# Patient Record
Sex: MALE | Race: WHITE | NOT HISPANIC OR LATINO | Employment: PART TIME | URBAN - METROPOLITAN AREA
[De-identification: names, ages, dates, MRNs, and addresses within clinical notes are randomized per-mention and may not be internally consistent; named-entity substitution may affect disease eponyms.]

---

## 2021-04-14 ENCOUNTER — HOSPITAL ENCOUNTER (EMERGENCY)
Facility: HOSPITAL | Age: 61
Discharge: HOME | End: 2021-04-14
Attending: EMERGENCY MEDICINE

## 2021-04-14 VITALS
HEART RATE: 86 BPM | BODY MASS INDEX: 26.66 KG/M2 | HEIGHT: 69 IN | DIASTOLIC BLOOD PRESSURE: 85 MMHG | SYSTOLIC BLOOD PRESSURE: 175 MMHG | TEMPERATURE: 98 F | RESPIRATION RATE: 20 BRPM | WEIGHT: 180 LBS | OXYGEN SATURATION: 90 %

## 2021-04-14 DIAGNOSIS — B96.89 BACTERIAL CONJUNCTIVITIS OF BOTH EYES: Primary | ICD-10-CM

## 2021-04-14 DIAGNOSIS — H10.9 BACTERIAL CONJUNCTIVITIS OF BOTH EYES: Primary | ICD-10-CM

## 2021-04-14 PROCEDURE — 99283 EMERGENCY DEPT VISIT LOW MDM: CPT

## 2021-04-14 RX ORDER — ERYTHROMYCIN 5 MG/G
OINTMENT OPHTHALMIC
Qty: 1 TUBE | Refills: 0 | Status: SHIPPED | OUTPATIENT
Start: 2021-04-14 | End: 2022-10-07

## 2022-10-07 PROBLEM — I10 PRIMARY HYPERTENSION: Status: ACTIVE | Noted: 2022-10-07

## 2022-10-07 PROBLEM — I70.203 ATHEROSCLEROSIS OF ARTERY OF BOTH LOWER EXTREMITIES: Status: ACTIVE | Noted: 2022-10-07

## 2022-10-07 PROBLEM — I82.90 THROMBOSIS: Status: ACTIVE | Noted: 2022-10-07

## 2022-10-07 PROBLEM — Z72.0 TOBACCO ABUSE: Status: ACTIVE | Noted: 2022-10-07

## 2022-10-07 PROBLEM — I70.222 CRITICAL LIMB ISCHEMIA OF LEFT LOWER EXTREMITY: Status: ACTIVE | Noted: 2022-10-07

## 2022-10-12 ENCOUNTER — PATIENT OUTREACH (OUTPATIENT)
Dept: ADMINISTRATIVE | Facility: CLINIC | Age: 62
End: 2022-10-12
Payer: MEDICAID

## 2022-10-12 NOTE — PROGRESS NOTES
C3 nurse attempted to contact Eric Soliman  for a TCC post hospital discharge follow up call. No answer. The patient does not have a scheduled HOSFU appointment, and the pt does not have an [facility] PCP.

## 2022-10-13 NOTE — PROGRESS NOTES
C3 Nurse made second attempt to reach patient for TCC call. Number is unavailable at this time.

## 2022-10-24 ENCOUNTER — TELEPHONE (OUTPATIENT)
Dept: CARDIOLOGY | Facility: CLINIC | Age: 62
End: 2022-10-24
Payer: MEDICAID

## 2022-10-24 NOTE — TELEPHONE ENCOUNTER
Called pt x2 regarding setting up a follow up for a recent procedure, lvm with call back number and brief explanation of following up.

## 2023-01-01 ENCOUNTER — TELEPHONE (OUTPATIENT)
Dept: PULMONOLOGY | Facility: CLINIC | Age: 63
End: 2023-01-01
Payer: MEDICAID

## 2023-01-01 ENCOUNTER — CLINICAL SUPPORT (OUTPATIENT)
Dept: SMOKING CESSATION | Facility: CLINIC | Age: 63
End: 2023-01-01

## 2023-01-01 ENCOUNTER — ANESTHESIA (OUTPATIENT)
Dept: ENDOSCOPY | Facility: HOSPITAL | Age: 63
DRG: 180 | End: 2023-01-01
Payer: MEDICAID

## 2023-01-01 ENCOUNTER — CLINICAL SUPPORT (OUTPATIENT)
Dept: SMOKING CESSATION | Facility: CLINIC | Age: 63
End: 2023-01-01
Payer: MEDICAID

## 2023-01-01 ENCOUNTER — TELEPHONE (OUTPATIENT)
Dept: PRIMARY CARE CLINIC | Facility: CLINIC | Age: 63
End: 2023-01-01

## 2023-01-01 ENCOUNTER — PATIENT OUTREACH (OUTPATIENT)
Dept: EMERGENCY MEDICINE | Facility: HOSPITAL | Age: 63
End: 2023-01-01
Payer: MEDICAID

## 2023-01-01 ENCOUNTER — OFFICE VISIT (OUTPATIENT)
Dept: PRIMARY CARE CLINIC | Facility: CLINIC | Age: 63
End: 2023-01-01
Payer: MEDICAID

## 2023-01-01 ENCOUNTER — HOSPITAL ENCOUNTER (EMERGENCY)
Facility: HOSPITAL | Age: 63
Discharge: HOME | End: 2023-05-13
Attending: STUDENT IN AN ORGANIZED HEALTH CARE EDUCATION/TRAINING PROGRAM
Payer: MEDICAID

## 2023-01-01 ENCOUNTER — HOSPITAL ENCOUNTER (INPATIENT)
Facility: HOSPITAL | Age: 63
LOS: 2 days | DRG: 300 | End: 2023-12-05
Attending: EMERGENCY MEDICINE | Admitting: FAMILY MEDICINE
Payer: MEDICAID

## 2023-01-01 ENCOUNTER — TELEPHONE (OUTPATIENT)
Dept: PHARMACY | Facility: CLINIC | Age: 63
End: 2023-01-01
Payer: MEDICAID

## 2023-01-01 ENCOUNTER — HOSPITAL ENCOUNTER (INPATIENT)
Facility: HOSPITAL | Age: 63
LOS: 10 days | Discharge: HOME | DRG: 180 | End: 2023-11-17
Attending: EMERGENCY MEDICINE | Admitting: HOSPITALIST
Payer: MEDICAID

## 2023-01-01 ENCOUNTER — ANESTHESIA EVENT (OUTPATIENT)
Dept: ENDOSCOPY | Facility: HOSPITAL | Age: 63
DRG: 180 | End: 2023-01-01
Payer: MEDICAID

## 2023-01-01 ENCOUNTER — DOCUMENTATION ONLY (OUTPATIENT)
Dept: PULMONOLOGY | Facility: OTHER | Age: 63
End: 2023-01-01

## 2023-01-01 ENCOUNTER — HOSPITAL ENCOUNTER (EMERGENCY)
Facility: HOSPITAL | Age: 63
Discharge: HOME | End: 2023-04-25
Attending: EMERGENCY MEDICINE
Payer: MEDICAID

## 2023-01-01 ENCOUNTER — HOSPITAL ENCOUNTER (INPATIENT)
Facility: HOSPITAL | Age: 63
LOS: 3 days | Discharge: HOME | DRG: 271 | End: 2023-04-06
Attending: EMERGENCY MEDICINE | Admitting: FAMILY MEDICINE
Payer: MEDICAID

## 2023-01-01 VITALS
RESPIRATION RATE: 20 BRPM | SYSTOLIC BLOOD PRESSURE: 156 MMHG | TEMPERATURE: 98 F | WEIGHT: 151 LBS | HEART RATE: 76 BPM | OXYGEN SATURATION: 96 % | BODY MASS INDEX: 22.3 KG/M2 | DIASTOLIC BLOOD PRESSURE: 88 MMHG

## 2023-01-01 VITALS
RESPIRATION RATE: 18 BRPM | TEMPERATURE: 99 F | OXYGEN SATURATION: 79 % | SYSTOLIC BLOOD PRESSURE: 116 MMHG | DIASTOLIC BLOOD PRESSURE: 67 MMHG | HEART RATE: 139 BPM

## 2023-01-01 VITALS
SYSTOLIC BLOOD PRESSURE: 122 MMHG | HEIGHT: 69 IN | TEMPERATURE: 97 F | WEIGHT: 315 LBS | OXYGEN SATURATION: 93 % | DIASTOLIC BLOOD PRESSURE: 61 MMHG | BODY MASS INDEX: 46.65 KG/M2 | RESPIRATION RATE: 18 BRPM | HEART RATE: 80 BPM

## 2023-01-01 VITALS
BODY MASS INDEX: 22.37 KG/M2 | HEART RATE: 83 BPM | OXYGEN SATURATION: 94 % | DIASTOLIC BLOOD PRESSURE: 83 MMHG | WEIGHT: 151.44 LBS | SYSTOLIC BLOOD PRESSURE: 157 MMHG

## 2023-01-01 VITALS
WEIGHT: 133.19 LBS | DIASTOLIC BLOOD PRESSURE: 67 MMHG | HEIGHT: 69 IN | HEART RATE: 110 BPM | TEMPERATURE: 98 F | BODY MASS INDEX: 19.73 KG/M2 | OXYGEN SATURATION: 95 % | RESPIRATION RATE: 16 BRPM | SYSTOLIC BLOOD PRESSURE: 148 MMHG

## 2023-01-01 VITALS
RESPIRATION RATE: 16 BRPM | TEMPERATURE: 98 F | BODY MASS INDEX: 21.41 KG/M2 | HEART RATE: 85 BPM | SYSTOLIC BLOOD PRESSURE: 149 MMHG | OXYGEN SATURATION: 95 % | DIASTOLIC BLOOD PRESSURE: 79 MMHG | WEIGHT: 145 LBS

## 2023-01-01 DIAGNOSIS — I99.8 ACUTE LOWER LIMB ISCHEMIA: Primary | ICD-10-CM

## 2023-01-01 DIAGNOSIS — F17.210 CIGARETTE SMOKER: Primary | ICD-10-CM

## 2023-01-01 DIAGNOSIS — I73.9 PERIPHERAL ARTERIAL DISEASE: ICD-10-CM

## 2023-01-01 DIAGNOSIS — E44.1 MILD MALNUTRITION: ICD-10-CM

## 2023-01-01 DIAGNOSIS — J90 PLEURAL EFFUSION: ICD-10-CM

## 2023-01-01 DIAGNOSIS — K92.1 HEMATOCHEZIA: ICD-10-CM

## 2023-01-01 DIAGNOSIS — L03.116 CELLULITIS OF LEFT LOWER EXTREMITY: Primary | ICD-10-CM

## 2023-01-01 DIAGNOSIS — A41.9 SEPSIS: ICD-10-CM

## 2023-01-01 DIAGNOSIS — I70.222 CRITICAL LIMB ISCHEMIA OF LEFT LOWER EXTREMITY: ICD-10-CM

## 2023-01-01 DIAGNOSIS — R07.9 CHEST PAIN: ICD-10-CM

## 2023-01-01 DIAGNOSIS — B02.9 HERPES ZOSTER WITHOUT COMPLICATION: ICD-10-CM

## 2023-01-01 DIAGNOSIS — I10 PRIMARY HYPERTENSION: ICD-10-CM

## 2023-01-01 DIAGNOSIS — R06.02 SHORTNESS OF BREATH: ICD-10-CM

## 2023-01-01 DIAGNOSIS — R06.02 SOB (SHORTNESS OF BREATH): ICD-10-CM

## 2023-01-01 DIAGNOSIS — I26.99 PULMONARY EMBOLISM, UNSPECIFIED CHRONICITY, UNSPECIFIED PULMONARY EMBOLISM TYPE, UNSPECIFIED WHETHER ACUTE COR PULMONALE PRESENT: Primary | ICD-10-CM

## 2023-01-01 DIAGNOSIS — I70.223 CRITICAL LIMB ISCHEMIA OF BOTH LOWER EXTREMITIES: Primary | ICD-10-CM

## 2023-01-01 DIAGNOSIS — B02.8 HERPES ZOSTER WITH OTHER COMPLICATION: ICD-10-CM

## 2023-01-01 DIAGNOSIS — J93.12 SECONDARY SPONTANEOUS PNEUMOTHORAX: ICD-10-CM

## 2023-01-01 DIAGNOSIS — R91.8 LUNG MASS: ICD-10-CM

## 2023-01-01 DIAGNOSIS — E87.1 HYPONATREMIA: Primary | ICD-10-CM

## 2023-01-01 DIAGNOSIS — I73.9 PAD (PERIPHERAL ARTERY DISEASE): ICD-10-CM

## 2023-01-01 DIAGNOSIS — Z98.62 STATUS POST ANGIOPLASTY: ICD-10-CM

## 2023-01-01 DIAGNOSIS — Z72.0 TOBACCO ABUSE: ICD-10-CM

## 2023-01-01 DIAGNOSIS — F17.200 NEEDS SMOKING CESSATION EDUCATION: ICD-10-CM

## 2023-01-01 DIAGNOSIS — K62.5 RECTAL BLEEDING: ICD-10-CM

## 2023-01-01 DIAGNOSIS — F17.200 TOBACCO DEPENDENCY: ICD-10-CM

## 2023-01-01 DIAGNOSIS — I70.223 CRITICAL LIMB ISCHEMIA OF BOTH LOWER EXTREMITIES: ICD-10-CM

## 2023-01-01 DIAGNOSIS — C79.9 METASTATIC ADENOCARCINOMA: Primary | ICD-10-CM

## 2023-01-01 DIAGNOSIS — I82.90 THROMBOSIS: ICD-10-CM

## 2023-01-01 DIAGNOSIS — I20.0 UNSTABLE ANGINA: ICD-10-CM

## 2023-01-01 DIAGNOSIS — R53.1 WEAKNESS: Primary | ICD-10-CM

## 2023-01-01 LAB
ABO + RH BLD: NORMAL
ALBUMIN SERPL BCP-MCNC: 2.1 G/DL (ref 3.5–5.2)
ALBUMIN SERPL BCP-MCNC: 2.2 G/DL (ref 3.5–5.2)
ALBUMIN SERPL BCP-MCNC: 2.2 G/DL (ref 3.5–5.2)
ALBUMIN SERPL BCP-MCNC: 2.3 G/DL (ref 3.5–5.2)
ALBUMIN SERPL BCP-MCNC: 2.4 G/DL (ref 3.5–5.2)
ALBUMIN SERPL BCP-MCNC: 2.5 G/DL (ref 3.5–5.2)
ALBUMIN SERPL BCP-MCNC: 3.4 G/DL (ref 3.5–5.2)
ALBUMIN SERPL BCP-MCNC: 3.6 G/DL (ref 3.5–5.2)
ALBUMIN SERPL BCP-MCNC: 3.6 G/DL (ref 3.5–5.2)
ALLENS TEST: ABNORMAL
ALLENS TEST: ABNORMAL
ALLENS TEST: NORMAL
ALP SERPL-CCNC: 100 U/L (ref 55–135)
ALP SERPL-CCNC: 105 U/L (ref 55–135)
ALP SERPL-CCNC: 106 U/L (ref 55–135)
ALP SERPL-CCNC: 110 U/L (ref 55–135)
ALP SERPL-CCNC: 155 U/L (ref 55–135)
ALP SERPL-CCNC: 60 U/L (ref 55–135)
ALP SERPL-CCNC: 63 U/L (ref 55–135)
ALP SERPL-CCNC: 66 U/L (ref 55–135)
ALP SERPL-CCNC: 71 U/L (ref 55–135)
ALP SERPL-CCNC: 73 U/L (ref 55–135)
ALP SERPL-CCNC: 76 U/L (ref 55–135)
ALP SERPL-CCNC: 77 U/L (ref 55–135)
ALP SERPL-CCNC: 81 U/L (ref 55–135)
ALP SERPL-CCNC: 82 U/L (ref 55–135)
ALP SERPL-CCNC: 90 U/L (ref 55–135)
ALT SERPL W/O P-5'-P-CCNC: 10 U/L (ref 10–44)
ALT SERPL W/O P-5'-P-CCNC: 10 U/L (ref 10–44)
ALT SERPL W/O P-5'-P-CCNC: 23 U/L (ref 10–44)
ALT SERPL W/O P-5'-P-CCNC: 24 U/L (ref 10–44)
ALT SERPL W/O P-5'-P-CCNC: 32 U/L (ref 10–44)
ALT SERPL W/O P-5'-P-CCNC: 33 U/L (ref 10–44)
ALT SERPL W/O P-5'-P-CCNC: 38 U/L (ref 10–44)
ALT SERPL W/O P-5'-P-CCNC: 41 U/L (ref 10–44)
ALT SERPL W/O P-5'-P-CCNC: 42 U/L (ref 10–44)
ALT SERPL W/O P-5'-P-CCNC: 44 U/L (ref 10–44)
ALT SERPL W/O P-5'-P-CCNC: 54 U/L (ref 10–44)
ALT SERPL W/O P-5'-P-CCNC: 62 U/L (ref 10–44)
ALT SERPL W/O P-5'-P-CCNC: 7 U/L (ref 10–44)
ALT SERPL W/O P-5'-P-CCNC: 82 U/L (ref 10–44)
ALT SERPL W/O P-5'-P-CCNC: 94 U/L (ref 10–44)
ANION GAP SERPL CALC-SCNC: 10 MMOL/L (ref 8–16)
ANION GAP SERPL CALC-SCNC: 10 MMOL/L (ref 8–16)
ANION GAP SERPL CALC-SCNC: 11 MMOL/L (ref 8–16)
ANION GAP SERPL CALC-SCNC: 11 MMOL/L (ref 8–16)
ANION GAP SERPL CALC-SCNC: 14 MMOL/L (ref 8–16)
ANION GAP SERPL CALC-SCNC: 6 MMOL/L (ref 8–16)
ANION GAP SERPL CALC-SCNC: 7 MMOL/L (ref 8–16)
ANION GAP SERPL CALC-SCNC: 8 MMOL/L (ref 8–16)
ANION GAP SERPL CALC-SCNC: 9 MMOL/L (ref 8–16)
ANION GAP SERPL CALC-SCNC: 9 MMOL/L (ref 8–16)
APPEARANCE FLD: NORMAL
APTT PPP: 136.9 SEC (ref 21–32)
APTT PPP: 25.3 SEC (ref 21–32)
APTT PPP: 26.3 SEC (ref 21–32)
APTT PPP: 27.6 SEC (ref 21–32)
APTT PPP: 28.3 SEC (ref 21–32)
APTT PPP: 28.5 SEC (ref 21–32)
APTT PPP: 35.6 SEC (ref 21–32)
APTT PPP: 40 SEC (ref 21–32)
APTT PPP: 40.4 SEC (ref 21–32)
APTT PPP: 40.6 SEC (ref 21–32)
APTT PPP: 40.6 SEC (ref 21–32)
APTT PPP: 41.7 SEC (ref 21–32)
APTT PPP: 42.3 SEC (ref 21–32)
APTT PPP: 46.7 SEC (ref 21–32)
APTT PPP: 48.3 SEC (ref 21–32)
ASCENDING AORTA: 3.04 CM
AST SERPL-CCNC: 11 U/L (ref 10–40)
AST SERPL-CCNC: 12 U/L (ref 10–40)
AST SERPL-CCNC: 14 U/L (ref 10–40)
AST SERPL-CCNC: 15 U/L (ref 10–40)
AST SERPL-CCNC: 18 U/L (ref 10–40)
AST SERPL-CCNC: 24 U/L (ref 10–40)
AST SERPL-CCNC: 28 U/L (ref 10–40)
AST SERPL-CCNC: 33 U/L (ref 10–40)
AST SERPL-CCNC: 34 U/L (ref 10–40)
AST SERPL-CCNC: 34 U/L (ref 10–40)
AST SERPL-CCNC: 41 U/L (ref 10–40)
AST SERPL-CCNC: 43 U/L (ref 10–40)
AST SERPL-CCNC: 78 U/L (ref 10–40)
AST SERPL-CCNC: 83 U/L (ref 10–40)
AST SERPL-CCNC: 85 U/L (ref 10–40)
AV INDEX (PROSTH): 0.76
AV MEAN GRADIENT: 2 MMHG
AV PEAK GRADIENT: 3 MMHG
AV VALVE AREA BY VELOCITY RATIO: 3.6 CM²
AV VALVE AREA: 3.1 CM²
AV VELOCITY RATIO: 0.88
BACTERIA FLD AEROBE CULT: NO GROWTH
BASOPHILS # BLD AUTO: 0.02 K/UL (ref 0–0.2)
BASOPHILS # BLD AUTO: 0.02 K/UL (ref 0–0.2)
BASOPHILS # BLD AUTO: 0.03 K/UL (ref 0–0.2)
BASOPHILS # BLD AUTO: 0.04 K/UL (ref 0–0.2)
BASOPHILS # BLD AUTO: 0.05 K/UL (ref 0–0.2)
BASOPHILS # BLD AUTO: 0.05 K/UL (ref 0–0.2)
BASOPHILS # BLD AUTO: 0.06 K/UL (ref 0–0.2)
BASOPHILS # BLD AUTO: 0.08 K/UL (ref 0–0.2)
BASOPHILS NFR BLD: 0.2 % (ref 0–1.9)
BASOPHILS NFR BLD: 0.3 % (ref 0–1.9)
BASOPHILS NFR BLD: 0.4 % (ref 0–1.9)
BASOPHILS NFR BLD: 0.5 % (ref 0–1.9)
BASOPHILS NFR BLD: 0.6 % (ref 0–1.9)
BASOPHILS NFR BLD: 0.7 % (ref 0–1.9)
BASOPHILS NFR BLD: 0.7 % (ref 0–1.9)
BASOPHILS NFR BLD: 0.8 % (ref 0–1.9)
BASOPHILS NFR BLD: 1 % (ref 0–1.9)
BASOPHILS NFR BLD: 1 % (ref 0–1.9)
BILIRUB SERPL-MCNC: 0.1 MG/DL (ref 0.1–1)
BILIRUB SERPL-MCNC: 0.2 MG/DL (ref 0.1–1)
BILIRUB SERPL-MCNC: 0.2 MG/DL (ref 0.1–1)
BILIRUB SERPL-MCNC: 0.3 MG/DL (ref 0.1–1)
BILIRUB SERPL-MCNC: 0.3 MG/DL (ref 0.1–1)
BILIRUB SERPL-MCNC: 0.4 MG/DL (ref 0.1–1)
BILIRUB SERPL-MCNC: 0.5 MG/DL (ref 0.1–1)
BILIRUB SERPL-MCNC: 0.7 MG/DL (ref 0.1–1)
BILIRUB SERPL-MCNC: 1.5 MG/DL (ref 0.1–1)
BILIRUB UR QL STRIP: NEGATIVE
BILIRUB UR QL STRIP: NEGATIVE
BLD GP AB SCN CELLS X3 SERPL QL: NORMAL
BLD PROD TYP BPU: NORMAL
BLOOD UNIT EXPIRATION DATE: NORMAL
BLOOD UNIT TYPE CODE: 6200
BLOOD UNIT TYPE: NORMAL
BNP SERPL-MCNC: 74 PG/ML (ref 0–99)
BODY FLD TYPE: NORMAL
BODY FLUID SOURCE, LDH: NORMAL
BSA FOR ECHO PROCEDURE: 1.83 M2
BUN SERPL-MCNC: 11 MG/DL (ref 6–30)
BUN SERPL-MCNC: 11 MG/DL (ref 8–23)
BUN SERPL-MCNC: 12 MG/DL (ref 8–23)
BUN SERPL-MCNC: 13 MG/DL (ref 8–23)
BUN SERPL-MCNC: 14 MG/DL (ref 8–23)
BUN SERPL-MCNC: 15 MG/DL (ref 8–23)
BUN SERPL-MCNC: 15 MG/DL (ref 8–23)
BUN SERPL-MCNC: 17 MG/DL (ref 8–23)
BUN SERPL-MCNC: 4 MG/DL (ref 8–23)
BUN SERPL-MCNC: 9 MG/DL (ref 8–23)
BUN SERPL-MCNC: 9 MG/DL (ref 8–23)
CALCIUM SERPL-MCNC: 8.1 MG/DL (ref 8.7–10.5)
CALCIUM SERPL-MCNC: 8.2 MG/DL (ref 8.7–10.5)
CALCIUM SERPL-MCNC: 8.3 MG/DL (ref 8.7–10.5)
CALCIUM SERPL-MCNC: 8.4 MG/DL (ref 8.7–10.5)
CALCIUM SERPL-MCNC: 8.4 MG/DL (ref 8.7–10.5)
CALCIUM SERPL-MCNC: 8.5 MG/DL (ref 8.7–10.5)
CALCIUM SERPL-MCNC: 8.5 MG/DL (ref 8.7–10.5)
CALCIUM SERPL-MCNC: 8.7 MG/DL (ref 8.7–10.5)
CALCIUM SERPL-MCNC: 8.7 MG/DL (ref 8.7–10.5)
CALCIUM SERPL-MCNC: 8.8 MG/DL (ref 8.7–10.5)
CALCIUM SERPL-MCNC: 8.9 MG/DL (ref 8.7–10.5)
CALCIUM SERPL-MCNC: 9.6 MG/DL (ref 8.7–10.5)
CHLORIDE SERPL-SCNC: 102 MMOL/L (ref 95–110)
CHLORIDE SERPL-SCNC: 103 MMOL/L (ref 95–110)
CHLORIDE SERPL-SCNC: 103 MMOL/L (ref 95–110)
CHLORIDE SERPL-SCNC: 88 MMOL/L (ref 95–110)
CHLORIDE SERPL-SCNC: 89 MMOL/L (ref 95–110)
CHLORIDE SERPL-SCNC: 90 MMOL/L (ref 95–110)
CHLORIDE SERPL-SCNC: 94 MMOL/L (ref 95–110)
CHLORIDE SERPL-SCNC: 95 MMOL/L (ref 95–110)
CHLORIDE SERPL-SCNC: 96 MMOL/L (ref 95–110)
CHLORIDE SERPL-SCNC: 97 MMOL/L (ref 95–110)
CHLORIDE SERPL-SCNC: 97 MMOL/L (ref 95–110)
CHLORIDE SERPL-SCNC: 98 MMOL/L (ref 95–110)
CHLORIDE SERPL-SCNC: 99 MMOL/L (ref 95–110)
CHOLEST FLD-MCNC: 129 MG/DL
CHOLEST SERPL-MCNC: 131 MG/DL (ref 120–199)
CHOLEST/HDLC SERPL: 5.2 {RATIO} (ref 2–5)
CLARITY UR REFRACT.AUTO: CLEAR
CLARITY UR: CLEAR
CO2 SERPL-SCNC: 16 MMOL/L (ref 23–29)
CO2 SERPL-SCNC: 17 MMOL/L (ref 23–29)
CO2 SERPL-SCNC: 20 MMOL/L (ref 23–29)
CO2 SERPL-SCNC: 20 MMOL/L (ref 23–29)
CO2 SERPL-SCNC: 21 MMOL/L (ref 23–29)
CO2 SERPL-SCNC: 22 MMOL/L (ref 23–29)
CO2 SERPL-SCNC: 23 MMOL/L (ref 23–29)
CO2 SERPL-SCNC: 24 MMOL/L (ref 23–29)
CO2 SERPL-SCNC: 25 MMOL/L (ref 23–29)
CO2 SERPL-SCNC: 25 MMOL/L (ref 23–29)
CO2 SERPL-SCNC: 28 MMOL/L (ref 23–29)
CODING SYSTEM: NORMAL
COLOR FLD: YELLOW
COLOR UR AUTO: YELLOW
COLOR UR: YELLOW
CREAT SERPL-MCNC: 0.6 MG/DL (ref 0.5–1.4)
CREAT SERPL-MCNC: 0.7 MG/DL (ref 0.5–1.4)
CREAT SERPL-MCNC: 0.8 MG/DL (ref 0.5–1.4)
CREAT SERPL-MCNC: 0.9 MG/DL (ref 0.5–1.4)
CREAT SERPL-MCNC: 1 MG/DL (ref 0.5–1.4)
CREAT SERPL-MCNC: 1 MG/DL (ref 0.5–1.4)
CREAT SERPL-MCNC: 1.4 MG/DL (ref 0.5–1.4)
CROSSMATCH INTERPRETATION: NORMAL
DELSYS: ABNORMAL
DIFFERENTIAL METHOD: ABNORMAL
DISPENSE STATUS: NORMAL
DOP CALC AO PEAK VEL: 0.93 M/S
DOP CALC AO VTI: 20.8 CM
DOP CALC LVOT AREA: 4.1 CM2
DOP CALC LVOT DIAMETER: 2.28 CM
DOP CALC LVOT PEAK VEL: 0.82 M/S
DOP CALC LVOT STROKE VOLUME: 64.48 CM3
DOP CALC MV VTI: 13.7 CM
DOP CALCLVOT PEAK VEL VTI: 15.8 CM
E WAVE DECELERATION TIME: 194.14 MSEC
E/A RATIO: 0.42
E/E' RATIO: 10.86 M/S
EOSINOPHIL # BLD AUTO: 0 K/UL (ref 0–0.5)
EOSINOPHIL # BLD AUTO: 0 K/UL (ref 0–0.5)
EOSINOPHIL # BLD AUTO: 0.1 K/UL (ref 0–0.5)
EOSINOPHIL # BLD AUTO: 0.2 K/UL (ref 0–0.5)
EOSINOPHIL # BLD AUTO: 0.3 K/UL (ref 0–0.5)
EOSINOPHIL NFR BLD: 0.2 % (ref 0–8)
EOSINOPHIL NFR BLD: 0.3 % (ref 0–8)
EOSINOPHIL NFR BLD: 0.6 % (ref 0–8)
EOSINOPHIL NFR BLD: 0.6 % (ref 0–8)
EOSINOPHIL NFR BLD: 1 % (ref 0–8)
EOSINOPHIL NFR BLD: 1 % (ref 0–8)
EOSINOPHIL NFR BLD: 1.3 % (ref 0–8)
EOSINOPHIL NFR BLD: 1.5 % (ref 0–8)
EOSINOPHIL NFR BLD: 1.7 % (ref 0–8)
EOSINOPHIL NFR BLD: 1.8 % (ref 0–8)
EOSINOPHIL NFR BLD: 2.1 % (ref 0–8)
EOSINOPHIL NFR BLD: 2.4 % (ref 0–8)
EOSINOPHIL NFR BLD: 2.5 % (ref 0–8)
EOSINOPHIL NFR BLD: 2.7 % (ref 0–8)
EOSINOPHIL NFR BLD: 2.8 % (ref 0–8)
EOSINOPHIL NFR BLD: 3 % (ref 0–8)
EOSINOPHIL NFR BLD: 3.9 % (ref 0–8)
EOSINOPHIL NFR FLD MANUAL: 12 %
ERYTHROCYTE [DISTWIDTH] IN BLOOD BY AUTOMATED COUNT: 14.2 % (ref 11.5–14.5)
ERYTHROCYTE [DISTWIDTH] IN BLOOD BY AUTOMATED COUNT: 14.7 % (ref 11.5–14.5)
ERYTHROCYTE [DISTWIDTH] IN BLOOD BY AUTOMATED COUNT: 15.1 % (ref 11.5–14.5)
ERYTHROCYTE [DISTWIDTH] IN BLOOD BY AUTOMATED COUNT: 15.3 % (ref 11.5–14.5)
ERYTHROCYTE [DISTWIDTH] IN BLOOD BY AUTOMATED COUNT: 15.4 % (ref 11.5–14.5)
ERYTHROCYTE [DISTWIDTH] IN BLOOD BY AUTOMATED COUNT: 15.6 % (ref 11.5–14.5)
ERYTHROCYTE [DISTWIDTH] IN BLOOD BY AUTOMATED COUNT: 15.7 % (ref 11.5–14.5)
ERYTHROCYTE [DISTWIDTH] IN BLOOD BY AUTOMATED COUNT: 17.2 % (ref 11.5–14.5)
ERYTHROCYTE [DISTWIDTH] IN BLOOD BY AUTOMATED COUNT: 18.3 % (ref 11.5–14.5)
ERYTHROCYTE [DISTWIDTH] IN BLOOD BY AUTOMATED COUNT: 19.2 % (ref 11.5–14.5)
ERYTHROCYTE [DISTWIDTH] IN BLOOD BY AUTOMATED COUNT: 19.4 % (ref 11.5–14.5)
ERYTHROCYTE [DISTWIDTH] IN BLOOD BY AUTOMATED COUNT: 20.5 % (ref 11.5–14.5)
ERYTHROCYTE [DISTWIDTH] IN BLOOD BY AUTOMATED COUNT: 21 % (ref 11.5–14.5)
ERYTHROCYTE [DISTWIDTH] IN BLOOD BY AUTOMATED COUNT: 21.2 % (ref 11.5–14.5)
EST. GFR  (NO RACE VARIABLE): 57 ML/MIN/1.73 M^2
EST. GFR  (NO RACE VARIABLE): >60 ML/MIN/1.73 M^2
FINAL PATHOLOGIC DIAGNOSIS: ABNORMAL
FINAL PATHOLOGIC DIAGNOSIS: ABNORMAL
FIO2: 21
GLUCOSE FLD-MCNC: <5 MG/DL
GLUCOSE SERPL-MCNC: 102 MG/DL (ref 70–110)
GLUCOSE SERPL-MCNC: 103 MG/DL (ref 70–110)
GLUCOSE SERPL-MCNC: 105 MG/DL (ref 70–110)
GLUCOSE SERPL-MCNC: 106 MG/DL (ref 70–110)
GLUCOSE SERPL-MCNC: 107 MG/DL (ref 70–110)
GLUCOSE SERPL-MCNC: 110 MG/DL (ref 70–110)
GLUCOSE SERPL-MCNC: 75 MG/DL (ref 70–110)
GLUCOSE SERPL-MCNC: 79 MG/DL (ref 70–110)
GLUCOSE SERPL-MCNC: 83 MG/DL (ref 70–110)
GLUCOSE SERPL-MCNC: 86 MG/DL (ref 70–110)
GLUCOSE SERPL-MCNC: 88 MG/DL (ref 70–110)
GLUCOSE SERPL-MCNC: 89 MG/DL (ref 70–110)
GLUCOSE SERPL-MCNC: 92 MG/DL (ref 70–110)
GLUCOSE SERPL-MCNC: 92 MG/DL (ref 70–110)
GLUCOSE SERPL-MCNC: 93 MG/DL (ref 70–110)
GLUCOSE SERPL-MCNC: 94 MG/DL (ref 70–110)
GLUCOSE SERPL-MCNC: 95 MG/DL (ref 70–110)
GLUCOSE SERPL-MCNC: 95 MG/DL (ref 70–110)
GLUCOSE SERPL-MCNC: 97 MG/DL (ref 70–110)
GLUCOSE UR QL STRIP: NEGATIVE
GLUCOSE UR QL STRIP: NEGATIVE
GRAM STN SPEC: NORMAL
GRAM STN SPEC: NORMAL
HCO3 UR-SCNC: 22.3 MMOL/L (ref 24–28)
HCT VFR BLD AUTO: 20.4 % (ref 40–54)
HCT VFR BLD AUTO: 22.5 % (ref 40–54)
HCT VFR BLD AUTO: 23.1 % (ref 40–54)
HCT VFR BLD AUTO: 23.5 % (ref 40–54)
HCT VFR BLD AUTO: 24.6 % (ref 40–54)
HCT VFR BLD AUTO: 26.1 % (ref 40–54)
HCT VFR BLD AUTO: 26.8 % (ref 40–54)
HCT VFR BLD AUTO: 26.8 % (ref 40–54)
HCT VFR BLD AUTO: 27.1 % (ref 40–54)
HCT VFR BLD AUTO: 28.1 % (ref 40–54)
HCT VFR BLD AUTO: 28.5 % (ref 40–54)
HCT VFR BLD AUTO: 28.6 % (ref 40–54)
HCT VFR BLD AUTO: 28.6 % (ref 40–54)
HCT VFR BLD AUTO: 28.7 % (ref 40–54)
HCT VFR BLD AUTO: 28.9 % (ref 40–54)
HCT VFR BLD AUTO: 28.9 % (ref 40–54)
HCT VFR BLD AUTO: 29.2 % (ref 40–54)
HCT VFR BLD AUTO: 29.3 % (ref 40–54)
HCT VFR BLD AUTO: 29.7 % (ref 40–54)
HCT VFR BLD AUTO: 29.7 % (ref 40–54)
HCT VFR BLD AUTO: 30 % (ref 40–54)
HCT VFR BLD AUTO: 30.2 % (ref 40–54)
HCT VFR BLD AUTO: 30.4 % (ref 40–54)
HCT VFR BLD AUTO: 30.4 % (ref 40–54)
HCT VFR BLD AUTO: 31.5 % (ref 40–54)
HCT VFR BLD AUTO: 32.7 % (ref 40–54)
HCT VFR BLD AUTO: 35.4 % (ref 40–54)
HCT VFR BLD AUTO: 36.3 % (ref 40–54)
HCT VFR BLD AUTO: 36.3 % (ref 40–54)
HCT VFR BLD AUTO: 37.8 % (ref 40–54)
HCT VFR BLD AUTO: 38.6 % (ref 40–54)
HCT VFR BLD AUTO: 38.7 % (ref 40–54)
HCT VFR BLD AUTO: 40.2 % (ref 40–54)
HCT VFR BLD CALC: 25 %PCV (ref 36–54)
HDLC SERPL-MCNC: 25 MG/DL (ref 40–75)
HDLC SERPL: 19.1 % (ref 20–50)
HGB BLD-MCNC: 10 G/DL (ref 14–18)
HGB BLD-MCNC: 10 G/DL (ref 14–18)
HGB BLD-MCNC: 10.2 G/DL (ref 14–18)
HGB BLD-MCNC: 10.4 G/DL (ref 14–18)
HGB BLD-MCNC: 10.8 G/DL (ref 14–18)
HGB BLD-MCNC: 11.9 G/DL (ref 14–18)
HGB BLD-MCNC: 12.1 G/DL (ref 14–18)
HGB BLD-MCNC: 12.1 G/DL (ref 14–18)
HGB BLD-MCNC: 12.3 G/DL (ref 14–18)
HGB BLD-MCNC: 12.6 G/DL (ref 14–18)
HGB BLD-MCNC: 12.6 G/DL (ref 14–18)
HGB BLD-MCNC: 12.8 G/DL (ref 14–18)
HGB BLD-MCNC: 12.9 G/DL (ref 14–18)
HGB BLD-MCNC: 13.1 G/DL (ref 14–18)
HGB BLD-MCNC: 6.6 G/DL (ref 14–18)
HGB BLD-MCNC: 7.1 G/DL (ref 14–18)
HGB BLD-MCNC: 7.3 G/DL (ref 14–18)
HGB BLD-MCNC: 7.5 G/DL (ref 14–18)
HGB BLD-MCNC: 8.2 G/DL (ref 14–18)
HGB BLD-MCNC: 8.7 G/DL (ref 14–18)
HGB BLD-MCNC: 8.7 G/DL (ref 14–18)
HGB BLD-MCNC: 8.8 G/DL (ref 14–18)
HGB BLD-MCNC: 8.9 G/DL (ref 14–18)
HGB BLD-MCNC: 9.1 G/DL (ref 14–18)
HGB BLD-MCNC: 9.1 G/DL (ref 14–18)
HGB BLD-MCNC: 9.2 G/DL (ref 14–18)
HGB BLD-MCNC: 9.5 G/DL (ref 14–18)
HGB BLD-MCNC: 9.6 G/DL (ref 14–18)
HGB BLD-MCNC: 9.6 G/DL (ref 14–18)
HGB BLD-MCNC: 9.7 G/DL (ref 14–18)
HGB BLD-MCNC: 9.9 G/DL (ref 14–18)
HGB UR QL STRIP: NEGATIVE
HGB UR QL STRIP: NEGATIVE
IMM GRANULOCYTES # BLD AUTO: 0.01 K/UL (ref 0–0.04)
IMM GRANULOCYTES # BLD AUTO: 0.02 K/UL (ref 0–0.04)
IMM GRANULOCYTES # BLD AUTO: 0.05 K/UL (ref 0–0.04)
IMM GRANULOCYTES # BLD AUTO: 0.07 K/UL (ref 0–0.04)
IMM GRANULOCYTES # BLD AUTO: 0.08 K/UL (ref 0–0.04)
IMM GRANULOCYTES # BLD AUTO: 0.09 K/UL (ref 0–0.04)
IMM GRANULOCYTES # BLD AUTO: 0.1 K/UL (ref 0–0.04)
IMM GRANULOCYTES # BLD AUTO: 0.1 K/UL (ref 0–0.04)
IMM GRANULOCYTES # BLD AUTO: 0.11 K/UL (ref 0–0.04)
IMM GRANULOCYTES # BLD AUTO: 0.12 K/UL (ref 0–0.04)
IMM GRANULOCYTES # BLD AUTO: 0.13 K/UL (ref 0–0.04)
IMM GRANULOCYTES # BLD AUTO: 0.13 K/UL (ref 0–0.04)
IMM GRANULOCYTES # BLD AUTO: 0.19 K/UL (ref 0–0.04)
IMM GRANULOCYTES # BLD AUTO: 0.21 K/UL (ref 0–0.04)
IMM GRANULOCYTES # BLD AUTO: 0.21 K/UL (ref 0–0.04)
IMM GRANULOCYTES # BLD AUTO: 0.23 K/UL (ref 0–0.04)
IMM GRANULOCYTES # BLD AUTO: 0.24 K/UL (ref 0–0.04)
IMM GRANULOCYTES # BLD AUTO: 0.26 K/UL (ref 0–0.04)
IMM GRANULOCYTES # BLD AUTO: 0.26 K/UL (ref 0–0.04)
IMM GRANULOCYTES # BLD AUTO: 0.31 K/UL (ref 0–0.04)
IMM GRANULOCYTES NFR BLD AUTO: 0.2 % (ref 0–0.5)
IMM GRANULOCYTES NFR BLD AUTO: 0.4 % (ref 0–0.5)
IMM GRANULOCYTES NFR BLD AUTO: 0.6 % (ref 0–0.5)
IMM GRANULOCYTES NFR BLD AUTO: 0.8 % (ref 0–0.5)
IMM GRANULOCYTES NFR BLD AUTO: 0.9 % (ref 0–0.5)
IMM GRANULOCYTES NFR BLD AUTO: 1.1 % (ref 0–0.5)
IMM GRANULOCYTES NFR BLD AUTO: 1.4 % (ref 0–0.5)
IMM GRANULOCYTES NFR BLD AUTO: 1.4 % (ref 0–0.5)
IMM GRANULOCYTES NFR BLD AUTO: 1.5 % (ref 0–0.5)
IMM GRANULOCYTES NFR BLD AUTO: 1.6 % (ref 0–0.5)
IMM GRANULOCYTES NFR BLD AUTO: 2.5 % (ref 0–0.5)
IMM GRANULOCYTES NFR BLD AUTO: 2.5 % (ref 0–0.5)
IMM GRANULOCYTES NFR BLD AUTO: 2.7 % (ref 0–0.5)
IMM GRANULOCYTES NFR BLD AUTO: 2.7 % (ref 0–0.5)
IMM GRANULOCYTES NFR BLD AUTO: 2.8 % (ref 0–0.5)
IMM GRANULOCYTES NFR BLD AUTO: 3 % (ref 0–0.5)
IMM GRANULOCYTES NFR BLD AUTO: 3.9 % (ref 0–0.5)
INR PPP: 1 (ref 0.8–1.2)
INR PPP: 1 (ref 0.8–1.2)
INR PPP: 1.1 (ref 0.8–1.2)
IVC DIAMETER: 1.25 CM
KETONES UR QL STRIP: NEGATIVE
KETONES UR QL STRIP: NEGATIVE
LA WIDTH: 2.7 CM
LACTATE SERPL-SCNC: 1.2 MMOL/L (ref 0.5–2.2)
LACTATE SERPL-SCNC: 1.4 MMOL/L (ref 0.5–2.2)
LDH FLD L TO P-CCNC: 3004 U/L
LDH SERPL L TO P-CCNC: 0.65 MMOL/L (ref 0.5–2.2)
LDH SERPL L TO P-CCNC: 194 U/L (ref 110–260)
LDH SERPL L TO P-CCNC: 2.72 MMOL/L (ref 0.5–2.2)
LDLC SERPL CALC-MCNC: 60.6 MG/DL (ref 63–159)
LEUKOCYTE ESTERASE UR QL STRIP: NEGATIVE
LEUKOCYTE ESTERASE UR QL STRIP: NEGATIVE
LV LATERAL E/E' RATIO: 9.5 M/S
LV SEPTAL E/E' RATIO: 12.67 M/S
LVOT MG: 1.17 MMHG
LVOT MV: 0.5 CM/S
LYMPHOCYTES # BLD AUTO: 1.3 K/UL (ref 1–4.8)
LYMPHOCYTES # BLD AUTO: 1.7 K/UL (ref 1–4.8)
LYMPHOCYTES # BLD AUTO: 1.8 K/UL (ref 1–4.8)
LYMPHOCYTES # BLD AUTO: 1.9 K/UL (ref 1–4.8)
LYMPHOCYTES # BLD AUTO: 1.9 K/UL (ref 1–4.8)
LYMPHOCYTES # BLD AUTO: 2 K/UL (ref 1–4.8)
LYMPHOCYTES # BLD AUTO: 2.1 K/UL (ref 1–4.8)
LYMPHOCYTES # BLD AUTO: 2.2 K/UL (ref 1–4.8)
LYMPHOCYTES # BLD AUTO: 2.3 K/UL (ref 1–4.8)
LYMPHOCYTES # BLD AUTO: 2.4 K/UL (ref 1–4.8)
LYMPHOCYTES # BLD AUTO: 2.4 K/UL (ref 1–4.8)
LYMPHOCYTES # BLD AUTO: 2.5 K/UL (ref 1–4.8)
LYMPHOCYTES # BLD AUTO: 2.7 K/UL (ref 1–4.8)
LYMPHOCYTES # BLD AUTO: 3 K/UL (ref 1–4.8)
LYMPHOCYTES # BLD AUTO: 3.5 K/UL (ref 1–4.8)
LYMPHOCYTES NFR BLD: 17.2 % (ref 18–48)
LYMPHOCYTES NFR BLD: 17.2 % (ref 18–48)
LYMPHOCYTES NFR BLD: 17.8 % (ref 18–48)
LYMPHOCYTES NFR BLD: 18.2 % (ref 18–48)
LYMPHOCYTES NFR BLD: 19 % (ref 18–48)
LYMPHOCYTES NFR BLD: 20.3 % (ref 18–48)
LYMPHOCYTES NFR BLD: 20.3 % (ref 18–48)
LYMPHOCYTES NFR BLD: 20.9 % (ref 18–48)
LYMPHOCYTES NFR BLD: 21 % (ref 18–48)
LYMPHOCYTES NFR BLD: 21 % (ref 18–48)
LYMPHOCYTES NFR BLD: 22.6 % (ref 18–48)
LYMPHOCYTES NFR BLD: 22.6 % (ref 18–48)
LYMPHOCYTES NFR BLD: 23.2 % (ref 18–48)
LYMPHOCYTES NFR BLD: 23.2 % (ref 18–48)
LYMPHOCYTES NFR BLD: 25.7 % (ref 18–48)
LYMPHOCYTES NFR BLD: 26.7 % (ref 18–48)
LYMPHOCYTES NFR BLD: 31.6 % (ref 18–48)
LYMPHOCYTES NFR BLD: 34.5 % (ref 18–48)
LYMPHOCYTES NFR BLD: 36.5 % (ref 18–48)
LYMPHOCYTES NFR BLD: 38.2 % (ref 18–48)
LYMPHOCYTES NFR BLD: 40 % (ref 18–48)
LYMPHOCYTES NFR BLD: 40.3 % (ref 18–48)
LYMPHOCYTES NFR BLD: 40.3 % (ref 18–48)
LYMPHOCYTES NFR FLD MANUAL: 25 %
Lab: ABNORMAL
Lab: ABNORMAL
MAGNESIUM SERPL-MCNC: 1.7 MG/DL (ref 1.6–2.6)
MAGNESIUM SERPL-MCNC: 1.7 MG/DL (ref 1.6–2.6)
MAGNESIUM SERPL-MCNC: 1.8 MG/DL (ref 1.6–2.6)
MAGNESIUM SERPL-MCNC: 1.8 MG/DL (ref 1.6–2.6)
MAGNESIUM SERPL-MCNC: 1.9 MG/DL (ref 1.6–2.6)
MAGNESIUM SERPL-MCNC: 1.9 MG/DL (ref 1.6–2.6)
MAGNESIUM SERPL-MCNC: 2 MG/DL (ref 1.6–2.6)
MAGNESIUM SERPL-MCNC: 2.1 MG/DL (ref 1.6–2.6)
MCH RBC QN AUTO: 25 PG (ref 27–31)
MCH RBC QN AUTO: 26.3 PG (ref 27–31)
MCH RBC QN AUTO: 26.3 PG (ref 27–31)
MCH RBC QN AUTO: 26.4 PG (ref 27–31)
MCH RBC QN AUTO: 26.5 PG (ref 27–31)
MCH RBC QN AUTO: 26.6 PG (ref 27–31)
MCH RBC QN AUTO: 26.7 PG (ref 27–31)
MCH RBC QN AUTO: 26.7 PG (ref 27–31)
MCH RBC QN AUTO: 26.8 PG (ref 27–31)
MCH RBC QN AUTO: 27.1 PG (ref 27–31)
MCH RBC QN AUTO: 29.2 PG (ref 27–31)
MCH RBC QN AUTO: 29.4 PG (ref 27–31)
MCH RBC QN AUTO: 30.6 PG (ref 27–31)
MCH RBC QN AUTO: 30.7 PG (ref 27–31)
MCH RBC QN AUTO: 30.8 PG (ref 27–31)
MCH RBC QN AUTO: 31 PG (ref 27–31)
MCH RBC QN AUTO: 31.4 PG (ref 27–31)
MCH RBC QN AUTO: 31.7 PG (ref 27–31)
MCHC RBC AUTO-ENTMCNC: 31.1 G/DL (ref 32–36)
MCHC RBC AUTO-ENTMCNC: 31.6 G/DL (ref 32–36)
MCHC RBC AUTO-ENTMCNC: 31.6 G/DL (ref 32–36)
MCHC RBC AUTO-ENTMCNC: 31.8 G/DL (ref 32–36)
MCHC RBC AUTO-ENTMCNC: 31.9 G/DL (ref 32–36)
MCHC RBC AUTO-ENTMCNC: 32.5 G/DL (ref 32–36)
MCHC RBC AUTO-ENTMCNC: 32.5 G/DL (ref 32–36)
MCHC RBC AUTO-ENTMCNC: 32.6 G/DL (ref 32–36)
MCHC RBC AUTO-ENTMCNC: 32.7 G/DL (ref 32–36)
MCHC RBC AUTO-ENTMCNC: 32.8 G/DL (ref 32–36)
MCHC RBC AUTO-ENTMCNC: 32.8 G/DL (ref 32–36)
MCHC RBC AUTO-ENTMCNC: 32.9 G/DL (ref 32–36)
MCHC RBC AUTO-ENTMCNC: 33.2 G/DL (ref 32–36)
MCHC RBC AUTO-ENTMCNC: 33.2 G/DL (ref 32–36)
MCHC RBC AUTO-ENTMCNC: 33.3 G/DL (ref 32–36)
MCHC RBC AUTO-ENTMCNC: 33.6 G/DL (ref 32–36)
MCHC RBC AUTO-ENTMCNC: 33.6 G/DL (ref 32–36)
MCV RBC AUTO: 79 FL (ref 82–98)
MCV RBC AUTO: 80 FL (ref 82–98)
MCV RBC AUTO: 81 FL (ref 82–98)
MCV RBC AUTO: 82 FL (ref 82–98)
MCV RBC AUTO: 88 FL (ref 82–98)
MCV RBC AUTO: 90 FL (ref 82–98)
MCV RBC AUTO: 95 FL (ref 82–98)
MCV RBC AUTO: 95 FL (ref 82–98)
MCV RBC AUTO: 97 FL (ref 82–98)
MCV RBC AUTO: 97 FL (ref 82–98)
MCV RBC AUTO: 98 FL (ref 82–98)
MCV RBC AUTO: 98 FL (ref 82–98)
MESOTHL CELL NFR FLD MANUAL: 6 %
MICROSCOPIC EXAM: ABNORMAL
MICROSCOPIC EXAM: ABNORMAL
MODE: ABNORMAL
MONOCYTES # BLD AUTO: 0.5 K/UL (ref 0.3–1)
MONOCYTES # BLD AUTO: 0.6 K/UL (ref 0.3–1)
MONOCYTES # BLD AUTO: 0.6 K/UL (ref 0.3–1)
MONOCYTES # BLD AUTO: 0.7 K/UL (ref 0.3–1)
MONOCYTES # BLD AUTO: 0.8 K/UL (ref 0.3–1)
MONOCYTES # BLD AUTO: 0.9 K/UL (ref 0.3–1)
MONOCYTES # BLD AUTO: 0.9 K/UL (ref 0.3–1)
MONOCYTES # BLD AUTO: 1 K/UL (ref 0.3–1)
MONOCYTES # BLD AUTO: 1.1 K/UL (ref 0.3–1)
MONOCYTES # BLD AUTO: 1.2 K/UL (ref 0.3–1)
MONOCYTES # BLD AUTO: 1.2 K/UL (ref 0.3–1)
MONOCYTES NFR BLD: 10.8 % (ref 4–15)
MONOCYTES NFR BLD: 11.5 % (ref 4–15)
MONOCYTES NFR BLD: 11.8 % (ref 4–15)
MONOCYTES NFR BLD: 5.7 % (ref 4–15)
MONOCYTES NFR BLD: 6.6 % (ref 4–15)
MONOCYTES NFR BLD: 7.3 % (ref 4–15)
MONOCYTES NFR BLD: 7.4 % (ref 4–15)
MONOCYTES NFR BLD: 7.5 % (ref 4–15)
MONOCYTES NFR BLD: 7.7 % (ref 4–15)
MONOCYTES NFR BLD: 8 % (ref 4–15)
MONOCYTES NFR BLD: 8.1 % (ref 4–15)
MONOCYTES NFR BLD: 8.2 % (ref 4–15)
MONOCYTES NFR BLD: 8.3 % (ref 4–15)
MONOCYTES NFR BLD: 8.5 % (ref 4–15)
MONOCYTES NFR BLD: 8.5 % (ref 4–15)
MONOCYTES NFR BLD: 9.1 % (ref 4–15)
MONOCYTES NFR BLD: 9.1 % (ref 4–15)
MONOCYTES NFR BLD: 9.4 % (ref 4–15)
MONOCYTES NFR BLD: 9.5 % (ref 4–15)
MONOS+MACROS NFR FLD MANUAL: 32 %
MV MEAN GRADIENT: 2 MMHG
MV PEAK A VEL: 0.91 M/S
MV PEAK E VEL: 0.38 M/S
MV PEAK GRADIENT: 4 MMHG
MV STENOSIS PRESSURE HALF TIME: 56.3 MS
MV VALVE AREA BY CONTINUITY EQUATION: 4.71 CM2
MV VALVE AREA P 1/2 METHOD: 3.91 CM2
NEUTROPHILS # BLD AUTO: 2.4 K/UL (ref 1.8–7.7)
NEUTROPHILS # BLD AUTO: 3 K/UL (ref 1.8–7.7)
NEUTROPHILS # BLD AUTO: 3 K/UL (ref 1.8–7.7)
NEUTROPHILS # BLD AUTO: 3.2 K/UL (ref 1.8–7.7)
NEUTROPHILS # BLD AUTO: 3.6 K/UL (ref 1.8–7.7)
NEUTROPHILS # BLD AUTO: 4.1 K/UL (ref 1.8–7.7)
NEUTROPHILS # BLD AUTO: 4.2 K/UL (ref 1.8–7.7)
NEUTROPHILS # BLD AUTO: 5.2 K/UL (ref 1.8–7.7)
NEUTROPHILS # BLD AUTO: 5.2 K/UL (ref 1.8–7.7)
NEUTROPHILS # BLD AUTO: 5.4 K/UL (ref 1.8–7.7)
NEUTROPHILS # BLD AUTO: 5.5 K/UL (ref 1.8–7.7)
NEUTROPHILS # BLD AUTO: 5.9 K/UL (ref 1.8–7.7)
NEUTROPHILS # BLD AUTO: 6.2 K/UL (ref 1.8–7.7)
NEUTROPHILS # BLD AUTO: 6.2 K/UL (ref 1.8–7.7)
NEUTROPHILS # BLD AUTO: 6.8 K/UL (ref 1.8–7.7)
NEUTROPHILS # BLD AUTO: 7.1 K/UL (ref 1.8–7.7)
NEUTROPHILS # BLD AUTO: 7.1 K/UL (ref 1.8–7.7)
NEUTROPHILS # BLD AUTO: 8.6 K/UL (ref 1.8–7.7)
NEUTROPHILS # BLD AUTO: 8.9 K/UL (ref 1.8–7.7)
NEUTROPHILS # BLD AUTO: 9 K/UL (ref 1.8–7.7)
NEUTROPHILS NFR BLD: 44.7 % (ref 38–73)
NEUTROPHILS NFR BLD: 47.1 % (ref 38–73)
NEUTROPHILS NFR BLD: 47.1 % (ref 38–73)
NEUTROPHILS NFR BLD: 47.5 % (ref 38–73)
NEUTROPHILS NFR BLD: 53 % (ref 38–73)
NEUTROPHILS NFR BLD: 53.5 % (ref 38–73)
NEUTROPHILS NFR BLD: 57.5 % (ref 38–73)
NEUTROPHILS NFR BLD: 64 % (ref 38–73)
NEUTROPHILS NFR BLD: 64 % (ref 38–73)
NEUTROPHILS NFR BLD: 64.5 % (ref 38–73)
NEUTROPHILS NFR BLD: 64.5 % (ref 38–73)
NEUTROPHILS NFR BLD: 66 % (ref 38–73)
NEUTROPHILS NFR BLD: 66 % (ref 38–73)
NEUTROPHILS NFR BLD: 66.1 % (ref 38–73)
NEUTROPHILS NFR BLD: 66.2 % (ref 38–73)
NEUTROPHILS NFR BLD: 66.4 % (ref 38–73)
NEUTROPHILS NFR BLD: 66.4 % (ref 38–73)
NEUTROPHILS NFR BLD: 68.1 % (ref 38–73)
NEUTROPHILS NFR BLD: 71.2 % (ref 38–73)
NEUTROPHILS NFR BLD: 71.8 % (ref 38–73)
NEUTROPHILS NFR BLD: 72.4 % (ref 38–73)
NEUTROPHILS NFR BLD: 73.4 % (ref 38–73)
NEUTROPHILS NFR BLD: 73.7 % (ref 38–73)
NEUTROPHILS NFR FLD MANUAL: 25 %
NITRITE UR QL STRIP: NEGATIVE
NITRITE UR QL STRIP: NEGATIVE
NONHDLC SERPL-MCNC: 106 MG/DL
NRBC BLD-RTO: 0 /100 WBC
PCO2 BLDA: 34 MMHG (ref 35–45)
PH SMN: 7.42 [PH] (ref 7.35–7.45)
PH UR STRIP: 6 [PH] (ref 5–8)
PH UR STRIP: 7 [PH] (ref 5–8)
PHOSPHATE SERPL-MCNC: 3.5 MG/DL (ref 2.7–4.5)
PHOSPHATE SERPL-MCNC: 3.6 MG/DL (ref 2.7–4.5)
PHOSPHATE SERPL-MCNC: 3.7 MG/DL (ref 2.7–4.5)
PHOSPHATE SERPL-MCNC: 3.8 MG/DL (ref 2.7–4.5)
PHOSPHATE SERPL-MCNC: 4 MG/DL (ref 2.7–4.5)
PHOSPHATE SERPL-MCNC: 4.4 MG/DL (ref 2.7–4.5)
PHOSPHATE SERPL-MCNC: 4.4 MG/DL (ref 2.7–4.5)
PHOSPHATE SERPL-MCNC: 4.7 MG/DL (ref 2.7–4.5)
PLATELET # BLD AUTO: 237 K/UL (ref 150–450)
PLATELET # BLD AUTO: 243 K/UL (ref 150–450)
PLATELET # BLD AUTO: 256 K/UL (ref 150–450)
PLATELET # BLD AUTO: 258 K/UL (ref 150–450)
PLATELET # BLD AUTO: 265 K/UL (ref 150–450)
PLATELET # BLD AUTO: 295 K/UL (ref 150–450)
PLATELET # BLD AUTO: 296 K/UL (ref 150–450)
PLATELET # BLD AUTO: 301 K/UL (ref 150–450)
PLATELET # BLD AUTO: 311 K/UL (ref 150–450)
PLATELET # BLD AUTO: 338 K/UL (ref 150–450)
PLATELET # BLD AUTO: 341 K/UL (ref 150–450)
PLATELET # BLD AUTO: 349 K/UL (ref 150–450)
PLATELET # BLD AUTO: 350 K/UL (ref 150–450)
PLATELET # BLD AUTO: 350 K/UL (ref 150–450)
PLATELET # BLD AUTO: 356 K/UL (ref 150–450)
PLATELET # BLD AUTO: 356 K/UL (ref 150–450)
PLATELET # BLD AUTO: 358 K/UL (ref 150–450)
PLATELET # BLD AUTO: 358 K/UL (ref 150–450)
PLATELET # BLD AUTO: 368 K/UL (ref 150–450)
PLATELET # BLD AUTO: 406 K/UL (ref 150–450)
PLATELET # BLD AUTO: 420 K/UL (ref 150–450)
PLATELET # BLD AUTO: 464 K/UL (ref 150–450)
PLATELET # BLD AUTO: 484 K/UL (ref 150–450)
PMV BLD AUTO: 8.4 FL (ref 9.2–12.9)
PMV BLD AUTO: 8.7 FL (ref 9.2–12.9)
PMV BLD AUTO: 8.8 FL (ref 9.2–12.9)
PMV BLD AUTO: 8.9 FL (ref 9.2–12.9)
PMV BLD AUTO: 9 FL (ref 9.2–12.9)
PMV BLD AUTO: 9.2 FL (ref 9.2–12.9)
PMV BLD AUTO: 9.3 FL (ref 9.2–12.9)
PMV BLD AUTO: 9.4 FL (ref 9.2–12.9)
PMV BLD AUTO: 9.7 FL (ref 9.2–12.9)
PO2 BLDA: 84 MMHG (ref 80–100)
POC ACTIVATED CLOTTING TIME K: 167 SEC (ref 74–137)
POC ACTIVATED CLOTTING TIME K: 179 SEC (ref 74–137)
POC ACTIVATED CLOTTING TIME K: 179 SEC (ref 74–137)
POC ACTIVATED CLOTTING TIME K: 209 SEC (ref 74–137)
POC ACTIVATED CLOTTING TIME K: 209 SEC (ref 74–137)
POC ACTIVATED CLOTTING TIME K: 221 SEC (ref 74–137)
POC ACTIVATED CLOTTING TIME K: 245 SEC (ref 74–137)
POC ACTIVATED CLOTTING TIME K: 287 SEC (ref 74–137)
POC BE: -2 MMOL/L
POC IONIZED CALCIUM: 1.12 MMOL/L (ref 1.06–1.42)
POC SATURATED O2: 97 % (ref 95–100)
POC TCO2 (MEASURED): 26 MMOL/L (ref 23–29)
POC TCO2: 23 MMOL/L (ref 23–27)
POCT GLUCOSE: 93 MG/DL (ref 70–110)
POTASSIUM BLD-SCNC: 4 MMOL/L (ref 3.5–5.1)
POTASSIUM SERPL-SCNC: 4 MMOL/L (ref 3.5–5.1)
POTASSIUM SERPL-SCNC: 4.1 MMOL/L (ref 3.5–5.1)
POTASSIUM SERPL-SCNC: 4.2 MMOL/L (ref 3.5–5.1)
POTASSIUM SERPL-SCNC: 4.3 MMOL/L (ref 3.5–5.1)
POTASSIUM SERPL-SCNC: 4.5 MMOL/L (ref 3.5–5.1)
POTASSIUM SERPL-SCNC: 4.6 MMOL/L (ref 3.5–5.1)
POTASSIUM SERPL-SCNC: 4.9 MMOL/L (ref 3.5–5.1)
PROT FLD-MCNC: 8.3 G/DL
PROT SERPL-MCNC: 7.4 G/DL (ref 6–8.4)
PROT SERPL-MCNC: 7.5 G/DL (ref 6–8.4)
PROT SERPL-MCNC: 7.6 G/DL (ref 6–8.4)
PROT SERPL-MCNC: 7.8 G/DL (ref 6–8.4)
PROT SERPL-MCNC: 7.9 G/DL (ref 6–8.4)
PROT SERPL-MCNC: 7.9 G/DL (ref 6–8.4)
PROT SERPL-MCNC: 8 G/DL (ref 6–8.4)
PROT SERPL-MCNC: 8.2 G/DL (ref 6–8.4)
PROT SERPL-MCNC: 8.6 G/DL (ref 6–8.4)
PROT SERPL-MCNC: 8.7 G/DL (ref 6–8.4)
PROT SERPL-MCNC: 9 G/DL (ref 6–8.4)
PROT SERPL-MCNC: 9.5 G/DL (ref 6–8.4)
PROT SERPL-MCNC: 9.9 G/DL (ref 6–8.4)
PROT UR QL STRIP: NEGATIVE
PROT UR QL STRIP: NEGATIVE
PROTHROMBIN TIME: 10.4 SEC (ref 9–12.5)
PROTHROMBIN TIME: 11.1 SEC (ref 9–12.5)
PROTHROMBIN TIME: 11.1 SEC (ref 9–12.5)
PROTHROMBIN TIME: 11.2 SEC (ref 9–12.5)
PROTHROMBIN TIME: 11.4 SEC (ref 9–12.5)
RA PRESSURE ESTIMATED: 3 MMHG
RBC # BLD AUTO: 2.31 M/UL (ref 4.6–6.2)
RBC # BLD AUTO: 2.39 M/UL (ref 4.6–6.2)
RBC # BLD AUTO: 2.59 M/UL (ref 4.6–6.2)
RBC # BLD AUTO: 2.81 M/UL (ref 4.6–6.2)
RBC # BLD AUTO: 2.91 M/UL (ref 4.6–6.2)
RBC # BLD AUTO: 2.92 M/UL (ref 4.6–6.2)
RBC # BLD AUTO: 2.95 M/UL (ref 4.6–6.2)
RBC # BLD AUTO: 3.31 M/UL (ref 4.6–6.2)
RBC # BLD AUTO: 3.34 M/UL (ref 4.6–6.2)
RBC # BLD AUTO: 3.43 M/UL (ref 4.6–6.2)
RBC # BLD AUTO: 3.49 M/UL (ref 4.6–6.2)
RBC # BLD AUTO: 3.58 M/UL (ref 4.6–6.2)
RBC # BLD AUTO: 3.63 M/UL (ref 4.6–6.2)
RBC # BLD AUTO: 3.72 M/UL (ref 4.6–6.2)
RBC # BLD AUTO: 3.74 M/UL (ref 4.6–6.2)
RBC # BLD AUTO: 4.39 M/UL (ref 4.6–6.2)
RBC # BLD AUTO: 4.45 M/UL (ref 4.6–6.2)
RBC # BLD AUTO: 4.56 M/UL (ref 4.6–6.2)
RBC # BLD AUTO: 4.56 M/UL (ref 4.6–6.2)
RBC # BLD AUTO: 4.64 M/UL (ref 4.6–6.2)
RBC # BLD AUTO: 4.74 M/UL (ref 4.6–6.2)
RBC # BLD AUTO: 4.74 M/UL (ref 4.6–6.2)
RBC # BLD AUTO: 4.81 M/UL (ref 4.6–6.2)
RV TISSUE DOPPLER FREE WALL SYSTOLIC VELOCITY 1 (APICAL 4 CHAMBER VIEW): 12.6 CM/S
SAMPLE: ABNORMAL
SAMPLE: NORMAL
SINUS: 3.11 CM
SITE: ABNORMAL
SITE: ABNORMAL
SITE: NORMAL
SODIUM BLD-SCNC: 132 MMOL/L (ref 136–145)
SODIUM SERPL-SCNC: 120 MMOL/L (ref 136–145)
SODIUM SERPL-SCNC: 121 MMOL/L (ref 136–145)
SODIUM SERPL-SCNC: 122 MMOL/L (ref 136–145)
SODIUM SERPL-SCNC: 122 MMOL/L (ref 136–145)
SODIUM SERPL-SCNC: 123 MMOL/L (ref 136–145)
SODIUM SERPL-SCNC: 123 MMOL/L (ref 136–145)
SODIUM SERPL-SCNC: 126 MMOL/L (ref 136–145)
SODIUM SERPL-SCNC: 127 MMOL/L (ref 136–145)
SODIUM SERPL-SCNC: 128 MMOL/L (ref 136–145)
SODIUM SERPL-SCNC: 130 MMOL/L (ref 136–145)
SODIUM SERPL-SCNC: 131 MMOL/L (ref 136–145)
SODIUM SERPL-SCNC: 132 MMOL/L (ref 136–145)
SODIUM SERPL-SCNC: 132 MMOL/L (ref 136–145)
SP GR UR STRIP: 1 (ref 1–1.03)
SP GR UR STRIP: 1.02 (ref 1–1.03)
SP02: 95
SPECIMEN OUTDATE: NORMAL
SPECIMEN SOURCE: NORMAL
SPECIMEN SOURCE: NORMAL
STJ: 2.97 CM
SUPPLEMENTAL DIAGNOSIS: ABNORMAL
TDI LATERAL: 0.04 M/S
TDI SEPTAL: 0.03 M/S
TDI: 0.04 M/S
TRANS ERYTHROCYTES VOL PATIENT: NORMAL ML
TRICUSPID ANNULAR PLANE SYSTOLIC EXCURSION: 1.57 CM
TRIGL SERPL-MCNC: 227 MG/DL (ref 30–150)
TROPONIN I SERPL DL<=0.01 NG/ML-MCNC: 0.01 NG/ML (ref 0–0.03)
TROPONIN I SERPL DL<=0.01 NG/ML-MCNC: 0.01 NG/ML (ref 0–0.03)
URN SPEC COLLECT METH UR: NORMAL
URN SPEC COLLECT METH UR: NORMAL
UROBILINOGEN UR STRIP-ACNC: NEGATIVE EU/DL
WBC # BLD AUTO: 10.02 K/UL (ref 3.9–12.7)
WBC # BLD AUTO: 10.62 K/UL (ref 3.9–12.7)
WBC # BLD AUTO: 11.04 K/UL (ref 3.9–12.7)
WBC # BLD AUTO: 12.1 K/UL (ref 3.9–12.7)
WBC # BLD AUTO: 12.29 K/UL (ref 3.9–12.7)
WBC # BLD AUTO: 12.36 K/UL (ref 3.9–12.7)
WBC # BLD AUTO: 5.1 K/UL (ref 3.9–12.7)
WBC # BLD AUTO: 6.09 K/UL (ref 3.9–12.7)
WBC # BLD AUTO: 6.26 K/UL (ref 3.9–12.7)
WBC # BLD AUTO: 6.62 K/UL (ref 3.9–12.7)
WBC # BLD AUTO: 6.66 K/UL (ref 3.9–12.7)
WBC # BLD AUTO: 7.22 K/UL (ref 3.9–12.7)
WBC # BLD AUTO: 7.52 K/UL (ref 3.9–12.7)
WBC # BLD AUTO: 7.89 K/UL (ref 3.9–12.7)
WBC # BLD AUTO: 8.1 K/UL (ref 3.9–12.7)
WBC # BLD AUTO: 8.12 K/UL (ref 3.9–12.7)
WBC # BLD AUTO: 8.13 K/UL (ref 3.9–12.7)
WBC # BLD AUTO: 8.28 K/UL (ref 3.9–12.7)
WBC # BLD AUTO: 8.39 K/UL (ref 3.9–12.7)
WBC # BLD AUTO: 8.39 K/UL (ref 3.9–12.7)
WBC # BLD AUTO: 8.68 K/UL (ref 3.9–12.7)
WBC # BLD AUTO: 9.6 K/UL (ref 3.9–12.7)
WBC # BLD AUTO: 9.6 K/UL (ref 3.9–12.7)
WBC # FLD: 1430 /CU MM

## 2023-01-01 PROCEDURE — 99285 PR EMERGENCY DEPT VISIT,LEVEL V: ICD-10-PCS | Mod: ,,, | Performed by: SURGERY

## 2023-01-01 PROCEDURE — 11000001 HC ACUTE MED/SURG PRIVATE ROOM

## 2023-01-01 PROCEDURE — 36415 COLL VENOUS BLD VENIPUNCTURE: CPT | Performed by: STUDENT IN AN ORGANIZED HEALTH CARE EDUCATION/TRAINING PROGRAM

## 2023-01-01 PROCEDURE — 80048 BASIC METABOLIC PNL TOTAL CA: CPT | Performed by: NURSE PRACTITIONER

## 2023-01-01 PROCEDURE — 85730 THROMBOPLASTIN TIME PARTIAL: CPT | Performed by: EMERGENCY MEDICINE

## 2023-01-01 PROCEDURE — 99222 PR INITIAL HOSPITAL CARE,LEVL II: ICD-10-PCS | Mod: 25,,, | Performed by: NURSE PRACTITIONER

## 2023-01-01 PROCEDURE — 85730 THROMBOPLASTIN TIME PARTIAL: CPT | Mod: 91 | Performed by: HOSPITALIST

## 2023-01-01 PROCEDURE — 63600175 PHARM REV CODE 636 W HCPCS

## 2023-01-01 PROCEDURE — 80053 COMPREHEN METABOLIC PANEL: CPT | Performed by: STUDENT IN AN ORGANIZED HEALTH CARE EDUCATION/TRAINING PROGRAM

## 2023-01-01 PROCEDURE — 25000003 PHARM REV CODE 250: Performed by: STUDENT IN AN ORGANIZED HEALTH CARE EDUCATION/TRAINING PROGRAM

## 2023-01-01 PROCEDURE — 37221 PR REVASCULARIZE ILIAC ARTERY,ANGIOPLASTY/STENT, INITIAL VESSEL: ICD-10-PCS | Mod: LT,,, | Performed by: INTERNAL MEDICINE

## 2023-01-01 PROCEDURE — 37000009 HC ANESTHESIA EA ADD 15 MINS: Performed by: INTERNAL MEDICINE

## 2023-01-01 PROCEDURE — 93005 ELECTROCARDIOGRAM TRACING: CPT

## 2023-01-01 PROCEDURE — 80053 COMPREHEN METABOLIC PANEL: CPT

## 2023-01-01 PROCEDURE — 84100 ASSAY OF PHOSPHORUS: CPT | Performed by: STUDENT IN AN ORGANIZED HEALTH CARE EDUCATION/TRAINING PROGRAM

## 2023-01-01 PROCEDURE — 99233 SBSQ HOSP IP/OBS HIGH 50: CPT | Mod: ,,,

## 2023-01-01 PROCEDURE — 96367 TX/PROPH/DG ADDL SEQ IV INF: CPT

## 2023-01-01 PROCEDURE — 75716 ARTERY X-RAYS ARMS/LEGS: CPT | Mod: 59 | Performed by: INTERNAL MEDICINE

## 2023-01-01 PROCEDURE — 25000003 PHARM REV CODE 250

## 2023-01-01 PROCEDURE — 99222 1ST HOSP IP/OBS MODERATE 55: CPT | Mod: ,,, | Performed by: INTERNAL MEDICINE

## 2023-01-01 PROCEDURE — 88342 IMHCHEM/IMCYTCHM 1ST ANTB: CPT | Performed by: PATHOLOGY

## 2023-01-01 PROCEDURE — 85014 HEMATOCRIT: CPT

## 2023-01-01 PROCEDURE — 88305 TISSUE EXAM BY PATHOLOGIST: CPT | Performed by: PATHOLOGY

## 2023-01-01 PROCEDURE — 85018 HEMOGLOBIN: CPT

## 2023-01-01 PROCEDURE — 84100 ASSAY OF PHOSPHORUS: CPT

## 2023-01-01 PROCEDURE — 88305 TISSUE EXAM BY PATHOLOGIST: CPT | Mod: 26,,, | Performed by: PATHOLOGY

## 2023-01-01 PROCEDURE — 97161 PT EVAL LOW COMPLEX 20 MIN: CPT

## 2023-01-01 PROCEDURE — 85610 PROTHROMBIN TIME: CPT | Performed by: NURSE PRACTITIONER

## 2023-01-01 PROCEDURE — 85025 COMPLETE CBC W/AUTO DIFF WBC: CPT

## 2023-01-01 PROCEDURE — 83735 ASSAY OF MAGNESIUM: CPT

## 2023-01-01 PROCEDURE — 97165 OT EVAL LOW COMPLEX 30 MIN: CPT

## 2023-01-01 PROCEDURE — 96375 TX/PRO/DX INJ NEW DRUG ADDON: CPT

## 2023-01-01 PROCEDURE — 85025 COMPLETE CBC W/AUTO DIFF WBC: CPT | Performed by: EMERGENCY MEDICINE

## 2023-01-01 PROCEDURE — 99223 PR INITIAL HOSPITAL CARE,LEVL III: ICD-10-PCS | Mod: ,,,

## 2023-01-01 PROCEDURE — 88112 PR  CYTOPATH, CELL ENHANCE TECH: ICD-10-PCS | Mod: 26,,, | Performed by: PATHOLOGY

## 2023-01-01 PROCEDURE — 99205 PR OFFICE/OUTPT VISIT, NEW, LEVL V, 60-74 MIN: ICD-10-PCS | Mod: S$PBB,,, | Performed by: INTERNAL MEDICINE

## 2023-01-01 PROCEDURE — 85610 PROTHROMBIN TIME: CPT

## 2023-01-01 PROCEDURE — 99900035 HC TECH TIME PER 15 MIN (STAT)

## 2023-01-01 PROCEDURE — 93010 EKG 12-LEAD: ICD-10-PCS | Mod: ,,, | Performed by: INTERNAL MEDICINE

## 2023-01-01 PROCEDURE — 99223 PR INITIAL HOSPITAL CARE,LEVL III: ICD-10-PCS | Mod: ,,, | Performed by: NURSE PRACTITIONER

## 2023-01-01 PROCEDURE — D9220A PRA ANESTHESIA: ICD-10-PCS | Mod: ANES,,, | Performed by: ANESTHESIOLOGY

## 2023-01-01 PROCEDURE — 99232 SBSQ HOSP IP/OBS MODERATE 35: CPT | Mod: ,,, | Performed by: INTERNAL MEDICINE

## 2023-01-01 PROCEDURE — 36415 COLL VENOUS BLD VENIPUNCTURE: CPT

## 2023-01-01 PROCEDURE — 63600175 PHARM REV CODE 636 W HCPCS: Performed by: EMERGENCY MEDICINE

## 2023-01-01 PROCEDURE — C9113 INJ PANTOPRAZOLE SODIUM, VIA: HCPCS

## 2023-01-01 PROCEDURE — 75716 ARTERY X-RAYS ARMS/LEGS: CPT | Mod: 26,59,, | Performed by: INTERNAL MEDICINE

## 2023-01-01 PROCEDURE — 99233 SBSQ HOSP IP/OBS HIGH 50: CPT | Mod: ,,, | Performed by: NURSE PRACTITIONER

## 2023-01-01 PROCEDURE — 32551 INSERTION OF CHEST TUBE: CPT

## 2023-01-01 PROCEDURE — 88342 CHG IMMUNOCYTOCHEMISTRY: ICD-10-PCS | Mod: 26,,, | Performed by: PATHOLOGY

## 2023-01-01 PROCEDURE — 25500020 PHARM REV CODE 255: Performed by: INTERNAL MEDICINE

## 2023-01-01 PROCEDURE — 63600175 PHARM REV CODE 636 W HCPCS: Performed by: INTERNAL MEDICINE

## 2023-01-01 PROCEDURE — 93010 ELECTROCARDIOGRAM REPORT: CPT | Mod: ,,, | Performed by: INTERNAL MEDICINE

## 2023-01-01 PROCEDURE — 88112 CYTOPATH CELL ENHANCE TECH: CPT | Performed by: PATHOLOGY

## 2023-01-01 PROCEDURE — 88313 PR  SPECIAL STAINS,GROUP II: ICD-10-PCS | Mod: 26,,, | Performed by: PATHOLOGY

## 2023-01-01 PROCEDURE — 99233 PR SUBSEQUENT HOSPITAL CARE,LEVL III: ICD-10-PCS | Mod: ,,, | Performed by: INTERNAL MEDICINE

## 2023-01-01 PROCEDURE — 85025 COMPLETE CBC W/AUTO DIFF WBC: CPT | Mod: 91 | Performed by: EMERGENCY MEDICINE

## 2023-01-01 PROCEDURE — 0238T PR TRANSLUMINAL PERIPHERAL ATHERECTOMY, ILIAC ARTERY, EACH: ICD-10-PCS | Mod: LT,,, | Performed by: INTERNAL MEDICINE

## 2023-01-01 PROCEDURE — 86900 BLOOD TYPING SEROLOGIC ABO: CPT | Performed by: NURSE PRACTITIONER

## 2023-01-01 PROCEDURE — C1760 CLOSURE DEV, VASC: HCPCS | Performed by: INTERNAL MEDICINE

## 2023-01-01 PROCEDURE — 27201423 OPTIME MED/SURG SUP & DEVICES STERILE SUPPLY: Performed by: INTERNAL MEDICINE

## 2023-01-01 PROCEDURE — 99233 PR SUBSEQUENT HOSPITAL CARE,LEVL III: ICD-10-PCS | Mod: ,,, | Performed by: NURSE PRACTITIONER

## 2023-01-01 PROCEDURE — 99406 BEHAV CHNG SMOKING 3-10 MIN: CPT | Mod: S$GLB,,,

## 2023-01-01 PROCEDURE — D9220A PRA ANESTHESIA: Mod: CRNA,,, | Performed by: NURSE ANESTHETIST, CERTIFIED REGISTERED

## 2023-01-01 PROCEDURE — 94761 N-INVAS EAR/PLS OXIMETRY MLT: CPT

## 2023-01-01 PROCEDURE — 99406 PT REFUSED TOBACCO CESSATION: ICD-10-PCS | Mod: ,,,

## 2023-01-01 PROCEDURE — 97530 THERAPEUTIC ACTIVITIES: CPT | Mod: CO

## 2023-01-01 PROCEDURE — 36415 COLL VENOUS BLD VENIPUNCTURE: CPT | Performed by: INTERNAL MEDICINE

## 2023-01-01 PROCEDURE — 85014 HEMATOCRIT: CPT | Mod: 91 | Performed by: STUDENT IN AN ORGANIZED HEALTH CARE EDUCATION/TRAINING PROGRAM

## 2023-01-01 PROCEDURE — 99497 PR ADVNCD CARE PLAN 30 MIN: ICD-10-PCS | Mod: 25,,,

## 2023-01-01 PROCEDURE — 36430 TRANSFUSION BLD/BLD COMPNT: CPT

## 2023-01-01 PROCEDURE — 83605 ASSAY OF LACTIC ACID: CPT

## 2023-01-01 PROCEDURE — 99152 MOD SED SAME PHYS/QHP 5/>YRS: CPT | Performed by: INTERNAL MEDICINE

## 2023-01-01 PROCEDURE — 99222 1ST HOSP IP/OBS MODERATE 55: CPT | Mod: ,,, | Performed by: EMERGENCY MEDICINE

## 2023-01-01 PROCEDURE — D9220A PRA ANESTHESIA: ICD-10-PCS | Mod: CRNA,,, | Performed by: NURSE ANESTHETIST, CERTIFIED REGISTERED

## 2023-01-01 PROCEDURE — 83735 ASSAY OF MAGNESIUM: CPT | Performed by: STUDENT IN AN ORGANIZED HEALTH CARE EDUCATION/TRAINING PROGRAM

## 2023-01-01 PROCEDURE — 25000003 PHARM REV CODE 250: Performed by: NURSE ANESTHETIST, CERTIFIED REGISTERED

## 2023-01-01 PROCEDURE — 88112 CYTOPATH CELL ENHANCE TECH: CPT | Mod: 26,,, | Performed by: PATHOLOGY

## 2023-01-01 PROCEDURE — 85025 COMPLETE CBC W/AUTO DIFF WBC: CPT | Performed by: STUDENT IN AN ORGANIZED HEALTH CARE EDUCATION/TRAINING PROGRAM

## 2023-01-01 PROCEDURE — 36415 COLL VENOUS BLD VENIPUNCTURE: CPT | Performed by: HOSPITALIST

## 2023-01-01 PROCEDURE — 99497 ADVNCD CARE PLAN 30 MIN: CPT | Mod: 25,,,

## 2023-01-01 PROCEDURE — 85730 THROMBOPLASTIN TIME PARTIAL: CPT | Performed by: FAMILY MEDICINE

## 2023-01-01 PROCEDURE — 20600001 HC STEP DOWN PRIVATE ROOM

## 2023-01-01 PROCEDURE — 25500020 PHARM REV CODE 255: Performed by: EMERGENCY MEDICINE

## 2023-01-01 PROCEDURE — 99285 EMERGENCY DEPT VISIT HI MDM: CPT | Mod: ,,, | Performed by: SURGERY

## 2023-01-01 PROCEDURE — P9021 RED BLOOD CELLS UNIT: HCPCS | Performed by: NURSE PRACTITIONER

## 2023-01-01 PROCEDURE — 75625 CONTRAST EXAM ABDOMINL AORTA: CPT | Mod: 26,59,, | Performed by: INTERNAL MEDICINE

## 2023-01-01 PROCEDURE — 1111F DSCHRG MED/CURRENT MED MERGE: CPT | Mod: CPTII,,, | Performed by: INTERNAL MEDICINE

## 2023-01-01 PROCEDURE — 63600175 PHARM REV CODE 636 W HCPCS: Performed by: NURSE PRACTITIONER

## 2023-01-01 PROCEDURE — 25000003 PHARM REV CODE 250: Performed by: EMERGENCY MEDICINE

## 2023-01-01 PROCEDURE — 96360 HYDRATION IV INFUSION INIT: CPT

## 2023-01-01 PROCEDURE — 97116 GAIT TRAINING THERAPY: CPT

## 2023-01-01 PROCEDURE — 88305 TISSUE EXAM BY PATHOLOGIST: ICD-10-PCS | Mod: 26,,, | Performed by: PATHOLOGY

## 2023-01-01 PROCEDURE — 63600175 PHARM REV CODE 636 W HCPCS: Performed by: STUDENT IN AN ORGANIZED HEALTH CARE EDUCATION/TRAINING PROGRAM

## 2023-01-01 PROCEDURE — 85018 HEMOGLOBIN: CPT | Performed by: NURSE PRACTITIONER

## 2023-01-01 PROCEDURE — 80053 COMPREHEN METABOLIC PANEL: CPT | Performed by: NURSE PRACTITIONER

## 2023-01-01 PROCEDURE — 85730 THROMBOPLASTIN TIME PARTIAL: CPT | Performed by: HOSPITALIST

## 2023-01-01 PROCEDURE — 97530 THERAPEUTIC ACTIVITIES: CPT

## 2023-01-01 PROCEDURE — 99406 BEHAV CHNG SMOKING 3-10 MIN: CPT | Mod: ,,,

## 2023-01-01 PROCEDURE — 36415 COLL VENOUS BLD VENIPUNCTURE: CPT | Performed by: FAMILY MEDICINE

## 2023-01-01 PROCEDURE — 99222 1ST HOSP IP/OBS MODERATE 55: CPT | Mod: 25,,, | Performed by: NURSE PRACTITIONER

## 2023-01-01 PROCEDURE — 25000003 PHARM REV CODE 250: Performed by: NURSE PRACTITIONER

## 2023-01-01 PROCEDURE — 88313 SPECIAL STAINS GROUP 2: CPT | Mod: 26,,, | Performed by: PATHOLOGY

## 2023-01-01 PROCEDURE — 3079F DIAST BP 80-89 MM HG: CPT | Mod: CPTII,,, | Performed by: INTERNAL MEDICINE

## 2023-01-01 PROCEDURE — 83615 LACTATE (LD) (LDH) ENZYME: CPT

## 2023-01-01 PROCEDURE — 80053 COMPREHEN METABOLIC PANEL: CPT | Performed by: EMERGENCY MEDICINE

## 2023-01-01 PROCEDURE — 85018 HEMOGLOBIN: CPT | Mod: 91 | Performed by: STUDENT IN AN ORGANIZED HEALTH CARE EDUCATION/TRAINING PROGRAM

## 2023-01-01 PROCEDURE — 85025 COMPLETE CBC W/AUTO DIFF WBC: CPT | Performed by: INTERNAL MEDICINE

## 2023-01-01 PROCEDURE — 63600175 PHARM REV CODE 636 W HCPCS: Performed by: RADIOLOGY

## 2023-01-01 PROCEDURE — 87205 SMEAR GRAM STAIN: CPT

## 2023-01-01 PROCEDURE — 37252 INTRVASC US NONCORONARY 1ST: CPT | Performed by: INTERNAL MEDICINE

## 2023-01-01 PROCEDURE — 99283 EMERGENCY DEPT VISIT LOW MDM: CPT

## 2023-01-01 PROCEDURE — 85730 THROMBOPLASTIN TIME PARTIAL: CPT | Mod: 91 | Performed by: FAMILY MEDICINE

## 2023-01-01 PROCEDURE — 85018 HEMOGLOBIN: CPT | Mod: 91

## 2023-01-01 PROCEDURE — 63600175 PHARM REV CODE 636 W HCPCS: Performed by: FAMILY MEDICINE

## 2023-01-01 PROCEDURE — 3008F BODY MASS INDEX DOCD: CPT | Mod: CPTII,,, | Performed by: INTERNAL MEDICINE

## 2023-01-01 PROCEDURE — 99285 EMERGENCY DEPT VISIT HI MDM: CPT | Mod: 25

## 2023-01-01 PROCEDURE — 84157 ASSAY OF PROTEIN OTHER: CPT

## 2023-01-01 PROCEDURE — 85347 COAGULATION TIME ACTIVATED: CPT | Performed by: INTERNAL MEDICINE

## 2023-01-01 PROCEDURE — 82803 BLOOD GASES ANY COMBINATION: CPT

## 2023-01-01 PROCEDURE — 37000008 HC ANESTHESIA 1ST 15 MINUTES: Performed by: INTERNAL MEDICINE

## 2023-01-01 PROCEDURE — C1884 EMBOLIZATION PROTECT SYST: HCPCS | Performed by: INTERNAL MEDICINE

## 2023-01-01 PROCEDURE — 37225 HC FEM/POPL REVAS W/ATHER: CPT | Mod: LT | Performed by: INTERNAL MEDICINE

## 2023-01-01 PROCEDURE — 83605 ASSAY OF LACTIC ACID: CPT | Performed by: STUDENT IN AN ORGANIZED HEALTH CARE EDUCATION/TRAINING PROGRAM

## 2023-01-01 PROCEDURE — 85025 COMPLETE CBC W/AUTO DIFF WBC: CPT | Performed by: NURSE PRACTITIONER

## 2023-01-01 PROCEDURE — 1111F PR DISCHARGE MEDS RECONCILED W/ CURRENT OUTPATIENT MED LIST: ICD-10-PCS | Mod: CPTII,,, | Performed by: INTERNAL MEDICINE

## 2023-01-01 PROCEDURE — C1725 CATH, TRANSLUMIN NON-LASER: HCPCS | Performed by: INTERNAL MEDICINE

## 2023-01-01 PROCEDURE — C1769 GUIDE WIRE: HCPCS | Performed by: INTERNAL MEDICINE

## 2023-01-01 PROCEDURE — 37225 PR FEM/POPL REVAS W/ATHERECTOMY: CPT | Mod: LT,,, | Performed by: INTERNAL MEDICINE

## 2023-01-01 PROCEDURE — 88341 IMHCHEM/IMCYTCHM EA ADD ANTB: CPT | Performed by: PATHOLOGY

## 2023-01-01 PROCEDURE — C1894 INTRO/SHEATH, NON-LASER: HCPCS | Performed by: INTERNAL MEDICINE

## 2023-01-01 PROCEDURE — 99999 PR PBB SHADOW E&M-EST. PATIENT-LVL III: ICD-10-PCS | Mod: PBBFAC,,, | Performed by: INTERNAL MEDICINE

## 2023-01-01 PROCEDURE — 88342 IMHCHEM/IMCYTCHM 1ST ANTB: CPT | Mod: 26,,, | Performed by: PATHOLOGY

## 2023-01-01 PROCEDURE — 3077F PR MOST RECENT SYSTOLIC BLOOD PRESSURE >= 140 MM HG: ICD-10-PCS | Mod: CPTII,,, | Performed by: INTERNAL MEDICINE

## 2023-01-01 PROCEDURE — 85730 THROMBOPLASTIN TIME PARTIAL: CPT | Mod: 91 | Performed by: EMERGENCY MEDICINE

## 2023-01-01 PROCEDURE — 99223 1ST HOSP IP/OBS HIGH 75: CPT | Mod: NSCH,25,, | Performed by: PHYSICIAN ASSISTANT

## 2023-01-01 PROCEDURE — 81003 URINALYSIS AUTO W/O SCOPE: CPT | Performed by: STUDENT IN AN ORGANIZED HEALTH CARE EDUCATION/TRAINING PROGRAM

## 2023-01-01 PROCEDURE — 3077F SYST BP >= 140 MM HG: CPT | Mod: CPTII,,, | Performed by: INTERNAL MEDICINE

## 2023-01-01 PROCEDURE — 85018 HEMOGLOBIN: CPT | Performed by: STUDENT IN AN ORGANIZED HEALTH CARE EDUCATION/TRAINING PROGRAM

## 2023-01-01 PROCEDURE — 85610 PROTHROMBIN TIME: CPT | Performed by: EMERGENCY MEDICINE

## 2023-01-01 PROCEDURE — 0238T TRLUML PERIP ATHRC ILIAC ART: CPT | Mod: LT,,, | Performed by: INTERNAL MEDICINE

## 2023-01-01 PROCEDURE — 84484 ASSAY OF TROPONIN QUANT: CPT | Performed by: EMERGENCY MEDICINE

## 2023-01-01 PROCEDURE — 88313 SPECIAL STAINS GROUP 2: CPT | Performed by: PATHOLOGY

## 2023-01-01 PROCEDURE — 99284 EMERGENCY DEPT VISIT MOD MDM: CPT | Mod: 25

## 2023-01-01 PROCEDURE — 97110 THERAPEUTIC EXERCISES: CPT

## 2023-01-01 PROCEDURE — 37221 PR REVASCULARIZE ILIAC ARTERY,ANGIOPLASTY/STENT, INITIAL VESSEL: CPT | Mod: LT,,, | Performed by: INTERNAL MEDICINE

## 2023-01-01 PROCEDURE — 36415 COLL VENOUS BLD VENIPUNCTURE: CPT | Performed by: NURSE PRACTITIONER

## 2023-01-01 PROCEDURE — 84311 SPECTROPHOTOMETRY: CPT

## 2023-01-01 PROCEDURE — 99222 PR INITIAL HOSPITAL CARE,LEVL II: ICD-10-PCS | Mod: ,,, | Performed by: EMERGENCY MEDICINE

## 2023-01-01 PROCEDURE — 99223 PR INITIAL HOSPITAL CARE,LEVL III: ICD-10-PCS | Mod: NSCH,25,, | Performed by: PHYSICIAN ASSISTANT

## 2023-01-01 PROCEDURE — 99222 PR INITIAL HOSPITAL CARE,LEVL II: ICD-10-PCS | Mod: ,,, | Performed by: INTERNAL MEDICINE

## 2023-01-01 PROCEDURE — C1887 CATHETER, GUIDING: HCPCS | Performed by: INTERNAL MEDICINE

## 2023-01-01 PROCEDURE — 0238T TRLUML PERIP ATHRC ILIAC ART: CPT | Mod: LT | Performed by: INTERNAL MEDICINE

## 2023-01-01 PROCEDURE — 37252 INTRVASC US NONCORONARY 1ST: CPT | Mod: ,,, | Performed by: INTERNAL MEDICINE

## 2023-01-01 PROCEDURE — C1757 CATH, THROMBECTOMY/EMBOLECT: HCPCS | Performed by: INTERNAL MEDICINE

## 2023-01-01 PROCEDURE — 80061 LIPID PANEL: CPT | Performed by: NURSE PRACTITIONER

## 2023-01-01 PROCEDURE — 99205 OFFICE O/P NEW HI 60 MIN: CPT | Mod: S$PBB,,, | Performed by: INTERNAL MEDICINE

## 2023-01-01 PROCEDURE — 85610 PROTHROMBIN TIME: CPT | Mod: 91 | Performed by: EMERGENCY MEDICINE

## 2023-01-01 PROCEDURE — 25000003 PHARM REV CODE 250: Performed by: INTERNAL MEDICINE

## 2023-01-01 PROCEDURE — 37252 PR IVUS, NON-CORONARY, 1ST VESSEL: ICD-10-PCS | Mod: ,,, | Performed by: INTERNAL MEDICINE

## 2023-01-01 PROCEDURE — 99232 PR SUBSEQUENT HOSPITAL CARE,LEVL II: ICD-10-PCS | Mod: ,,, | Performed by: INTERNAL MEDICINE

## 2023-01-01 PROCEDURE — 36600 WITHDRAWAL OF ARTERIAL BLOOD: CPT

## 2023-01-01 PROCEDURE — 99999 PR PBB SHADOW E&M-EST. PATIENT-LVL III: CPT | Mod: PBBFAC,,, | Performed by: INTERNAL MEDICINE

## 2023-01-01 PROCEDURE — 85730 THROMBOPLASTIN TIME PARTIAL: CPT | Performed by: STUDENT IN AN ORGANIZED HEALTH CARE EDUCATION/TRAINING PROGRAM

## 2023-01-01 PROCEDURE — 99152 PR MOD CONSCIOUS SEDATION, SAME PHYS, 5+ YRS, FIRST 15 MIN: ICD-10-PCS | Mod: ,,, | Performed by: INTERNAL MEDICINE

## 2023-01-01 PROCEDURE — C1876 STENT, NON-COA/NON-COV W/DEL: HCPCS | Performed by: INTERNAL MEDICINE

## 2023-01-01 PROCEDURE — 36415 COLL VENOUS BLD VENIPUNCTURE: CPT | Performed by: EMERGENCY MEDICINE

## 2023-01-01 PROCEDURE — 37225 PR FEM/POPL REVAS W/ATHERECTOMY: ICD-10-PCS | Mod: LT,,, | Performed by: INTERNAL MEDICINE

## 2023-01-01 PROCEDURE — 99152 MOD SED SAME PHYS/QHP 5/>YRS: CPT | Mod: ,,, | Performed by: INTERNAL MEDICINE

## 2023-01-01 PROCEDURE — 99233 PR SUBSEQUENT HOSPITAL CARE,LEVL III: ICD-10-PCS | Mod: ,,,

## 2023-01-01 PROCEDURE — 81003 URINALYSIS AUTO W/O SCOPE: CPT

## 2023-01-01 PROCEDURE — 97535 SELF CARE MNGMENT TRAINING: CPT | Mod: CO

## 2023-01-01 PROCEDURE — 85014 HEMATOCRIT: CPT | Mod: 91

## 2023-01-01 PROCEDURE — 1159F PR MEDICATION LIST DOCUMENTED IN MEDICAL RECORD: ICD-10-PCS | Mod: CPTII,,, | Performed by: INTERNAL MEDICINE

## 2023-01-01 PROCEDURE — 27000221 HC OXYGEN, UP TO 24 HOURS

## 2023-01-01 PROCEDURE — 85730 THROMBOPLASTIN TIME PARTIAL: CPT

## 2023-01-01 PROCEDURE — 37221 HC ILIAC REVASC W/STENT: CPT | Mod: LT | Performed by: INTERNAL MEDICINE

## 2023-01-01 PROCEDURE — 75625 PR  ANGIO AORTOGRAM ABD SERIAL: ICD-10-PCS | Mod: 26,59,, | Performed by: INTERNAL MEDICINE

## 2023-01-01 PROCEDURE — 82945 GLUCOSE OTHER FLUID: CPT

## 2023-01-01 PROCEDURE — 97535 SELF CARE MNGMENT TRAINING: CPT

## 2023-01-01 PROCEDURE — 99233 SBSQ HOSP IP/OBS HIGH 50: CPT | Mod: ,,, | Performed by: INTERNAL MEDICINE

## 2023-01-01 PROCEDURE — 99223 1ST HOSP IP/OBS HIGH 75: CPT | Mod: ,,,

## 2023-01-01 PROCEDURE — 3079F PR MOST RECENT DIASTOLIC BLOOD PRESSURE 80-89 MM HG: ICD-10-PCS | Mod: CPTII,,, | Performed by: INTERNAL MEDICINE

## 2023-01-01 PROCEDURE — 99291 CRITICAL CARE FIRST HOUR: CPT

## 2023-01-01 PROCEDURE — 83880 ASSAY OF NATRIURETIC PEPTIDE: CPT

## 2023-01-01 PROCEDURE — 75625 CONTRAST EXAM ABDOMINL AORTA: CPT | Mod: 59 | Performed by: INTERNAL MEDICINE

## 2023-01-01 PROCEDURE — 99213 OFFICE O/P EST LOW 20 MIN: CPT | Mod: PBBFAC,PO | Performed by: INTERNAL MEDICINE

## 2023-01-01 PROCEDURE — 87070 CULTURE OTHR SPECIMN AEROBIC: CPT

## 2023-01-01 PROCEDURE — D9220A PRA ANESTHESIA: Mod: ANES,,, | Performed by: ANESTHESIOLOGY

## 2023-01-01 PROCEDURE — 12000002 HC ACUTE/MED SURGE SEMI-PRIVATE ROOM

## 2023-01-01 PROCEDURE — 85730 THROMBOPLASTIN TIME PARTIAL: CPT | Performed by: NURSE PRACTITIONER

## 2023-01-01 PROCEDURE — 88341 PR IHC OR ICC EACH ADD'L SINGLE ANTIBODY  STAINPR: ICD-10-PCS | Mod: 26,,, | Performed by: PATHOLOGY

## 2023-01-01 PROCEDURE — 85014 HEMATOCRIT: CPT | Performed by: STUDENT IN AN ORGANIZED HEALTH CARE EDUCATION/TRAINING PROGRAM

## 2023-01-01 PROCEDURE — 75716 PR  ANGIO EXTERMITY BILAT: ICD-10-PCS | Mod: 26,59,, | Performed by: INTERNAL MEDICINE

## 2023-01-01 PROCEDURE — 45378 DIAGNOSTIC COLONOSCOPY: CPT | Performed by: INTERNAL MEDICINE

## 2023-01-01 PROCEDURE — 87040 BLOOD CULTURE FOR BACTERIA: CPT

## 2023-01-01 PROCEDURE — 88341 IMHCHEM/IMCYTCHM EA ADD ANTB: CPT | Mod: 26,,, | Performed by: PATHOLOGY

## 2023-01-01 PROCEDURE — 3008F PR BODY MASS INDEX (BMI) DOCUMENTED: ICD-10-PCS | Mod: CPTII,,, | Performed by: INTERNAL MEDICINE

## 2023-01-01 PROCEDURE — 85014 HEMATOCRIT: CPT | Performed by: NURSE PRACTITIONER

## 2023-01-01 PROCEDURE — 85025 COMPLETE CBC W/AUTO DIFF WBC: CPT | Mod: 91

## 2023-01-01 PROCEDURE — 63600175 PHARM REV CODE 636 W HCPCS: Performed by: NURSE ANESTHETIST, CERTIFIED REGISTERED

## 2023-01-01 PROCEDURE — 1159F MED LIST DOCD IN RCRD: CPT | Mod: CPTII,,, | Performed by: INTERNAL MEDICINE

## 2023-01-01 PROCEDURE — 99406 PT REFUSED TOBACCO CESSATION: ICD-10-PCS | Mod: S$GLB,,,

## 2023-01-01 PROCEDURE — 88341 IMHCHEM/IMCYTCHM EA ADD ANTB: CPT | Mod: 59 | Performed by: PATHOLOGY

## 2023-01-01 PROCEDURE — 86920 COMPATIBILITY TEST SPIN: CPT | Performed by: NURSE PRACTITIONER

## 2023-01-01 PROCEDURE — 99223 1ST HOSP IP/OBS HIGH 75: CPT | Mod: ,,, | Performed by: NURSE PRACTITIONER

## 2023-01-01 PROCEDURE — 89051 BODY FLUID CELL COUNT: CPT

## 2023-01-01 PROCEDURE — 96365 THER/PROPH/DIAG IV INF INIT: CPT

## 2023-01-01 PROCEDURE — 99153 MOD SED SAME PHYS/QHP EA: CPT | Performed by: INTERNAL MEDICINE

## 2023-01-01 DEVICE — LIFESTENT® 5F VASCULAR STENT SYSTEM, 7 MM X 40 MM (80 CM DELIVERY CATHETER)
Type: IMPLANTABLE DEVICE | Site: HIP | Status: FUNCTIONAL
Brand: LIFESTENT®

## 2023-01-01 RX ORDER — MORPHINE SULFATE 15 MG/1
15 TABLET, FILM COATED, EXTENDED RELEASE ORAL EVERY 12 HOURS
Qty: 60 TABLET | Refills: 0 | Status: SHIPPED | OUTPATIENT
Start: 2023-01-01 | End: 2023-12-17

## 2023-01-01 RX ORDER — PANTOPRAZOLE SODIUM 40 MG/1
40 TABLET, DELAYED RELEASE ORAL 2 TIMES DAILY
Qty: 60 TABLET | Refills: 11 | Status: SHIPPED | OUTPATIENT
Start: 2023-01-01 | End: 2024-11-16

## 2023-01-01 RX ORDER — VALACYCLOVIR HYDROCHLORIDE 500 MG/1
1000 TABLET, FILM COATED ORAL
Status: COMPLETED | OUTPATIENT
Start: 2023-01-01 | End: 2023-01-01

## 2023-01-01 RX ORDER — HYDROMORPHONE HYDROCHLORIDE 2 MG/1
2 TABLET ORAL
Qty: 240 TABLET | Refills: 0 | Status: SHIPPED | OUTPATIENT
Start: 2023-01-01 | End: 2023-12-17

## 2023-01-01 RX ORDER — CARVEDILOL 3.12 MG/1
3.12 TABLET ORAL 2 TIMES DAILY
Status: DISCONTINUED | OUTPATIENT
Start: 2023-01-01 | End: 2023-01-01 | Stop reason: HOSPADM

## 2023-01-01 RX ORDER — PROPOFOL 10 MG/ML
VIAL (ML) INTRAVENOUS CONTINUOUS PRN
Status: DISCONTINUED | OUTPATIENT
Start: 2023-01-01 | End: 2023-01-01

## 2023-01-01 RX ORDER — HYDROMORPHONE HYDROCHLORIDE 2 MG/1
2 TABLET ORAL
Status: DISCONTINUED | OUTPATIENT
Start: 2023-01-01 | End: 2023-01-01 | Stop reason: HOSPADM

## 2023-01-01 RX ORDER — ACETAMINOPHEN 500 MG
500 TABLET ORAL EVERY 6 HOURS PRN
Status: ON HOLD | COMMUNITY
End: 2023-01-01 | Stop reason: HOSPADM

## 2023-01-01 RX ORDER — CARVEDILOL 6.25 MG/1
6.25 TABLET ORAL 2 TIMES DAILY
Status: DISCONTINUED | OUTPATIENT
Start: 2023-01-01 | End: 2023-01-01

## 2023-01-01 RX ORDER — RIVAROXABAN 2.5 MG/1
2.5 TABLET, FILM COATED ORAL 2 TIMES DAILY
Qty: 60 TABLET | Refills: 2 | Status: SHIPPED | OUTPATIENT
Start: 2023-01-01 | End: 2023-01-01 | Stop reason: SDUPTHER

## 2023-01-01 RX ORDER — ACETAMINOPHEN 325 MG/1
650 TABLET ORAL EVERY 4 HOURS PRN
Status: DISCONTINUED | OUTPATIENT
Start: 2023-01-01 | End: 2023-01-01 | Stop reason: HOSPADM

## 2023-01-01 RX ORDER — HYDROMORPHONE HYDROCHLORIDE 1 MG/ML
2 INJECTION, SOLUTION INTRAMUSCULAR; INTRAVENOUS; SUBCUTANEOUS
Status: COMPLETED | OUTPATIENT
Start: 2023-01-01 | End: 2023-01-01

## 2023-01-01 RX ORDER — HYDROCODONE BITARTRATE AND ACETAMINOPHEN 7.5; 325 MG/1; MG/1
1 TABLET ORAL EVERY 4 HOURS
Status: DISCONTINUED | OUTPATIENT
Start: 2023-01-01 | End: 2023-01-01

## 2023-01-01 RX ORDER — ASPIRIN 81 MG/1
81 TABLET ORAL DAILY
Status: DISCONTINUED | OUTPATIENT
Start: 2023-01-01 | End: 2023-01-01

## 2023-01-01 RX ORDER — ASPIRIN 325 MG
325 TABLET ORAL
Status: COMPLETED | OUTPATIENT
Start: 2023-01-01 | End: 2023-01-01

## 2023-01-01 RX ORDER — SODIUM CHLORIDE 9 MG/ML
INJECTION, SOLUTION INTRAVENOUS CONTINUOUS
Status: ACTIVE | OUTPATIENT
Start: 2023-01-01 | End: 2023-01-01

## 2023-01-01 RX ORDER — ACETAMINOPHEN 325 MG/1
650 TABLET ORAL EVERY 8 HOURS PRN
Status: CANCELLED | OUTPATIENT
Start: 2023-01-01

## 2023-01-01 RX ORDER — SODIUM CHLORIDE 0.9 % (FLUSH) 0.9 %
3 SYRINGE (ML) INJECTION EVERY 12 HOURS PRN
Status: DISCONTINUED | OUTPATIENT
Start: 2023-01-01 | End: 2023-01-01 | Stop reason: HOSPADM

## 2023-01-01 RX ORDER — VERAPAMIL HYDROCHLORIDE 2.5 MG/ML
INJECTION, SOLUTION INTRAVENOUS
Status: DISCONTINUED | OUTPATIENT
Start: 2023-01-01 | End: 2023-01-01 | Stop reason: HOSPADM

## 2023-01-01 RX ORDER — LIDOCAINE 50 MG/G
1 PATCH TOPICAL DAILY PRN
Status: DISCONTINUED | OUTPATIENT
Start: 2023-01-01 | End: 2023-01-01 | Stop reason: HOSPADM

## 2023-01-01 RX ORDER — TALC
9 POWDER (GRAM) TOPICAL NIGHTLY PRN
Status: DISCONTINUED | OUTPATIENT
Start: 2023-01-01 | End: 2023-01-01 | Stop reason: HOSPADM

## 2023-01-01 RX ORDER — ONDANSETRON 2 MG/ML
4 INJECTION INTRAMUSCULAR; INTRAVENOUS EVERY 8 HOURS PRN
Status: DISCONTINUED | OUTPATIENT
Start: 2023-01-01 | End: 2023-01-01 | Stop reason: HOSPADM

## 2023-01-01 RX ORDER — ASPIRIN 81 MG/1
81 TABLET ORAL DAILY
Qty: 30 TABLET | Refills: 0 | Status: SHIPPED | OUTPATIENT
Start: 2023-01-01 | End: 2023-01-01 | Stop reason: HOSPADM

## 2023-01-01 RX ORDER — ATORVASTATIN CALCIUM 40 MG/1
40 TABLET, FILM COATED ORAL DAILY
Status: DISCONTINUED | OUTPATIENT
Start: 2023-01-01 | End: 2023-01-01 | Stop reason: HOSPADM

## 2023-01-01 RX ORDER — ACETAMINOPHEN 500 MG
500 TABLET ORAL ONCE
Status: COMPLETED | OUTPATIENT
Start: 2023-01-01 | End: 2023-01-01

## 2023-01-01 RX ORDER — HYDRALAZINE HYDROCHLORIDE 25 MG/1
25 TABLET, FILM COATED ORAL EVERY 8 HOURS
Status: DISCONTINUED | OUTPATIENT
Start: 2023-01-01 | End: 2023-01-01

## 2023-01-01 RX ORDER — ACETAMINOPHEN 325 MG/1
650 TABLET ORAL EVERY 8 HOURS PRN
Status: DISCONTINUED | OUTPATIENT
Start: 2023-01-01 | End: 2023-01-01

## 2023-01-01 RX ORDER — MAGNESIUM SULFATE HEPTAHYDRATE 40 MG/ML
2 INJECTION, SOLUTION INTRAVENOUS ONCE
Status: COMPLETED | OUTPATIENT
Start: 2023-01-01 | End: 2023-01-01

## 2023-01-01 RX ORDER — CARVEDILOL 3.12 MG/1
3.12 TABLET ORAL 2 TIMES DAILY
Status: DISCONTINUED | OUTPATIENT
Start: 2023-01-01 | End: 2023-01-01

## 2023-01-01 RX ORDER — ATORVASTATIN CALCIUM 40 MG/1
40 TABLET, FILM COATED ORAL DAILY
Qty: 30 TABLET | Refills: 2 | Status: SHIPPED | OUTPATIENT
Start: 2023-01-01 | End: 2023-01-01 | Stop reason: SDUPTHER

## 2023-01-01 RX ORDER — HYDROMORPHONE HCL IN 0.9% NACL 6 MG/30 ML
PATIENT CONTROLLED ANALGESIA SYRINGE INTRAVENOUS CONTINUOUS
Status: DISCONTINUED | OUTPATIENT
Start: 2023-01-01 | End: 2023-01-01 | Stop reason: HOSPADM

## 2023-01-01 RX ORDER — HYDROMORPHONE HYDROCHLORIDE 1 MG/ML
0.2 INJECTION, SOLUTION INTRAMUSCULAR; INTRAVENOUS; SUBCUTANEOUS ONCE
Status: COMPLETED | OUTPATIENT
Start: 2023-01-01 | End: 2023-01-01

## 2023-01-01 RX ORDER — ONDANSETRON 2 MG/ML
4 INJECTION INTRAMUSCULAR; INTRAVENOUS EVERY 6 HOURS PRN
Status: DISCONTINUED | OUTPATIENT
Start: 2023-01-01 | End: 2023-01-01 | Stop reason: HOSPADM

## 2023-01-01 RX ORDER — CARVEDILOL 3.12 MG/1
3.12 TABLET ORAL 2 TIMES DAILY
Qty: 60 TABLET | Refills: 2 | Status: ON HOLD | OUTPATIENT
Start: 2023-01-01 | End: 2023-01-01 | Stop reason: HOSPADM

## 2023-01-01 RX ORDER — GLUCAGON 1 MG
1 KIT INJECTION
Status: DISCONTINUED | OUTPATIENT
Start: 2023-01-01 | End: 2023-01-01 | Stop reason: HOSPADM

## 2023-01-01 RX ORDER — OXYCODONE HYDROCHLORIDE 5 MG/1
5 TABLET ORAL
Status: COMPLETED | OUTPATIENT
Start: 2023-01-01 | End: 2023-01-01

## 2023-01-01 RX ORDER — PREDNISONE 20 MG/1
40 TABLET ORAL
Status: COMPLETED | OUTPATIENT
Start: 2023-01-01 | End: 2023-01-01

## 2023-01-01 RX ORDER — HYDROCODONE BITARTRATE AND ACETAMINOPHEN 500; 5 MG/1; MG/1
TABLET ORAL
Status: DISCONTINUED | OUTPATIENT
Start: 2023-01-01 | End: 2023-01-01 | Stop reason: HOSPADM

## 2023-01-01 RX ORDER — ACETAMINOPHEN 325 MG/1
650 TABLET ORAL EVERY 6 HOURS PRN
Qty: 240 TABLET | Refills: 0 | Status: SHIPPED | OUTPATIENT
Start: 2023-01-01 | End: 2023-12-17

## 2023-01-01 RX ORDER — PANTOPRAZOLE SODIUM 40 MG/10ML
40 INJECTION, POWDER, LYOPHILIZED, FOR SOLUTION INTRAVENOUS 2 TIMES DAILY
Status: DISCONTINUED | OUTPATIENT
Start: 2023-01-01 | End: 2023-01-01 | Stop reason: HOSPADM

## 2023-01-01 RX ORDER — IODIXANOL 320 MG/ML
INJECTION, SOLUTION INTRAVASCULAR
Status: DISCONTINUED | OUTPATIENT
Start: 2023-01-01 | End: 2023-01-01 | Stop reason: HOSPADM

## 2023-01-01 RX ORDER — CLINDAMYCIN HYDROCHLORIDE 150 MG/1
450 CAPSULE ORAL EVERY 6 HOURS
Qty: 120 CAPSULE | Refills: 0 | Status: SHIPPED | OUTPATIENT
Start: 2023-01-01 | End: 2023-01-01

## 2023-01-01 RX ORDER — AMOXICILLIN 250 MG
1 CAPSULE ORAL DAILY
Status: DISCONTINUED | OUTPATIENT
Start: 2023-01-01 | End: 2023-01-01 | Stop reason: HOSPADM

## 2023-01-01 RX ORDER — GABAPENTIN 300 MG/1
600 CAPSULE ORAL 3 TIMES DAILY
Qty: 180 CAPSULE | Refills: 11 | Status: SHIPPED | OUTPATIENT
Start: 2023-01-01 | End: 2024-11-16

## 2023-01-01 RX ORDER — CARVEDILOL 3.12 MG/1
3.12 TABLET ORAL 2 TIMES DAILY
Qty: 60 TABLET | Refills: 2 | Status: SHIPPED | OUTPATIENT
Start: 2023-01-01 | End: 2023-01-01 | Stop reason: SDUPTHER

## 2023-01-01 RX ORDER — TALC
9 POWDER (GRAM) TOPICAL NIGHTLY PRN
Qty: 90 TABLET | Refills: 0 | Status: SHIPPED | OUTPATIENT
Start: 2023-01-01 | End: 2023-12-17

## 2023-01-01 RX ORDER — HEPARIN SODIUM,PORCINE/D5W 25000/250
0-40 INTRAVENOUS SOLUTION INTRAVENOUS CONTINUOUS
Status: DISCONTINUED | OUTPATIENT
Start: 2023-01-01 | End: 2023-01-01

## 2023-01-01 RX ORDER — AMOXICILLIN 250 MG
1 CAPSULE ORAL 2 TIMES DAILY PRN
Status: DISCONTINUED | OUTPATIENT
Start: 2023-01-01 | End: 2023-01-01

## 2023-01-01 RX ORDER — ASPIRIN 81 MG/1
81 TABLET ORAL DAILY
Qty: 30 TABLET | Refills: 0 | Status: SHIPPED | OUTPATIENT
Start: 2023-01-01 | End: 2023-01-01 | Stop reason: SDUPTHER

## 2023-01-01 RX ORDER — IBUPROFEN 200 MG
1 TABLET ORAL DAILY
Status: DISCONTINUED | OUTPATIENT
Start: 2023-01-01 | End: 2023-01-01 | Stop reason: HOSPADM

## 2023-01-01 RX ORDER — IBUPROFEN 200 MG
1 TABLET ORAL DAILY
Qty: 30 PATCH | Refills: 0 | Status: SHIPPED | OUTPATIENT
Start: 2023-01-01 | End: 2023-12-18

## 2023-01-01 RX ORDER — PANTOPRAZOLE SODIUM 40 MG/10ML
80 INJECTION, POWDER, LYOPHILIZED, FOR SOLUTION INTRAVENOUS ONCE
Status: COMPLETED | OUTPATIENT
Start: 2023-01-01 | End: 2023-01-01

## 2023-01-01 RX ORDER — VALACYCLOVIR HYDROCHLORIDE 1 G/1
1000 TABLET, FILM COATED ORAL 3 TIMES DAILY
Qty: 21 TABLET | Refills: 0 | Status: ON HOLD | OUTPATIENT
Start: 2023-01-01 | End: 2023-01-01 | Stop reason: HOSPADM

## 2023-01-01 RX ORDER — HYDROCODONE BITARTRATE AND ACETAMINOPHEN 5; 325 MG/1; MG/1
1 TABLET ORAL EVERY 6 HOURS PRN
Status: DISCONTINUED | OUTPATIENT
Start: 2023-01-01 | End: 2023-01-01

## 2023-01-01 RX ORDER — KETOROLAC TROMETHAMINE 30 MG/ML
15 INJECTION, SOLUTION INTRAMUSCULAR; INTRAVENOUS ONCE
Status: COMPLETED | OUTPATIENT
Start: 2023-01-01 | End: 2023-01-01

## 2023-01-01 RX ORDER — DEXTROSE 40 %
30 GEL (GRAM) ORAL
Status: DISCONTINUED | OUTPATIENT
Start: 2023-01-01 | End: 2023-01-01 | Stop reason: HOSPADM

## 2023-01-01 RX ORDER — HYDROCODONE BITARTRATE AND ACETAMINOPHEN 5; 325 MG/1; MG/1
1 TABLET ORAL EVERY 4 HOURS PRN
Status: DISCONTINUED | OUTPATIENT
Start: 2023-01-01 | End: 2023-01-01

## 2023-01-01 RX ORDER — RIVAROXABAN 2.5 MG/1
2.5 TABLET, FILM COATED ORAL 2 TIMES DAILY
Qty: 60 TABLET | Refills: 2 | Status: SHIPPED | OUTPATIENT
Start: 2023-01-01 | End: 2023-01-01 | Stop reason: HOSPADM

## 2023-01-01 RX ORDER — HEPARIN SODIUM 200 [USP'U]/100ML
INJECTION, SOLUTION INTRAVENOUS
Status: DISCONTINUED | OUTPATIENT
Start: 2023-01-01 | End: 2023-01-01 | Stop reason: HOSPADM

## 2023-01-01 RX ORDER — HYDROMORPHONE HYDROCHLORIDE 1 MG/ML
1 INJECTION, SOLUTION INTRAMUSCULAR; INTRAVENOUS; SUBCUTANEOUS
Status: DISCONTINUED | OUTPATIENT
Start: 2023-01-01 | End: 2023-01-01

## 2023-01-01 RX ORDER — HEPARIN SODIUM 1000 [USP'U]/ML
INJECTION, SOLUTION INTRAVENOUS; SUBCUTANEOUS
Status: DISCONTINUED | OUTPATIENT
Start: 2023-01-01 | End: 2023-01-01 | Stop reason: HOSPADM

## 2023-01-01 RX ORDER — MIDAZOLAM HYDROCHLORIDE 1 MG/ML
INJECTION, SOLUTION INTRAMUSCULAR; INTRAVENOUS
Status: DISCONTINUED | OUTPATIENT
Start: 2023-01-01 | End: 2023-01-01 | Stop reason: HOSPADM

## 2023-01-01 RX ORDER — HYDROMORPHONE HYDROCHLORIDE 2 MG/1
2 TABLET ORAL EVERY 4 HOURS PRN
Status: DISCONTINUED | OUTPATIENT
Start: 2023-01-01 | End: 2023-01-01

## 2023-01-01 RX ORDER — CARVEDILOL 3.12 MG/1
3.12 TABLET ORAL
Status: COMPLETED | OUTPATIENT
Start: 2023-01-01 | End: 2023-01-01

## 2023-01-01 RX ORDER — GABAPENTIN 300 MG/1
300 CAPSULE ORAL 3 TIMES DAILY
Status: DISCONTINUED | OUTPATIENT
Start: 2023-01-01 | End: 2023-01-01

## 2023-01-01 RX ORDER — LIDOCAINE HYDROCHLORIDE 10 MG/ML
INJECTION, SOLUTION EPIDURAL; INFILTRATION; INTRACAUDAL; PERINEURAL
Status: DISCONTINUED | OUTPATIENT
Start: 2023-01-01 | End: 2023-01-01 | Stop reason: HOSPADM

## 2023-01-01 RX ORDER — KETOROLAC TROMETHAMINE 30 MG/ML
15 INJECTION, SOLUTION INTRAMUSCULAR; INTRAVENOUS EVERY 6 HOURS PRN
Status: DISPENSED | OUTPATIENT
Start: 2023-01-01 | End: 2023-01-01

## 2023-01-01 RX ORDER — PROPOFOL 10 MG/ML
VIAL (ML) INTRAVENOUS
Status: DISCONTINUED | OUTPATIENT
Start: 2023-01-01 | End: 2023-01-01

## 2023-01-01 RX ORDER — POLYETHYLENE GLYCOL 3350 17 G/17G
17 POWDER, FOR SOLUTION ORAL 2 TIMES DAILY
Status: DISCONTINUED | OUTPATIENT
Start: 2023-01-01 | End: 2023-01-01 | Stop reason: HOSPADM

## 2023-01-01 RX ORDER — FENTANYL CITRATE 50 UG/ML
INJECTION, SOLUTION INTRAMUSCULAR; INTRAVENOUS
Status: COMPLETED | OUTPATIENT
Start: 2023-01-01 | End: 2023-01-01

## 2023-01-01 RX ORDER — SODIUM CHLORIDE 0.9 % (FLUSH) 0.9 %
5 SYRINGE (ML) INJECTION
Status: DISCONTINUED | OUTPATIENT
Start: 2023-01-01 | End: 2023-01-01 | Stop reason: HOSPADM

## 2023-01-01 RX ORDER — HYDROMORPHONE HYDROCHLORIDE 1 MG/ML
0.2 INJECTION, SOLUTION INTRAMUSCULAR; INTRAVENOUS; SUBCUTANEOUS EVERY 6 HOURS PRN
Status: DISCONTINUED | OUTPATIENT
Start: 2023-01-01 | End: 2023-01-01

## 2023-01-01 RX ORDER — VALACYCLOVIR HYDROCHLORIDE 500 MG/1
1000 TABLET, FILM COATED ORAL 2 TIMES DAILY
Status: DISCONTINUED | OUTPATIENT
Start: 2023-01-01 | End: 2023-01-01

## 2023-01-01 RX ORDER — GABAPENTIN 300 MG/1
600 CAPSULE ORAL 3 TIMES DAILY
Status: DISCONTINUED | OUTPATIENT
Start: 2023-01-01 | End: 2023-01-01 | Stop reason: HOSPADM

## 2023-01-01 RX ORDER — PREDNISONE 50 MG/1
50 TABLET ORAL DAILY
Qty: 5 TABLET | Refills: 0 | Status: SHIPPED | OUTPATIENT
Start: 2023-01-01 | End: 2023-01-01

## 2023-01-01 RX ORDER — DIPHENHYDRAMINE HYDROCHLORIDE 50 MG/ML
INJECTION INTRAMUSCULAR; INTRAVENOUS
Status: DISCONTINUED | OUTPATIENT
Start: 2023-01-01 | End: 2023-01-01 | Stop reason: HOSPADM

## 2023-01-01 RX ORDER — HYDROCODONE BITARTRATE AND ACETAMINOPHEN 7.5; 325 MG/1; MG/1
1 TABLET ORAL EVERY 4 HOURS PRN
Status: DISCONTINUED | OUTPATIENT
Start: 2023-01-01 | End: 2023-01-01

## 2023-01-01 RX ORDER — POLYETHYLENE GLYCOL 3350 17 G/17G
17 POWDER, FOR SOLUTION ORAL 2 TIMES DAILY
Qty: 60 EACH | Refills: 0 | Status: SHIPPED | OUTPATIENT
Start: 2023-01-01 | End: 2023-12-17

## 2023-01-01 RX ORDER — MIDAZOLAM HYDROCHLORIDE 1 MG/ML
INJECTION INTRAMUSCULAR; INTRAVENOUS
Status: COMPLETED | OUTPATIENT
Start: 2023-01-01 | End: 2023-01-01

## 2023-01-01 RX ORDER — IBUPROFEN 200 MG
200 TABLET ORAL EVERY 6 HOURS PRN
Status: ON HOLD | COMMUNITY
End: 2023-01-01 | Stop reason: HOSPADM

## 2023-01-01 RX ORDER — CLOPIDOGREL BISULFATE 75 MG/1
300 TABLET ORAL
Status: COMPLETED | OUTPATIENT
Start: 2023-01-01 | End: 2023-01-01

## 2023-01-01 RX ORDER — POLYETHYLENE GLYCOL 3350 17 G/17G
17 POWDER, FOR SOLUTION ORAL 2 TIMES DAILY PRN
Status: DISCONTINUED | OUTPATIENT
Start: 2023-01-01 | End: 2023-01-01

## 2023-01-01 RX ORDER — CLOPIDOGREL BISULFATE 75 MG/1
75 TABLET ORAL DAILY
Qty: 30 TABLET | Refills: 11 | Status: SHIPPED | OUTPATIENT
Start: 2023-01-01 | End: 2023-01-01 | Stop reason: SDUPTHER

## 2023-01-01 RX ORDER — HEPARIN SODIUM,PORCINE/D5W 25000/250
0-40 INTRAVENOUS SOLUTION INTRAVENOUS CONTINUOUS
Status: CANCELLED | OUTPATIENT
Start: 2023-01-01

## 2023-01-01 RX ORDER — SODIUM BICARBONATE 650 MG/1
650 TABLET ORAL ONCE
Status: COMPLETED | OUTPATIENT
Start: 2023-01-01 | End: 2023-01-01

## 2023-01-01 RX ORDER — CEFAZOLIN SODIUM 1 G/3ML
INJECTION, POWDER, FOR SOLUTION INTRAMUSCULAR; INTRAVENOUS
Status: DISCONTINUED | OUTPATIENT
Start: 2023-01-01 | End: 2023-01-01 | Stop reason: HOSPADM

## 2023-01-01 RX ORDER — FENTANYL CITRATE 50 UG/ML
INJECTION, SOLUTION INTRAMUSCULAR; INTRAVENOUS
Status: DISCONTINUED | OUTPATIENT
Start: 2023-01-01 | End: 2023-01-01 | Stop reason: HOSPADM

## 2023-01-01 RX ORDER — CLOPIDOGREL BISULFATE 75 MG/1
75 TABLET ORAL DAILY
Status: DISCONTINUED | OUTPATIENT
Start: 2023-01-01 | End: 2023-01-01 | Stop reason: HOSPADM

## 2023-01-01 RX ORDER — LIDOCAINE 50 MG/G
1 PATCH TOPICAL DAILY PRN
Qty: 30 PATCH | Refills: 0 | Status: SHIPPED | OUTPATIENT
Start: 2023-01-01 | End: 2023-12-17

## 2023-01-01 RX ORDER — MORPHINE SULFATE 15 MG/1
15 TABLET, FILM COATED, EXTENDED RELEASE ORAL EVERY 12 HOURS
Status: DISCONTINUED | OUTPATIENT
Start: 2023-01-01 | End: 2023-01-01 | Stop reason: HOSPADM

## 2023-01-01 RX ORDER — AMOXICILLIN 250 MG
1 CAPSULE ORAL DAILY
Qty: 30 TABLET | Refills: 0 | Status: SHIPPED | OUTPATIENT
Start: 2023-01-01 | End: 2023-12-18

## 2023-01-01 RX ORDER — NALOXONE HCL 0.4 MG/ML
0.02 VIAL (ML) INJECTION
Status: DISCONTINUED | OUTPATIENT
Start: 2023-01-01 | End: 2023-01-01

## 2023-01-01 RX ORDER — POLYETHYLENE GLYCOL 3350 17 G/17G
17 POWDER, FOR SOLUTION ORAL 2 TIMES DAILY PRN
Status: DISCONTINUED | OUTPATIENT
Start: 2023-01-01 | End: 2023-01-01 | Stop reason: HOSPADM

## 2023-01-01 RX ORDER — ACETAMINOPHEN 325 MG/1
650 TABLET ORAL EVERY 6 HOURS PRN
Status: DISCONTINUED | OUTPATIENT
Start: 2023-01-01 | End: 2023-01-01 | Stop reason: HOSPADM

## 2023-01-01 RX ORDER — NIFEDIPINE 60 MG/1
60 TABLET, EXTENDED RELEASE ORAL DAILY
Status: DISCONTINUED | OUTPATIENT
Start: 2023-01-01 | End: 2023-01-01

## 2023-01-01 RX ORDER — HYDROMORPHONE HYDROCHLORIDE 1 MG/ML
1.5 INJECTION, SOLUTION INTRAMUSCULAR; INTRAVENOUS; SUBCUTANEOUS
Status: DISCONTINUED | OUTPATIENT
Start: 2023-01-01 | End: 2023-01-01 | Stop reason: HOSPADM

## 2023-01-01 RX ORDER — MORPHINE SULFATE 4 MG/ML
4 INJECTION, SOLUTION INTRAMUSCULAR; INTRAVENOUS EVERY 4 HOURS PRN
Status: DISCONTINUED | OUTPATIENT
Start: 2023-01-01 | End: 2023-01-01 | Stop reason: HOSPADM

## 2023-01-01 RX ORDER — CLINDAMYCIN HYDROCHLORIDE 150 MG/1
450 CAPSULE ORAL
Status: COMPLETED | OUTPATIENT
Start: 2023-01-01 | End: 2023-01-01

## 2023-01-01 RX ORDER — ATORVASTATIN CALCIUM 40 MG/1
40 TABLET, FILM COATED ORAL DAILY
Qty: 30 TABLET | Refills: 2 | Status: ON HOLD | OUTPATIENT
Start: 2023-01-01 | End: 2023-01-01 | Stop reason: HOSPADM

## 2023-01-01 RX ORDER — NALOXONE HCL 0.4 MG/ML
0.02 VIAL (ML) INJECTION
Status: DISCONTINUED | OUTPATIENT
Start: 2023-01-01 | End: 2023-01-01 | Stop reason: HOSPADM

## 2023-01-01 RX ORDER — MAGNESIUM SULFATE HEPTAHYDRATE 40 MG/ML
2 INJECTION, SOLUTION INTRAVENOUS
Status: COMPLETED | OUTPATIENT
Start: 2023-01-01 | End: 2023-01-01

## 2023-01-01 RX ORDER — TALC
6 POWDER (GRAM) TOPICAL NIGHTLY PRN
Status: DISCONTINUED | OUTPATIENT
Start: 2023-01-01 | End: 2023-01-01 | Stop reason: HOSPADM

## 2023-01-01 RX ORDER — LIDOCAINE HYDROCHLORIDE 20 MG/ML
INJECTION INTRAVENOUS
Status: DISCONTINUED | OUTPATIENT
Start: 2023-01-01 | End: 2023-01-01

## 2023-01-01 RX ORDER — HYDRALAZINE HYDROCHLORIDE 20 MG/ML
10 INJECTION INTRAMUSCULAR; INTRAVENOUS EVERY 6 HOURS PRN
Status: DISCONTINUED | OUTPATIENT
Start: 2023-01-01 | End: 2023-01-01

## 2023-01-01 RX ORDER — MORPHINE SULFATE 2 MG/ML
2 INJECTION, SOLUTION INTRAMUSCULAR; INTRAVENOUS EVERY 4 HOURS PRN
Status: DISPENSED | OUTPATIENT
Start: 2023-01-01 | End: 2023-01-01

## 2023-01-01 RX ORDER — AMOXICILLIN 250 MG
2 CAPSULE ORAL NIGHTLY
Status: DISCONTINUED | OUTPATIENT
Start: 2023-01-01 | End: 2023-01-01 | Stop reason: HOSPADM

## 2023-01-01 RX ORDER — DEXTROSE 40 %
15 GEL (GRAM) ORAL
Status: DISCONTINUED | OUTPATIENT
Start: 2023-01-01 | End: 2023-01-01 | Stop reason: HOSPADM

## 2023-01-01 RX ORDER — MORPHINE SULFATE 2 MG/ML
2 INJECTION, SOLUTION INTRAMUSCULAR; INTRAVENOUS EVERY 4 HOURS PRN
Status: DISCONTINUED | OUTPATIENT
Start: 2023-01-01 | End: 2023-01-01 | Stop reason: HOSPADM

## 2023-01-01 RX ORDER — MORPHINE SULFATE 2 MG/ML
6 INJECTION, SOLUTION INTRAMUSCULAR; INTRAVENOUS
Status: COMPLETED | OUTPATIENT
Start: 2023-01-01 | End: 2023-01-01

## 2023-01-01 RX ORDER — LORAZEPAM 2 MG/ML
1 INJECTION INTRAMUSCULAR EVERY 4 HOURS PRN
Status: DISCONTINUED | OUTPATIENT
Start: 2023-01-01 | End: 2023-01-01 | Stop reason: HOSPADM

## 2023-01-01 RX ORDER — CLOPIDOGREL BISULFATE 75 MG/1
75 TABLET ORAL DAILY
Qty: 30 TABLET | Refills: 11 | Status: ON HOLD | OUTPATIENT
Start: 2023-01-01 | End: 2023-01-01

## 2023-01-01 RX ADMIN — PANTOPRAZOLE SODIUM 40 MG: 40 INJECTION, POWDER, FOR SOLUTION INTRAVENOUS at 08:11

## 2023-01-01 RX ADMIN — ASPIRIN 325 MG ORAL TABLET 325 MG: 325 PILL ORAL at 09:11

## 2023-01-01 RX ADMIN — HEPARIN SODIUM 18 UNITS/KG/HR: 10000 INJECTION, SOLUTION INTRAVENOUS at 06:11

## 2023-01-01 RX ADMIN — HEPARIN SODIUM 13 UNITS/KG/HR: 10000 INJECTION, SOLUTION INTRAVENOUS at 07:04

## 2023-01-01 RX ADMIN — PANTOPRAZOLE SODIUM 40 MG: 40 INJECTION, POWDER, FOR SOLUTION INTRAVENOUS at 09:11

## 2023-01-01 RX ADMIN — GABAPENTIN 600 MG: 300 CAPSULE ORAL at 02:11

## 2023-01-01 RX ADMIN — Medication: at 10:12

## 2023-01-01 RX ADMIN — OXYCODONE HYDROCHLORIDE 5 MG: 5 TABLET ORAL at 03:04

## 2023-01-01 RX ADMIN — NITROGLYCERIN 2 INCH: 20 OINTMENT TOPICAL at 09:11

## 2023-01-01 RX ADMIN — HYDROCODONE BITARTRATE AND ACETAMINOPHEN 1 TABLET: 5; 325 TABLET ORAL at 11:11

## 2023-01-01 RX ADMIN — FENTANYL CITRATE 100 MCG: 50 INJECTION, SOLUTION INTRAMUSCULAR; INTRAVENOUS at 03:11

## 2023-01-01 RX ADMIN — MAGNESIUM SULFATE HEPTAHYDRATE 2 G: 40 INJECTION, SOLUTION INTRAVENOUS at 08:11

## 2023-01-01 RX ADMIN — HYDROCODONE BITARTRATE AND ACETAMINOPHEN 1 TABLET: 5; 325 TABLET ORAL at 03:11

## 2023-01-01 RX ADMIN — GABAPENTIN 600 MG: 300 CAPSULE ORAL at 09:11

## 2023-01-01 RX ADMIN — HYDROCODONE BITARTRATE AND ACETAMINOPHEN 1 TABLET: 5; 325 TABLET ORAL at 07:11

## 2023-01-01 RX ADMIN — HYDROCODONE BITARTRATE AND ACETAMINOPHEN 1 TABLET: 7.5; 325 TABLET ORAL at 06:11

## 2023-01-01 RX ADMIN — PIPERACILLIN SODIUM AND TAZOBACTAM SODIUM 4.5 G: 4; .5 INJECTION, POWDER, FOR SOLUTION INTRAVENOUS at 11:12

## 2023-01-01 RX ADMIN — GABAPENTIN 600 MG: 300 CAPSULE ORAL at 08:11

## 2023-01-01 RX ADMIN — Medication: at 02:12

## 2023-01-01 RX ADMIN — ASPIRIN 325 MG ORAL TABLET 325 MG: 325 PILL ORAL at 05:04

## 2023-01-01 RX ADMIN — SODIUM BICARBONATE 650 MG: 650 TABLET ORAL at 06:11

## 2023-01-01 RX ADMIN — PROPOFOL 25 MG: 10 INJECTION, EMULSION INTRAVENOUS at 10:11

## 2023-01-01 RX ADMIN — MELATONIN TAB 3 MG 9 MG: 3 TAB at 08:11

## 2023-01-01 RX ADMIN — MORPHINE SULFATE 15 MG: 15 TABLET, EXTENDED RELEASE ORAL at 08:11

## 2023-01-01 RX ADMIN — GABAPENTIN 600 MG: 300 CAPSULE ORAL at 03:11

## 2023-01-01 RX ADMIN — VANCOMYCIN HYDROCHLORIDE 1000 MG: 1 INJECTION, POWDER, LYOPHILIZED, FOR SOLUTION INTRAVENOUS at 12:12

## 2023-01-01 RX ADMIN — ASPIRIN 81 MG: 81 TABLET, COATED ORAL at 09:04

## 2023-01-01 RX ADMIN — HYDROCODONE BITARTRATE AND ACETAMINOPHEN 1 TABLET: 5; 325 TABLET ORAL at 09:11

## 2023-01-01 RX ADMIN — HYDROCODONE BITARTRATE AND ACETAMINOPHEN 1 TABLET: 7.5; 325 TABLET ORAL at 01:11

## 2023-01-01 RX ADMIN — CLOPIDOGREL BISULFATE 75 MG: 75 TABLET ORAL at 09:04

## 2023-01-01 RX ADMIN — HYDROCODONE BITARTRATE AND ACETAMINOPHEN 1 TABLET: 5; 325 TABLET ORAL at 02:11

## 2023-01-01 RX ADMIN — ACETAMINOPHEN 650 MG: 325 TABLET ORAL at 02:11

## 2023-01-01 RX ADMIN — HYDROMORPHONE HYDROCHLORIDE 0.2 MG: 1 INJECTION, SOLUTION INTRAMUSCULAR; INTRAVENOUS; SUBCUTANEOUS at 11:11

## 2023-01-01 RX ADMIN — MORPHINE SULFATE 6 MG: 2 INJECTION, SOLUTION INTRAMUSCULAR; INTRAVENOUS at 11:12

## 2023-01-01 RX ADMIN — GABAPENTIN 300 MG: 300 CAPSULE ORAL at 08:11

## 2023-01-01 RX ADMIN — HEPARIN SODIUM 13 UNITS/KG/HR: 10000 INJECTION, SOLUTION INTRAVENOUS at 01:04

## 2023-01-01 RX ADMIN — HYDROCODONE BITARTRATE AND ACETAMINOPHEN 1 TABLET: 5; 325 TABLET ORAL at 04:11

## 2023-01-01 RX ADMIN — CARVEDILOL 6.25 MG: 6.25 TABLET, FILM COATED ORAL at 10:11

## 2023-01-01 RX ADMIN — HYDROCODONE BITARTRATE AND ACETAMINOPHEN 1 TABLET: 7.5; 325 TABLET ORAL at 02:11

## 2023-01-01 RX ADMIN — MELATONIN TAB 3 MG 9 MG: 3 TAB at 09:11

## 2023-01-01 RX ADMIN — HYDROMORPHONE HYDROCHLORIDE 2 MG: 2 TABLET ORAL at 08:11

## 2023-01-01 RX ADMIN — HEPARIN SODIUM 18 UNITS/KG/HR: 10000 INJECTION, SOLUTION INTRAVENOUS at 12:11

## 2023-01-01 RX ADMIN — NIFEDIPINE 60 MG: 60 TABLET, FILM COATED, EXTENDED RELEASE ORAL at 10:11

## 2023-01-01 RX ADMIN — SODIUM CHLORIDE: 0.9 INJECTION, SOLUTION INTRAVENOUS at 11:04

## 2023-01-01 RX ADMIN — CARVEDILOL 3.12 MG: 3.12 TABLET, FILM COATED ORAL at 11:04

## 2023-01-01 RX ADMIN — HYDROMORPHONE HYDROCHLORIDE 0.2 MG: 1 INJECTION, SOLUTION INTRAMUSCULAR; INTRAVENOUS; SUBCUTANEOUS at 09:11

## 2023-01-01 RX ADMIN — MORPHINE SULFATE 15 MG: 15 TABLET, EXTENDED RELEASE ORAL at 09:11

## 2023-01-01 RX ADMIN — LORAZEPAM 1 MG: 2 INJECTION INTRAMUSCULAR; INTRAVENOUS at 12:12

## 2023-01-01 RX ADMIN — SODIUM CHLORIDE 1000 ML: 0.9 INJECTION, SOLUTION INTRAVENOUS at 06:04

## 2023-01-01 RX ADMIN — HYDROMORPHONE HYDROCHLORIDE 0.2 MG: 1 INJECTION, SOLUTION INTRAMUSCULAR; INTRAVENOUS; SUBCUTANEOUS at 12:11

## 2023-01-01 RX ADMIN — APIXABAN 5 MG: 5 TABLET, FILM COATED ORAL at 09:04

## 2023-01-01 RX ADMIN — HEPARIN SODIUM 11 UNITS/KG/HR: 10000 INJECTION, SOLUTION INTRAVENOUS at 12:04

## 2023-01-01 RX ADMIN — TOPICAL ANESTHETIC 1 EACH: 200 SPRAY DENTAL; PERIODONTAL at 10:11

## 2023-01-01 RX ADMIN — HYDROCODONE BITARTRATE AND ACETAMINOPHEN 1 TABLET: 7.5; 325 TABLET ORAL at 05:11

## 2023-01-01 RX ADMIN — SODIUM CHLORIDE: 0.9 INJECTION, SOLUTION INTRAVENOUS at 10:11

## 2023-01-01 RX ADMIN — HYDROCODONE BITARTRATE AND ACETAMINOPHEN 1 TABLET: 7.5; 325 TABLET ORAL at 10:11

## 2023-01-01 RX ADMIN — DOCUSATE SODIUM AND SENNOSIDES 1 TABLET: 8.6; 5 TABLET, FILM COATED ORAL at 09:11

## 2023-01-01 RX ADMIN — VALACYCLOVIR HYDROCHLORIDE 1000 MG: 500 TABLET, FILM COATED ORAL at 05:04

## 2023-01-01 RX ADMIN — HEPARIN SODIUM 12 UNITS/KG/HR: 10000 INJECTION, SOLUTION INTRAVENOUS at 05:04

## 2023-01-01 RX ADMIN — HYDROCODONE BITARTRATE AND ACETAMINOPHEN 1 TABLET: 5; 325 TABLET ORAL at 12:11

## 2023-01-01 RX ADMIN — ACETAMINOPHEN 500 MG: 500 TABLET ORAL at 11:11

## 2023-01-01 RX ADMIN — MAGNESIUM SULFATE HEPTAHYDRATE 2 G: 40 INJECTION, SOLUTION INTRAVENOUS at 11:11

## 2023-01-01 RX ADMIN — HYDROMORPHONE HYDROCHLORIDE 0.2 MG: 1 INJECTION, SOLUTION INTRAMUSCULAR; INTRAVENOUS; SUBCUTANEOUS at 08:11

## 2023-01-01 RX ADMIN — LIDOCAINE HYDROCHLORIDE 100 MG: 20 INJECTION, SOLUTION INTRAVENOUS at 10:11

## 2023-01-01 RX ADMIN — HYDROCODONE BITARTRATE AND ACETAMINOPHEN 1 TABLET: 5; 325 TABLET ORAL at 06:11

## 2023-01-01 RX ADMIN — NIFEDIPINE 60 MG: 60 TABLET, FILM COATED, EXTENDED RELEASE ORAL at 06:11

## 2023-01-01 RX ADMIN — HYDROMORPHONE HYDROCHLORIDE 0.2 MG: 1 INJECTION, SOLUTION INTRAMUSCULAR; INTRAVENOUS; SUBCUTANEOUS at 07:11

## 2023-01-01 RX ADMIN — HYDROMORPHONE HYDROCHLORIDE 1 MG: 1 INJECTION, SOLUTION INTRAMUSCULAR; INTRAVENOUS; SUBCUTANEOUS at 09:12

## 2023-01-01 RX ADMIN — HYDROMORPHONE HYDROCHLORIDE 1 MG: 1 INJECTION, SOLUTION INTRAMUSCULAR; INTRAVENOUS; SUBCUTANEOUS at 06:12

## 2023-01-01 RX ADMIN — GLYCOPYRROLATE 0.1 MG: 0.2 INJECTION, SOLUTION INTRAMUSCULAR; INTRAVITREAL at 10:11

## 2023-01-01 RX ADMIN — CARVEDILOL 3.12 MG: 3.12 TABLET, FILM COATED ORAL at 09:04

## 2023-01-01 RX ADMIN — KETOROLAC TROMETHAMINE 15 MG: 30 INJECTION, SOLUTION INTRAMUSCULAR; INTRAVENOUS at 01:11

## 2023-01-01 RX ADMIN — HEPARIN SODIUM 12 UNITS/KG/HR: 10000 INJECTION, SOLUTION INTRAVENOUS at 08:04

## 2023-01-01 RX ADMIN — CLOPIDOGREL BISULFATE 300 MG: 75 TABLET ORAL at 05:04

## 2023-01-01 RX ADMIN — ACETAMINOPHEN 650 MG: 325 TABLET ORAL at 06:11

## 2023-01-01 RX ADMIN — HYDROCODONE BITARTRATE AND ACETAMINOPHEN 1 TABLET: 7.5; 325 TABLET ORAL at 03:11

## 2023-01-01 RX ADMIN — HYDRALAZINE HYDROCHLORIDE 10 MG: 20 INJECTION, SOLUTION INTRAMUSCULAR; INTRAVENOUS at 03:11

## 2023-01-01 RX ADMIN — MAGNESIUM SULFATE HEPTAHYDRATE 2 G: 40 INJECTION, SOLUTION INTRAVENOUS at 07:11

## 2023-01-01 RX ADMIN — DOCUSATE SODIUM AND SENNOSIDES 1 TABLET: 8.6; 5 TABLET, FILM COATED ORAL at 08:11

## 2023-01-01 RX ADMIN — IOHEXOL 100 ML: 350 INJECTION, SOLUTION INTRAVENOUS at 10:11

## 2023-01-01 RX ADMIN — Medication: at 06:12

## 2023-01-01 RX ADMIN — CARVEDILOL 6.25 MG: 6.25 TABLET, FILM COATED ORAL at 09:11

## 2023-01-01 RX ADMIN — HYDROMORPHONE HYDROCHLORIDE 2 MG: 2 TABLET ORAL at 10:11

## 2023-01-01 RX ADMIN — MORPHINE SULFATE 4 MG: 4 INJECTION INTRAVENOUS at 03:04

## 2023-01-01 RX ADMIN — HYDROMORPHONE HYDROCHLORIDE 2 MG: 2 TABLET ORAL at 02:11

## 2023-01-01 RX ADMIN — MORPHINE SULFATE 2 MG: 2 INJECTION, SOLUTION INTRAMUSCULAR; INTRAVENOUS at 02:11

## 2023-01-01 RX ADMIN — HUMAN ALBUMIN MICROSPHERES AND PERFLUTREN 0.11 MG: 10; .22 INJECTION, SOLUTION INTRAVENOUS at 04:11

## 2023-01-01 RX ADMIN — ATORVASTATIN CALCIUM 40 MG: 40 TABLET, FILM COATED ORAL at 09:04

## 2023-01-01 RX ADMIN — HYDROCODONE BITARTRATE AND ACETAMINOPHEN 1 TABLET: 5; 325 TABLET ORAL at 01:11

## 2023-01-01 RX ADMIN — CLINDAMYCIN HYDROCHLORIDE 450 MG: 150 CAPSULE ORAL at 01:05

## 2023-01-01 RX ADMIN — MIDAZOLAM HYDROCHLORIDE 2 MG: 1 INJECTION, SOLUTION INTRAMUSCULAR; INTRAVENOUS at 03:11

## 2023-01-01 RX ADMIN — PROPOFOL 100 MCG/KG/MIN: 10 INJECTION, EMULSION INTRAVENOUS at 10:11

## 2023-01-01 RX ADMIN — MAGNESIUM SULFATE HEPTAHYDRATE 2 G: 40 INJECTION, SOLUTION INTRAVENOUS at 06:11

## 2023-01-01 RX ADMIN — HYDROMORPHONE HYDROCHLORIDE 1 MG: 1 INJECTION, SOLUTION INTRAMUSCULAR; INTRAVENOUS; SUBCUTANEOUS at 03:12

## 2023-01-01 RX ADMIN — HYDROMORPHONE HYDROCHLORIDE 0.2 MG: 1 INJECTION, SOLUTION INTRAMUSCULAR; INTRAVENOUS; SUBCUTANEOUS at 03:11

## 2023-01-01 RX ADMIN — ACETAMINOPHEN 650 MG: 325 TABLET ORAL at 03:11

## 2023-01-01 RX ADMIN — KETOROLAC TROMETHAMINE 15 MG: 30 INJECTION, SOLUTION INTRAMUSCULAR; INTRAVENOUS at 11:04

## 2023-01-01 RX ADMIN — HYDROMORPHONE HYDROCHLORIDE 2 MG: 1 INJECTION, SOLUTION INTRAMUSCULAR; INTRAVENOUS; SUBCUTANEOUS at 12:12

## 2023-01-01 RX ADMIN — HEPARIN SODIUM 11 UNITS/KG/HR: 10000 INJECTION, SOLUTION INTRAVENOUS at 08:04

## 2023-01-01 RX ADMIN — ACETAMINOPHEN 650 MG: 325 TABLET ORAL at 08:11

## 2023-01-01 RX ADMIN — CARVEDILOL 3.12 MG: 3.12 TABLET, FILM COATED ORAL at 09:11

## 2023-01-01 RX ADMIN — HYDROCODONE BITARTRATE AND ACETAMINOPHEN 1 TABLET: 5; 325 TABLET ORAL at 05:11

## 2023-01-01 RX ADMIN — GABAPENTIN 600 MG: 300 CAPSULE ORAL at 10:11

## 2023-01-01 RX ADMIN — HYDROMORPHONE HYDROCHLORIDE 1 MG: 1 INJECTION, SOLUTION INTRAMUSCULAR; INTRAVENOUS; SUBCUTANEOUS at 11:12

## 2023-01-01 RX ADMIN — MAGNESIUM SULFATE HEPTAHYDRATE 2 G: 40 INJECTION, SOLUTION INTRAVENOUS at 09:11

## 2023-01-01 RX ADMIN — CARVEDILOL 3.12 MG: 3.12 TABLET, FILM COATED ORAL at 10:04

## 2023-01-01 RX ADMIN — SODIUM CHLORIDE: 0.9 INJECTION, SOLUTION INTRAVENOUS at 03:04

## 2023-01-01 RX ADMIN — PREDNISONE 40 MG: 20 TABLET ORAL at 04:04

## 2023-01-01 RX ADMIN — KETOROLAC TROMETHAMINE 15 MG: 30 INJECTION, SOLUTION INTRAMUSCULAR; INTRAVENOUS at 12:04

## 2023-01-01 RX ADMIN — PANTOPRAZOLE SODIUM 80 MG: 40 INJECTION, POWDER, FOR SOLUTION INTRAVENOUS at 09:11

## 2023-01-01 RX ADMIN — GABAPENTIN 600 MG: 300 CAPSULE ORAL at 04:11

## 2023-01-01 RX ADMIN — MELATONIN TAB 3 MG 9 MG: 3 TAB at 11:11

## 2023-01-01 RX ADMIN — CARVEDILOL 3.12 MG: 3.12 TABLET, FILM COATED ORAL at 08:04

## 2023-04-03 PROBLEM — I70.223 CRITICAL LIMB ISCHEMIA OF BOTH LOWER EXTREMITIES: Status: ACTIVE | Noted: 2022-10-07

## 2023-04-03 NOTE — Clinical Note
Diagnosis: Critical limb ischemia of both lower extremities [[id number]]   Admitting Provider:: BAKARI BRADEN [[id number]]   Future Attending Provider: BAKARI BRADEN [[id number]]   Reason for IP Medical Treatment  (Clinical interventions that can only be accomplished in the IP setting? ) :: Continuous Heparin Infusion / Cardiology Consult   I certify that Inpatient services for greater than or equal to 2 midnights are medically necessary:: Yes   Plans for Post-Acute care--if anticipated (pick the single best option):: A. No post acute care anticipated at this time

## 2023-04-03 NOTE — Clinical Note
An angiography was performed of the proximal, mid and distal left external iliac artery via hand injection with 10 mL of contrast

## 2023-04-03 NOTE — ED PROVIDER NOTES
Encounter Date: 4/3/2023    SCRIBE #1 NOTE: I, Wilber Loving, am scribing for, and in the presence of,  Dominik Cuello MD. I have scribed the following portions of the note - Other sections scribed: HPI, ROS, Physical Exam.     History     Chief Complaint   Patient presents with    Leg Swelling     Pt presents to the ed with a red hurting left leg. Pt states that he has stints in his leg and a hx of dvt. Pt denies current use of anticoags.      This is a 62 y.o. male who has no past medical history on file.     The patient presents to the Emergency Department with left foot pain.   Patient reports a sharp pain that starts at the left 2nd toe and shoots up the leg.  He also reports intermittent numbness to both feet.  Pt denies blood thinner or aspirin use.  Pt is a current smoker (<1ppd)  He has a past medical history of DVT and has had stents placed in the left foot.    The history is provided by the patient.   Review of patient's allergies indicates:  No Known Allergies  Past Medical History:   Diagnosis Date    Anticoagulant long-term use     DVT (deep venous thrombosis)     Seizures     epilepsy controlled w/ marijuana     Past Surgical History:   Procedure Laterality Date    ANGIOGRAPHY OF LOWER EXTREMITY Left 10/7/2022    Procedure: Angiogram Extremity Unilateral;  Surgeon: Charles Patten III, MD;  Location: [facility] CATH LAB;  Service: Cardiology;  Laterality: Left;    AORTOGRAPHY WITH SERIALOGRAPHY Bilateral 10/7/2022    Procedure: AORTOGRAM, WITH SERIALOGRAPHY;  Surgeon: Charles Patten III, MD;  Location: [facility] CATH LAB;  Service: Cardiology;  Laterality: Bilateral;     Family History   Problem Relation Age of Onset    No Known Problems Mother     Heart disease Father      Social History     Tobacco Use    Smoking status: Every Day     Packs/day: 2.00     Years: 49.00     Pack years: 98.00     Types: Cigarettes     Start date: 1974    Smokeless tobacco: Never    Tobacco comments:     Pt states that he  smoked 2 pk/day cigarettes until approximately one month ago, at which time he cut down to 0.5 pk/day.  He declines referral to Ambulatory Smoking Cessation clinic.  Handout provided.    Substance Use Topics    Alcohol use: Not Currently     Comment: rarely 1 beer    Drug use: Yes     Types: Marijuana     Review of Systems   Musculoskeletal:         +left foot pain   Neurological:  Positive for numbness.   All other systems reviewed and are negative.    Physical Exam     Initial Vitals [04/03/23 1311]   BP Pulse Resp Temp SpO2   (!) 187/92 89 18 97.6 °F (36.4 °C) 99 %      MAP       --         Physical Exam    Nursing note and vitals reviewed.  Constitutional: He appears well-developed and well-nourished. He is not diaphoretic. No distress.   HENT:   Head: Normocephalic and atraumatic.   Mouth/Throat: Oropharynx is clear and moist.   Eyes: Conjunctivae are normal.   Cardiovascular:  Normal rate and regular rhythm.           Pulmonary/Chest: No respiratory distress.   Musculoskeletal:      Comments: Left ankle and distal foot:  Significant tenderness to light touch. Foot is cold to touch. Notable pallor and blanching.  Right foot:   Foot not dusky in appearance, no evidence of gangrene.  Foot is cold to the touch.     Neurological: He is alert and oriented to person, place, and time. A sensory deficit is present.   Numbness to bilateral foot.   Skin: Skin is warm and dry. Capillary refill takes more than 3 seconds. No rash noted. No pallor.   Decreased capillary refill, bilateral feet.   Psychiatric: He has a normal mood and affect.       ED Course   Procedures  Labs Reviewed   CBC W/ AUTO DIFFERENTIAL - Abnormal; Notable for the following components:       Result Value    RBC 2.95 (*)     Hemoglobin 9.1 (*)     Hematocrit 28.6 (*)     MCHC 31.8 (*)     RDW 21.2 (*)     Immature Granulocytes 1.4 (*)     Immature Grans (Abs) 0.12 (*)     All other components within normal limits   COMPREHENSIVE METABOLIC PANEL -  Abnormal; Notable for the following components:    Sodium 131 (*)     Total Protein 9.9 (*)     Total Bilirubin 1.5 (*)     ALT 7 (*)     All other components within normal limits   CBC W/ AUTO DIFFERENTIAL - Abnormal; Notable for the following components:    RBC 2.81 (*)     Hemoglobin 8.7 (*)     Hematocrit 26.8 (*)     RDW 21.0 (*)     MPV 8.9 (*)     Immature Granulocytes 1.4 (*)     Immature Grans (Abs) 0.09 (*)     All other components within normal limits    Narrative:     Draw baseline aPTT prior to starting the heparin bolus or  infusion  (if patient is on warfarin prior to heparin therapy)   APTT - Abnormal; Notable for the following components:    aPTT 136.9 (*)     All other components within normal limits   APTT - Abnormal; Notable for the following components:    aPTT 48.3 (*)     All other components within normal limits    Narrative:     Elevated PTT   APTT   PROTIME-INR   APTT    Narrative:     Draw baseline aPTT prior to starting the heparin bolus or  infusion  (if patient is on warfarin prior to heparin therapy)   PROTIME-INR    Narrative:     Draw baseline aPTT prior to starting the heparin bolus or  infusion  (if patient is on warfarin prior to heparin therapy)          Imaging Results              US Lower Extremity Arteries Bilateral (Final result)  Result time 04/03/23 16:40:10      Final result by Tammi Warner MD (04/03/23 16:40:10)                   Impression:      1. Occlusion of the distal right superficial femoral artery.  There is reconstitution at the level of the right proximal popliteal artery.  2. Stenosis of the right common femoral artery.      Electronically signed by: Tammi Warner  Date:    04/03/2023  Time:    16:40               Narrative:    EXAMINATION:  US LOWER EXTREMITY ARTERIES BILATERAL    CLINICAL HISTORY:  Atherosclerosis of native arteries of extremities with rest pain, bilateral legs    TECHNIQUE:  Bilateral spectral, color and grayscale images of the  large arteries of both lower extremities were performed.    COMPARISON:  10/10/2022    FINDINGS:  Peak systolic velocities were obtained as follows (in cm/sec):    Right common femoral:  137/381, triphasic    Right deep femoral artery: 185, triphasic    Right superficial femoral proximal: 78, triphasic    Right superficial femoral mid: 84, monophasic    Right superficial femoral distal: Occluded    Right proximal popliteal: 48, monophasic    Right distal popliteal: 19, monophasic    Right posterior tibial:  28, monophasic    Right anterior tibial:  26, monophasic    Left common femoral:  38, monophasic    Left deep femoral: 28, monophasic    Left superficial femoral proximal: 68, monophasic    Left superficial femoral mid: 61, monophasic    Left superficial femoral distal: 50, monophasic    Left proximal popliteal: 29, monophasic    Left distal popliteal: 34, monophasic    Left posterior tibial: 15, monophasic    Left anterior tibial:  24, monophasic                                       Medications   heparin 25,000 units in dextrose 5% 250 mL (100 units/mL) infusion LOW INTENSITY nomogram - [facility] (11 Units/kg/hr × 74.2 kg (Adjusted) Intravenous Handoff 4/4/23 0730)   heparin 25,000 units in dextrose 5% (100 units/ml) IV bolus from bag - ADDITIONAL PRN BOLUS - 60 units/kg (max bolus 4000 units) (4,000 Units Intravenous Bolus from Bag 4/4/23 0596)   heparin 25,000 units in dextrose 5% (100 units/ml) IV bolus from bag - ADDITIONAL PRN BOLUS - 30 units/kg (max bolus 4000 units) (has no administration in time range)   atorvastatin tablet 40 mg (40 mg Oral Given 4/4/23 0913)   carvediloL tablet 3.125 mg (3.125 mg Oral Given 4/4/23 0913)   sodium chloride 0.9% flush 3 mL (has no administration in time range)   melatonin tablet 6 mg (6 mg Oral Not Given 4/3/23 5214)   ondansetron injection 4 mg (has no administration in time range)   naloxone 0.4 mg/mL injection 0.02 mg (has no administration in time range)   glucagon (human  recombinant) injection 1 mg (has no administration in time range)   dextrose 10% bolus 125 mL 125 mL (has no administration in time range)   dextrose 10% bolus 250 mL 250 mL (has no administration in time range)   dextrose 40 % gel 15,000 mg (has no administration in time range)   dextrose 40 % gel 30,000 mg (has no administration in time range)   acetaminophen tablet 650 mg (has no administration in time range)   ketorolac injection 15 mg (15 mg Intravenous Given 4/3/23 2328)   morphine injection 4 mg (4 mg Intravenous Given 4/4/23 0311)   aspirin EC tablet 81 mg (81 mg Oral Given 4/4/23 0913)   clopidogreL tablet 75 mg (75 mg Oral Given 4/4/23 0913)   heparin infusion 1,000 units/500 ml in 0.9% NaCl (pressure line flush) (2,000 Units/hr Intravenous New Bag 4/4/23 1126)   0.9%  NaCl infusion ( Intravenous New Bag 4/4/23 1525)   oxyCODONE immediate release tablet 5 mg (5 mg Oral Given 4/3/23 1530)   aspirin tablet 325 mg (325 mg Oral Given 4/3/23 1741)   clopidogreL tablet 300 mg (300 mg Oral Given 4/3/23 1741)   heparin 25,000 units in dextrose 5% (100 units/ml) IV bolus from bag INITIAL BOLUS (max bolus 4000 units) (4,000 Units Intravenous Bolus from Bag 4/3/23 1747)     Medical Decision Making:   History:   Old Medical Records: I decided to obtain old medical records.  Old Records Summarized: records from previous admission(s).       <> Summary of Records: [facility] for [facility] issue - found to have large thrombus in left common through external iliac arteries into the thigh requiring thrombectomy.   Initial Assessment:   This is an emergent evaluation of a 62 y.o.male patient with presentation of pain to his left foot and distal leg, history of critical limb ischemia requiring thrombectomy last year.  Foot is pale, has evidence of mottling, exquisitely sensitive to touch, no pulse and delayed cap refill.  Concern for recurrent thrombosis to the LLE with acute to subacute critical limb ischemia.      Initial differentials include but are not limited to:  As above, neuropathy, sciatica, less likely aortic or arterial dissection.     Plan:  Ultrasound bilateral lower extremities, CBC, CMP, PT/INR, consider hospitalization in his patient with likely critical limb ischemia   Clinical Tests:   Lab Tests: Ordered and Reviewed       <> Summary of Lab:   CBC: Chronic anemia  CMP: hyponatremia, elev bilirubin  PT/PTT WNL    Radiological Study: Ordered and Reviewed  Medical Tests: Ordered and Reviewed        Scribe Attestation:   Scribe #1: I performed the above scribed service and the documentation accurately describes the services I performed. I attest to the accuracy of the note.    Attending Attestation:         Attending Critical Care:   Critical Care Times:   Direct Patient Care (initial evaluation, reassessments, and time considering the case)................................................................10 minutes.   Additional History from reviewing old medical records or taking additional history from the family, EMS, PCP, etc.......................10 minutes.   Ordering, Reviewing, and Interpreting Diagnostic Studies...............................................................................................................5 minutes.   Documentation..................................................................................................................................................................................10 minutes.   Consultation with other Physicians. .................................................................................................................................................10 minutes.   ==============================================================  Total Critical Care Time - exclusive of procedural time: 45 minutes.  ==============================================================  Critical care reasons: vascular emergency with acute critical limb ischemia.    Critical care was time spent personally by me on the following activities: obtaining history from patient or relative, examination of patient, review of x-rays / CT sent with the patient, review of old charts, ordering lab, x-rays, and/or EKG, development of treatment plan with patient or relative, ordering and performing treatments and interventions, evaluation of patient's response to treatment, discussion with consultants, interpretation of cardiac measurements and re-evaluation of patient's conition.         ED Course as of 04/04/23 1957 Mon Apr 03, 2023   1611 WBC: 8.68 [NP]   1611 Hemoglobin(!): 9.1 [NP]   1611 Hematocrit(!): 28.6 [NP]   1611 Platelets: 301 [NP]   1611 Sodium(!): 131 [NP]   1611 Potassium: 4.0 [NP]   1611 Chloride: 99 [NP]   1611 CO2: 23 [NP]   1611 Glucose: 95 [NP]   1611 BUN: 9 [NP]   1611 Creatinine: 0.8 [NP]   1611 BILIRUBIN TOTAL(!): 1.5 [NP]   1611 Protime: 11.1 [NP]   1611 INR: 1.1 [NP]   1611 aPTT: 25.3 [NP]      ED Course User Index  [NP] Dominik Cuello MD            US shows decreased flow and velocity to left left lower extremity as compared to the right.    I discussed the case with Cardiology, Dr. Yi.  Believes patient needs emergent angiogram.  Admit to Hospital Medicine, with Cardiology consultation.  Will start patient on aspirin, Plavix load, heparin bolus and drip.    Case discussed with Hospital Medicine JJ, plan as above including angiogram and meds for acute limb ischemia.           Clinical Impression:   Final diagnoses:  [I70.223] Critical limb ischemia of both lower extremities  [I99.8] Acute lower limb ischemia (Primary)        ED Disposition Condition    Admit Corinne Cuello MD  04/04/23 1958

## 2023-04-03 NOTE — Clinical Note
A percutaneous stick to the right radial artery was performed. Ultrasound guidance was used to obtain access. ANTICOAGULATION FOLLOW-UP CLINIC VISIT    Patient Name:  Bebe Alfonso  Date:  2020  Contact Type:  Telephone    SUBJECTIVE:  Patient Findings     Comments:   No changes in medications, activity, or diet noted. No concerns with clotting, bleeding, or increased bruising noted. Took warfarin as prescribed.  Patient is to continue maintenance warfarin plan, and check INR in 6 weeks.  Patient verbalizes understanding and agrees to plan. No further questions or concerns.        Clinical Outcomes     Negatives:   Major bleeding event, Thromboembolic event, Anticoagulation-related hospital admission, Anticoagulation-related ED visit, Anticoagulation-related fatality    Comments:   No changes in medications, activity, or diet noted. No concerns with clotting, bleeding, or increased bruising noted. Took warfarin as prescribed.  Patient is to continue maintenance warfarin plan, and check INR in 6 weeks.  Patient verbalizes understanding and agrees to plan. No further questions or concerns.           OBJECTIVE    Recent labs: (last 7 days)     20  0841   INR 2.30*       ASSESSMENT / PLAN  INR assessment THER    Recheck INR In: 6 WEEKS    INR Location Clinic      Anticoagulation Summary  As of 2020    INR goal:   2.0-3.0   TTR:   83.4 % (1 y)   INR used for dosin.30 (2020)   Warfarin maintenance plan:   10 mg (5 mg x 2) every Mon; 7.5 mg (5 mg x 1.5) all other days   Full warfarin instructions:   10 mg every Mon; 7.5 mg all other days   Weekly warfarin total:   55 mg   No change documented:   Deneen Pretty, RN   Plan last modified:   Natalie Sorto RN (2020)   Next INR check:   2020   Priority:   Maintenance   Target end date:   Indefinite    Indications    History of CVA (cerebrovascular accident) (Resolved) [Z86.73]  Long term current use of anticoagulant therapy [Z79.01]             Anticoagulation Episode Summary     INR check location:       Preferred lab:       Send INR reminders to:    [facility]    Comments:   * Pt states she has had 4 strokes (some after hip surgeries).       Anticoagulation Care Providers     Provider Role Specialty Phone number    Diana Ely APRN CNP Referring [facility] [phone]            See the Encounter Report to view Anticoagulation Flowsheet and Dosing Calendar (Go to Encounters tab in chart review, and find the Anticoagulation Therapy Visit)        Deneen Pretty RN

## 2023-04-03 NOTE — Clinical Note
An angiography was performed of the mid suprarenal abdominal aorta  via power injection. Injection was performed with 30 mL contrast at 15 mL/s.

## 2023-04-03 NOTE — Clinical Note
350 ml of contrast were injected throughout the case. 150 mL of contrast was the total wasted during the case. 500 mL was the total amount used during the case.

## 2023-04-03 NOTE — FIRST PROVIDER EVALUATION
Emergency Department TeleTriage Encounter Note      CHIEF COMPLAINT    Chief Complaint   Patient presents with    Leg Swelling     Pt presents to the ed with a red hurting left leg. Pt states that he has stints in his leg and a hx of dvt. Pt denies current use of anticoags.        VITAL SIGNS   Initial Vitals [04/03/23 1311]   BP Pulse Resp Temp SpO2   (!) 187/92 89 18 97.6 °F (36.4 °C) 99 %      MAP       --            ALLERGIES    Review of patient's allergies indicates:  No Known Allergies    PROVIDER TRIAGE NOTE  This is a teletriage evaluation of a 62 y.o. male presenting to the ED complaining of LLE redness and pain . PMhx of Acute thrombotic occlusion of the L proximal common iliac artery, L External Iliac artery with severe thrombus burden extending to popliteal artery with 100% occlusion. Had successful aspiration thrombectomy and PTA of the L proximal External Iliac artery. Pt denies CP and SOB. No current anticoagulant use.     Well-appearing, no distress.     Initial orders will be placed and care will be transferred to an alternate provider when patient is roomed for a full evaluation. Any additional orders and the final disposition will be determined by that provider.         ORDERS  Labs Reviewed - No data to display    ED Orders (720h ago, onward)      Start Ordered     Status Ordering Provider    04/03/23 1321 04/03/23 1321  CBC Auto Differential  STAT         Ordered YURIDIA BUCHANAN    04/03/23 1321 04/03/23 1321  Comprehensive Metabolic Panel  STAT         Ordered YURIDIA BUCHANAN    04/03/23 1321 04/03/23 1321  Insert Saline lock IV  Once         Ordered YURIDIA BUCHANAN              Virtual Visit Note: The provider triage portion of this emergency department evaluation and documentation was performed via [employer], a HIPAA-compliant telemedicine application, in concert with a tele-presenter in the room. A face to face patient evaluation with one of my colleagues  will occur once the patient is placed in an emergency department room.      DISCLAIMER: This note was prepared with [employer] voice recognition transcription software. Garbled syntax, mangled pronouns, and other bizarre constructions may be attributed to that software system.

## 2023-04-04 PROBLEM — I73.9 PAD (PERIPHERAL ARTERY DISEASE): Status: ACTIVE | Noted: 2023-01-01

## 2023-04-04 NOTE — NURSING
Received report from Cath lab nurse, pt arrived on unit. Sites assessed, Pulses assessed with doppler, VS taken. Heparin to resume at 11 units at 20:00 (8pm.), Pt can sit up at 19:30 (7:30pm), at 21:30 (9:30pm) pt can ambulate. Diet ordered, bed in lowest position, call bell within reach. Instructed to call for assistance.

## 2023-04-04 NOTE — ASSESSMENT & PLAN NOTE
· Per chart review, prior thrombosis noted LLE:  ·  Acute thrombotic occlusion of the L proximal common iliac artery due to a 80% discrete stenois of the proxiaml L External Iliac artery with severe thrombus burden extending to popliteal artery with 100% occlusion status post successful aspiration thrombectomy and PTA with 8x58 mm and 8x26 mm covered stents of the L proximal External Iliac artery  · See above for plan  Will need medication assistance to afford medications

## 2023-04-04 NOTE — H&P
[facility] Medicine  History & Physical    Patient Name: Eric Soliman  MRN: [id number]  Patient Class: IP- Inpatient  Admission Date: 4/3/2023  Attending Physician: Leah Torres*   Primary Care Provider: Primary Doctor No         Patient information was obtained from patient, past medical records and ER records.     Subjective:     Principal Problem:Critical limb ischemia of both lower extremities    Chief Complaint:   Chief Complaint   Patient presents with    Leg Swelling     Pt presents to the ed with a red hurting left leg. Pt states that he has stints in his leg and a hx of dvt. Pt denies current use of anticoags.         HPI: Eric Soliman is a 62 year old male w/ PMH DVT LLE s/p stent placement and epilepsy. Patient presented to the ED w/ significant left foot pain that he states has been worsening throughout the weekend, which prompted him to come in. He reports a sharp pain that shoots up the leg and prevents him from being able to walk. He also reports redness in the left foot and intermittent numbness to b/l feet. He had stents placed in his LLE 2/2 DVT 10/2022. He reports that he was initially prescribed xarelto but was unable to afford it so he stopped taking it a few months ago. He denies CP, SOB, abd pain, palpitation, N/V, or other complaints.    Cardiology contacted by ED provider and patient initiated on heparin gtt, plavix load, and ASA load.       Past Medical History:   Diagnosis Date    Anticoagulant long-term use     DVT (deep venous thrombosis)     Seizures     epilepsy controlled w/ marijuana       Past Surgical History:   Procedure Laterality Date    ANGIOGRAPHY OF LOWER EXTREMITY Left 10/7/2022    Procedure: Angiogram Extremity Unilateral;  Surgeon: Charles Patten III, MD;  Location: [facility] CATH LAB;  Service: Cardiology;  Laterality: Left;    AORTOGRAPHY WITH SERIALOGRAPHY Bilateral 10/7/2022    Procedure: AORTOGRAM, WITH SERIALOGRAPHY;  Surgeon: Charles  SURINDER Patten III, MD;  Location: [facility] CATH LAB;  Service: Cardiology;  Laterality: Bilateral;       Review of patient's allergies indicates:  No Known Allergies    Current Facility-Administered Medications on File Prior to Encounter   Medication    iodixanoL (VISIPAQUE 320) injection     Current Outpatient Medications on File Prior to Encounter   Medication Sig    atorvastatin (LIPITOR) 40 MG tablet Take 1 tablet (40 mg total) by mouth once daily.    carvediloL (COREG) 3.125 MG tablet Take 1 tablet (3.125 mg total) by mouth 2 (two) times daily.    clopidogreL (PLAVIX) 75 mg tablet Take 1 tablet (75 mg total) by mouth once daily.    rivaroxaban (XARELTO) 20 mg Tab Take 1 tablet (20 mg total) by mouth daily with dinner or evening meal.     Family History       Problem Relation (Age of Onset)    Heart disease Father    No Known Problems Mother          Tobacco Use    Smoking status: Every Day     Packs/day: 0.50     Types: Cigarettes    Smokeless tobacco: Never    Tobacco comments:     Refusing nicotine patch   Substance and Sexual Activity    Alcohol use: Not Currently     Comment: rarely 1 beer    Drug use: Yes     Types: Marijuana    Sexual activity: Not on file     Review of Systems   Constitutional:  Positive for activity change. Negative for chills and fever.   HENT:  Negative for trouble swallowing.    Respiratory:  Negative for cough and shortness of breath.    Cardiovascular:  Negative for chest pain and palpitations.   Gastrointestinal:  Negative for abdominal pain, nausea and vomiting.   Genitourinary:  Negative for difficulty urinating.   Musculoskeletal:  Positive for gait problem and myalgias (Left foot pain).   Skin:  Positive for color change.   Neurological:  Positive for numbness (b/l feet). Negative for dizziness, seizures, facial asymmetry, light-headedness and headaches.   Psychiatric/Behavioral:  Negative for agitation and confusion. The patient is not nervous/anxious.    Objective:     Vital Signs  (Most Recent):  Temp: 97.6 °F (36.4 °C) (04/03/23 1311)  Pulse: 77 (04/03/23 1748)  Resp: 18 (04/03/23 1530)  BP: (!) 161/84 (04/03/23 1502)  SpO2: (!) 92 % (04/03/23 1748)   Vital Signs (24h Range):  Temp:  [97.6 °F (36.4 °C)] 97.6 °F (36.4 °C)  Pulse:  [72-91] 77  Resp:  [18] 18  SpO2:  [88 %-99 %] 92 %  BP: (161-187)/(81-92) 161/84     Weight: 79.4 kg (175 lb)  Body mass index is 25.84 kg/m².    Physical Exam  Vitals and nursing note reviewed.   HENT:      Head: Normocephalic and atraumatic.   Cardiovascular:      Rate and Rhythm: Normal rate and regular rhythm.      Pulses:           Dorsalis pedis pulses are detected w/ Doppler on the right side and detected w/ Doppler on the left side.      Heart sounds: Normal heart sounds. No murmur heard.     Comments: B/l DP pulses faintly detected w/ doppler  Pulmonary:      Effort: Pulmonary effort is normal. No respiratory distress.      Breath sounds: Normal breath sounds. No wheezing, rhonchi or rales.   Abdominal:      General: Bowel sounds are normal. There is no distension.      Palpations: Abdomen is soft.      Tenderness: There is no abdominal tenderness.   Musculoskeletal:         General: Tenderness (LLE) present.      Right lower leg: No edema.      Left lower leg: No edema.   Skin:     General: Skin is warm.      Findings: Erythema (LLE) present.   Neurological:      Mental Status: He is alert and oriented to person, place, and time.   Psychiatric:         Mood and Affect: Mood normal.         Behavior: Behavior normal.         Thought Content: Thought content normal.         Judgment: Judgment normal.           Significant Labs: All pertinent labs within the past 24 hours have been reviewed.    Significant Imaging: I have reviewed all pertinent imaging results/findings within the past 24 hours.    Assessment/Plan:     * Critical limb ischemia of both lower extremities  LLE pain, tenderness, color change  Faint pulses dopplerable b/l feet  S/p angiogram and  "stent placement LLE 2/2 thrombosis 10/2022  Patient stopped xarelto d/t inability to afford  Cardiology consulted  Heparin gtt  Plavix load  ASA load  Will start statin  NPO after MN for possible interventions      Seizures  Unknown last seizure event  Epilepsy "controlled" by patient w/ marijuana usage  Does not take anti-epileptic  Initiate seizure precautions      Thrombosis  Per chart review, prior thrombosis noted LLE:   Acute thrombotic occlusion of the L proximal common iliac artery due to a 80% discrete stenois of the proxiaml L External Iliac artery with severe thrombus burden extending to popliteal artery with 100% occlusion status post successful aspiration thrombectomy and PTA with 8x58 mm and 8x26 mm covered stents of the L proximal External Iliac artery  See above for plan      Primary hypertension  Will start coreg      Tobacco abuse  Ready to quit: Yes  Counseling given: Yes  Tobacco comments: Refusing nicotine patch          VTE Risk Mitigation (From admission, onward)           Ordered     heparin 25,000 units in dextrose 5% (100 units/ml) IV bolus from bag - ADDITIONAL PRN BOLUS - 60 units/kg (max bolus 4000 units)  As needed (PRN)        Question:  Heparin Infusion Adjustment (DO NOT MODIFY ANSWER)  Answer:  \[email or web address]\epic\Images\Pharmacy\HeparinInfusions\heparin LOW INTENSITY nomogram for [facility] [id number].pdf    04/03/23 1728     heparin 25,000 units in dextrose 5% (100 units/ml) IV bolus from bag - ADDITIONAL PRN BOLUS - 30 units/kg (max bolus 4000 units)  As needed (PRN)        Question:  Heparin Infusion Adjustment (DO NOT MODIFY ANSWER)  Answer:  \[email or web address]\epic\Images\Pharmacy\HeparinInfusions\heparin LOW INTENSITY nomogram for [facility] [id number].pdf    04/03/23 1728     IP VTE LOW RISK PATIENT  Once         04/03/23 1735     Place sequential compression device  Until discontinued         04/03/23 1735     heparin 25,000 units in dextrose 5% 250 mL (100 units/mL) infusion LOW INTENSITY nomogram - " [facility]  Continuous        Question Answer Comment   Heparin Infusion Adjustment (DO NOT MODIFY ANSWER) \\[email or web address]\epic\Images\Pharmacy\HeparinInfusions\heparin LOW INTENSITY nomogram for [facility] [id number].pdf    Begin at (in units/kg/hr) 12 04/03/23 7936                               Rosenda Pineda NP  Department of Hospital Medicine  [facility] - Emergency Dept

## 2023-04-04 NOTE — PLAN OF CARE
DATE OF ADMISSION: 01/22/2019

DATE OF CONSULTATION:  

 

 

 

TYPE OF CONSULTATION:  Gastroenterology.

 

Dear Dr. Carrillo:

 

Thank you for asking me to see Mr. Johnson in GI consultation.

 

HISTORY OF PRESENT ILLNESS:  As you know, the patient is a 67-year-old white gentleman who was admitt
ed to the hospital because of pneumonia and at that time, he had septic shock on admission.  He was i
ntubated and extubated 2 times.  He is still in the intensive care unit and GI consultation is reques
alexus for consideration of placement of a percutaneous endoscopic gastrostomy tube.  He has recurrent a
spirations and he failed the swallow evaluation.  He did have a pneumothorax in the past which was tr
eated with chest tube.

 

There is a report saying that he also had hemoperitoneum at one point that needs to be evaluated.  He
 has history of emphysema.

 

PHYSICAL EXAMINATION:

GENERAL:  Currently, he is still short of breath and tachypneic.

VITAL SIGNS:  Pulse is 89, blood pressure is 112/73.

CARDIOVASCULAR:  Showed tachycardia.

RESPIRATORY:  Gurgling sounds heard bilaterally.

ABDOMEN:  Shows soft abdomen.

 

LABORATORY WORKUP:  Hemoglobin is 10.3, WBC count of 6600.  The potassium is 3.6, sodium is 154, BUN 
is 46, creatinine is 0.8.

 

CLINICAL IMPRESSION:  The patient is presenting with history of recurrent aspiration pneumonia, faile
d swallow evaluation and multiple other medical problems essentially as mentioned above.

 

PLAN:  At this time, I agree that patient could benefit from a percutaneous endoscopic gastrostomy tu
be placement for long-term nutritional support.  However, I suspect that the patient needs to be intu
bated for procedure because he appears to be rather tachypneic at this time.

 

Once again, Dr. Carrillo, thank you for this consultation.

 

 

Dictated By: ANGÉLICA DANIEL/NTS

DD:    02/12/2019 14:47:09

DT:    02/12/2019 14:57:39

Conf#: [id number]

DID#:  [id number]

CC: DONALDO CARRILLO MD; MAURI GRIGSBY MD; KARLO DUDLEY NP;*EndCC* Patient transferred to recovery cath lab slot 4 via stretcher with side rails up x2 .  Pt drowsy but able to follow commands. Pt is stable when connecting to cardiac monitors.  VSS. Right radial vasc band in place with 12ml of air in band c.d.i. no bleeding or hematoma noted. Dsg to rt and left groin with gauze and tegaderm c.d.I. with no bleeding or hematoma noted.  +2 brandon radial pulses palpated.  Able to  Doppler rt pedal and rt and left post tib pulses.  Unable to doppler left pedal pulse.  Nitro on left pedal side with tegaderm over.   Yousef aware pulse not dopplerable to left pedal site.  Skin normal in color and warm to touch, <3 sec cap refill.  Fall risk precautions given and patient acknowledges.  AIDET completed to pt.  Will continue to monitor patient.

## 2023-04-04 NOTE — ASSESSMENT & PLAN NOTE
"· Unknown last seizure event  · Epilepsy "controlled" by patient w/ marijuana usage  · Does not take anti-epileptic  · Initiate seizure precautions    "

## 2023-04-04 NOTE — ASSESSMENT & PLAN NOTE
- presented with left foot pain x 1 months with worsening for past 2 days  - toes cool and discolored; forefoot and shin warm to touch; unable to doppler DP and PT pulses on left  - BLE arterial with severe PAD indicated with decreased velocities and monophasic waveforms; suspect high grade disease of left with reocclusion of DIANNE/DEB likely  - continue DAPT and statin therapy- off prior to admission; continue IV Heparin drip for now  - feel LE angiogram needed for further evaluation and will need to proceed today  - further recs once LE angiogram completed

## 2023-04-04 NOTE — PLAN OF CARE
2 mL of air removed from radial vasc band.  No hematoma or bleeding noted.  +2 brandon radial pulses palpated. Skin normal in color, warm to touch, < 3 sec cap refill.   Will continue to monitor pt.

## 2023-04-04 NOTE — ASSESSMENT & PLAN NOTE
- SBP 120s-170s overnight  - on Coreg currently; goal BP less than 130/80; elevation could be pain related  - monitor BP and adjust meds prn

## 2023-04-04 NOTE — ASSESSMENT & PLAN NOTE
- history of PAD with intervention in 10/2022 with thrombectomy to DIANNE to popliteal with covered stent placement  - prescribed Plavix x 3months with plans for ASA for life; non compliant with ASA therapy  - stressed importance of medication compliance and smoking cessation  - will need  LE angiogram today

## 2023-04-04 NOTE — PROGRESS NOTES
Smoking cessation education provided. Pt is a 2 pk/day cigarette smoker x 49 yrs. He denies nicotine withdrawal symptoms, stating that he cut down to 0.5 pk/day on average, approximately one month ago. Pt declines referral to Ambualtory Smoking Cessation clinic at this time. Handout provided.

## 2023-04-04 NOTE — DISCHARGE INSTRUCTIONS
Take all medications as prescribed and keep all follow up appointment.       Understanding Transradial Cardiac Catheterization    Cardiac catheterization (cardiac cath) is a common, non-surgical procedure. During the procedure, your doctor will insert a long, thin tube (catheter) into an artery and move it up into your heart. Transradial means the catheter is inserted into an artery in the wrist (the radial artery). This procedure can be used to diagnose and treat certain heart problems.  Why do I need a transradial cardiac cath?  You may need a cardiac cath if signs indicate a problem with your heart. These may include:  Symptoms of chest pain, tightness, or heaviness (known as angina). This is a common symptom of blocked heart arteries, known as coronary artery disease.  Symptoms of weakness, dizziness, trouble breathing, or swollen legs or feet. These may be symptoms of a problem with a heart valve or the heart muscle.  Other test results show heart problems. Tests may include stress tests, heart scans, and echocardiography.  During a cardiac cath, your doctor can see the condition of the coronary arteries and heart valves. He or she can also check how well the heart pumps and the flow of blood through the heart. Your doctor can also measure pressures and take blood samples. And, if needed, he or she can open blocked arteries. This can help reduce symptoms of angina.  Cardiac cath is often done using a catheter inserted into an artery in the groin. During transradial cardiac cath, the catheter is inserted into an artery in the wrist. This can mean less bleeding and a faster recovery. Some people may have blockages in the groin arteries as well as in the heart arteries, making it difficult to reach the heart. The transradial approach can be used to get around this problem.   What happens during a transradial cardiac cath?  The procedure is done in the hospital or a surgery center. First, an IV line is put in your  arm or hand to deliver fluids and medicines. You will likely be given medicine to relax you and make you drowsy. When the procedure begins:  You lie on an X-ray table.  The skin over the insertion site in your wrist is numbed.  The doctor makes a tiny puncture or incision into the artery in the wrist. He or she then inserts a catheter and threads it through the blood vessel into your heart.    The doctor may inject a contrast fluid through the catheter into the arteries. This fluid makes the arteries show up better on X-rays.  Tests may be done to check the condition of your heart and arteries. If needed, the doctor can clear blockages in the arteries or do other repairs.  When the doctor is finished, he or she will remove the catheter and put direct pressure on the site to prevent bleeding.  You will stay for a time to recover, and then go home.  What are the risks of transradial cardiac cath?  These include:  Bleeding, bruising, infection, or blood clots  Damage to the radial artery that may cause injury to the hand  Allergic reaction to the contrast fluid  Abnormal heartbeat (arrhythmia)  Damage to blood vessels or tissues  Kidney damage or failure  The need for emergency heart surgery  Heart attack, stroke, or death  Date Last Reviewed: 5/1/2016  © 0355-5883 [employer]. [address] [place] [address], [place] [address]. All rights reserved. This information is not intended as a substitute for professional medical care. Always follow your healthcare professional's instructions.

## 2023-04-04 NOTE — PROGRESS NOTES
[facility] ([facility] Medicine  Progress Note    Patient Name: Eric Soliman  MRN: [id number]  Patient Class: IP- Inpatient   Admission Date: 4/3/2023  Length of Stay: 1 days  Attending Physician: Leah Torres*  Primary Care Provider: Primary Doctor No        Subjective:     Principal Problem:Critical limb ischemia of both lower extremities        HPI:  Eric Soliman is a 62 year old male w/ PMH DVT LLE s/p stent placement and epilepsy. Patient presented to the ED w/ significant left foot pain that he states has been worsening throughout the weekend, which prompted him to come in. He reports a sharp pain that shoots up the leg and prevents him from being able to walk. He also reports redness in the left foot and intermittent numbness to b/l feet. He had stents placed in his LLE 2/2 DVT 10/2022. He reports that he was initially prescribed xarelto but was unable to afford it so he stopped taking it a few months ago. He denies CP, SOB, abd pain, palpitation, N/V, or other complaints.    Cardiology contacted by ED provider and patient initiated on heparin gtt, plavix load, and ASA load.       Overview/Hospital Course:  No notes on file    Interval history:  Patient complains of left foot pain extending the leg to below the left knee.  He denies chest pain or shortness and breath.      Objective:     Vital Signs (Most Recent):  Temp: 97 °F (36.1 °C) (04/04/23 0840)  Pulse: 73 (04/04/23 0840)  Resp: 18 (04/04/23 0840)  BP: 126/61 (04/04/23 0840)  SpO2: (!) 93 % (04/04/23 0840)   Vital Signs (24h Range):  Temp:  [96.9 °F (36.1 °C)-98 °F (36.7 °C)] 97 °F (36.1 °C)  Pulse:  [64-91] 73  Resp:  [18] 18  SpO2:  [88 %-99 %] 93 %  BP: (126-187)/(58-92) 126/61     Weight: 79.4 kg (175 lb)  Body mass index is 25.84 kg/m².    Physical Exam  Vitals and nursing note reviewed.   HENT:      Head: Normocephalic and atraumatic.   Cardiovascular:      Rate and Rhythm: Normal rate and regular rhythm.      Pulses:   "         Dorsalis pedis pulses are detected w/ Doppler on the right side and detected w/ Doppler on the left side.      Heart sounds: Normal heart sounds. No murmur heard.     Comments: B/l DP pulses faintly detected w/ doppler  Pulmonary:      Effort: Pulmonary effort is normal. No respiratory distress.      Breath sounds: Normal breath sounds. No wheezing, rhonchi or rales.   Abdominal:      General: Bowel sounds are normal. There is no distension.      Palpations: Abdomen is soft.      Tenderness: There is no abdominal tenderness.   Musculoskeletal:         General: Tenderness (LLE) present.      Right lower leg: No edema.      Left lower leg: No edema.   Skin:     General: Skin is warm.      Findings: Erythema (LLE) present.   Neurological:      Mental Status: He is alert and oriented to person, place, and time.   Psychiatric:         Mood and Affect: Mood normal.         Behavior: Behavior normal.         Thought Content: Thought content normal.         Judgment: Judgment normal.           Significant Labs: All pertinent labs within the past 24 hours have been reviewed.    Significant Imaging: I have reviewed all pertinent imaging results/findings within the past 24 hours.      Assessment/Plan:      * Critical limb ischemia of both lower extremities  · LLE pain, tenderness, color change  · Faint pulses dopplerable b/l feet  · S/p angiogram and stent placement LLE 2/2 thrombosis 10/2022  · Patient stopped xarelto d/t inability to afford  · Cardiology consulted  · Heparin gtt  · Plavix load  · ASA load  · Will start statin  · NPO after MN for possible interventions  · Plan for peripheral angiogram with possible intervention today      PAD (peripheral artery disease)        Seizures  · Unknown last seizure event  · Epilepsy "controlled" by patient w/ marijuana usage  · Does not take anti-epileptic  · Initiate seizure precautions      Thrombosis  · Per chart review, prior thrombosis noted LLE:  ·  Acute thrombotic " occlusion of the L proximal common iliac artery due to a 80% discrete stenois of the proxiaml L External Iliac artery with severe thrombus burden extending to popliteal artery with 100% occlusion status post successful aspiration thrombectomy and PTA with 8x58 mm and 8x26 mm covered stents of the L proximal External Iliac artery  · See above for plan  Will need medication assistance to afford medications    Primary hypertension  · Will start coreg      Tobacco abuse  Ready to quit: Not Answered  Counseling given: Yes  Tobacco comments: Pt states that he smoked 2 pk/day cigarettes until approximately one month ago, at which time he cut down to 0.5 pk/day.  He declines referral to Ambulatory Smoking Cessation clinic.  Handout provided.     He states he does not need a nicotine patch      VTE Risk Mitigation (From admission, onward)         Ordered     heparin infusion 1,000 units/500 ml in 0.9% NaCl (pressure line flush)  Intra-op continuous PRN         04/04/23 1126     heparin 25,000 units in dextrose 5% (100 units/ml) IV bolus from bag - ADDITIONAL PRN BOLUS - 60 units/kg (max bolus 4000 units)  As needed (PRN)        Question:  Heparin Infusion Adjustment (DO NOT MODIFY ANSWER)  Answer:  \[email or web address]\epic\Images\Pharmacy\HeparinInfusions\heparin LOW INTENSITY nomogram for [facility] [id number].pdf    04/03/23 1728     heparin 25,000 units in dextrose 5% (100 units/ml) IV bolus from bag - ADDITIONAL PRN BOLUS - 30 units/kg (max bolus 4000 units)  As needed (PRN)        Question:  Heparin Infusion Adjustment (DO NOT MODIFY ANSWER)  Answer:  [email or web address]\epic\Images\Pharmacy\HeparinInfusions\heparin LOW INTENSITY nomogram for [facility] [id number].pdf    04/03/23 1728     IP VTE LOW RISK PATIENT  Once         04/03/23 1735     Place sequential compression device  Until discontinued         04/03/23 1735     heparin 25,000 units in dextrose 5% 250 mL (100 units/mL) infusion LOW INTENSITY nomogram - [facility]  Continuous        Question Answer  Comment   Heparin Infusion Adjustment (DO NOT MODIFY ANSWER) \\[email or web address]\epic\Images\Pharmacy\HeparinInfusions\heparin LOW INTENSITY nomogram for [facility] [id number].pdf    Begin at (in units/kg/hr) 12 04/03/23 5326                Discharge Planning   [place]:      Code Status: Full Code   Is the patient medically ready for discharge?:     Reason for patient still in hospital (select all that apply): Patient trending condition                     Vivian Escamilla NP  Department of Hospital Medicine   [place] - Cath Lab ([facility]

## 2023-04-04 NOTE — HOSPITAL COURSE
4/4/2023 per HPI  4/5/2023 LE angiogram yesterday with following results:       occluded left common iliac/external iliac retrograde approach through left common femoral  Successful atherectomy and aspiration thrombectomy to the left common iliac external iliac with with Rotarex atherectomy device   Successful PTA to left common iliac external iliac with 8 x 100 mm balloon  Successful stenting to left external iliac with 7.0x 40 mm self expandable stent   Clot noted in the left common femoral artery and she is occluded the flow distally and PTA left common femoral done with 5.0 x 80 mm balloon right radial approach  Residual clot noted in the left common femoral/left SFA so aspiration thrombectomy done with Carbon Salon CAT 7 aspiration catheter with successful removal of the thrombus  Angiogram findings:  Left common iliac/external iliac under 100% occluded pre intervention reduced to 0% post intervention  Left common femoral extensive thrombus pre intervention reduced to 0% post intervention  Left % mid segment from migrated thrombus reduced to 0%  Left pop diffuse 70%  3 vs run off  Plan:  Successful PTA as above  ASA/Plavix  At discharge will need DOAC   Tobacco cessation  Resume heparin gtt at previous rate without bolus after 6 hours    Tolerated procedure well and recovered on telemetry. Access site stable. Dopplerable DP/PT/popliteal pulses present. Remains on IV Heparin drip  4/6/2023 Pain improving. H&H down to 6.6&20.4 yesterday up today to 8.2&24.6. BMP WNL. Pain improving. Dopplerable pulses improved as well. HR and BP stable    Last office visit  2/6/2017  Next Appointment  6/5/2017  Last Refill    IBUPROFEN 800MG TABLETS  In chart as: ibuprofen (MOTRIN) 800 MG tablet  TAKE 1 TABLET BY MOUTH THREE TIMES DAILY       Disp: 90 tablet Refills: 0    Class: Eprescribe Start: 2/19/2017   Originally ordered: 5 months ago by Jason Andrea MD  Last refill: 1/22/2017

## 2023-04-04 NOTE — PLAN OF CARE
Received from emergency department via wheel chair. AAOx3, orientation to room provided, voices understanding. Care plan reviewed with patient, no respiratory distress noted, on room air. NSR per cardiac monitor, no red alarm noted. Education provided on medication effect and side effect, including current heparin drip, voices understanding. Call light within his reach, no apparent distress noted, bed in low position, bed alarm on, educated on the importance of calling as needed, voices understanding, stable at this time.       Problem: Adult Inpatient Plan of Care  Goal: Plan of Care Review  Outcome: Ongoing, Progressing  Goal: Patient-Specific Goal (Individualized)  Outcome: Ongoing, Progressing  Goal: Absence of Hospital-Acquired Illness or Injury  Outcome: Ongoing, Progressing  Goal: Optimal Comfort and Wellbeing  Outcome: Ongoing, Progressing  Goal: Readiness for Transition of Care  Outcome: Ongoing, Progressing

## 2023-04-04 NOTE — HPI
61yo male with CLI, PAD s/p LLE revascularization 10/2022, tobacca abuse and seizures who presented to the ER with complaints of left foot pain. He reports his pain started about one month ago and he was hopeful it would improve. He reports the pain worsened over the weekend and was unbearable yesterday therefore he presented to the ER for evaluation. He reports discoloration, pain that is intermittent at rest and can increase with weight bearing. He reports undergoing procedure by Dr. Patten in 10/2022 and took medications for some time afterwards but then independently stopped taking ASA. He unfortunately continues to smoke .5ppd down from 1.5ppd. Labs BMP WNL CBC with H&H 8.9&28.6 HR and BP stable. BLE arterial ultrasound with significant PAD present. Admitted to [facility] and Cardiology consulted for CLI evaluation

## 2023-04-04 NOTE — ASSESSMENT & PLAN NOTE
· LLE pain, tenderness, color change  · Faint pulses dopplerable b/l feet  · S/p angiogram and stent placement LLE 2/2 thrombosis 10/2022  · Patient stopped xarelto d/t inability to afford  · Cardiology consulted  · Heparin gtt  · Plavix load  · ASA load  · Will start statin  · NPO after MN for possible interventions  · Plan for peripheral angiogram with possible intervention today

## 2023-04-04 NOTE — PLAN OF CARE
Report given to Jason tele nurse.  All questions answered.  Pt resting quietly with NAD noted.  VSS.

## 2023-04-04 NOTE — PHARMACY MED REC
"    Admission Medication History     The home medication history was taken by Karie Martinez CPhT.    Medication history obtained from, Patient Verified    You may go to "Admission" then "Reconcile Home Medications" tabs to review and/or act upon these items.     The home medication list has been updated by the Pharmacy department.   Please read ALL comments highlighted in yellow.   Please address this information as you see fit.    Feel free to contact us if you have any questions or require assistance.      The medications listed below were removed from the home medication list.  Please reorder if appropriate:  Patient reports no longer taking the following medication(s):  Atorvastatin 40 mg  Carvedilol 3.125 mg  Plavix 75 mg  Xarelto 20 mg        Karie Martinez CPhT.  Ext [phone]           .        "

## 2023-04-04 NOTE — CONSULTS
[facility] - Telemetry  Cardiology  Consult Note    Patient Name: Erci Soliman  MRN: [id number]  Admission Date: 4/3/2023  Hospital Length of Stay: 1 days  Code Status: Full Code   Attending Provider: Leah Torres*   Consulting Provider: MAN Murillo, ANP  Primary Care Physician: Primary Doctor No  Principal Problem:Critical limb ischemia of both lower extremities    Patient information was obtained from patient, past medical records and ER records.     Inpatient consult to Cardiology[facility]  Consult performed by: MAN Avila, ANP  Consult ordered by: Dominik Cuello MD  Reason for consult: CLI         Subjective:     Chief Complaint:  Left foot pain      HPI:   61yo male with CLI, PAD s/p LLE revascularization 10/2022, tobacca abuse and seizures who presented to the ER with complaints of left foot pain. He reports his pain started about one month ago and he was hopeful it would improve. He reports the pain worsened over the weekend and was unbearable yesterday therefore he presented to the ER for evaluation. He reports discoloration, pain that is intermittent at rest and can increase with weight bearing. He reports undergoing procedure by Dr. Patten in 10/2022 and took medications for some time afterwards but then independently stopped taking ASA. He unfortunately continues to smoke .5ppd down from 1.5ppd. Labs BMP WNL CBC with H&H 8.9&28.6 HR and BP stable. BLE arterial ultrasound with significant PAD present. Admitted to [facility] and Cardiology consulted for CLI evaluation     Hospital Course: 4/4/2023 per HPI    Past Medical History:   Diagnosis Date    Anticoagulant long-term use     DVT (deep venous thrombosis)     Seizures     epilepsy controlled w/ marijuana       Past Surgical History:   Procedure Laterality Date    ANGIOGRAPHY OF LOWER EXTREMITY Left 10/7/2022    Procedure: Angiogram Extremity Unilateral;  Surgeon: Charles Patten III, MD;  Location:  [facility] CATH LAB;  Service: Cardiology;  Laterality: Left;    AORTOGRAPHY WITH SERIALOGRAPHY Bilateral 10/7/2022    Procedure: AORTOGRAM, WITH SERIALOGRAPHY;  Surgeon: Charles Patten III, MD;  Location: [facility] CATH LAB;  Service: Cardiology;  Laterality: Bilateral;       Review of patient's allergies indicates:  No Known Allergies    Current Facility-Administered Medications on File Prior to Encounter   Medication    iodixanoL (VISIPAQUE 320) injection     Current Outpatient Medications on File Prior to Encounter   Medication Sig    acetaminophen (TYLENOL) 500 MG tablet Take 500 mg by mouth every 6 (six) hours as needed for Pain.    ibuprofen (ADVIL,MOTRIN) 200 MG tablet Take 200 mg by mouth every 6 (six) hours as needed for Pain.     Family History       Problem Relation (Age of Onset)    Heart disease Father    No Known Problems Mother          Tobacco Use    Smoking status: Every Day     Packs/day: 0.50     Types: Cigarettes    Smokeless tobacco: Never    Tobacco comments:     Refusing nicotine patch   Substance and Sexual Activity    Alcohol use: Not Currently     Comment: rarely 1 beer    Drug use: Yes     Types: Marijuana    Sexual activity: Not on file     Review of Systems   Constitutional: Negative for chills, decreased appetite, diaphoresis and fever.   Cardiovascular:  Negative for chest pain, claudication, cyanosis, dyspnea on exertion, irregular heartbeat, leg swelling, near-syncope, orthopnea, palpitations, paroxysmal nocturnal dyspnea and syncope.   Respiratory:  Negative for cough, hemoptysis, shortness of breath and wheezing.    Gastrointestinal:  Negative for bloating, abdominal pain, constipation, diarrhea, melena, nausea and vomiting.   Neurological:  Negative for dizziness and weakness.   Objective:     Vital Signs (Most Recent):  Temp: 97 °F (36.1 °C) (04/04/23 0840)  Pulse: 73 (04/04/23 0840)  Resp: 18 (04/04/23 0840)  BP: 126/61 (04/04/23 0840)  SpO2: (!) 93 % (04/04/23 0840)   Vital  Signs (24h Range):  Temp:  [96.9 °F (36.1 °C)-98 °F (36.7 °C)] 97 °F (36.1 °C)  Pulse:  [64-91] 73  Resp:  [18] 18  SpO2:  [88 %-99 %] 93 %  BP: (126-187)/(58-92) 126/61     Weight: 79.4 kg (175 lb)  Body mass index is 25.84 kg/m².    SpO2: (!) 93 %         Intake/Output Summary (Last 24 hours) at 4/4/2023 1058  Last data filed at 4/3/2023 1956  Gross per 24 hour   Intake 57.13 ml   Output 500 ml   Net -442.87 ml       Lines/Drains/Airways       Peripheral Intravenous Line  Duration                  Peripheral IV - Single Lumen 04/03/23 1900 20 G Posterior;Right Forearm <1 day                    Physical Exam  Constitutional:       General: He is not in acute distress.     Appearance: He is well-developed.   Cardiovascular:      Rate and Rhythm: Normal rate and regular rhythm.      Pulses:           Dorsalis pedis pulses are 0 on the left side.        Posterior tibial pulses are 0 on the left side.      Heart sounds: No murmur heard.    No gallop.      Comments: Left femoral pulse 1+ faintly palpable   Pulmonary:      Effort: Pulmonary effort is normal. No respiratory distress.      Breath sounds: Normal breath sounds. No wheezing.   Abdominal:      General: Bowel sounds are normal. There is no distension.      Palpations: Abdomen is soft.      Tenderness: There is no abdominal tenderness.   Skin:     General: Skin is warm and dry.      Comments: Left toes cool to touch with discoloration. No wound noted    Neurological:      Mental Status: He is alert and oriented to person, place, and time.       Significant Labs: BMP:   Recent Labs   Lab 04/03/23  1425 04/04/23  0335   GLU 95 92   * 132*   K 4.0 4.1   CL 99 102   CO2 23 20*   BUN 9 17   CREATININE 0.8 1.0   CALCIUM 8.9 8.8    and CBC   Recent Labs   Lab 04/03/23  1425 04/03/23  1733 04/04/23  0336   WBC 8.68 6.26 6.62   HGB 9.1* 8.7* 8.9*   HCT 28.6* 26.8* 28.6*    296 311       Significant Imaging: Echocardiogram: Transthoracic echo (TTE) complete  (Cupid Only):   Results for orders placed or performed during the hospital encounter of 10/07/22   Echo   Result Value Ref Range    BSA 1.76 m2    TDI SEPTAL 0.06 m/s    LV LATERAL E/E' RATIO 4.50 m/s    LV SEPTAL E/E' RATIO 6.00 m/s    LA WIDTH 2.60 cm    IVC diameter 0.80 cm    Left Ventricular Outflow Tract Mean Velocity 0.60 cm/s    Left Ventricular Outflow Tract Mean Gradient 1.74 mmHg    AORTIC VALVE CUSP SEPERATION 2.03 cm    TDI LATERAL 0.08 m/s    LVIDd 3.96 3.5 - 6.0 cm    IVS 0.86 0.6 - 1.1 cm    Posterior Wall 0.89 0.6 - 1.1 cm    Ao root annulus 3.41 cm    LVIDs 2.64 2.1 - 4.0 cm    FS 33 28 - 44 %    LA volume 19.40 cm3    STJ 2.40 cm    Ascending aorta 2.88 cm    LV mass 103.83 g    LA size 2.27 cm    RVDD 2.00 cm    Right ventricular length in diastole (apical 4-chamber view) 5.87 cm    RA area 12.5 cm2    Left Ventricle Relative Wall Thickness 0.45 cm    AV mean gradient 2 mmHg    AV valve area 3.84 cm2    AV Velocity Ratio 0.97     AV index (prosthetic) 1.13     MV mean gradient 1 mmHg    MV valve area by continuity eq 4.20 cm2    E/A ratio 0.59     Mean e' 0.07 m/s    LVOT diameter 2.08 cm    LVOT area 3.4 cm2    LVOT peak christopher 0.98 m/s    LVOT peak VTI 19.80 cm    Ao peak christopher 1.01 m/s    Ao VTI 17.5 cm    LVOT stroke volume 67.25 cm3    AV peak gradient 4 mmHg    MV peak gradient 2 mmHg    E/E' ratio 5.14 m/s    MV Peak E Christopher 0.36 m/s    TR Max Christopher 2.36 m/s    MV VTI 16.0 cm    MV Peak A Christopher 0.61 m/s    LV Systolic Volume 25.45 mL    LV Systolic Volume Index 14.3 mL/m2    LV Diastolic Volume 68.17 mL    LV Diastolic Volume Index 38.30 mL/m2    LA Volume Index 10.9 mL/m2    LV Mass Index 58 g/m2    Right Atrium Volume Systolic 30.93 mL    RA Major Axis 4.33 cm    Left Atrium Minor Axis 3.42 cm    Left Atrium Major Axis 4.45 cm    Triscuspid Valve Regurgitation Peak Gradient 22 mmHg    LA Volume Index (Mod) 24.5 mL/m2    LA volume (mod) 43.59 cm3    RA Width 2.73 cm    Right Atrial Pressure (from  IVC) 3 mmHg    EF 55 %    TV rest pulmonary artery pressure 25 mmHg    Narrative    · The left ventricle is normal in size with concentric remodeling and   normal systolic function.  · The estimated ejection fraction is 55%.  · Indeterminate left ventricular diastolic function.  · Normal right ventricular size with normal right ventricular systolic   function.  · Normal central venous pressure (3 mmHg).  · The estimated PA systolic pressure is 25 mmHg.        Assessment and Plan:     * Critical limb ischemia of both lower extremities  - presented with left foot pain x 1 months with worsening for past 2 days  - toes cool and discolored; forefoot and shin warm to touch; unable to doppler DP and PT pulses on left  - BLE arterial with severe PAD indicated with decreased velocities and monophasic waveforms; suspect high grade disease of left with reocclusion of DIANNE/DEB likely  - continue DAPT and statin therapy- off prior to admission; continue IV Heparin drip for now  - feel LE angiogram needed for further evaluation and will need to proceed today  - further recs once LE angiogram completed     PAD (peripheral artery disease)  - history of PAD with intervention in 10/2022 with thrombectomy to DIANNE to popliteal with covered stent placement  - prescribed Plavix x 3months with plans for ASA for life; non compliant with ASA therapy  - stressed importance of medication compliance and smoking cessation  - will need  LE angiogram today     Primary hypertension  - SBP 120s-170s overnight  - on Coreg currently; goal BP less than 130/80; elevation could be pain related  - monitor BP and adjust meds prn     Tobacco abuse  - active smoker; reports down to .5ppd from 1.5ppd  - stressed importance of complete cessation          VTE Risk Mitigation (From admission, onward)         Ordered     heparin 25,000 units in dextrose 5% (100 units/ml) IV bolus from bag - ADDITIONAL PRN BOLUS - 60 units/kg (max bolus 4000 units)  As needed  (PRN)        Question:  Heparin Infusion Adjustment (DO NOT MODIFY ANSWER)  Answer:  [email or web address]\epic\Images\Pharmacy\HeparinInfusions\heparin LOW INTENSITY nomogram for [facility] [id number]    04/03/23 1728     heparin 25,000 units in dextrose 5% (100 units/ml) IV bolus from bag - ADDITIONAL PRN BOLUS - 30 units/kg (max bolus 4000 units)  As needed (PRN)        Question:  Heparin Infusion Adjustment (DO NOT MODIFY ANSWER)  Answer:  [email or web address]\epic\Images\Pharmacy\HeparinInfusions\heparin LOW INTENSITY nomogram for [facility] [id number]    04/03/23 1728     IP VTE LOW RISK PATIENT  Once         04/03/23 1735     Place sequential compression device  Until discontinued         04/03/23 1735     heparin 25,000 units in dextrose 5% 250 mL (100 units/mL) infusion LOW INTENSITY nomogram - [facility]  Continuous        Question Answer Comment   Heparin Infusion Adjustment (DO NOT MODIFY ANSWER) \\[email or web address]\epic\Images\Pharmacy\HeparinInfusions\heparin LOW INTENSITY nomogram for [facility] [id number].pdf    Begin at (in units/kg/hr) 12        04/03/23 1728                Thank you for your consult. I will follow-up with patient. Please contact us if you have any additional questions.    MAN Murillo, ANP  Cardiology   [place] - Telemetry

## 2023-04-04 NOTE — PLAN OF CARE
04/03/23 2155   Admission   Initial VN Admission Questions Complete   Communication Issues? None   Shift   Virtual Nurse - Rounding Complete   Pain Management Interventions pain management plan reviewed with patient/caregiver   Virtual Nurse - Patient Verbalized Approval Of Camera Use;VN Rounding   Type of Frequent Check   Type Patient Rounds   Safety/Activity   Safety Promotion/Fall Prevention medications reviewed;Fall Risk reviewed with patient/family;instructed to call staff for mobility     Pt arrived to unit. Introduced self as VN for this shift. Admission questions completed by VN. Educated pt on VTE risk, safety precautions, and VN's role in pt care. Opportunity given for pt's questions. All questions answered.

## 2023-04-04 NOTE — PLAN OF CARE
Patient transferred to floor on tele monitor.  VSS. No c/o pain.   Patient attached to tele monitor upon arrival in room 469  Right groin gauze/tegaderm CDI. No bleeding or hematoma noted. Left groin gauze/tegaderm CDI with no bleeding noted, slight swelling noted around tegaderm, soft to touch.  Manual pressure held and swelling reduced. Right wrist with gauze/tegaderm CDI. No bleeding or hematoma noted.  dopplered brandon pedal, post tib pulses.  site reviewed with ACE Gomez at bedside.  All questions answered.

## 2023-04-04 NOTE — SUBJECTIVE & OBJECTIVE
Past Medical History:   Diagnosis Date    Anticoagulant long-term use     DVT (deep venous thrombosis)     Seizures     epilepsy controlled w/ marijuana       Past Surgical History:   Procedure Laterality Date    ANGIOGRAPHY OF LOWER EXTREMITY Left 10/7/2022    Procedure: Angiogram Extremity Unilateral;  Surgeon: Charles Patten III, MD;  [facility];  Service: Cardiology;  Laterality: Left;    AORTOGRAPHY WITH SERIALOGRAPHY Bilateral 10/7/2022    Procedure: AORTOGRAM, WITH SERIALOGRAPHY;  Surgeon: Charles Patten III, MD;  [facility];  Service: Cardiology;  Laterality: Bilateral;       Review of patient's allergies indicates:  No Known Allergies    Current Facility-Administered Medications on File Prior to Encounter   Medication    iodixanoL (VISIPAQUE 320) injection     Current Outpatient Medications on File Prior to Encounter   Medication Sig    atorvastatin (LIPITOR) 40 MG tablet Take 1 tablet (40 mg total) by mouth once daily.    carvediloL (COREG) 3.125 MG tablet Take 1 tablet (3.125 mg total) by mouth 2 (two) times daily.    clopidogreL (PLAVIX) 75 mg tablet Take 1 tablet (75 mg total) by mouth once daily.    rivaroxaban (XARELTO) 20 mg Tab Take 1 tablet (20 mg total) by mouth daily with dinner or evening meal.     Family History       Problem Relation (Age of Onset)    Heart disease Father    No Known Problems Mother          Tobacco Use    Smoking status: Every Day     Packs/day: 0.50     Types: Cigarettes    Smokeless tobacco: Never    Tobacco comments:     Refusing nicotine patch   Substance and Sexual Activity    Alcohol use: Not Currently     Comment: rarely 1 beer    Drug use: Yes     Types: Marijuana    Sexual activity: Not on file     Review of Systems   Constitutional:  Positive for activity change. Negative for chills and fever.   HENT:  Negative for trouble swallowing.    Respiratory:  Negative for cough and shortness of breath.    Cardiovascular:  Negative for chest  pain and palpitations.   Gastrointestinal:  Negative for abdominal pain, nausea and vomiting.   Genitourinary:  Negative for difficulty urinating.   Musculoskeletal:  Positive for gait problem and myalgias (Left foot pain).   Skin:  Positive for color change.   Neurological:  Positive for numbness (b/l feet). Negative for dizziness, seizures, facial asymmetry, light-headedness and headaches.   Psychiatric/Behavioral:  Negative for agitation and confusion. The patient is not nervous/anxious.    Objective:     Vital Signs (Most Recent):  Temp: 97.6 °F (36.4 °C) (04/03/23 1311)  Pulse: 77 (04/03/23 1748)  Resp: 18 (04/03/23 1530)  BP: (!) 161/84 (04/03/23 1502)  SpO2: (!) 92 % (04/03/23 1748)   Vital Signs (24h Range):  Temp:  [97.6 °F (36.4 °C)] 97.6 °F (36.4 °C)  Pulse:  [72-91] 77  Resp:  [18] 18  SpO2:  [88 %-99 %] 92 %  BP: (161-187)/(81-92) 161/84     Weight: 79.4 kg (175 lb)  Body mass index is 25.84 kg/m².    Physical Exam  Vitals and nursing note reviewed.   HENT:      Head: Normocephalic and atraumatic.   Cardiovascular:      Rate and Rhythm: Normal rate and regular rhythm.      Pulses:           Dorsalis pedis pulses are detected w/ Doppler on the right side and detected w/ Doppler on the left side.      Heart sounds: Normal heart sounds. No murmur heard.     Comments: B/l DP pulses faintly detected w/ doppler  Pulmonary:      Effort: Pulmonary effort is normal. No respiratory distress.      Breath sounds: Normal breath sounds. No wheezing, rhonchi or rales.   Abdominal:      General: Bowel sounds are normal. There is no distension.      Palpations: Abdomen is soft.      Tenderness: There is no abdominal tenderness.   Musculoskeletal:         General: Tenderness (LLE) present.      Right lower leg: No edema.      Left lower leg: No edema.   Skin:     General: Skin is warm.      Findings: Erythema (LLE) present.   Neurological:      Mental Status: He is alert and oriented to person, place, and time.    Psychiatric:         Mood and Affect: Mood normal.         Behavior: Behavior normal.         Thought Content: Thought content normal.         Judgment: Judgment normal.           Significant Labs: All pertinent labs within the past 24 hours have been reviewed.    Significant Imaging: I have reviewed all pertinent imaging results/findings within the past 24 hours.

## 2023-04-04 NOTE — SUBJECTIVE & OBJECTIVE
Interval history:  Patient complains of left foot pain extending the leg to below the left knee.  He denies chest pain or shortness and breath.      Objective:     Vital Signs (Most Recent):  Temp: 97 °F (36.1 °C) (04/04/23 0840)  Pulse: 73 (04/04/23 0840)  Resp: 18 (04/04/23 0840)  BP: 126/61 (04/04/23 0840)  SpO2: (!) 93 % (04/04/23 0840)   Vital Signs (24h Range):  Temp:  [96.9 °F (36.1 °C)-98 °F (36.7 °C)] 97 °F (36.1 °C)  Pulse:  [64-91] 73  Resp:  [18] 18  SpO2:  [88 %-99 %] 93 %  BP: (126-187)/(58-92) 126/61     Weight: 79.4 kg (175 lb)  Body mass index is 25.84 kg/m².    Physical Exam  Vitals and nursing note reviewed.   HENT:      Head: Normocephalic and atraumatic.   Cardiovascular:      Rate and Rhythm: Normal rate and regular rhythm.      Pulses:           Dorsalis pedis pulses are detected w/ Doppler on the right side and detected w/ Doppler on the left side.      Heart sounds: Normal heart sounds. No murmur heard.     Comments: B/l DP pulses faintly detected w/ doppler  Pulmonary:      Effort: Pulmonary effort is normal. No respiratory distress.      Breath sounds: Normal breath sounds. No wheezing, rhonchi or rales.   Abdominal:      General: Bowel sounds are normal. There is no distension.      Palpations: Abdomen is soft.      Tenderness: There is no abdominal tenderness.   Musculoskeletal:         General: Tenderness (LLE) present.      Right lower leg: No edema.      Left lower leg: No edema.   Skin:     General: Skin is warm.      Findings: Erythema (LLE) present.   Neurological:      Mental Status: He is alert and oriented to person, place, and time.   Psychiatric:         Mood and Affect: Mood normal.         Behavior: Behavior normal.         Thought Content: Thought content normal.         Judgment: Judgment normal.           Significant Labs: All pertinent labs within the past 24 hours have been reviewed.    Significant Imaging: I have reviewed all pertinent imaging results/findings within  the past 24 hours.

## 2023-04-04 NOTE — SUBJECTIVE & OBJECTIVE
Past Medical History:   Diagnosis Date    Anticoagulant long-term use     DVT (deep venous thrombosis)     Seizures     epilepsy controlled w/ marijuana       Past Surgical History:   Procedure Laterality Date    ANGIOGRAPHY OF LOWER EXTREMITY Left 10/7/2022    Procedure: Angiogram Extremity Unilateral;  Surgeon: Charles Patten III, MD;  Location: [facility] CATH LAB;  Service: Cardiology;  Laterality: Left;    AORTOGRAPHY WITH SERIALOGRAPHY Bilateral 10/7/2022    Procedure: AORTOGRAM, WITH SERIALOGRAPHY;  Surgeon: Charles Patten III, MD;  Location: [facility] CATH LAB;  Service: Cardiology;  Laterality: Bilateral;       Review of patient's allergies indicates:  No Known Allergies    Current Facility-Administered Medications on File Prior to Encounter   Medication    iodixanoL (VISIPAQUE 320) injection     Current Outpatient Medications on File Prior to Encounter   Medication Sig    acetaminophen (TYLENOL) 500 MG tablet Take 500 mg by mouth every 6 (six) hours as needed for Pain.    ibuprofen (ADVIL,MOTRIN) 200 MG tablet Take 200 mg by mouth every 6 (six) hours as needed for Pain.     Family History       Problem Relation (Age of Onset)    Heart disease Father    No Known Problems Mother          Tobacco Use    Smoking status: Every Day     Packs/day: 0.50     Types: Cigarettes    Smokeless tobacco: Never    Tobacco comments:     Refusing nicotine patch   Substance and Sexual Activity    Alcohol use: Not Currently     Comment: rarely 1 beer    Drug use: Yes     Types: Marijuana    Sexual activity: Not on file     Review of Systems   Constitutional: Negative for chills, decreased appetite, diaphoresis and fever.   Cardiovascular:  Negative for chest pain, claudication, cyanosis, dyspnea on exertion, irregular heartbeat, leg swelling, near-syncope, orthopnea, palpitations, paroxysmal nocturnal dyspnea and syncope.   Respiratory:  Negative for cough, hemoptysis, shortness of breath and wheezing.    Gastrointestinal:   Negative for bloating, abdominal pain, constipation, diarrhea, melena, nausea and vomiting.   Neurological:  Negative for dizziness and weakness.   Objective:     Vital Signs (Most Recent):  Temp: 97 °F (36.1 °C) (04/04/23 0840)  Pulse: 73 (04/04/23 0840)  Resp: 18 (04/04/23 0840)  BP: 126/61 (04/04/23 0840)  SpO2: (!) 93 % (04/04/23 0840)   Vital Signs (24h Range):  Temp:  [96.9 °F (36.1 °C)-98 °F (36.7 °C)] 97 °F (36.1 °C)  Pulse:  [64-91] 73  Resp:  [18] 18  SpO2:  [88 %-99 %] 93 %  BP: (126-187)/(58-92) 126/61     Weight: 79.4 kg (175 lb)  Body mass index is 25.84 kg/m².    SpO2: (!) 93 %         Intake/Output Summary (Last 24 hours) at 4/4/2023 1058  Last data filed at 4/3/2023 1956  Gross per 24 hour   Intake 57.13 ml   Output 500 ml   Net -442.87 ml       Lines/Drains/Airways       Peripheral Intravenous Line  Duration                  Peripheral IV - Single Lumen 04/03/23 1900 20 G Posterior;Right Forearm <1 day                    Physical Exam  Constitutional:       General: He is not in acute distress.     Appearance: He is well-developed.   Cardiovascular:      Rate and Rhythm: Normal rate and regular rhythm.      Pulses:           Dorsalis pedis pulses are 0 on the left side.        Posterior tibial pulses are 0 on the left side.      Heart sounds: No murmur heard.    No gallop.      Comments: Left femoral pulse 1+ faintly palpable   Pulmonary:      Effort: Pulmonary effort is normal. No respiratory distress.      Breath sounds: Normal breath sounds. No wheezing.   Abdominal:      General: Bowel sounds are normal. There is no distension.      Palpations: Abdomen is soft.      Tenderness: There is no abdominal tenderness.   Skin:     General: Skin is warm and dry.      Comments: Left toes cool to touch with discoloration. No wound noted    Neurological:      Mental Status: He is alert and oriented to person, place, and time.       Significant Labs: BMP:   Recent Labs   Lab 04/03/23  1425 04/04/23  9250    GLU 95 92   * 132*   K 4.0 4.1   CL 99 102   CO2 23 20*   BUN 9 17   CREATININE 0.8 1.0   CALCIUM 8.9 8.8    and CBC   Recent Labs   Lab 04/03/23  1425 04/03/23  1733 04/04/23  0336   WBC 8.68 6.26 6.62   HGB 9.1* 8.7* 8.9*   HCT 28.6* 26.8* 28.6*    296 311       Significant Imaging: Echocardiogram: Transthoracic echo (TTE) complete (Cupid Only):   Results for orders placed or performed during the hospital encounter of 10/07/22   Echo   Result Value Ref Range    BSA 1.76 m2    TDI SEPTAL 0.06 m/s    LV LATERAL E/E' RATIO 4.50 m/s    LV SEPTAL E/E' RATIO 6.00 m/s    LA WIDTH 2.60 cm    IVC diameter 0.80 cm    Left Ventricular Outflow Tract Mean Velocity 0.60 cm/s    Left Ventricular Outflow Tract Mean Gradient 1.74 mmHg    AORTIC VALVE CUSP SEPERATION 2.03 cm    TDI LATERAL 0.08 m/s    LVIDd 3.96 3.5 - 6.0 cm    IVS 0.86 0.6 - 1.1 cm    Posterior Wall 0.89 0.6 - 1.1 cm    Ao root annulus 3.41 cm    LVIDs 2.64 2.1 - 4.0 cm    FS 33 28 - 44 %    LA volume 19.40 cm3    STJ 2.40 cm    Ascending aorta 2.88 cm    LV mass 103.83 g    LA size 2.27 cm    RVDD 2.00 cm    Right ventricular length in diastole (apical 4-chamber view) 5.87 cm    RA area 12.5 cm2    Left Ventricle Relative Wall Thickness 0.45 cm    AV mean gradient 2 mmHg    AV valve area 3.84 cm2    AV Velocity Ratio 0.97     AV index (prosthetic) 1.13     MV mean gradient 1 mmHg    MV valve area by continuity eq 4.20 cm2    E/A ratio 0.59     Mean e' 0.07 m/s    LVOT diameter 2.08 cm    LVOT area 3.4 cm2    LVOT peak christopher 0.98 m/s    LVOT peak VTI 19.80 cm    Ao peak christohper 1.01 m/s    Ao VTI 17.5 cm    LVOT stroke volume 67.25 cm3    AV peak gradient 4 mmHg    MV peak gradient 2 mmHg    E/E' ratio 5.14 m/s    MV Peak E Christopher 0.36 m/s    TR Max Chrisotpher 2.36 m/s    MV VTI 16.0 cm    MV Peak A Christopher 0.61 m/s    LV Systolic Volume 25.45 mL    LV Systolic Volume Index 14.3 mL/m2    LV Diastolic Volume 68.17 mL    LV Diastolic Volume Index 38.30 mL/m2    LA Volume  Index 10.9 mL/m2    LV Mass Index 58 g/m2    Right Atrium Volume Systolic 30.93 mL    RA Major Axis 4.33 cm    Left Atrium Minor Axis 3.42 cm    Left Atrium Major Axis 4.45 cm    Triscuspid Valve Regurgitation Peak Gradient 22 mmHg    LA Volume Index (Mod) 24.5 mL/m2    LA volume (mod) 43.59 cm3    RA Width 2.73 cm    Right Atrial Pressure (from IVC) 3 mmHg    EF 55 %    TV rest pulmonary artery pressure 25 mmHg    Narrative    · The left ventricle is normal in size with concentric remodeling and   normal systolic function.  · The estimated ejection fraction is 55%.  · Indeterminate left ventricular diastolic function.  · Normal right ventricular size with normal right ventricular systolic   function.  · Normal central venous pressure (3 mmHg).  · The estimated PA systolic pressure is 25 mmHg.

## 2023-04-04 NOTE — BRIEF OP NOTE
Procedures:  1. Abdominal aortogram  2. Selective lower extremity angiogram   3. Successful crossing of the occluded left common iliac/external iliac retrograde approach through left common femoral  4. Successful atherectomy and aspiration thrombectomy to the left common iliac external iliac with with [employer] atherectomy device   5. Successful PTA to left common iliac external iliac with 8 x 100 mm balloon  6. Successful stenting to left external iliac with 7.0x 40 mm self expandable stent     6. Clot noted in the left common femoral artery and she is occluded the flow distally and PTA left common femoral done with 5.0 x 80 mm balloon right radial approach  7. Residual clot noted in the left common femoral/left SFA so aspiration thrombectomy done with mTraks CAT 7 aspiration catheter with successful removal of the thrombus      Access:  Right radial was 4-5 slender sheath   Left common femoral with 6 Spanish sheath  Right common femoral with 7 Spanish sheath    Closure:  Vasc band, manual pull right common femoral, Mynx closure left common femoral    Angiogram findings:  Left common iliac/external iliac under 100% occluded pre intervention reduced to 0% post intervention    Left common femoral extensive thrombus pre intervention reduced to 0% post intervention    Left % mid segment from migrated thrombus reduced to 0%    Left pop diffuse 70%  3 vs run off    Complications: No apparent complications post procedure    A/P  Critical limb ischemia  Tobacco abuse    Plan:  Successful PTA as above  ASA/Plavix  At discharge with add xarelto 2.5 mg bid along with ASA and plavix and stop ASA after 4 weeks.  Tobacco cessation  Resume heparin gtt at previous rate without bolus after 6 hours

## 2023-04-04 NOTE — ASSESSMENT & PLAN NOTE
Ready to quit: Not Answered  Counseling given: Yes  Tobacco comments: Pt states that he smoked 2 pk/day cigarettes until approximately one month ago, at which time he cut down to 0.5 pk/day.  He declines referral to Ambulatory Smoking Cessation clinic.  Handout provided.     He states he does not need a nicotine patch

## 2023-04-04 NOTE — HPI
Eric Soliman is a 62 year old male w/ PMH DVT LLE s/p stent placement and epilepsy. Patient presented to the ED w/ significant left foot pain that he states has been worsening throughout the weekend, which prompted him to come in. He reports a sharp pain that shoots up the leg and prevents him from being able to walk. He also reports redness in the left foot and intermittent numbness to b/l feet. He had stents placed in his LLE 2/2 DVT 10/2022. He reports that he was initially prescribed xarelto but was unable to afford it so he stopped taking it a few months ago. He denies CP, SOB, abd pain, palpitation, N/V, or other complaints.    Cardiology contacted by ED provider and patient initiated on heparin gtt, plavix load, and ASA load.

## 2023-04-04 NOTE — PLAN OF CARE
SW attempted to complete dca. Please note pt was off the floor. SW will follow up at a later time.        04/04/23 1500   Post-Acute Status   Post-Acute Authorization Other

## 2023-04-04 NOTE — ASSESSMENT & PLAN NOTE
· Per chart review, prior DVT noted LLE:  ·  Acute thrombotic occlusion of the L proximal common iliac artery due to a 80% discrete stenois of the proxiaml L External Iliac artery with severe thrombus burden extending to popliteal artery with 100% occlusion status post successful aspiration thrombectomy and PTA with 8x58 mm and 8x26 mm covered stents of the L proximal External Iliac artery  · See above for plan

## 2023-04-04 NOTE — ASSESSMENT & PLAN NOTE
· LLE pain, tenderness, color change  · Faint pulses dopplerable b/l feet  · S/p angiogram and stent placement LLE 2/2 DVT 10/2022  · Patient stopped xarelto d/t inability to afford  · Cardiology consulted  · Heparin gtt  · Plavix load  · ASA load  · Will start statin  · NPO after MN for possible interventions

## 2023-04-04 NOTE — PLAN OF CARE
Remaining air removed from right radial vasc band. Gauze and tegaderm dressing applied c.d.i.   No drainage or shadowing noted. No bleeding or hematoma noted around site. +2 brandon radial pulses palpated. Skin normal in color, warm to touch, < 3 sec cap refill.   Pt tolerated well.  Will continue to monitor pt.

## 2023-04-05 PROBLEM — D64.9 ANEMIA: Status: ACTIVE | Noted: 2023-01-01

## 2023-04-05 NOTE — PROGRESS NOTES
[facility] Medicine  Progress Note    Patient Name: Eric Soliman  MRN: [id number]  Patient Class: IP- Inpatient   Admission Date: 4/3/2023  Length of Stay: 2 days  Attending Physician: Leah Torres*  Primary Care Provider: Primary Doctor No        Subjective:     Principal Problem:Critical limb ischemia of both lower extremities        HPI:  Eric Soliman is a 62 year old male w/ PMH DVT LLE s/p stent placement and epilepsy. Patient presented to the ED w/ significant left foot pain that he states has been worsening throughout the weekend, which prompted him to come in. He reports a sharp pain that shoots up the leg and prevents him from being able to walk. He also reports redness in the left foot and intermittent numbness to b/l feet. He had stents placed in his LLE 2/2 DVT 10/2022. He reports that he was initially prescribed xarelto but was unable to afford it so he stopped taking it a few months ago. He denies CP, SOB, abd pain, palpitation, N/V, or other complaints.    Cardiology contacted by ED provider and patient initiated on heparin gtt, plavix load, and ASA load.       Overview/Hospital Course:  No notes on file    Interval history:  Patient has tenderness to Bilateral groin access sites, and LLE. He denies CP, SOB. Tolerating PO and able to urinate.       Objective:     Vital Signs (Most Recent):  Temp: 97.7 °F (36.5 °C) (04/05/23 1154)  Pulse: 80 (04/05/23 1158)  Resp: 18 (04/05/23 1154)  BP: (!) 106/59 (04/05/23 1154)  SpO2: (!) 93 % (04/05/23 1154)   Vital Signs (24h Range):  Temp:  [96.4 °F (35.8 °C)-98.4 °F (36.9 °C)] 97.7 °F (36.5 °C)  Pulse:  [71-90] 80  Resp:  [13-20] 18  SpO2:  [90 %-99 %] 93 %  BP: (106-171)/(58-93) 106/59     Weight: 79.4 kg (175 lb)  Body mass index is 25.84 kg/m².    Physical Exam  Vitals and nursing note reviewed.   HENT:      Head: Normocephalic and atraumatic.   Cardiovascular:      Rate and Rhythm: Normal rate and regular rhythm.      Pulses:            Dorsalis pedis pulses are detected w/ Doppler on the right side and detected w/ Doppler on the left side.      Heart sounds: Normal heart sounds. No murmur heard.     Comments: R DP pulses faintly detected w/ doppler    1+ L dorsalis pedal pulse   Pulmonary:      Effort: Pulmonary effort is normal. No respiratory distress.      Breath sounds: Normal breath sounds. No wheezing, rhonchi or rales.   Abdominal:      General: Bowel sounds are normal. There is no distension.      Palpations: Abdomen is soft.      Tenderness: There is no abdominal tenderness.   Musculoskeletal:         General: Tenderness (LLE) present.      Right lower leg: No edema.      Left lower leg: No edema.   Skin:     General: Skin is warm.      Findings: Erythema (LLE) present.      Comments: Bilateral groin access sites clean d/I no erythema, edema , or hematoma.    Neurological:      Mental Status: He is alert and oriented to person, place, and time.   Psychiatric:         Mood and Affect: Mood normal.         Behavior: Behavior normal.         Thought Content: Thought content normal.         Judgment: Judgment normal.           Significant Labs: All pertinent labs within the past 24 hours have been reviewed.    Significant Imaging: I have reviewed all pertinent imaging results/findings within the past 24 hours.  Review of Systems      Assessment/Plan:      * Critical limb ischemia of both lower extremities  · LLE pain, tenderness, color change  · Faint pulses dopplerable b/l feet  · S/p angiogram and stent placement LLE 2/2 thrombosis 10/2022  · Patient stopped xarelto d/t inability to afford  · Cardiology consulted  · Heparin gtt  · Plavix load  · ASA load  · Will start statin  · NPO after MN for possible interventions  · Plan for peripheral angiogram with possible intervention    S/P Successful PTA  4/4  ASA/Plavix  At discharge will add xarelto 2.5 mg bid along with ASA and plavix and stop ASA after 4 weeks.  Tobacco cessation  "  Groin sites without erythema, edema, drainage, or hematoma.   1+ pedal pulse noted to LE dorsalis pedis  LLE tender, but neurovascularly intact. ROM intact.    Tolerating PO   Able to urinate    Follow up with priority care and cardiology   rx sent to [facility] pharmacy for medication assistance   Per cardiology continue heparin gtt today with plans to transition to full dose anticoagulation tomorrow.       Anemia    8.9---> 7.1 post procedure   Patient on DAPT, heparin, post procedure risk of bleeding   Recheck H/H at 1400 to see if he requires PRBC transfusion   Will need anemia panel, can be done outpatient     PAD (peripheral artery disease)    As above     Seizures  · Unknown last seizure event  · Epilepsy "controlled" by patient w/ marijuana usage  · Does not take anti-epileptic  · Initiate seizure precautions      Thrombosis  · Per chart review, prior thrombosis noted LLE:  ·  Acute thrombotic occlusion of the L proximal common iliac artery due to a 80% discrete stenois of the proxiaml L External Iliac artery with severe thrombus burden extending to popliteal artery with 100% occlusion status post successful aspiration thrombectomy and PTA with 8x58 mm and 8x26 mm covered stents of the L proximal External Iliac artery  · See above for plan  Will need medication assistance to afford medications    xarelto 2.5 mg BID tomorrow, heparin gtt continued today per cards recs     Primary hypertension  · Will start coreg      Tobacco abuse  Ready to quit: Not Answered  Counseling given: Yes  Tobacco comments: Pt states that he smoked 2 pk/day cigarettes until approximately one month ago, at which time he cut down to 0.5 pk/day.  He declines referral to Ambulatory Smoking Cessation clinic.  Handout provided.     He states he does not need a nicotine patch  Referred to tobacco cessation       VTE Risk Mitigation (From admission, onward)         Ordered     heparin infusion 1,000 units/500 ml in 0.9% NaCl (pressure " line flush)  Intra-op continuous PRN         04/04/23 1126     heparin 25,000 units in dextrose 5% (100 units/ml) IV bolus from bag - ADDITIONAL PRN BOLUS - 60 units/kg (max bolus 4000 units)  As needed (PRN)        Question:  Heparin Infusion Adjustment (DO NOT MODIFY ANSWER)  Answer:  [email or web address]\[email or web address]\Images\Pharmacy\HeparinInfusions\heparin LOW INTENSITY nomogram for [facility] [id number]    04/03/23 1728     heparin 25,000 units in dextrose 5% (100 units/ml) IV bolus from bag - ADDITIONAL PRN BOLUS - 30 units/kg (max bolus 4000 units)  As needed (PRN)        Question:  Heparin Infusion Adjustment (DO NOT MODIFY ANSWER)  Answer:  \\[email or web address]\epic\Images\Pharmacy\HeparinInfusions\heparin LOW INTENSITY nomogram for [facility] [id number].pdf    04/03/23 1728     IP VTE LOW RISK PATIENT  Once         04/03/23 1735     Place sequential compression device  Until discontinued         04/03/23 1735     heparin 25,000 units in dextrose 5% 250 mL (100 units/mL) infusion LOW INTENSITY nomogram - [facility]  Continuous        Question Answer Comment   Heparin Infusion Adjustment (DO NOT MODIFY ANSWER) \\[email or web address]\epic\Images\Pharmacy\HeparinInfusions\heparin LOW INTENSITY nomogram for [facility] [id number]    Begin at (in units/kg/hr) 12        04/03/23 1728                Discharge Planning   [place]: 4/5/2023     Code Status: Full Code   Is the patient medically ready for discharge?:     Reason for patient still in hospital (select all that apply): Patient trending condition                     Vivian Escamilla NP  Department of Hospital Medicine   [facility]

## 2023-04-05 NOTE — ASSESSMENT & PLAN NOTE
8.9---> 7.1 post procedure   Patient on DAPT, heparin, post procedure risk of bleeding   Recheck H/H at 1400 to see if he requires PRBC transfusion   Will need anemia panel, can be done outpatient

## 2023-04-05 NOTE — ASSESSMENT & PLAN NOTE
- history of PAD with intervention in 10/2022 with thrombectomy to DIANNE to popliteal with covered stent placement  - recurrent occlusion to DIANNE/SFA treated with thrombectomy and PTA  - management as detailed under CLI

## 2023-04-05 NOTE — SUBJECTIVE & OBJECTIVE
Review of Systems   Constitutional: Negative for chills, decreased appetite, diaphoresis and fever.   Cardiovascular:  Negative for chest pain, claudication, cyanosis, dyspnea on exertion, irregular heartbeat, leg swelling, near-syncope, orthopnea, palpitations, paroxysmal nocturnal dyspnea and syncope.   Respiratory:  Negative for cough, hemoptysis, shortness of breath and wheezing.    Gastrointestinal:  Negative for bloating, abdominal pain, constipation, diarrhea, melena, nausea and vomiting.   Neurological:  Negative for dizziness and weakness.   Objective:     Vital Signs (Most Recent):  Temp: 97.7 °F (36.5 °C) (04/05/23 1154)  Pulse: 80 (04/05/23 1158)  Resp: 18 (04/05/23 1154)  BP: (!) 106/59 (04/05/23 1154)  SpO2: (!) 93 % (04/05/23 1154)   Vital Signs (24h Range):  Temp:  [96.4 °F (35.8 °C)-98.4 °F (36.9 °C)] 97.7 °F (36.5 °C)  Pulse:  [71-90] 80  Resp:  [13-20] 18  SpO2:  [90 %-99 %] 93 %  BP: (106-171)/(58-93) 106/59     Weight: 79.4 kg (175 lb)  Body mass index is 25.84 kg/m².     SpO2: (!) 93 %       No intake or output data in the 24 hours ending 04/05/23 1430    Lines/Drains/Airways       Peripheral Intravenous Line  Duration                  Peripheral IV - Single Lumen 04/03/23 1900 20 G Posterior;Right Forearm 1 day         Peripheral IV - Single Lumen 04/05/23 1107 18 G Left;Posterior Forearm <1 day                    Physical Exam  Constitutional:       General: He is not in acute distress.     Appearance: He is well-developed.   Cardiovascular:      Rate and Rhythm: Normal rate and regular rhythm.      Pulses:           Dorsalis pedis pulses are detected w/ Doppler on the left side.        Posterior tibial pulses are detected w/ Doppler on the left side.      Heart sounds: No murmur heard.    No gallop.   Pulmonary:      Effort: Pulmonary effort is normal. No respiratory distress.      Breath sounds: Normal breath sounds. No wheezing.   Abdominal:      General: Bowel sounds are normal. There  is no distension.      Palpations: Abdomen is soft.      Tenderness: There is no abdominal tenderness.   Skin:     General: Skin is warm and dry.   Neurological:      Mental Status: He is alert and oriented to person, place, and time.       Significant Labs: BMP:   Recent Labs   Lab 04/04/23  0335 04/05/23  0255   GLU 92 95   * 131*   K 4.1 4.3    103   CO2 20* 21*   BUN 17 14   CREATININE 1.0 0.9   CALCIUM 8.8 8.1*   , CBC   Recent Labs   Lab 04/04/23  0336 04/05/23  0007 04/05/23  0255   WBC 6.62 7.22 6.09   HGB 8.9* 7.5* 7.1*   HCT 28.6* 23.5* 22.5*    256 243       Significant Imaging: Echocardiogram: Transthoracic echo (TTE) complete (Cupid Only):   Results for orders placed or performed during the hospital encounter of 10/07/22   Echo   Result Value Ref Range    BSA 1.76 m2    TDI SEPTAL 0.06 m/s    LV LATERAL E/E' RATIO 4.50 m/s    LV SEPTAL E/E' RATIO 6.00 m/s    LA WIDTH 2.60 cm    IVC diameter 0.80 cm    Left Ventricular Outflow Tract Mean Velocity 0.60 cm/s    Left Ventricular Outflow Tract Mean Gradient 1.74 mmHg    AORTIC VALVE CUSP SEPERATION 2.03 cm    TDI LATERAL 0.08 m/s    LVIDd 3.96 3.5 - 6.0 cm    IVS 0.86 0.6 - 1.1 cm    Posterior Wall 0.89 0.6 - 1.1 cm    Ao root annulus 3.41 cm    LVIDs 2.64 2.1 - 4.0 cm    FS 33 28 - 44 %    LA volume 19.40 cm3    STJ 2.40 cm    Ascending aorta 2.88 cm    LV mass 103.83 g    LA size 2.27 cm    RVDD 2.00 cm    Right ventricular length in diastole (apical 4-chamber view) 5.87 cm    RA area 12.5 cm2    Left Ventricle Relative Wall Thickness 0.45 cm    AV mean gradient 2 mmHg    AV valve area 3.84 cm2    AV Velocity Ratio 0.97     AV index (prosthetic) 1.13     MV mean gradient 1 mmHg    MV valve area by continuity eq 4.20 cm2    E/A ratio 0.59     Mean e' 0.07 m/s    LVOT diameter 2.08 cm    LVOT area 3.4 cm2    LVOT peak nicolette 0.98 m/s    LVOT peak VTI 19.80 cm    Ao peak nicolette 1.01 m/s    Ao VTI 17.5 cm    LVOT stroke volume 67.25 cm3    AV peak  gradient 4 mmHg    MV peak gradient 2 mmHg    E/E' ratio 5.14 m/s    MV Peak E Christopher 0.36 m/s    TR Max Christopher 2.36 m/s    MV VTI 16.0 cm    MV Peak A Christopher 0.61 m/s    LV Systolic Volume 25.45 mL    LV Systolic Volume Index 14.3 mL/m2    LV Diastolic Volume 68.17 mL    LV Diastolic Volume Index 38.30 mL/m2    LA Volume Index 10.9 mL/m2    LV Mass Index 58 g/m2    Right Atrium Volume Systolic 30.93 mL    RA Major Axis 4.33 cm    Left Atrium Minor Axis 3.42 cm    Left Atrium Major Axis 4.45 cm    Triscuspid Valve Regurgitation Peak Gradient 22 mmHg    LA Volume Index (Mod) 24.5 mL/m2    LA volume (mod) 43.59 cm3    RA Width 2.73 cm    Right Atrial Pressure (from IVC) 3 mmHg    EF 55 %    TV rest pulmonary artery pressure 25 mmHg    Narrative    · The left ventricle is normal in size with concentric remodeling and   normal systolic function.  · The estimated ejection fraction is 55%.  · Indeterminate left ventricular diastolic function.  · Normal right ventricular size with normal right ventricular systolic   function.  · Normal central venous pressure (3 mmHg).  · The estimated PA systolic pressure is 25 mmHg.

## 2023-04-05 NOTE — SUBJECTIVE & OBJECTIVE
Interval history:  Patient has tenderness to Bilateral groin access sites, and LLE. He denies CP, SOB. Tolerating PO and able to urinate.       Objective:     Vital Signs (Most Recent):  Temp: 97.7 °F (36.5 °C) (04/05/23 1154)  Pulse: 80 (04/05/23 1158)  Resp: 18 (04/05/23 1154)  BP: (!) 106/59 (04/05/23 1154)  SpO2: (!) 93 % (04/05/23 1154)   Vital Signs (24h Range):  Temp:  [96.4 °F (35.8 °C)-98.4 °F (36.9 °C)] 97.7 °F (36.5 °C)  Pulse:  [71-90] 80  Resp:  [13-20] 18  SpO2:  [90 %-99 %] 93 %  BP: (106-171)/(58-93) 106/59     Weight: 79.4 kg (175 lb)  Body mass index is 25.84 kg/m².    Physical Exam  Vitals and nursing note reviewed.   HENT:      Head: Normocephalic and atraumatic.   Cardiovascular:      Rate and Rhythm: Normal rate and regular rhythm.      Pulses:           Dorsalis pedis pulses are detected w/ Doppler on the right side and detected w/ Doppler on the left side.      Heart sounds: Normal heart sounds. No murmur heard.     Comments: R DP pulses faintly detected w/ doppler    1+ L dorsalis pedal pulse   Pulmonary:      Effort: Pulmonary effort is normal. No respiratory distress.      Breath sounds: Normal breath sounds. No wheezing, rhonchi or rales.   Abdominal:      General: Bowel sounds are normal. There is no distension.      Palpations: Abdomen is soft.      Tenderness: There is no abdominal tenderness.   Musculoskeletal:         General: Tenderness (LLE) present.      Right lower leg: No edema.      Left lower leg: No edema.   Skin:     General: Skin is warm.      Findings: Erythema (LLE) present.      Comments: Bilateral groin access sites clean d/I no erythema, edema , or hematoma.    Neurological:      Mental Status: He is alert and oriented to person, place, and time.   Psychiatric:         Mood and Affect: Mood normal.         Behavior: Behavior normal.         Thought Content: Thought content normal.         Judgment: Judgment normal.           Significant Labs: All pertinent labs within  the past 24 hours have been reviewed.    Significant Imaging: I have reviewed all pertinent imaging results/findings within the past 24 hours.  Review of Systems

## 2023-04-05 NOTE — TELEPHONE ENCOUNTER
Unfortunately, The Pharmacy Patient Assistance Team is unable to assist Mr. Soliman at this time due to the following reasons       Patient has Medicaid/Government           The [facility] Patient Assistance Program Eligibility requirements states patients cant not have Medicaid insurance to qualify for assistance.           Pharmacy Patient Assistance Team

## 2023-04-05 NOTE — ASSESSMENT & PLAN NOTE
· LLE pain, tenderness, color change  · Faint pulses dopplerable b/l feet  · S/p angiogram and stent placement LLE 2/2 thrombosis 10/2022  · Patient stopped xarelto d/t inability to afford  · Cardiology consulted  · Heparin gtt  · Plavix load  · ASA load  · Will start statin  · NPO after MN for possible interventions  · Plan for peripheral angiogram with possible intervention    S/P Successful PTA  4/4  ASA/Plavix  At discharge will add xarelto 2.5 mg bid along with ASA and plavix and stop ASA after 4 weeks.  Tobacco cessation   Groin sites without erythema, edema, drainage, or hematoma.   1+ pedal pulse noted to LE dorsalis pedis  LLE tender, but neurovascularly intact. ROM intact.    Tolerating PO   Able to urinate    Follow up with priority care and cardiology   rx sent to [facility] pharmacy for medication assistance   Per cardiology continue heparin gtt today with plans to transition to full dose anticoagulation tomorrow.

## 2023-04-05 NOTE — ASSESSMENT & PLAN NOTE
- H&H 7.1&22.5 this AM down from 8.9&28.6 yesterday  - extensive thrombus burden on LE angiogram requiring thrombectomy; anticipated trend down given thrombectomy  - will monitor for now

## 2023-04-05 NOTE — TELEPHONE ENCOUNTER
----- Message from Alexandra Arango RN sent at 4/5/2023  1:47 PM CDT -----  Regarding: Pt needs med assist with Xarelto

## 2023-04-05 NOTE — ASSESSMENT & PLAN NOTE
· Per chart review, prior thrombosis noted LLE:  ·  Acute thrombotic occlusion of the L proximal common iliac artery due to a 80% discrete stenois of the proxiaml L External Iliac artery with severe thrombus burden extending to popliteal artery with 100% occlusion status post successful aspiration thrombectomy and PTA with 8x58 mm and 8x26 mm covered stents of the L proximal External Iliac artery  · See above for plan  Will need medication assistance to afford medications    xarelto 2.5 mg BID tomorrow, heparin gtt continued today per cards recs

## 2023-04-05 NOTE — PLAN OF CARE
SW met with pt via Chirpme to discuss dc planning. Pt confirmed information on chart. Pt is independent in ADLs. Pt has a cane for use. Pt has no HH serves. Pt reports that he has been homeless for over a year and half now. Pt reports that he has been living in his car. SW discussed homeless shelter vs. Group Home. Pt reports that he is interested in hearing more about group home. Pt reports that he gets SS check once a month. SW will continue to follow pt throughout his transitions of care and assist with any dc needs. Pt made aware to contact SW with any questions or concerns.     Homeless shelter vs. Group Home.     SW will contact Deepthi with Group tomorrow to see if she is able to accept pt.      04/05/23 9061   Discharge Assessment   Assessment Type Discharge Planning Assessment   Confirmed/corrected address, phone number and insurance Yes   Confirmed Demographics Correct on Facesheet   Source of Information patient   Communicated [place] with patient/caregiver Yes   Reason For Admission Critical limb ischemia of both lower extremities   Do you expect to return to your current living situation? Yes   Do you have help at home or someone to help you manage your care at home? Yes   Prior to hospitilization cognitive status: Alert/Oriented   Current cognitive status: Alert/Oriented   Equipment Currently Used at Home cane, straight   Readmission within 30 days? No   Patient currently being followed by outpatient case management? No   Do you currently have service(s) that help you manage your care at home? No   Do you take prescription medications? No   Do you have prescription coverage? Yes   Coverage Medicaid   Do you have any problems affording any of your prescribed medications? TBD   How do you get to doctors appointments? car, drives self   Are you on dialysis? No   Do you take coumadin? No   Discharge Plan A Group Home   DME Needed Upon Discharge  none   Discharge Plan discussed with: Patient   Discharge Barriers  Identified None

## 2023-04-05 NOTE — PROGRESS NOTES
[facility]  Card[facility]  Progress Note    Patient Name: Eric Soliman  MRN: [id number]  Admission Date: 4/3/2023  Hospital Length of Stay: 2 days  Code Status: Full Code   Attending Physician: Leah Torres*   Primary Care Physician: Primary Doctor No  Expected Discharge Date: 4/5/2023  Principal Problem:Critical limb ischemia of both lower extremities    Subjective:     Hospital Course:   4/4/2023 per HPI  4/5/2023 LE angiogram yesterday with following results:       occluded left common iliac/external iliac retrograde approach through left common femoral  Successful atherectomy and aspiration thrombectomy to the left common iliac external iliac with with Mailgun atherectomy device   Successful PTA to left common iliac external iliac with 8 x 100 mm balloon  Successful stenting to left external iliac with 7.0x 40 mm self expandable stent   Clot noted in the left common femoral artery and she is occluded the flow distally and PTA left common femoral done with 5.0 x 80 mm balloon right radial approach  Residual clot noted in the left common femoral/left SFA so aspiration thrombectomy done with [employer] CAT 7 aspiration catheter with successful removal of the thrombus  Angiogram findings:  Left common iliac/external iliac under 100% occluded pre intervention reduced to 0% post intervention  Left common femoral extensive thrombus pre intervention reduced to 0% post intervention  Left % mid segment from migrated thrombus reduced to 0%  Left pop diffuse 70%  3 vs run off  Plan:  Successful PTA as above  ASA/Plavix  At discharge will need DOAC   Tobacco cessation  Resume heparin gtt at previous rate without bolus after 6 hours    Tolerated procedure well and recovered on telemetry. Access site stable. Dopplerable DP/PT/popliteal pulses present. Remains on IV Heparin drip          Review of Systems   Constitutional: Negative for chills, decreased appetite, diaphoresis and fever.   Cardiovascular:   Negative for chest pain, claudication, cyanosis, dyspnea on exertion, irregular heartbeat, leg swelling, near-syncope, orthopnea, palpitations, paroxysmal nocturnal dyspnea and syncope.   Respiratory:  Negative for cough, hemoptysis, shortness of breath and wheezing.    Gastrointestinal:  Negative for bloating, abdominal pain, constipation, diarrhea, melena, nausea and vomiting.   Neurological:  Negative for dizziness and weakness.   Objective:     Vital Signs (Most Recent):  Temp: 97.7 °F (36.5 °C) (04/05/23 1154)  Pulse: 80 (04/05/23 1158)  Resp: 18 (04/05/23 1154)  BP: (!) 106/59 (04/05/23 1154)  SpO2: (!) 93 % (04/05/23 1154)   Vital Signs (24h Range):  Temp:  [96.4 °F (35.8 °C)-98.4 °F (36.9 °C)] 97.7 °F (36.5 °C)  Pulse:  [71-90] 80  Resp:  [13-20] 18  SpO2:  [90 %-99 %] 93 %  BP: (106-171)/(58-93) 106/59     Weight: 79.4 kg (175 lb)  Body mass index is 25.84 kg/m².     SpO2: (!) 93 %       No intake or output data in the 24 hours ending 04/05/23 1430    Lines/Drains/Airways       Peripheral Intravenous Line  Duration                  Peripheral IV - Single Lumen 04/03/23 1900 20 G Posterior;Right Forearm 1 day         Peripheral IV - Single Lumen 04/05/23 1107 18 G Left;Posterior Forearm <1 day                    Physical Exam  Constitutional:       General: He is not in acute distress.     Appearance: He is well-developed.   Cardiovascular:      Rate and Rhythm: Normal rate and regular rhythm.      Pulses:           Dorsalis pedis pulses are detected w/ Doppler on the left side.        Posterior tibial pulses are detected w/ Doppler on the left side.      Heart sounds: No murmur heard.    No gallop.   Pulmonary:      Effort: Pulmonary effort is normal. No respiratory distress.      Breath sounds: Normal breath sounds. No wheezing.   Abdominal:      General: Bowel sounds are normal. There is no distension.      Palpations: Abdomen is soft.      Tenderness: There is no abdominal tenderness.   Skin:      General: Skin is warm and dry.   Neurological:      Mental Status: He is alert and oriented to person, place, and time.       Significant Labs: BMP:   Recent Labs   Lab 04/04/23  0335 04/05/23  0255   GLU 92 95   * 131*   K 4.1 4.3    103   CO2 20* 21*   BUN 17 14   CREATININE 1.0 0.9   CALCIUM 8.8 8.1*   , CBC   Recent Labs   Lab 04/04/23  0336 04/05/23  0007 04/05/23  0255   WBC 6.62 7.22 6.09   HGB 8.9* 7.5* 7.1*   HCT 28.6* 23.5* 22.5*    256 243       Significant Imaging: Echocardiogram: Transthoracic echo (TTE) complete (Cupid Only):   Results for orders placed or performed during the hospital encounter of 10/07/22   Echo   Result Value Ref Range    BSA 1.76 m2    TDI SEPTAL 0.06 m/s    LV LATERAL E/E' RATIO 4.50 m/s    LV SEPTAL E/E' RATIO 6.00 m/s    LA WIDTH 2.60 cm    IVC diameter 0.80 cm    Left Ventricular Outflow Tract Mean Velocity 0.60 cm/s    Left Ventricular Outflow Tract Mean Gradient 1.74 mmHg    AORTIC VALVE CUSP SEPERATION 2.03 cm    TDI LATERAL 0.08 m/s    LVIDd 3.96 3.5 - 6.0 cm    IVS 0.86 0.6 - 1.1 cm    Posterior Wall 0.89 0.6 - 1.1 cm    Ao root annulus 3.41 cm    LVIDs 2.64 2.1 - 4.0 cm    FS 33 28 - 44 %    LA volume 19.40 cm3    STJ 2.40 cm    Ascending aorta 2.88 cm    LV mass 103.83 g    LA size 2.27 cm    RVDD 2.00 cm    Right ventricular length in diastole (apical 4-chamber view) 5.87 cm    RA area 12.5 cm2    Left Ventricle Relative Wall Thickness 0.45 cm    AV mean gradient 2 mmHg    AV valve area 3.84 cm2    AV Velocity Ratio 0.97     AV index (prosthetic) 1.13     MV mean gradient 1 mmHg    MV valve area by continuity eq 4.20 cm2    E/A ratio 0.59     Mean e' 0.07 m/s    LVOT diameter 2.08 cm    LVOT area 3.4 cm2    LVOT peak christopher 0.98 m/s    LVOT peak VTI 19.80 cm    Ao peak christopher 1.01 m/s    Ao VTI 17.5 cm    LVOT stroke volume 67.25 cm3    AV peak gradient 4 mmHg    MV peak gradient 2 mmHg    E/E' ratio 5.14 m/s    MV Peak E Christopher 0.36 m/s    TR Max Christopher 2.36 m/s     MV VTI 16.0 cm    MV Peak A Christopher 0.61 m/s    LV Systolic Volume 25.45 mL    LV Systolic Volume Index 14.3 mL/m2    LV Diastolic Volume 68.17 mL    LV Diastolic Volume Index 38.30 mL/m2    LA Volume Index 10.9 mL/m2    LV Mass Index 58 g/m2    Right Atrium Volume Systolic 30.93 mL    RA Major Axis 4.33 cm    Left Atrium Minor Axis 3.42 cm    Left Atrium Major Axis 4.45 cm    Triscuspid Valve Regurgitation Peak Gradient 22 mmHg    LA Volume Index (Mod) 24.5 mL/m2    LA volume (mod) 43.59 cm3    RA Width 2.73 cm    Right Atrial Pressure (from IVC) 3 mmHg    EF 55 %    TV rest pulmonary artery pressure 25 mmHg    Narrative    · The left ventricle is normal in size with concentric remodeling and   normal systolic function.  · The estimated ejection fraction is 55%.  · Indeterminate left ventricular diastolic function.  · Normal right ventricular size with normal right ventricular systolic   function.  · Normal central venous pressure (3 mmHg).  · The estimated PA systolic pressure is 25 mmHg.        Assessment and Plan:     Brief HPI: Seen this morning on AM rounds with Dr. Yi while resting in bed. Reports pain improved. Discussed POC as detailed below-verbalized understanding and agrees with POC     * Critical limb ischemia of both lower extremities  - presented with left foot pain x 1 months with worsening for past 2 days  - toes cool and discolored; forefoot and shin warm to touch; unable to doppler DP and PT pulses on left initially  - BLE arterial with severe PAD indicated with decreased velocities and monophasic waveforms; suspect high grade disease of left with reocclusion of DIANNE/DEB likely  - LE yesterday with thrombectomy and PTA to DIANNE/LSFA with good results; able to doppler left DP and PT pulses this AM   - continue DAPT and statin therapy-continue IV Heparin drip for now; plan to transition to full dose OAC tomorrow and will plan to stop ASA and continue Plavix   - stressed importance of medication  compliance as well as tobacco cessation         Anemia  - H&H 7.1&22.5 this AM down from 8.9&28.6 yesterday  - extensive thrombus burden on LE angiogram requiring thrombectomy; anticipated trend down given thrombectomy  - will monitor for now     PAD (peripheral artery disease)  - history of PAD with intervention in 10/2022 with thrombectomy to DIANNE to popliteal with covered stent placement  - recurrent occlusion to DIANNE/SFA treated with thrombectomy and PTA  - management as detailed under CLI    Primary hypertension  - SBP 100s-110s overnight  - on Coreg currently; BP improved and at  goal BP  - continue meds     Tobacco abuse  - active smoker; reports down to .5ppd from 1.5ppd  - stressed importance of complete cessation          VTE Risk Mitigation (From admission, onward)         Ordered     heparin infusion 1,000 units/500 ml in 0.9% NaCl (pressure line flush)  Intra-op continuous PRN         04/04/23 1126     heparin 25,000 units in dextrose 5% (100 units/ml) IV bolus from bag - ADDITIONAL PRN BOLUS - 60 units/kg (max bolus 4000 units)  As needed (PRN)        Question:  Heparin Infusion Adjustment (DO NOT MODIFY ANSWER)  Answer:  [email or web address]\Science\Images\Pharmacy\HeparinInfusions\heparin LOW INTENSITY nomogram for [facility] [id number]    04/03/23 1728     heparin 25,000 units in dextrose 5% (100 units/ml) IV bolus from bag - ADDITIONAL PRN BOLUS - 30 units/kg (max bolus 4000 units)  As needed (PRN)        Question:  Heparin Infusion Adjustment (DO NOT MODIFY ANSWER)  Answer:  \[email or web address]\Images\Pharmacy\HeparinInfusions\heparin LOW INTENSITY nomogram for [facility] [id number].pdf    04/03/23 1728     IP VTE LOW RISK PATIENT  Once         04/03/23 1735     Place sequential compression device  Until discontinued         04/03/23 1735     heparin 25,000 units in dextrose 5% 250 mL (100 units/mL) infusion LOW INTENSITY nomogram - [facility]  Continuous        Question Answer Comment   Heparin Infusion Adjustment (DO NOT MODIFY  ANSWER) [email or web address]\[email or web address]\Images\Pharmacy\HeparinInfusions\heparin LOW INTENSITY nomogram for [facility] [id number]    Begin at (in units/kg/hr) 12        04/03/23 8876                MAN Murillo, ANP  Cardiology  [place] - Telemetry

## 2023-04-05 NOTE — NURSING
Home Oxygen Evaluation    Date Performed: 2022    1) Patient's Home O2 Sat on room air, while at rest: 91% with pulse 81        If O2 sats on room air at rest are 88% or below, patient qualifies. No additional testing needed. Document N/A in steps 2 and 3. If 89% or above, complete steps 2.      2) Patient's O2 Sat on room air while exercisin% pulse 107        If O2 sats on room air while exercising remain 89% or above patient does not qualify, no further testing needed Document N/A in step 3. If O2 sats on room air while exercising are 88% or below, continue to step 3.      3) Patient's O2 Sat while exercising on O2: N/A         (Must show improvement from #2 for patients to qualify)    If O2 sats improve on oxygen, patient qualifies for portable oxygen. If not, the patient does not qualify.

## 2023-04-05 NOTE — ASSESSMENT & PLAN NOTE
Ready to quit: Not Answered  Counseling given: Yes  Tobacco comments: Pt states that he smoked 2 pk/day cigarettes until approximately one month ago, at which time he cut down to 0.5 pk/day.  He declines referral to Ambulatory Smoking Cessation clinic.  Handout provided.     He states he does not need a nicotine patch  Referred to tobacco cessation

## 2023-04-05 NOTE — PLAN OF CARE
Problem: Adult Inpatient Plan of Care  Goal: Plan of Care Review  Outcome: Ongoing, Progressing  Goal: Patient-Specific Goal (Individualized)  Outcome: Ongoing, Progressing  Goal: Absence of Hospital-Acquired Illness or Injury  Outcome: Ongoing, Progressing  Goal: Optimal Comfort and Wellbeing  Outcome: Ongoing, Progressing  Goal: Readiness for Transition of Care  Outcome: Ongoing, Progressing     Problem: Fall Injury Risk  Goal: Absence of Fall and Fall-Related Injury  Outcome: Ongoing, Progressing     Problem: Seizure Disorder Comorbidity  Goal: Maintenance of Seizure Control  Outcome: Ongoing, Progressing

## 2023-04-06 PROBLEM — R91.8 LUNG MASS: Status: ACTIVE | Noted: 2023-01-01

## 2023-04-06 NOTE — ASSESSMENT & PLAN NOTE
- active smoker; reports down to .5ppd from 1.5ppd  - stressed importance of complete cessation; appears more receptive to quitting today

## 2023-04-06 NOTE — PLAN OF CARE
LISY met with pt via [employer] to discuss dc planning. Pt reports that he is agreeable to dc to Group home with Deepthi. Pt was made aware that SW will  transport him by  van to Group home located in [place]. Pt was informed that Deepthi will coordinate transport tomorrow to get his car. Pt expressed understanding. Pt was made aware that rent at Group home is 650 a month. Pt expressed that he is agreeable to pay.     Deepthi reported that she is able to accept pt to [facility]. Deepthi reported that she will provide transport to pts car on tomorrow. Deepthi expressed that pt is able to dc to group home today.     Transportation: SW setup ambulatory van for 6:00 pm to transport pt to Group Home. ETA pending.     SW will continue to follow pt throughout his transitions of care and assist with any dc needs.     LISY requested Pulm Follow up  LISY requested Cardi follow up    Cleared from CM . Bedside Nurse and VN notified.      Future Appointments   Date Time Provider Department Center   4/19/2023  2:00 PM Viktoriya Shrestha MD [facility]        04/06/23 1604   Final Note   Assessment Type Final Discharge Note   Anticipated Discharge Disposition   (GROUP HOME)   Hospital Resources/Appts/Education Provided Appointments scheduled by Navigator/Coordinator   Post-Acute Status   Discharge Delays None known at this time

## 2023-04-06 NOTE — PROGRESS NOTES
[facility]  Cardiology  Progress Note    Patient Name: Eric Soliman  MRN: [id number]  Admission Date: 4/3/2023  Hospital Length of Stay: 3 days  Code Status: Full Code   Attending Physician: Leah Torres*   Primary Care Physician: Primary Doctor No  Expected Discharge Date: 4/5/2023  Principal Problem:Critical limb ischemia of both lower extremities    Subjective:     Hospital Course:   4/4/2023 per HPI  4/5/2023 LE angiogram yesterday with following results:       occluded left common iliac/external iliac retrograde approach through left common femoral  Successful atherectomy and aspiration thrombectomy to the left common iliac external iliac with with [employer] atherectomy device   Successful PTA to left common iliac external iliac with 8 x 100 mm balloon  Successful stenting to left external iliac with 7.0x 40 mm self expandable stent   Clot noted in the left common femoral artery and she is occluded the flow distally and PTA left common femoral done with 5.0 x 80 mm balloon right radial approach  Residual clot noted in the left common femoral/left SFA so aspiration thrombectomy done with Percolate CAT 7 aspiration catheter with successful removal of the thrombus  Angiogram findings:  Left common iliac/external iliac under 100% occluded pre intervention reduced to 0% post intervention  Left common femoral extensive thrombus pre intervention reduced to 0% post intervention  Left % mid segment from migrated thrombus reduced to 0%  Left pop diffuse 70%  3 vs run off  Plan:  Successful PTA as above  ASA/Plavix  At discharge will need DOAC   Tobacco cessation  Resume heparin gtt at previous rate without bolus after 6 hours    Tolerated procedure well and recovered on telemetry. Access site stable. Dopplerable DP/PT/popliteal pulses present. Remains on IV Heparin drip  4/6/2023 Pain improving. H&H down to 6.6&20.4 yesterday up today to 8.2&24.6. BMP WNL. Pain improving. Dopplerable pulses  improved as well. HR and BP stable           Review of Systems   Constitutional: Negative for chills, decreased appetite, diaphoresis and fever.   Cardiovascular:  Negative for chest pain, claudication, cyanosis, dyspnea on exertion, irregular heartbeat, leg swelling, near-syncope, orthopnea, palpitations, paroxysmal nocturnal dyspnea and syncope.   Respiratory:  Negative for cough, hemoptysis, shortness of breath and wheezing.    Gastrointestinal:  Negative for bloating, abdominal pain, constipation, diarrhea, melena, nausea and vomiting.   Neurological:  Negative for dizziness and weakness.   Objective:     Vital Signs (Most Recent):  Temp: 97.3 °F (36.3 °C) (04/06/23 0822)  Pulse: 80 (04/06/23 0822)  Resp: 16 (04/06/23 0822)  BP: 138/63 (04/06/23 0822)  SpO2: (!) 92 % (04/06/23 0822)   Vital Signs (24h Range):  Temp:  [96.6 °F (35.9 °C)-98.5 °F (36.9 °C)] 97.3 °F (36.3 °C)  Pulse:  [77-92] 80  Resp:  [16-20] 16  SpO2:  [90 %-94 %] 92 %  BP: (106-138)/(55-72) 138/63     Weight: (!) 153.1 kg (337 lb 8.4 oz)  Body mass index is 49.84 kg/m².     SpO2: (!) 92 %         Intake/Output Summary (Last 24 hours) at 4/6/2023 1003  Last data filed at 4/6/2023 0127  Gross per 24 hour   Intake 343.33 ml   Output 500 ml   Net -156.67 ml       Lines/Drains/Airways       Peripheral Intravenous Line  Duration                  Peripheral IV - Single Lumen 04/03/23 1900 20 G Posterior;Right Forearm 2 days         Peripheral IV - Single Lumen 04/05/23 1107 18 G Left;Posterior Forearm <1 day                    Physical Exam  Constitutional:       General: He is not in acute distress.     Appearance: He is well-developed.   Cardiovascular:      Rate and Rhythm: Normal rate and regular rhythm.      Pulses:           Dorsalis pedis pulses are detected w/ Doppler on the left side.        Posterior tibial pulses are detected w/ Doppler on the left side.      Heart sounds: No murmur heard.    No gallop.   Pulmonary:      Effort: Pulmonary  effort is normal. No respiratory distress.      Breath sounds: Normal breath sounds. No wheezing.   Abdominal:      General: Bowel sounds are normal. There is no distension.      Palpations: Abdomen is soft.      Tenderness: There is no abdominal tenderness.   Skin:     General: Skin is warm and dry.   Neurological:      Mental Status: He is alert and oriented to person, place, and time.       Significant Labs: BMP:   Recent Labs   Lab 04/05/23  0255 04/06/23  0435   GLU 95 88   * 130*   K 4.3 4.1    103   CO2 21* 21*   BUN 14 11   CREATININE 0.9 0.8   CALCIUM 8.1* 8.4*    and CBC   Recent Labs   Lab 04/05/23  0007 04/05/23  0255 04/05/23  1519 04/06/23  0435   WBC 7.22 6.09  --  5.10   HGB 7.5* 7.1* 6.6* 8.2*   HCT 23.5* 22.5* 20.4* 24.6*    243  --  237       Significant Imaging: Echocardiogram: Transthoracic echo (TTE) complete ([facility] Only):   Results for orders placed or performed during the hospital encounter of 10/07/22   Echo   Result Value Ref Range    BSA 1.76 m2    TDI SEPTAL 0.06 m/s    LV LATERAL E/E' RATIO 4.50 m/s    LV SEPTAL E/E' RATIO 6.00 m/s    LA WIDTH 2.60 cm    IVC diameter 0.80 cm    Left Ventricular Outflow Tract Mean Velocity 0.60 cm/s    Left Ventricular Outflow Tract Mean Gradient 1.74 mmHg    AORTIC VALVE CUSP SEPERATION 2.03 cm    TDI LATERAL 0.08 m/s    LVIDd 3.96 3.5 - 6.0 cm    IVS 0.86 0.6 - 1.1 cm    Posterior Wall 0.89 0.6 - 1.1 cm    Ao root annulus 3.41 cm    LVIDs 2.64 2.1 - 4.0 cm    FS 33 28 - 44 %    LA volume 19.40 cm3    STJ 2.40 cm    Ascending aorta 2.88 cm    LV mass 103.83 g    LA size 2.27 cm    RVDD 2.00 cm    Right ventricular length in diastole (apical 4-chamber view) 5.87 cm    RA area 12.5 cm2    Left Ventricle Relative Wall Thickness 0.45 cm    AV mean gradient 2 mmHg    AV valve area 3.84 cm2    AV Velocity Ratio 0.97     AV index (prosthetic) 1.13     MV mean gradient 1 mmHg    MV valve area by continuity eq 4.20 cm2    E/A ratio 0.59      Mean e' 0.07 m/s    LVOT diameter 2.08 cm    LVOT area 3.4 cm2    LVOT peak christopher 0.98 m/s    LVOT peak VTI 19.80 cm    Ao peak christopher 1.01 m/s    Ao VTI 17.5 cm    LVOT stroke volume 67.25 cm3    AV peak gradient 4 mmHg    MV peak gradient 2 mmHg    E/E' ratio 5.14 m/s    MV Peak E Christopher 0.36 m/s    TR Max Christopher 2.36 m/s    MV VTI 16.0 cm    MV Peak A Christopher 0.61 m/s    LV Systolic Volume 25.45 mL    LV Systolic Volume Index 14.3 mL/m2    LV Diastolic Volume 68.17 mL    LV Diastolic Volume Index 38.30 mL/m2    LA Volume Index 10.9 mL/m2    LV Mass Index 58 g/m2    Right Atrium Volume Systolic 30.93 mL    RA Major Axis 4.33 cm    Left Atrium Minor Axis 3.42 cm    Left Atrium Major Axis 4.45 cm    Triscuspid Valve Regurgitation Peak Gradient 22 mmHg    LA Volume Index (Mod) 24.5 mL/m2    LA volume (mod) 43.59 cm3    RA Width 2.73 cm    Right Atrial Pressure (from IVC) 3 mmHg    EF 55 %    TV rest pulmonary artery pressure 25 mmHg    Narrative    · The left ventricle is normal in size with concentric remodeling and   normal systolic function.  · The estimated ejection fraction is 55%.  · Indeterminate left ventricular diastolic function.  · Normal right ventricular size with normal right ventricular systolic   function.  · Normal central venous pressure (3 mmHg).  · The estimated PA systolic pressure is 25 mmHg.        Assessment and Plan:     Brief HPI: Seen on AM rounds with Dr. Yi while resting in bed. Reports pain improving from admission.Discussed POC as detailed below-verbalized understanding and agrees with POC. Stressed importance of medication compliance along with complete smoking cessation. Discussed risk of recurrent occlusion leading to limb loss/amputation if non compliant with recommendations     * Critical limb ischemia of both lower extremities  - presented with left foot pain x 1 months with worsening for past 2 days  - toes cool and discolored; forefoot and shin warm to touch; unable to doppler DP and PT  pulses on left initially  - BLE arterial with severe PAD indicated with decreased velocities and monophasic waveforms; suspect high grade disease of left with reocclusion of DIANNE/DEB likely  - LE  with thrombectomy and PTA to DIANNE/LSFA with good results; able to doppler left DP and PT pulses this AM and improved from yesterday   - will stop IV Heparin today; continue Plavix; will add full dose Eliquis 5mg po BID; originally planned on PAD dosing of Xarelto but given extent of clot burden recommend use of full dose Eliquis; no ASA due to bleeding risk with Plavix and Eliquis   - stressed importance of medication compliance as well as tobacco cessation         Anemia  - H&H 7.1&22.5 yesterday AM down to 6.6&20.4 yesterday; improved after 1U PRBC to 8.2&24.6 this AM   - extensive thrombus burden on LE angiogram requiring thrombectomy; anticipated trend down given thrombectomy- no concern for active bleeding       PAD (peripheral artery disease)  - history of PAD with intervention in 10/2022 with thrombectomy to DIANNE to popliteal with covered stent placement  - recurrent occlusion to DIANNE/SFA treated with thrombectomy and PTA  - management as detailed under CLI    Primary hypertension  - SBP 100s-120s overnight  - on Coreg currently; BP at  goal and well controlled  - continue meds     Tobacco abuse  - active smoker; reports down to .5ppd from 1.5ppd  - stressed importance of complete cessation; appears more receptive to quitting today           VTE Risk Mitigation (From admission, onward)         Ordered     apixaban tablet 5 mg  2 times daily         04/06/23 0827     heparin infusion 1,000 units/500 ml in 0.9% NaCl (pressure line flush)  Intra-op continuous PRN         04/04/23 1126     IP VTE LOW RISK PATIENT  Once         04/03/23 1735     Place sequential compression device  Until discontinued         04/03/23 1735              Plan as detailed above. Suitable for discharge from cardiac standpoint. Needs close  follow up in cardiology clinic- can follow up with MAN Matthew, ANP  Cardiology  [facility] - Telemetry

## 2023-04-06 NOTE — ASSESSMENT & PLAN NOTE
- H&H 7.1&22.5 yesterday AM down to 6.6&20.4 yesterday; improved after 1U PRBC to 8.2&24.6 this AM   - extensive thrombus burden on LE angiogram requiring thrombectomy; anticipated trend down given thrombectomy- no concern for active bleeding

## 2023-04-06 NOTE — ASSESSMENT & PLAN NOTE
Patient noted to have some hypoxia with recovery to 93% on RA   ABG  Recent Labs   Lab 04/06/23  1008   PH 7.424   PO2 84   PCO2 34.0*   HCO3 22.3*   BE -2      CT chest: 1. Since prior CT chest performed 10/10/2022, marked enlargement spiculated mass lesion in the left upper lobe measuring up to 4.9 cm.  Finding primarily concerning for neoplasm.  Tissue sampling would provide definitive characterization of the lesion.  2. Multifocal geographic areas of ground-glass type attenuation on the prior examination appear to have essentially resolved, possibly a cyst on the basis of prior infectious or noninfectious inflammatory process.  3. Newly evident airspace consolidation in the right lower lobe may represent atelectasis and/or scar correlating with appearance on coronal reformat imaging, however infectious, noninfectious inflammatory, or neoplastic etiology considered.  Continued attention on follow-up be recommended.  4. Prominent mediastinal lymph nodes, likely related to the above.  Continued attention on follow-up recommended.  5. Additional details, as provided in the body of report.       Pulmonary consulted and arranging outpatinet bronchoscopy with biopsy.   Pulm also discussing holding anticoagulation/antiplatelet for procedure if able.   Referral to pulm sent

## 2023-04-06 NOTE — CONSULTS
Consult Note  [facility]    Attending: Liss Cross  Admit Date: 4/3/2023  Today's Date: 04/06/2023    HPI  Eric Soliman is a 62 year old male w/ epilepsy, HTN, Tobacco Use Disorder, PAD s/p thrombectomy & PTA. Patient presented to the ED w/ significant left foot pain that he states has been worsening throughout the weekend with recent difficulty walking.  He denies CP, SOB, abd pain, palpitation, N/V, or other complaints.     He had imaging performed 10/10/2022, since then there is a marked enlargement spiculated mass lesion in the left upper lobe measuring up to 4.9 cm.  Finding primarily concerning for neoplasm.     On the admission, he has has O2  sats as low as 91% and has required up to 3lpm. He still denies SOB, cough, bloody sputum, fevers, night sweats, weight loss, chest wall tenderness. He has smoked 1pk/day cigarettes since age 14, reducing to 1/2 pk/day within the last few years. He also smokes marijuana 3-4x/week. 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   SUBJECTIVE:     Patient seen and examined. No SOB, cough, resp complaints on RA.     Review of Systems   Constitutional:  Negative for chills, diaphoresis, fever and weight loss.   Respiratory:  Negative for cough, hemoptysis, sputum production and shortness of breath.    Cardiovascular:  Negative for chest pain.     OBJECTIVE:     Vital Signs Trends/Hx Reviewed  Vitals:    04/06/23 0400 04/06/23 0822 04/06/23 1008 04/06/23 1149   BP:  138/63     BP Location:  Left arm     Patient Position:  Lying     Pulse: 77 80 84    Resp:  16 18    Temp:  97.3 °F (36.3 °C)     TempSrc:  Oral     SpO2:  (!) 92% 95% (!) 92%   Weight:  (!) 153.1 kg (337 lb 8.4 oz)     Height:           Physical Exam  Constitutional:       Appearance: Normal appearance. He is obese.   HENT:      Mouth/Throat:      Mouth: Mucous membranes are moist.   Cardiovascular:      Rate and Rhythm: Normal  rate and regular rhythm.      Pulses: Normal pulses.      Heart sounds: Normal heart sounds.   Pulmonary:      Effort: Pulmonary effort is normal. No respiratory distress.      Breath sounds: Normal breath sounds. No wheezing.      Comments: No palp LNs  Chest:      Chest wall: No tenderness.   Neurological:      Mental Status: He is alert and oriented to person, place, and time.       Laboratory:  Recent Labs   Lab 04/06/23  0435   WBC 5.10   RBC 2.59*   HGB 8.2*   HCT 24.6*      MCV 95   MCH 31.7*   MCHC 33.3     Recent Labs   Lab 04/06/23  0435   *   K 4.1      CO2 21*   BUN 11   CREATININE 0.8     Recent Labs   Lab 04/06/23  1008   PH 7.424   PCO2 34.0*   PO2 84   HCO3 22.3*   POCSATURATED 97   BE -2         Chest CT:   1. Since prior CT chest performed 10/10/2022, marked enlargement spiculated mass lesion in the left upper lobe measuring up to 4.9 cm.  Finding primarily concerning for neoplasm.  Tissue sampling would provide definitive characterization of the lesion.  2. Multifocal geographic areas of ground-glass type attenuation on the prior examination appear to have essentially resolved, possibly a cyst on the basis of prior infectious or noninfectious inflammatory process.  3. Newly evident airspace consolidation in the right lower lobe may represent atelectasis and/or scar correlating with appearance on coronal reformat imaging, however infectious, noninfectious inflammatory, or neoplastic etiology considered.  Continued attention on follow-up be recommended.  4. Prominent mediastinal lymph nodes, likely related to the above.  Continued attention on follow-up recommended.    CXR:  Large masslike opacity in the left mid lung zone measures up to 7.1 cm, may be slightly progressed when compared to 10/07/2022 radiograph.  Given persistence of time, findings concerning for neoplasm.      ASSESSMENT & RECOMMENDATIONS     Suspected lung malignancy  No subjective complaints of SOB, cough,  bloody sputum or constitutional symptoms  With risk factors of approx 35 pk/yrs, marijuana use, toxic inhalants at work.  Lungs clear bilat on auscultation, no tenderness to chest wall or palpable lymph nodes  - CT chest back in October with ALO mass consolidation peripherally; repeat imaging this time appears more dense 4.9cm with mediastinal lymphadenopathy concerning for malignancy    PLAN:   - Scheduled for EBUS & Bronchoscopy at [facility] 5/12/2023; Please stop plavix 5 days prior and Eliquis 3 days prior to scheduled procedure.    Critical Limb Ischemia 2/2 PAD  - s/p angiogram findings:  Left common iliac/external iliac under 100% occluded pre intervention reduced to 0% post intervention  Left common femoral extensive thrombus pre intervention reduced to 0% post intervention  Left % mid segment from migrated thrombus reduced to 0%  Left pop diffuse 70%  3 vs run off    Plan:  Successful PTA as above  ASA/Plavix  At discharge eliquis  Tobacco cessation  Resume heparin gtt at previous rate without bolus after 6 hours    #Anemia  Last Hgb 8.2 up from 7.1 post-revascularization    #Seizure disorder  Controlled w/self medication marijuana (inhaled)    #Tobacco use  Approx 35pk yrs, currently at 1/2pk/day with prior attempts to quit w/o assistance  No current cravings while inpatient        Jan Sow M.D.  [facility], PGY-1   04/06/2023 12:15 PM     Felipe Corrigan MD  [facility] Fellow

## 2023-04-06 NOTE — PLAN OF CARE
AAOx 4, blood transfusion completed at 2123, no adverse reaction noted, denies any pain or discomfort at this time, stable, continue to monitor.    Problem: Adult Inpatient Plan of Care  Goal: Plan of Care Review  Outcome: Ongoing, Progressing  Goal: Patient-Specific Goal (Individualized)  Outcome: Ongoing, Progressing  Goal: Absence of Hospital-Acquired Illness or Injury  Outcome: Ongoing, Progressing  Goal: Optimal Comfort and Wellbeing  Outcome: Ongoing, Progressing  Goal: Readiness for Transition of Care  Outcome: Ongoing, Progressing

## 2023-04-06 NOTE — TELEPHONE ENCOUNTER
----- Message from Kaylene Gonzalez sent at 4/6/2023  4:51 PM CDT -----  Regarding: HFU  Patient will discharge from [facility] and will need a HFU with Pulmonary.  Please schedule and message me back so DC can relay appointment information to patient prior  to discharge.    DX: Lung mass     ThanksKimberlee  Physician Referral Specialist

## 2023-04-06 NOTE — ASSESSMENT & PLAN NOTE
· LLE pain, tenderness, color change  · Faint pulses dopplerable b/l feet  · S/p angiogram and stent placement LLE 2/2 thrombosis 10/2022  · Patient stopped xarelto d/t inability to afford  · Cardiology consulted  · Heparin gtt  · Plavix load  · ASA load  · Will start statin  · NPO after MN for possible interventions  · Plan for peripheral angiogram with possible intervention    S/P Successful PTA  4/4  Per cardiology no ASA, continue plavix, start on eliquis 5 mg PO BID   Tobacco cessation   Groin sites without erythema, edema, drainage, or hematoma.   1+ pedal pulse noted to LE dorsalis pedis  LLE tender, but neurovascularly intact. ROM intact.    Tolerating PO   Able to urinate    Follow up with priority care and cardiology   rx sent to [facility] pharmacy for medication assistance   Reviewed home medication changes with patient who voiced understanding   Follow up with cardiology and primary care

## 2023-04-06 NOTE — PROGRESS NOTES
[facility]                    Pharmacy       Discharge Medication Education    Patient ACCEPTED medication education. Pharmacy has provided education on the name, indication, and possible side effects of the medication(s) prescribed, using teach-back method.     The following medications have also been discussed, during this admission.        Medication List        START taking these medications      clopidogreL 75 mg tablet  Commonly known as: PLAVIX  Take 1 tablet (75 mg total) by mouth once daily.     ELIQUIS 5 mg Tab  Generic drug: apixaban  Take 1 tablet (5 mg total) by mouth 2 (two) times daily.            CONTINUE taking these medications      acetaminophen 500 MG tablet  Commonly known as: TYLENOL     atorvastatin 40 MG tablet  Commonly known as: LIPITOR  Take 1 tablet (40 mg total) by mouth once daily.     carvediloL 3.125 MG tablet  Commonly known as: COREG  Take 1 tablet (3.125 mg total) by mouth 2 (two) times daily.            STOP taking these medications      ibuprofen 200 MG tablet  Commonly known as: ADVIL,MOTRIN               Where to Get Your Medications        These medications were sent to [facility]  [address], [place] [address]      Hours: Mon-Fri, 8a-5:30p Phone: [phone]   atorvastatin 40 MG tablet  carvediloL 3.125 MG tablet  clopidogreL 75 mg tablet  ELIQUIS 5 mg Tab          Thank you  Craig Brannon, HussainD

## 2023-04-06 NOTE — ASSESSMENT & PLAN NOTE
8.9---> 7.1 post procedure   Patient on DAPT, heparin, post procedure risk of bleeding   Recheck H/H at 1400 to see if he requires PRBC transfusion   Will need anemia panel, can be done outpatient     Lab Results   Component Value Date    WBC 5.10 04/06/2023    HGB 8.2 (L) 04/06/2023    HCT 24.6 (L) 04/06/2023    MCV 95 04/06/2023     04/06/2023       hgb stable for discharge post transfusion

## 2023-04-06 NOTE — ASSESSMENT & PLAN NOTE
- presented with left foot pain x 1 months with worsening for past 2 days  - toes cool and discolored; forefoot and shin warm to touch; unable to doppler DP and PT pulses on left initially  - BLE arterial with severe PAD indicated with decreased velocities and monophasic waveforms; suspect high grade disease of left with reocclusion of DIANNE/DEB likely  - LE  with thrombectomy and PTA to DIANNE/LSFA with good results; able to doppler left DP and PT pulses this AM and improved from yesterday   - will stop IV Heparin today; continue Plavix; will add full dose Eliquis 5mg po BID; originally planned on PAD dosing of Xarelto but given extent of clot burden recommend use of full dose Eliquis; no ASA due to bleeding risk with Plavix and Eliquis   - stressed importance of medication compliance as well as tobacco cessation

## 2023-04-06 NOTE — PLAN OF CARE
04/06/23 1702   AVS Confirmation   Discharge instructions and AVS given to and reviewed with patient and/or significant other. Yes       AVS printed and handed to patient by bedside nurse. VN reviewed discharge instructions with patient using teachback method.  Allowed time for questions, all questions answered.  Patient verbalized complete understanding of discharge instructions and voices no concerns. Discharge instructions complete. Bedside delivery complete. Bedside nurse notified.

## 2023-04-06 NOTE — ASSESSMENT & PLAN NOTE
- SBP 100s-120s overnight  - on Coreg currently; BP at  goal and well controlled  - continue meds

## 2023-04-06 NOTE — ASSESSMENT & PLAN NOTE
· Per chart review, prior thrombosis noted LLE:  ·  Acute thrombotic occlusion of the L proximal common iliac artery due to a 80% discrete stenois of the proxiaml L External Iliac artery with severe thrombus burden extending to popliteal artery with 100% occlusion status post successful aspiration thrombectomy and PTA with 8x58 mm and 8x26 mm covered stents of the L proximal External Iliac artery  · See above for plan  Will need medication assistance to afford medications    Heparin gtt transition to full dose eliquis per cards recs

## 2023-04-06 NOTE — SUBJECTIVE & OBJECTIVE
Review of Systems   Constitutional: Negative for chills, decreased appetite, diaphoresis and fever.   Cardiovascular:  Negative for chest pain, claudication, cyanosis, dyspnea on exertion, irregular heartbeat, leg swelling, near-syncope, orthopnea, palpitations, paroxysmal nocturnal dyspnea and syncope.   Respiratory:  Negative for cough, hemoptysis, shortness of breath and wheezing.    Gastrointestinal:  Negative for bloating, abdominal pain, constipation, diarrhea, melena, nausea and vomiting.   Neurological:  Negative for dizziness and weakness.   Objective:     Vital Signs (Most Recent):  Temp: 97.3 °F (36.3 °C) (04/06/23 0822)  Pulse: 80 (04/06/23 0822)  Resp: 16 (04/06/23 0822)  BP: 138/63 (04/06/23 0822)  SpO2: (!) 92 % (04/06/23 0822)   Vital Signs (24h Range):  Temp:  [96.6 °F (35.9 °C)-98.5 °F (36.9 °C)] 97.3 °F (36.3 °C)  Pulse:  [77-92] 80  Resp:  [16-20] 16  SpO2:  [90 %-94 %] 92 %  BP: (106-138)/(55-72) 138/63     Weight: (!) 153.1 kg (337 lb 8.4 oz)  Body mass index is 49.84 kg/m².     SpO2: (!) 92 %         Intake/Output Summary (Last 24 hours) at 4/6/2023 1003  Last data filed at 4/6/2023 0127  Gross per 24 hour   Intake 343.33 ml   Output 500 ml   Net -156.67 ml       Lines/Drains/Airways       Peripheral Intravenous Line  Duration                  Peripheral IV - Single Lumen 04/03/23 1900 20 G Posterior;Right Forearm 2 days         Peripheral IV - Single Lumen 04/05/23 1107 18 G Left;Posterior Forearm <1 day                    Physical Exam  Constitutional:       General: He is not in acute distress.     Appearance: He is well-developed.   Cardiovascular:      Rate and Rhythm: Normal rate and regular rhythm.      Pulses:           Dorsalis pedis pulses are detected w/ Doppler on the left side.        Posterior tibial pulses are detected w/ Doppler on the left side.      Heart sounds: No murmur heard.    No gallop.   Pulmonary:      Effort: Pulmonary effort is normal. No respiratory distress.       Breath sounds: Normal breath sounds. No wheezing.   Abdominal:      General: Bowel sounds are normal. There is no distension.      Palpations: Abdomen is soft.      Tenderness: There is no abdominal tenderness.   Skin:     General: Skin is warm and dry.   Neurological:      Mental Status: He is alert and oriented to person, place, and time.       Significant Labs: BMP:   Recent Labs   Lab 04/05/23  0255 04/06/23  0435   GLU 95 88   * 130*   K 4.3 4.1    103   CO2 21* 21*   BUN 14 11   CREATININE 0.9 0.8   CALCIUM 8.1* 8.4*    and CBC   Recent Labs   Lab 04/05/23  0007 04/05/23  0255 04/05/23  1519 04/06/23  0435   WBC 7.22 6.09  --  5.10   HGB 7.5* 7.1* 6.6* 8.2*   HCT 23.5* 22.5* 20.4* 24.6*    243  --  237       Significant Imaging: Echocardiogram: Transthoracic echo (TTE) complete ([facility] Only):   Results for orders placed or performed during the hospital encounter of 10/07/22   Echo   Result Value Ref Range    BSA 1.76 m2    TDI SEPTAL 0.06 m/s    LV LATERAL E/E' RATIO 4.50 m/s    LV SEPTAL E/E' RATIO 6.00 m/s    LA WIDTH 2.60 cm    IVC diameter 0.80 cm    Left Ventricular Outflow Tract Mean Velocity 0.60 cm/s    Left Ventricular Outflow Tract Mean Gradient 1.74 mmHg    AORTIC VALVE CUSP SEPERATION 2.03 cm    TDI LATERAL 0.08 m/s    LVIDd 3.96 3.5 - 6.0 cm    IVS 0.86 0.6 - 1.1 cm    Posterior Wall 0.89 0.6 - 1.1 cm    Ao root annulus 3.41 cm    LVIDs 2.64 2.1 - 4.0 cm    FS 33 28 - 44 %    LA volume 19.40 cm3    STJ 2.40 cm    Ascending aorta 2.88 cm    LV mass 103.83 g    LA size 2.27 cm    RVDD 2.00 cm    Right ventricular length in diastole (apical 4-chamber view) 5.87 cm    RA area 12.5 cm2    Left Ventricle Relative Wall Thickness 0.45 cm    AV mean gradient 2 mmHg    AV valve area 3.84 cm2    AV Velocity Ratio 0.97     AV index (prosthetic) 1.13     MV mean gradient 1 mmHg    MV valve area by continuity eq 4.20 cm2    E/A ratio 0.59     Mean e' 0.07 m/s    LVOT diameter 2.08 cm     LVOT area 3.4 cm2    LVOT peak christopher 0.98 m/s    LVOT peak VTI 19.80 cm    Ao peak christopher 1.01 m/s    Ao VTI 17.5 cm    LVOT stroke volume 67.25 cm3    AV peak gradient 4 mmHg    MV peak gradient 2 mmHg    E/E' ratio 5.14 m/s    MV Peak E Christopher 0.36 m/s    TR Max Christopher 2.36 m/s    MV VTI 16.0 cm    MV Peak A Christopher 0.61 m/s    LV Systolic Volume 25.45 mL    LV Systolic Volume Index 14.3 mL/m2    LV Diastolic Volume 68.17 mL    LV Diastolic Volume Index 38.30 mL/m2    LA Volume Index 10.9 mL/m2    LV Mass Index 58 g/m2    Right Atrium Volume Systolic 30.93 mL    RA Major Axis 4.33 cm    Left Atrium Minor Axis 3.42 cm    Left Atrium Major Axis 4.45 cm    Triscuspid Valve Regurgitation Peak Gradient 22 mmHg    LA Volume Index (Mod) 24.5 mL/m2    LA volume (mod) 43.59 cm3    RA Width 2.73 cm    Right Atrial Pressure (from IVC) 3 mmHg    EF 55 %    TV rest pulmonary artery pressure 25 mmHg    Narrative    · The left ventricle is normal in size with concentric remodeling and   normal systolic function.  · The estimated ejection fraction is 55%.  · Indeterminate left ventricular diastolic function.  · Normal right ventricular size with normal right ventricular systolic   function.  · Normal central venous pressure (3 mmHg).  · The estimated PA systolic pressure is 25 mmHg.

## 2023-04-06 NOTE — PLAN OF CARE
Discharge orders noted. AVS prepared with medication details, clinical reference list and 24/7 [facility] Nurse On Call number attached. AVS review to follow.

## 2023-04-06 NOTE — DISCHARGE SUMMARY
[facility] Medicine  Discharge Summary      Patient Name: Eric Soliman  MRN: [id number]  GILBERT: [id number]  Patient Class: IP- Inpatient  Admission Date: 4/3/2023  Hospital Length of Stay: 3 days  Discharge Date and Time:  04/06/2023 4:01 PM  Attending Physician: Leah Torres*   Discharging Provider: Nicole Vo NP  Primary Care Provider: Primary Doctor Jesika    Primary Care Team: Networked reference to record PCT     HPI:   Eric Soliman is a 62 year old male w/ PMH DVT LLE s/p stent placement and epilepsy. Patient presented to the ED w/ significant left foot pain that he states has been worsening throughout the weekend, which prompted him to come in. He reports a sharp pain that shoots up the leg and prevents him from being able to walk. He also reports redness in the left foot and intermittent numbness to b/l feet. He had stents placed in his LLE 2/2 DVT 10/2022. He reports that he was initially prescribed xarelto but was unable to afford it so he stopped taking it a few months ago. He denies CP, SOB, abd pain, palpitation, N/V, or other complaints.    Cardiology contacted by ED provider and patient initiated on heparin gtt, plavix load, and ASA load.       Procedure(s) (LRB):  AORTOGRAM, WITH SERIALOGRAPHY (N/A)  PTA, Iliac Artery (Left)  ULTRASOUND, INTRAVASCULAR (Left)  ATHERECTOMY, PERIPHERAL BLOOD VESSEL (Left)  Filter Protection, Peripheral  THROMBECTOMY  Placement of Closure Device      Hospital Course:   No notes on file     Goals of Care Treatment Preferences:  Code Status: Full Code      Consults:   Consults (From admission, onward)        Status Ordering Provider     Inpatient consult to Pulmonology  Once        Provider:  (Not yet assigned)    Acknowledged NICOLE VO     Inpatient consult to Social Work  Once        Provider:  (Not yet assigned)    Acknowledged NICOLE VO     Inpatient consult to Social Work  Once        Provider:  (Not yet  "assigned)    Acknowledged YASMANI MAK     Inpatient consult to Cardiology-[facility]  Once        Provider:  Royce Yi MD    Completed PORESSHIRA          Neuro  Seizures  · Unknown last seizure event  · Epilepsy "controlled" by patient w/ marijuana usage  · Does not take anti-epileptic  · Initiate seizure precautions      Pulmonary  Lung mass    Patient noted to have some hypoxia with recovery to 93% on RA   No acute distress  Asymptomatic   ABG  Recent Labs   Lab 04/06/23  1008   PH 7.424   PO2 84   PCO2 34.0*   HCO3 22.3*   BE -2      CT chest: 1. Since prior CT chest performed 10/10/2022, marked enlargement spiculated mass lesion in the left upper lobe measuring up to 4.9 cm.  Finding primarily concerning for neoplasm.  Tissue sampling would provide definitive characterization of the lesion.  2. Multifocal geographic areas of ground-glass type attenuation on the prior examination appear to have essentially resolved, possibly a cyst on the basis of prior infectious or noninfectious inflammatory process.  3. Newly evident airspace consolidation in the right lower lobe may represent atelectasis and/or scar correlating with appearance on coronal reformat imaging, however infectious, noninfectious inflammatory, or neoplastic etiology considered.  Continued attention on follow-up be recommended.  4. Prominent mediastinal lymph nodes, likely related to the above.  Continued attention on follow-up recommended.  5. Additional details, as provided in the body of report.       Pulmonary consulted and arranging outpatinet bronchoscopy with biopsy.   Pulm also discussing holding anticoagulation/antiplatelet for procedure if able.   Referral to pulm sent     Cardiac/Vascular  PAD (peripheral artery disease)    As above     Primary hypertension  · Will start coreg      Oncology  Anemia    8.9---> 7.1 post procedure   Patient on DAPT, heparin, post procedure risk of bleeding   Recheck H/H at 1400 to see if he " requires PRBC transfusion   Will need anemia panel, can be done outpatient     Lab Results   Component Value Date    WBC 5.10 04/06/2023    HGB 8.2 (L) 04/06/2023    HCT 24.6 (L) 04/06/2023    MCV 95 04/06/2023     04/06/2023       hgb stable for discharge post transfusion     Other  * Critical limb ischemia of both lower extremities  · LLE pain, tenderness, color change  · Faint pulses dopplerable b/l feet  · S/p angiogram and stent placement LLE 2/2 thrombosis 10/2022  · Patient stopped xarelto d/t inability to afford  · Cardiology consulted  · Heparin gtt  · Plavix load  · ASA load  · Will start statin  · NPO after MN for possible interventions  · Plan for peripheral angiogram with possible intervention    S/P Successful PTA  4/4  Per cardiology no ASA, continue plavix, start on eliquis 5 mg PO BID   Tobacco cessation   Groin sites without erythema, edema, drainage, or hematoma.   1+ pedal pulse noted to LE dorsalis pedis  LLE tender, but neurovascularly intact. ROM intact.    Tolerating PO   Able to urinate    Follow up with priority care and cardiology   rx sent to [facility] pharmacy for medication assistance   Reviewed home medication changes with patient who voiced understanding   Follow up with cardiology and primary care       Thrombosis  · Per chart review, prior thrombosis noted LLE:  ·  Acute thrombotic occlusion of the L proximal common iliac artery due to a 80% discrete stenois of the proxiaml L External Iliac artery with severe thrombus burden extending to popliteal artery with 100% occlusion status post successful aspiration thrombectomy and PTA with 8x58 mm and 8x26 mm covered stents of the L proximal External Iliac artery  · See above for plan  Will need medication assistance to afford medications    Heparin gtt transition to full dose eliquis per cards recs     Tobacco abuse  Ready to quit: Not Answered  Counseling given: Yes  Tobacco comments: Pt states that he smoked 2 pk/day cigarettes  until approximately one month ago, at which time he cut down to 0.5 pk/day.  He declines referral to Ambulatory Smoking Cessation clinic.  Handout provided.     He states he does not need a nicotine patch  Referred to tobacco cessation       Final Active Diagnoses:    Diagnosis Date Noted POA    PRINCIPAL PROBLEM:  Critical limb ischemia of both lower extremities [I70.223] 10/07/2022 Yes    Lung mass [R91.8] 04/06/2023 Unknown    Anemia [D64.9] 04/05/2023 Yes    PAD (peripheral artery disease) [I73.9] 04/04/2023 Yes    Seizures [R56.9]  Yes    Primary hypertension [I10] 10/07/2022 Yes    Thrombosis [I82.90] 10/07/2022 Yes    Tobacco abuse [Z72.0] 10/07/2022 Yes      Problems Resolved During this Admission:       Discharged Condition: stable    Disposition: Home or Self Care    Follow Up:   Follow-up Information     Primary Doctor No .           Liss Cross MD Follow up.    Specialties: Pulmonary Disease, Critical Care Medicine  Why: for bronchoscopy and biopsy planning  Contact information:  [address] [place] [id number]  [phone]                       Patient Instructions:      Ambulatory referral/consult to Cardiology   Standing Status: Future   Referral Priority: Routine Referral Type: Consultation   Referral Reason: Specialty Services Required   Requested Specialty: Cardiology   Number of Visits Requested: 1     Ambulatory referral/consult to [facility]   Standing Status: Future   Referral Priority: Routine Referral Type: Consultation   Referral Reason: Specialty Services Required   Number of Visits Requested: 1     Ambulatory referral/consult to Pulmonology   Standing Status: Future   Referral Priority: Urgent Referral Type: Consultation   Referral Reason: Specialty Services Required   Requested Specialty: Pulmonary Disease   Number of Visits Requested: 1     Diet Cardiac     Notify your health care provider if you experience any of the following:  temperature >100.4     Notify your  health care provider if you experience any of the following:  persistent nausea and vomiting or diarrhea     Notify your health care provider if you experience any of the following:  severe uncontrolled pain     Notify your health care provider if you experience any of the following:  redness, tenderness, or signs of infection (pain, swelling, redness, odor or green/yellow discharge around incision site)     Notify your health care provider if you experience any of the following:  difficulty breathing or increased cough     Notify your health care provider if you experience any of the following:  severe persistent headache     Notify your health care provider if you experience any of the following:  worsening rash     Notify your health care provider if you experience any of the following:  persistent dizziness, light-headedness, or visual disturbances     Notify your health care provider if you experience any of the following:  increased confusion or weakness     Activity as tolerated       Significant Diagnostic Studies: Labs:   CMP   Recent Labs   Lab 04/05/23  0255 04/06/23  0435   * 130*   K 4.3 4.1    103   CO2 21* 21*   GLU 95 88   BUN 14 11   CREATININE 0.9 0.8   CALCIUM 8.1* 8.4*   ANIONGAP 7* 6*    and CBC   Recent Labs   Lab 04/05/23  0007 04/05/23  0255 04/05/23  1519 04/06/23  0435   WBC 7.22 6.09  --  5.10   HGB 7.5* 7.1* 6.6* 8.2*   HCT 23.5* 22.5* 20.4* 24.6*    243  --  237       Pending Diagnostic Studies:     None         Medications:  Reconciled Home Medications:      Medication List      START taking these medications    clopidogreL 75 mg tablet  Commonly known as: PLAVIX  Take 1 tablet (75 mg total) by mouth once daily.     ELIQUIS 5 mg Tab  Generic drug: apixaban  Take 1 tablet (5 mg total) by mouth 2 (two) times daily.        CONTINUE taking these medications    acetaminophen 500 MG tablet  Commonly known as: TYLENOL  Take 500 mg by mouth every 6 (six) hours as needed for  Pain.     atorvastatin 40 MG tablet  Commonly known as: LIPITOR  Take 1 tablet (40 mg total) by mouth once daily.     carvediloL 3.125 MG tablet  Commonly known as: COREG  Take 1 tablet (3.125 mg total) by mouth 2 (two) times daily.        STOP taking these medications    ibuprofen 200 MG tablet  Commonly known as: ADVIL,MOTRIN            Indwelling Lines/Drains at time of discharge:   Lines/Drains/Airways     None                 Time spent on the discharge of patient: 60 minutes         Vivian Escamilla NP  Department of [facility] Medicine  [facility]

## 2023-04-06 NOTE — NURSING
Discharge instructions and education packet provided.Virtual nurse notified. IV sites removed , Catheter tip intact.Telemetry discontinued . Patient shows no signs of acute distress . Medications delivered at bedside . Awaiting Transport .

## 2023-04-19 NOTE — TELEPHONE ENCOUNTER
----- Message from Miranda Webb sent at 4/19/2023 10:28 AM CDT -----  Contact: pt/[phone]  Type:  Sooner Apoointment Request    Caller is requesting a sooner appointment.  Caller declined first available appointment listed below.  Caller will not accept being placed on the waitlist and is requesting a message be sent to doctor.  Name of Caller:pt    When is the first available appointment?no dates  given    Would the patient rather a call back or a response via [facility]? Call   Best Call Back Number: [phone]  Additional Information: pt  called  requesting to  reschedule  appointment that  was cancel  for  04/19

## 2023-04-25 NOTE — FIRST PROVIDER EVALUATION
Emergency Department TeleTriage Encounter Note      CHIEF COMPLAINT    Chief Complaint   Patient presents with    Leg Pain     Left lower leg pain that starts dull but will shoot from foot to hip. Pt also states he has rash back of left leg, left buttock and left groin. Hx of blood clots, takes eliquis.       VITAL SIGNS   Initial Vitals [04/25/23 1513]   BP Pulse Resp Temp SpO2   136/80 102 18 97.9 °F (36.6 °C) 98 %      MAP       --            ALLERGIES    Review of patient's allergies indicates:  No Known Allergies    PROVIDER TRIAGE NOTE  Patient presents with complaint of leg pain with associated rash. Denied fever. Denied swelling. Denied injury.      Phy:   Constitutional: well nourished, well developed, appearing stated age, NAD   HEENT: NCAT, symmetrical lids, No obvious facial deformity.  Normal phonation. Normal Conjunctiva   Neck: NAROM   Respiratory: Normal effort.  No obvious use of accessory muscles   Musculoskeletal: Moved upper extremities well   Neuro: Alert, answers questions appropriately    Psych: appropriate mood and affect      Initial orders will be placed and care will be transferred to an alternate provider when patient is roomed for a full evaluation. Any additional orders and the final disposition will be determined by that provider.        ORDERS  Labs Reviewed - No data to display    ED Orders (720h ago, onward)      None              Virtual Visit Note: The provider triage portion of this emergency department evaluation and documentation was performed via [email or web address], a HIPAA-compliant telemedicine application, in concert with a tele-presenter in the room. A face to face patient evaluation with one of my colleagues will occur once the patient is placed in an emergency department room.      DISCLAIMER: This note was prepared with [employer] voice recognition transcription software. Garbled syntax, mangled pronouns, and other bizarre constructions may be attributed to that software  system.

## 2023-04-25 NOTE — ED NOTES
Patient arrived to ED with complaints of pain to L leg. Patient reports constant stabbing pain with intermittent sharp, shooting pain. Pain starts at toes and travels up to knee. Reports that he had a blockage in LLE opened up 2 weeks ago and pain has continued.   Patient also complaints of pain to back of L thigh with red/purple burning rash. Rash dry. Rash appeared 1 day a go and has spread up to groin.       APPEARANCE: Alert, oriented and in no acute distress.  CARDIAC: Normal rate and rhythm, no murmur heard.   PERIPHERAL VASCULAR: L foot slightly red, both feet cool. Denies numbness and tingling. +1 doppler pulse bilateral pedal pulses. No edema noted.   RESPIRATORY:Normal rate and effort, breath sounds clear bilaterally throughout chest. Respirations are equal and unlabored no obvious signs of distress.  GASTRO: soft, bowel sounds normal, no tenderness, no abdominal distention.  MUSC: Full ROM. No bony tenderness or soft tissue tenderness. No obvious deformity.  SKIN: dry, patchy purple/red rash L thigh that burns. Skin is warm and dry, normal skin turgor, mucous membranes moist.  NEURO: 5/5 strength major flexors/extensors bilaterally. Sensory intact to light touch bilaterally. Mora coma scale: eyes open spontaneously-4, oriented & converses-5, obeys commands-6. No neurological abnormalities.   MENTAL STATUS: awake, alert and aware of environment.  EYE: PERRL, both eyes: pupils brisk and reactive to light. Normal size.  ENT: EARS: no obvious drainage. NOSE: no active bleeding.

## 2023-04-26 NOTE — ED PROVIDER NOTES
Encounter Date: 4/25/2023       History     Chief Complaint   Patient presents with    Leg Pain     Left lower leg pain that starts dull but will shoot from foot to hip. Pt also states he has rash back of left leg, left buttock and left groin. Hx of blood clots, takes eliquis.     62-year-old male presents emergency department complaining of persistent left lower extremity pain as well as a new rash.  Patient recently had stenting of an artery in his left lower leg.  States he is having persistent pain.  Does not worse since the stenting.  States he just thought it would go away because he had the stent and revascularization.  He also notes a rash to his posterior upper thigh and buttock area.  States this is very sensitive and tender.  Notes a shocking type pain even with light touch.  No other symptoms reported at this time.     Review of patient's allergies indicates:  No Known Allergies  Past Medical History:   Diagnosis Date    Anticoagulant long-term use     DVT (deep venous thrombosis)     Seizures     epilepsy controlled w/ marijuana     Past Surgical History:   Procedure Laterality Date    ANGIOGRAPHY OF LOWER EXTREMITY Left 10/7/2022    Procedure: Angiogram Extremity Unilateral;  Surgeon: Charles Patten III, MD;  Location: [facility] CATH LAB;  Service: Cardiology;  Laterality: Left;    AORTOGRAPHY WITH SERIALOGRAPHY Bilateral 10/7/2022    Procedure: AORTOGRAM, WITH SERIALOGRAPHY;  Surgeon: Charles Patten III, MD;  Location: [facility] CATH LAB;  Service: Cardiology;  Laterality: Bilateral;    AORTOGRAPHY WITH SERIALOGRAPHY N/A 4/4/2023    Procedure: AORTOGRAM, WITH SERIALOGRAPHY;  Surgeon: Royce Yi MD;  Location: [facility] CATH LAB/EP;  Service: Cardiology;  Laterality: N/A;    ATHERECTOMY, PERIPHERAL BLOOD VESSEL Left 4/4/2023    Procedure: ATHERECTOMY, PERIPHERAL BLOOD VESSEL;  Surgeon: Royce Yi MD;  Location: [facility] CATH LAB/EP;  Service: Cardiology;  Laterality: Left;    CLOSURE DEVICE   4/4/2023    Procedure: Placement of Closure Device;  Surgeon: Royce Yi MD;  Location: [facility] CATH LAB/EP;  Service: Cardiology;;    FILTER PROTECTION, PERIPHERAL  4/4/2023    Procedure: Filter Protection, Peripheral;  Surgeon: Royce Yi MD;  Location: [facility] CATH LAB/EP;  Service: Cardiology;;    IVUS (INTRAVASCULAR ULTRASOUND) Left 4/4/2023    Procedure: ULTRASOUND, INTRAVASCULAR;  Surgeon: Royce Yi MD;  Location: [facility] CATH LAB/EP;  Service: Cardiology;  Laterality: Left;    PTA, ILIAC ARTERY Left 4/4/2023    Procedure: PTA, Iliac Artery;  Surgeon: Royce Yi MD;  Location: [facility] CATH LAB/EP;  Service: Cardiology;  Laterality: Left;    THROMBECTOMY  4/4/2023    Procedure: THROMBECTOMY;  Surgeon: Royce Yi MD;  Location: [facility] CATH LAB/EP;  Service: Cardiology;;     Family History   Problem Relation Age of Onset    No Known Problems Mother     Heart disease Father      Social History     Tobacco Use    Smoking status: Every Day     Packs/day: 2.00     Years: 49.00     Pack years: 98.00     Types: Cigarettes     Start date: 1974    Smokeless tobacco: Never    Tobacco comments:     Pt states that he smoked 2 pk/day cigarettes until approximately one month ago, at which time he cut down to 0.5 pk/day.  He declines referral to Ambulatory Smoking Cessation clinic.  Handout provided.    Substance Use Topics    Alcohol use: Not Currently     Comment: rarely 1 beer    Drug use: Yes     Types: Marijuana     Review of Systems   Constitutional:  Negative for chills and fever.   HENT:  Negative for congestion.    Respiratory:  Negative for cough and shortness of breath.    Cardiovascular:  Negative for chest pain.   Gastrointestinal:  Negative for abdominal pain.   Musculoskeletal:  Negative for back pain.   Neurological:  Negative for light-headedness and headaches.     Physical Exam     Initial Vitals [04/25/23 1513]   BP Pulse Resp Temp SpO2   136/80 102 18 97.9 °F (36.6 °C) 98 %      MAP        --         Physical Exam    Nursing note and vitals reviewed.  Constitutional: He appears well-developed and well-nourished. No distress.   HENT:   Head: Normocephalic and atraumatic.   Eyes: Conjunctivae and EOM are normal. Pupils are equal, round, and reactive to light.   Neck: Neck supple. No tracheal deviation present.   Normal range of motion.  Cardiovascular:  Normal rate and intact distal pulses.           Patient's left foot is warm, with capillary refill less than 2 seconds; his dorsalis pedis and posterior tibialis pulses are obtainable only by Doppler which seems consistent with his recent discharge summary   Pulmonary/Chest: No respiratory distress.   Musculoskeletal:         General: Tenderness present. No edema. Normal range of motion.      Cervical back: Normal range of motion and neck supple.      Comments: +TTP to posterior leg with rash as noted in image below     Neurological: He is alert and oriented to person, place, and time. He has normal strength. No cranial nerve deficit. GCS score is 15. GCS eye subscore is 4. GCS verbal subscore is 5. GCS motor subscore is 6.   Skin: Skin is warm and dry.         ED Course   Procedures  Labs Reviewed   CBC W/ AUTO DIFFERENTIAL - Abnormal; Notable for the following components:       Result Value    RBC 4.45 (*)     Hemoglobin 13.1 (*)     RDW 14.7 (*)     Immature Granulocytes 0.6 (*)     Immature Grans (Abs) 0.05 (*)     All other components within normal limits   COMPREHENSIVE METABOLIC PANEL - Abnormal; Notable for the following components:    Sodium 127 (*)     CO2 21 (*)     Total Protein 9.5 (*)     Albumin 3.4 (*)     eGFR 57 (*)     All other components within normal limits   APTT   PROTIME-INR                 Medications   predniSONE tablet 40 mg (40 mg Oral Given 4/25/23 1644)   valACYclovir tablet 1,000 mg (1,000 mg Oral Given 4/25/23 1740)   sodium chloride 0.9% bolus 1,000 mL 1,000 mL (0 mLs Intravenous Stopped 4/25/23 1925)     Medical  Decision Making:   History:   Old Medical Records: I decided to obtain old medical records.  Old Records Summarized: records from previous admission(s).       <> Summary of Records: Reviewed most recent discharge summary documenting patient's past medical history, baseline medications, and recent interventions as well as his most recent physical exam  Initial Assessment:   62-year-old male presents emergency department complaining of pain to his left lower extremity as well as a rash to the back of his left leg  Differential Diagnosis:   Ischemic limb, neuropathy, radiculopathy, reperfusion pain, shingles, cellulitis,  Clinical Tests:   Lab Tests: Reviewed       <> Summary of Lab: CBC without leukocytosis, normal H&H; CMP with normal renal and liver function test, concerning for mild hyponatremia; coags within normal limits  ED Management:  Patient given a L of IV fluid as well as prednisone and valacyclovir.  Rashes most consistent with shingles.  Patient reports that he does drink almost only water and drinks a lot of water.  I encouraged him to drink something different like Gatorade or Powerade or something with electrolytes because his water intake is dropping his sodium levels.  He has follow-up in 3 days.  I have given prescriptions for prednisone and valacyclovir.  Instructed patient on home management, over-the-counter medications, follow-up Friday as scheduled, strict return precautions given.  Vital signs stable, patient comfortable with discharge at this time.           Of note, I did discuss the case with the cardiology group on-call that recently took care of this patient.  They agree that, with a warm foot with Doppler a bowl pulse similar to discharge, no repeat imaging needed at this time.                        Clinical Impression:   Final diagnoses:  [E87.1] Hyponatremia (Primary)  [B02.9] Herpes zoster without complication        ED Disposition Condition    Discharge Stable          ED  Prescriptions       Medication Sig Dispense Start Date End Date Auth. Provider    valACYclovir (VALTREX) 1000 MG tablet Take 1 tablet (1,000 mg total) by mouth 3 (three) times daily. for 7 days 21 tablet 4/25/2023 5/2/2023 Ruben Maynard MD    predniSONE (DELTASONE) 50 MG Tab Take 1 tablet (50 mg total) by mouth once daily. for 5 days 5 tablet 4/25/2023 4/30/2023 Ruben Maynard MD          Follow-up Information       Follow up With Specialties Details Why Contact Info Additional Information    [facility] In 3 days As scheduled [address], [address]  [facility] [address] [phone] Please park in [address] or D and use [address]. Take Medical Office Bldg elevators. [address]             Ruben Maynard MD  04/25/23 7833

## 2023-04-26 NOTE — DISCHARGE INSTRUCTIONS
Please make sure you are drinking plenty of fluids such as Gatorade or Powerade.  Please take the prescribed medication as directed.  Please follow-up in the [facility] Care Clinic on Friday as scheduled.  Please return with any new or worsening symptoms.

## 2023-04-26 NOTE — PROGRESS NOTES
Cathryn Hogue  ED Navigator  Emergency Department    Project: [facility] ED Navigator  Role: Community Health Worker    Date: 04/26/2023  Patient Name: Eric Soliman  MRN: [id number]  PCP: Primary Doctor No    Assessment:     Eric Soliman is a 62 y.o. male who has presented to ED for shingles. Patient has visited the ED 1 times in the past 3 months. Patient did not contact PCP.     ED Navigator Initial Assessment    ED Navigator Enrollment Documentation  Consent to Services  Does patient consent to completing the assessment?: Yes  Contact  Method of Initial Contact: Phone  Transportation  Does the patient have issues with Transportation?: No  Does the patient have transportation to and from healthcare appointments?: Yes  Insurance Coverage  Do you have coverage/adequate coverage?: Yes  Type/kind of coverage: [facility]  Is patient able to afford co-pays/deductibles?: Yes  Is patient able to afford HME or supplies?: Yes  Does patient have an established [facility] PCP?: No  Able to access?: Yes  Does patient need assistance finding a PCP?: No  Does the patient have a lack of adequate coverage?: No  Specialist Appointment  Did the patient come to the ED to see a specialist?: No  Does the patient have a pending specialist referral?: No  Does the patient have a specialist appointment made?: No  PCP Follow Up Appointment  Has the patient had an appointment with a primary care provider in the past year?: No  Does the patient have a follow up appontment with a PCP?: Yes  Upcoming appointment date: 4/27/23  Provider: Viktoriya Shrestha MD  When was the last time you saw your PCP?:  (Comment: No PCP)  Medications  Is patient able to afford medication?: Yes  Is patient unable to get medication due to lack of transportation?: No  Psychological  Does the patient have psycho-social concerns?: No  Food  Does the patient have concerns about food?: Yes  What concerns does the patient have regarding food?: Lack of  food  Communication/Education  Does the patient have limited English proficiency/English not primary language?: No  Does patient have low literacy and/or low health literacy?: Yes  Does patient have concerns with care?: No  Does patient have dissatisfaction with care?: No  Other Financial Concerns  Does the patient have immediate financial distress?: No  Does the patient have general financial concerns?: Yes  Other Social Barriers/Concerns  Does the patient have any additional barriers or concerns?: Housing concerns  Primary Barrier  Barriers identified: Cognitive barrier (health literacy, language and communication, etc.)  Root Cause of ED Utilization: Patient Knowledge/Low Health Literacy  Plan to address Patient Knowledge/Low Health Literacy: Provided information for [facility] On Call 24/7 Nurse triage line [phone] or 1-866-[facility] ([phone])  Next steps: Provided Education  Was education/educational materials provided surrounding PCP services/creating a medical home?: Yes Was education verbal or written?: Written     Was education/educational materials provided surrounding low cost, healthy foods?: Yes Was education verbal or written?: Written     Was education/educational materials provided surrounding other items? If so, use comment to explain.: Yes Was education verbal or written?: Written   Plan: Provided information for [facility] On Call 24/7 Nurse triage line, [phone] or 1-866-[facility] ([phone])  Expected Date of Follow Up 1: 5/24/23         Social History     Socioeconomic History    Marital status: Single   Tobacco Use    Smoking status: Every Day     Packs/day: 2.00     Years: 49.00     Pack years: 98.00     Types: Cigarettes     Start date: 1974    Smokeless tobacco: Never    Tobacco comments:     Pt states that he smoked 2 pk/day cigarettes until approximately one month ago, at which time he cut down to 0.5 pk/day.  He declines referral to Ambulatory Smoking Cessation clinic.  Handout  provided.    Substance and Sexual Activity    Alcohol use: Not Currently     Comment: rarely 1 beer    Drug use: Yes     Types: Marijuana     Social Determinants of Health     Financial Resource Strain: High Risk    Difficulty of Paying Living Expenses: Hard   Food Insecurity: Food Insecurity Present    Worried About Running Out of Food in the Last Year: Sometimes true    Ran Out of Food in the Last Year: Sometimes true   Transportation Needs: No Transportation Needs    Lack of Transportation (Medical): No    Lack of Transportation (Non-Medical): No   Social Connections: Unknown    Frequency of Communication with Friends and Family: Twice a week   Housing Stability: High Risk    Unable to Pay for Housing in the Last Year: Yes    Unstable Housing in the Last Year: Yes       Plan:   Patient agreed with enrollment and F/U calls. Patient states he has PCP appt set up for this week. He states he felt like he was treated as a drug seeker in the ED. I apologized and explained we do not want our patients to feel this way. He is on the way to fill his rx from ED visit. He does not want assistance setting up any other appts. He states he would like housing resources. Currently living out of his car and in hotels. Sending all financial resources available to be by mail to pt. He does not have email. Patient denies needing resources for transportation, smoking cessation, and anxiety/stress/depression. Mailed resources to patient along with [facility]IP documents (Right [facility] Right Place form, [place] Virtual Visit Flyer, [facility] PCP scheduling assistance, [facility] on call RN #, and Heart Healthy Diet education) to pt at [address] [place] [address] [place] [address]. Will F/U with pt on 5/24

## 2023-04-27 NOTE — PROGRESS NOTES
[facility]   New Visit Progress Note   Recent Hospital Discharge     PRESENTING HISTORY     Chief Complaint/Reason for Admission:  Follow up Hospital Discharge   PCP: Primary Doctor No    History of Present Illness:  Mr. Eric Soliman is a 62 y.o. male who was recently admitted to the hospital.    [facility] Medicine  Discharge Summary        Patient Name: Eric Soliman  MRN: [id number]  GILBERT: [id number]  Patient Class: IP- Inpatient  Admission Date: 4/3/2023  Hospital Length of Stay: 3 days  Discharge Date and Time:  04/06/2023 4:01 PM  Attending Physician: Leah Torres*   Discharging Provider: Vivian Escamilla NP  Primary Care Provider: Primary Doctor No  ___________________________________________________________________    Today:  [facility] follow up.  Recently hospitalized for management of bilateral lower extremity critical limb ischemia.   Admitted to [facility] Medicine service.  Interventional Cardiology team performed thrombectomy and revascularization on 4/4/23.   Plan for Plavix and full dose anticoagulation with Eliquis.   Required transfusion 1 U PRBC for worsening anemia.   CT Chest with new mass concerning for malignancy.  Patient discharged to home.    Patient unaccompanied today.  Ambulatory and independent with ADL's.  Reports compliance with all medication and brought bottles for review.  Does not have working phone to receive phone calls, but can use a friends phone to call out.     Review of Systems  General ROS: negative for chills, fever or weight loss  Psychological ROS: negative for hallucination, depression or suicidal ideation  Ophthalmic ROS: negative for blurry vision, photophobia or eye pain  ENT ROS: negative for epistaxis, sore throat or rhinorrhea  Respiratory ROS: no cough, shortness of breath, or wheezing  Cardiovascular ROS: no chest pain or dyspnea on exertion  Gastrointestinal ROS: no abdominal  pain, change in bowel habits, or black/ bloody stools  Genito-Urinary ROS: no dysuria, trouble voiding, or hematuria  Musculoskeletal ROS: negative for gait disturbance or muscular weakness  Neurological ROS: no syncope or seizures; no ataxia  Dermatological ROS: + blistering rash to left leg       PAST HISTORY:     Past Medical History:   Diagnosis Date    Anticoagulant long-term use     DVT (deep venous thrombosis)     Seizures     epilepsy controlled w/ marijuana       Past Surgical History:   Procedure Laterality Date    ANGIOGRAPHY OF LOWER EXTREMITY Left 10/7/2022    Procedure: Angiogram Extremity Unilateral;  Surgeon: Charles Patten III, MD;  Location: [facility] CATH LAB;  Service: Cardiology;  Laterality: Left;    AORTOGRAPHY WITH SERIALOGRAPHY Bilateral 10/7/2022    Procedure: AORTOGRAM, WITH SERIALOGRAPHY;  Surgeon: Charles Patten III, MD;  Location: [facility] CATH LAB;  Service: Cardiology;  Laterality: Bilateral;    AORTOGRAPHY WITH SERIALOGRAPHY N/A 4/4/2023    Procedure: AORTOGRAM, WITH SERIALOGRAPHY;  Surgeon: Royce Yi MD;  Location: [facility] CATH LAB/EP;  Service: Cardiology;  Laterality: N/A;    ATHERECTOMY, PERIPHERAL BLOOD VESSEL Left 4/4/2023    Procedure: ATHERECTOMY, PERIPHERAL BLOOD VESSEL;  Surgeon: Royce Yi MD;  Location: [facility] CATH LAB/EP;  Service: Cardiology;  Laterality: Left;    CLOSURE DEVICE  4/4/2023    Procedure: Placement of Closure Device;  Surgeon: Royce Yi MD;  Location: [facility] CATH LAB/EP;  Service: Cardiology;;    FILTER PROTECTION, PERIPHERAL  4/4/2023    Procedure: Filter Protection, Peripheral;  Surgeon: Royce Yi MD;  Location: [facility] CATH LAB/EP;  Service: Cardiology;;    IVUS (INTRAVASCULAR ULTRASOUND) Left 4/4/2023    Procedure: ULTRASOUND, INTRAVASCULAR;  Surgeon: Royce Yi MD;  Location: [facility] CATH LAB/EP;  Service: Cardiology;  Laterality: Left;    PTA, ILIAC ARTERY Left 4/4/2023    Procedure: PTA, Iliac Artery;  Surgeon: Royce YODER  MD Kd;  Location: [facility] CATH LAB/EP;  Service: Cardiology;  Laterality: Left;    THROMBECTOMY  4/4/2023    Procedure: THROMBECTOMY;  Surgeon: Royce Yi MD;  Location: [facility] CATH LAB/EP;  Service: Cardiology;;       Family History   Problem Relation Age of Onset    No Known Problems Mother     Heart disease Father          MEDICATIONS & ALLERGIES:     Current Outpatient Medications on File Prior to Visit   Medication Sig Dispense Refill    acetaminophen (TYLENOL) 500 MG tablet Take 500 mg by mouth every 6 (six) hours as needed for Pain.      apixaban (ELIQUIS) 5 mg Tab Take 1 tablet (5 mg total) by mouth 2 (two) times daily. 60 tablet 2    atorvastatin (LIPITOR) 40 MG tablet Take 1 tablet (40 mg total) by mouth once daily. 30 tablet 2    carvediloL (COREG) 3.125 MG tablet Take 1 tablet (3.125 mg total) by mouth 2 (two) times daily. 60 tablet 2    clopidogreL (PLAVIX) 75 mg tablet Take 1 tablet (75 mg total) by mouth once daily. 30 tablet 11    predniSONE (DELTASONE) 50 MG Tab Take 1 tablet (50 mg total) by mouth once daily. for 5 days 5 tablet 0    valACYclovir (VALTREX) 1000 MG tablet Take 1 tablet (1,000 mg total) by mouth 3 (three) times daily. for 7 days 21 tablet 0     Current Facility-Administered Medications on File Prior to Visit   Medication Dose Route Frequency Provider Last Rate Last Admin    iodixanoL (VISIPAQUE 320) injection    PRN Charles Patten III, MD   190 mL at 10/07/22 1500        Review of patient's allergies indicates:  No Known Allergies    OBJECTIVE:     Vital Signs:  BP (!) 157/83 (BP Location: Left arm, Patient Position: Sitting, BP Method: Small (Automatic))   Pulse 83   Wt 68.7 kg (151 lb 7.3 oz)   SpO2 (!) 94%   BMI 22.37 kg/m²   Wt Readings from Last 3 Encounters:   04/27/23 1355 68.7 kg (151 lb 7.3 oz)   04/25/23 1513 65.8 kg (145 lb)   04/06/23 0822 (!) 153.1 kg (337 lb 8.4 oz)   04/03/23 1311 79.4 kg (175 lb)     Body mass index is 22.37 kg/m².   (!) 94%    Physical  Exam:  BP (!) 157/83 (BP Location: Left arm, Patient Position: Sitting, BP Method: Small (Automatic))   Pulse 83   Wt 68.7 kg (151 lb 7.3 oz)   SpO2 (!) 94%   BMI 22.37 kg/m²   General appearance: alert, cooperative, no distress  + appears disheveled   Constitutional:Oriented to person, place, and time  + thin, frail appearing    HEENT: Normocephalic, atraumatic, neck symmetrical, no nasal discharge   Eyes: conjunctivae/corneas clear, PERRL, EOM's intact  Lungs: clear to auscultation bilaterally, no dullness to percussion bilaterally  Heart: regular rate and rhythm without rub; no displacement of the PMI   Abdomen: soft, non-tender; bowel sounds normoactive; no organomegaly  Extremities: extremities symmetric; no clubbing, cyanosis, or edema  Integument: + blistering rash left posterior thigh   Neurologic: Alert and oriented X 3, normal strength, normal coordination and gait  Psychiatric: no pressured speech; normal affect; no evidence of impaired cognition     Laboratory  Lab Results   Component Value Date    WBC 8.10 04/25/2023    HGB 13.1 (L) 04/25/2023    HCT 40.2 04/25/2023    MCV 90 04/25/2023     04/25/2023     BMP  Lab Results   Component Value Date     (L) 04/25/2023    K 4.9 04/25/2023    CL 95 04/25/2023    CO2 21 (L) 04/25/2023    BUN 11 04/25/2023    CREATININE 1.4 04/25/2023    CALCIUM 8.9 04/25/2023    ANIONGAP 11 04/25/2023    EGFRNORACEVR 57 (A) 04/25/2023     Lab Results   Component Value Date    ALT 10 04/25/2023    AST 18 04/25/2023    ALKPHOS 77 04/25/2023    BILITOT 0.3 04/25/2023     Lab Results   Component Value Date    INR 1.0 04/25/2023    INR 1.1 04/04/2023    INR 1.1 04/03/2023     Lab Results   Component Value Date    HGBA1C 4.7 10/07/2022       Diagnostic Results:    CT Chest 4/6/23:  1. Since prior CT chest performed 10/10/2022, marked enlargement spiculated mass lesion in the left upper lobe measuring up to 4.9 cm.  Finding primarily concerning for neoplasm.  Tissue  sampling would provide definitive characterization of the lesion.  2. Multifocal geographic areas of ground-glass type attenuation on the prior examination appear to have essentially resolved, possibly a cyst on the basis of prior infectious or noninfectious inflammatory process.  3. Newly evident airspace consolidation in the right lower lobe may represent atelectasis and/or scar correlating with appearance on coronal reformat imaging, however infectious, noninfectious inflammatory, or neoplastic etiology considered.  Continued attention on follow-up be recommended.  4. Prominent mediastinal lymph nodes, likely related to the above.  Continued attention on follow-up recommended.  ASSESSMENT & PLAN:       Critical limb ischemia of both lower extremities  Thrombosis  PAD (peripheral artery disease)  - s/p thrombectomy and revascularization while hospitalized  - remain on Eliquis and Plavix per recommendations of cardiology team  - remain on statin therapy   - see Cardiology 5/24/23    Tobacco abuse  - Smokes 1 ppd x 50 years  - counseling and education provided  -     Ambulatory referral/consult to Smoking Cessation Program; Future; Expected date: 05/04/2023    Lung mass  - known lung mass  - had follow up scheduled at [facility] 5/26/23 which has been canceled for reasons that are unclear   - patient provided with contact information for [facility] Pulmonary team so that he may contact for rescheduling  - counseling and education provided regarding need for follow up and biopsy ; high concern for malignancy     [facility]  He will see Dr Rajendra Ramirez 5/31/23 to establish primary care.     Instructions for the patient:      Scheduled Follow-up :  Future Appointments   Date Time Provider Department Center   5/24/2023 10:00 AM Royce Yi MD [facility]   5/31/2023  1:20 PM Rajendra Ramirez MD [facility]       Post Visit Medication List:     Medication List             Accurate as of April 27, 2023  3:37 PM. If you have any questions, ask your nurse or doctor.                CONTINUE taking these medications      acetaminophen 500 MG tablet  Commonly known as: TYLENOL     atorvastatin 40 MG tablet  Commonly known as: LIPITOR  Take 1 tablet (40 mg total) by mouth once daily.     carvediloL 3.125 MG tablet  Commonly known as: COREG  Take 1 tablet (3.125 mg total) by mouth 2 (two) times daily.     clopidogreL 75 mg tablet  Commonly known as: PLAVIX  Take 1 tablet (75 mg total) by mouth once daily.     ELIQUIS 5 mg Tab  Generic drug: apixaban  Take 1 tablet (5 mg total) by mouth 2 (two) times daily.     predniSONE 50 MG Tab  Commonly known as: DELTASONE  Take 1 tablet (50 mg total) by mouth once daily. for 5 days     valACYclovir 1000 MG tablet  Commonly known as: VALTREX  Take 1 tablet (1,000 mg total) by mouth 3 (three) times daily. for 7 days              Signing Physician:  Viktoriya Shrestha MD

## 2023-05-13 NOTE — DISCHARGE INSTRUCTIONS
Thank you for coming to our Emergency Department today. It is important to remember that some problems are difficult to diagnose and may not be found during your first visit. Be sure to follow up with your primary care doctor and review any labs/imaging that was performed with them. If you do not have a primary care doctor, you may contact the one listed on your discharge paperwork or you may also call the [facility] Appointment Desk at [phone] to schedule an appointment with one.     All medications may potentially have side effects and it is impossible to predict which medications may give you side effects. If you feel that you are having a negative effect of any medication you should immediately stop taking them and seek medical attention.    Return to the ER with any questions/concerns, new/concerning symptoms, worsening or failure to improve. Do not drive or make any important decisions for 24 hours if you have received any pain medications, sedatives or mood altering drugs during your ER visit.

## 2023-05-13 NOTE — ED NOTES
Pt reports to ED with c/o stabbing pain with urination x1 day. Pt also has blisters to penis and buttocks x3 weeks but some blisters have began to dry up s/t oral medications prescription.     Adult Physical Assessment  LOC: Eric Soliman, 62 y.o. male verified via two identifiers.  The patient is awake, alert, oriented and speaking appropriately at this time.  APPEARANCE: Patient resting comfortably and appears to be in no acute distress at this time. Patient is clean and well groomed, patient's clothing is properly fastened.  SKIN:The skin is warm and dry, color consistent with ethnicity, patient has normal skin turgor and moist mucus membranes. Blisters to penis and buttocks.   MUSCULOSKELETAL: Patient moving all extremities well, no obvious swelling or deformities noted.  RESPIRATORY: Airway is open and patent, respirations are spontaneous, patient has a normal effort and rate, no accessory muscle use noted.  CARDIAC: Patient has a normal rate and rhythm, no periphreal edema noted in any extremity, capillary refill < 3 seconds in all extremities  ABDOMEN: Soft and non tender to palpation, no abdominal distention noted. Bowel sounds present in all four quadrants.  NEUROLOGIC: Eyes open spontaneously, behavior appropriate to situation, follows commands, facial expression symmetrical, bilateral hand grasp equal and even, purposeful motor response noted, normal sensation in all extremities when touched with a finger.

## 2023-05-13 NOTE — ED PROVIDER NOTES
Encounter Date: 5/12/2023       History     Chief Complaint   Patient presents with    Male  Problem     Patient reports pain with urination x 24 hours. Pain described as a stabbing pain. States only a small amount comes out at a time. Patient also has blisters to penis and buttocks x 3 weeks. States some blisters have started to dry up after oral medication prescribed. Denies fever.     62-year-old male with history of DVTs not on anticoagulation, seizure disorder presents with urinary retention, and rash to his left lower extremity for the past 3 weeks.  Patient reports he was not able to pee throughout the day today but after arrival to the emergency room was able to urinate.  He reports having mild pain in his suprapubic region prior to urination.  Furthermore patient reports having a rash to his left lower extremity that begins at the gluteal region and extends down in the posterior thigh.  He reports rash has been ongoing for 3 weeks in his very painful.  Reports intermittent drainage from his rash.  Denies any fevers or chills.  Denies any chest pain or shortness of breath.  Denies any numbness tingling or weakness.  Denies any IV drug use.  Denies any recent sick contacts.  Patient reports he is currently homeless and lives in his car.      Review of patient's allergies indicates:  No Known Allergies  Past Medical History:   Diagnosis Date    Anticoagulant long-term use     DVT (deep venous thrombosis)     Seizures     epilepsy controlled w/ marijuana     Past Surgical History:   Procedure Laterality Date    ANGIOGRAPHY OF LOWER EXTREMITY Left 10/7/2022    Procedure: Angiogram Extremity Unilateral;  Surgeon: Charles Patten III, MD;  Location: [facility] CATH LAB;  Service: Cardiology;  Laterality: Left;    AORTOGRAPHY WITH SERIALOGRAPHY Bilateral 10/7/2022    Procedure: AORTOGRAM, WITH SERIALOGRAPHY;  Surgeon: Charles Patten III, MD;  Location: [facility] CATH LAB;  Service: Cardiology;  Laterality: Bilateral;     AORTOGRAPHY WITH SERIALOGRAPHY N/A 4/4/2023    Procedure: AORTOGRAM, WITH SERIALOGRAPHY;  Surgeon: Royce Yi MD;  Location: [facility] CATH LAB/EP;  Service: Cardiology;  Laterality: N/A;    ATHERECTOMY, PERIPHERAL BLOOD VESSEL Left 4/4/2023    Procedure: ATHERECTOMY, PERIPHERAL BLOOD VESSEL;  Surgeon: Royce Yi MD;  Location: [facility] CATH LAB/EP;  Service: Cardiology;  Laterality: Left;    CLOSURE DEVICE  4/4/2023    Procedure: Placement of Closure Device;  Surgeon: Royce Yi MD;  Location: [facility] CATH LAB/EP;  Service: Cardiology;;    FILTER PROTECTION, PERIPHERAL  4/4/2023    Procedure: Filter Protection, Peripheral;  Surgeon: Royce Yi MD;  Location: [facility] CATH LAB/EP;  Service: Cardiology;;    IVUS (INTRAVASCULAR ULTRASOUND) Left 4/4/2023    Procedure: ULTRASOUND, INTRAVASCULAR;  Surgeon: Royce Yi MD;  Location: [facility] CATH LAB/EP;  Service: Cardiology;  Laterality: Left;    PTA, ILIAC ARTERY Left 4/4/2023    Procedure: PTA, Iliac Artery;  Surgeon: Royce Yi MD;  Location: [facility] CATH LAB/EP;  Service: Cardiology;  Laterality: Left;    THROMBECTOMY  4/4/2023    Procedure: THROMBECTOMY;  Surgeon: Royce Yi MD;  Location: [facility] CATH LAB/EP;  Service: Cardiology;;     Family History   Problem Relation Age of Onset    No Known Problems Mother     Heart disease Father      Social History     Tobacco Use    Smoking status: Every Day     Packs/day: 2.00     Years: 49.00     Pack years: 98.00     Types: Cigarettes     Start date: 1974    Smokeless tobacco: Never    Tobacco comments:     Pt states that he smoked 2 pk/day cigarettes until approximately one month ago, at which time he cut down to 0.5 pk/day.  He declines referral to Ambulatory Smoking Cessation clinic.  Handout provided.    Substance Use Topics    Alcohol use: Not Currently     Comment: rarely 1 beer    Drug use: Yes     Types: Marijuana     Review of Systems   Constitutional:  Negative for chills and fever.    HENT:  Negative for congestion and rhinorrhea.    Eyes:  Negative for pain.   Respiratory:  Negative for cough and shortness of breath.    Cardiovascular:  Negative for chest pain and leg swelling.   Gastrointestinal:  Negative for abdominal pain, nausea and vomiting.   Endocrine: Negative for polyuria.   Genitourinary:  Positive for difficulty urinating. Negative for dysuria and hematuria.   Musculoskeletal:  Negative for gait problem and neck pain.   Skin:  Positive for rash.   Allergic/Immunologic: Negative for immunocompromised state.   Neurological:  Negative for weakness and headaches.     Physical Exam     Initial Vitals [05/12/23 2126]   BP Pulse Resp Temp SpO2   (!) 184/97 79 17 98.1 °F (36.7 °C) 96 %      MAP       --         Physical Exam    Constitutional: He is not diaphoretic. No distress.   HENT:   Head: Normocephalic and atraumatic.   Eyes: Conjunctivae and EOM are normal. Pupils are equal, round, and reactive to light.   Neck:   Normal range of motion.  Cardiovascular:  Regular rhythm.           Pulses:       Radial pulses are 2+ on the right side and 2+ on the left side.        Posterior tibial pulses are 2+ on the right side and 2+ on the left side.   Pulmonary/Chest: Breath sounds normal. No respiratory distress.   Abdominal: Abdomen is soft. Bowel sounds are normal. He exhibits no distension. There is no abdominal tenderness. There is no rebound and no guarding.   Musculoskeletal:         General: No tenderness. Normal range of motion.      Cervical back: Normal range of motion.     Lymphadenopathy:     He has no cervical adenopathy.   Neurological: He is alert and oriented to person, place, and time.   Skin: Skin is warm. Capillary refill takes less than 2 seconds. Rash noted.   Psychiatric: His behavior is normal.         ED Course   Procedures  Labs Reviewed   CBC W/ AUTO DIFFERENTIAL - Abnormal; Notable for the following components:       Result Value    RBC 4.39 (*)     Hemoglobin 12.8  (*)     Hematocrit 38.6 (*)     MPV 8.4 (*)     All other components within normal limits   COMPREHENSIVE METABOLIC PANEL - Abnormal; Notable for the following components:    Sodium 131 (*)     BUN 4 (*)     Total Protein 9.0 (*)     All other components within normal limits   URINALYSIS, REFLEX TO URINE CULTURE    Narrative:     Specimen Source->Urine   LACTIC ACID, PLASMA          Imaging Results    None          Medications   clindamycin capsule 450 mg (450 mg Oral Given 5/13/23 0134)     Medical Decision Making:   History:   Old Medical Records: I decided to obtain old medical records.  Initial Assessment:   62-year-old male with history of DVTs not on anticoagulation, seizure disorder presents with urinary retention, and rash to his left lower extremity for the past 3 weeks.  Patient in no acute distress exam notable for rash is imaged above.  Patient is seen recently in the emergency department diagnosed with herpes zoster.  Given patient's report of drainage from his rash concern for superinfection especially with surrounding erythema suggestive of cellulitis.  No palpable abscesses to drain.  Abdomen is soft nontender UA without signs of infection.  Bedside ultrasound with compressed bladder.  Obtained lab work to assess for any signs of systemic infection, patient without evidence of leukocytosis and lactic acid within normal limits.  Patient given 1st dose of clindamycin in the emergency department and prescription written.  Strict return precautions and anticipatory guidance discussed.  Recommended patient follows up with his primary care doctor as well as Urology if symptoms of difficulty urinating persist.  Currently no indication for Moore placement as patient's bladder is completely decompressed.  Patient in agreement with plan, all questions answered.  Clinical Tests:   Lab Tests: Ordered and Reviewed           ED Course as of 05/13/23 1857   Sat May 13, 2023   0130 CBC without significant  leukocytosis, anemia, or platelet abnormalities.  Chem 14 negative for hypo-or hyper natremia, kalemia, chloridemia, or other electrolyte abnormalities; BUN and creatinine were within normal limits indicating normal kidney function, ALT and AST were within normal limits indicating normal liver function.  La wnl, UA without signs of infection  [AS]      ED Course User Index  [AS] Cindi Zavala MD          DISCLAIMER: This note was prepared with [employer] voice recognition transcription software. Garbled syntax, mangled pronouns, and other bizarre constructions may be attributed to that software system.        Clinical Impression:   Final diagnoses:  [L03.116] Cellulitis of left lower extremity (Primary)  [B02.8] Herpes zoster with other complication        ED Disposition Condition    Discharge Stable          ED Prescriptions       Medication Sig Dispense Start Date End Date Auth. Provider    clindamycin (CLEOCIN) 150 MG capsule Take 3 capsules (450 mg total) by mouth every 6 (six) hours. for 10 days 120 capsule 5/13/2023 5/23/2023 Cindi Zavala MD          Follow-up Information       Follow up With Specialties Details Why Contact Info    [facility] - Emergency Dept Emergency Medicine  If symptoms worsen [address] [facility] [id number] [phone]    Primary care doctor  Schedule an appointment as soon as possible for a visit  for reassesment              Cindi Zavala MD  05/13/23 4307

## 2023-06-13 NOTE — PROGRESS NOTES
Pulmonary Plan of Care Note:    Mr. Soliman is a 61 yo M with PAD on Plavix and Eliquis (recently admitted to [facility] for critical limb ischemia s/p successful revascularization) who was seen at [facility] by pulmonology team on 4/6 for increasing ALO mass with mediastinal LAD. Patient has a 45 PY smoking history. At that time, we discussed the risks and benefits of bronchoscopy/EBUS with both the patient and the cardiology service given that Plavix/Eliquis needed to be stopped for the procedure.    Initial EBUS scheduled on 5/12 was cancelled as patient did not hold his Plavix/Eliquis. EBUS scheduled on 6/2 was cancelled because patient did not have a ride. EBUS scheduled on 6/16 is now cancelled as multiple physicians and nurses have been unable to get in touch with the patient for over 2 weeks to set up transportation and provide instructions for holding Plavix/Eliquis. Multiple call-back phone numbers have been left on his voicemail. Will continue to try to get in touch with him.    Liss Cross MD  [employer] [facility] Attending  [phone]

## 2023-11-07 NOTE — ED PROVIDER NOTES
"Encounter Date: 11/7/2023       History     Chief Complaint   Patient presents with    Chest Pain     Mid-sternal, CP on/off x1 month. Pt denies having CP but reports having a stent placed: "A long time ago." He's currently  homeless, no daily meds, and HTN noted upon arrival.      The patient is a 63-year-old male who presents to the emergency department via EMS with chest pain.  He states that he has had intermittent episodes over the past month.  With each episode, he has pressure-like chest discomfort that is mild.  It is retrosternal.  It is associated with shortness of breath.  He denies any nausea, diaphoresis, or radiation of the pain.  He states that the pain resolves within a few minutes.  He denies any obvious exacerbating or relieving factors.  He does report complete noncompliance with his medications which include Eliquis for DVT, atorvastatin for cholesterol, Coreg for blood pressure, and Plavix for peripheral artery disease.  His last episode of chest discomfort was early this morning.  He states that he has been asymptomatic over the past 3 hours or so.  He has no complaints at this time.  According to EMS, the patient's blood pressure was significantly elevated in route.      Review of patient's allergies indicates:  No Known Allergies  Past Medical History:   Diagnosis Date    Anticoagulant long-term use     DVT (deep venous thrombosis)     Seizures     epilepsy controlled w/ marijuana     Past Surgical History:   Procedure Laterality Date    ANGIOGRAPHY OF LOWER EXTREMITY Left 10/7/2022    Procedure: Angiogram Extremity Unilateral;  Surgeon: Charles Patten III, MD;  Location: [facility] CATH LAB;  Service: Cardiology;  Laterality: Left;    AORTOGRAPHY WITH SERIALOGRAPHY Bilateral 10/7/2022    Procedure: AORTOGRAM, WITH SERIALOGRAPHY;  Surgeon: Charles Patten III, MD;  Location: [facility] CATH LAB;  Service: Cardiology;  Laterality: Bilateral;    AORTOGRAPHY WITH SERIALOGRAPHY N/A 4/4/2023    " Procedure: AORTOGRAM, WITH SERIALOGRAPHY;  Surgeon: Royce Yi MD;  Location: [facility] CATH LAB/EP;  Service: Cardiology;  Laterality: N/A;    ATHERECTOMY, PERIPHERAL BLOOD VESSEL Left 4/4/2023    Procedure: ATHERECTOMY, PERIPHERAL BLOOD VESSEL;  Surgeon: Royce Yi MD;  Location: [facility] CATH LAB/EP;  Service: Cardiology;  Laterality: Left;    CLOSURE DEVICE  4/4/2023    Procedure: Placement of Closure Device;  Surgeon: Royce Yi MD;  Location: [facility] CATH LAB/EP;  Service: Cardiology;;    FILTER PROTECTION, PERIPHERAL  4/4/2023    Procedure: Filter Protection, Peripheral;  Surgeon: Royce Yi MD;  Location: [facility] CATH LAB/EP;  Service: Cardiology;;    IVUS (INTRAVASCULAR ULTRASOUND) Left 4/4/2023    Procedure: ULTRASOUND, INTRAVASCULAR;  Surgeon: Royce Yi MD;  Location: [facility] CATH LAB/EP;  Service: Cardiology;  Laterality: Left;    PTA, ILIAC ARTERY Left 4/4/2023    Procedure: PTA, Iliac Artery;  Surgeon: Royce Yi MD;  Location: [facility] CATH LAB/EP;  Service: Cardiology;  Laterality: Left;    THROMBECTOMY  4/4/2023    Procedure: THROMBECTOMY;  Surgeon: Royce Yi MD;  Location: [facility] CATH LAB/EP;  Service: Cardiology;;     Family History   Problem Relation Age of Onset    No Known Problems Mother     Heart disease Father      Social History     Tobacco Use    Smoking status: Every Day     Current packs/day: 2.00     Average packs/day: 2.0 packs/day for 49.9 years (99.7 ttl pk-yrs)     Types: Cigarettes     Start date: 1974    Smokeless tobacco: Never    Tobacco comments:     Pt states that he smoked 2 pk/day cigarettes until approximately one month ago, at which time he cut down to 0.5 pk/day.  He declines referral to Ambulatory Smoking Cessation clinic.  Handout provided.    Substance Use Topics    Alcohol use: Not Currently     Comment: rarely 1 beer    Drug use: Yes     Types: Marijuana     Review of Systems  General: Denies fever.  Denies chills.  Denies generalized  weakness.  HENT: Denies sore throat.  Denies earache.  Denies rhinorrhea.  Eyes: Denies visual changes.  Denies eye pain.  Denies drainage.  Cardiovascular:  Reports intermittent pressure-like chest pain.  Reports intermittent shortness of breath.  Denies orthopnea.  Denies dyspnea on exertion.  Respiratory:  Reports intermittent shortness of breath.  Denies wheezing.  Denies coughing.  GI: Denies abdominal pain.  Denies nausea.  Denies vomiting.  Denies diarrhea.  Denies constipation.  Denies melena.  Denies hematochezia.  : Denies dysuria.  Denies hematuria.  Denies pelvic pain.  Denies swelling.  Skin: Denies rashes.  Denies lesions.  Denies pallor.  Neuro: Denies headache.  Denies head trauma.  Denies numbness.  Denies focal weakness.  Musculoskeletal: Denies neck pain.  Denies back pain.  Denies extremity pain.  Denies extremity swelling.      Physical Exam     Initial Vitals   BP Pulse Resp Temp SpO2   11/07/23 0919 11/07/23 0919 11/07/23 0919 11/07/23 1639 11/07/23 0919   (!) 190/90 102 20 98.1 °F (36.7 °C) 99 %      MAP       --                Physical Exam  General: No apparent distress.  Well-nourished.  Well-developed.  Alert and oriented x3.  HENT: Moist mucous membranes.  Normocephalic atraumatic.  Oropharynx clear.   Eyes: Pupils equally round and reactive to light.  Extraocular movements intact.  No scleral icterus.  No conjunctival pallor.  Cardiovascular: Regular rate and rhythm.  No murmurs, rubs, or gallops.  Brisk capillary fill.  2+ distal pulses.  Respiratory:  Mild shortness of breath noted with tachypnea.  Decreased breath sounds in the left lung field.  Coarse breath sounds noted on the right.  Abdomen: Soft.  Nontender.  Nondistended.  No guarding.  No rebound.  No masses.  No abdominal bruit auscultated.  Skin: No rashes.  No lesions.  No pallor.  No jaundice.  Neuro: Cranial nerves II through XII grossly intact.  Moving all extremities equally.  No sensory deficits.  Strength 5 out of 5  in all 4 extremities.  Musculoskeletal: Neck supple.  No extremity tenderness.  Moving all extremities without pain.  No back tenderness.  No neck tenderness.  Negative Homans sign.  No palpable cord.  No calf tenderness.      ED Course   Critical Care    Date/Time: 11/7/2023 1:33 PM    Performed by: Bob Rodriguez MD  Authorized by: Bob Rodriguez MD  Direct patient critical care time: 11 minutes  Additional history critical care time: 9 minutes  Ordering / reviewing critical care time: 10 minutes  Documentation critical care time: 12 minutes  Consulting other physicians critical care time: 7 minutes  Total critical care time (exclusive of procedural time) : 49 minutes        Labs Reviewed   CBC W/ AUTO DIFFERENTIAL - Abnormal; Notable for the following components:       Result Value    Hemoglobin 12.3 (*)     Hematocrit 37.8 (*)     MCH 26.5 (*)     RDW 15.3 (*)     MPV 9.0 (*)     Immature Granulocytes 1.5 (*)     Immature Grans (Abs) 0.11 (*)     Gran % 73.7 (*)     Lymph % 17.8 (*)     All other components within normal limits   COMPREHENSIVE METABOLIC PANEL - Abnormal; Notable for the following components:    Sodium 132 (*)     Total Protein 8.6 (*)     Albumin 2.5 (*)     All other components within normal limits   TROPONIN I   TROPONIN I   B-TYPE NATRIURETIC PEPTIDE   LACTIC ACID, PLASMA   CYTOLOGY SPECIMEN- MEDICAL CYTOLOGY (FLUID/WASH/BRUSH)     EKG Readings: (Independently Interpreted)   I independently interpreted this EKG.  Normal sinus rhythm with a rate of 75.  Normal axis.  Normal intervals.  No obvious acute ischemic changes.       ECG Results              EKG 12-lead (Final result)  Result time 11/08/23 07:44:25      Final result by Interface, Lab [facility] (11/08/23 07:44:25)                   Narrative:    Test Reason : R07.9,    Vent. Rate : 075 BPM     Atrial Rate : 075 BPM     P-R Int : 196 ms          QRS Dur : 058 ms      QT Int : 364 ms       P-R-T Axes : 052 023 -35 degrees     QTc  Int : 406 ms    Normal sinus rhythm  Nonspecific T wave abnormality  Abnormal ECG  When compared with ECG of 04-APR-2023 16:53,  nonspecific T wave changes noted   Confirmed by Cherise Pickard MD ([id number]) on 11/8/2023 7:44:18 AM    Referred By: SANA   SELF           Confirmed By:Cherise Pickard MD                                  Imaging Results               US Lower Extremity Veins Bilateral (Final result)  Result time 11/07/23 22:14:10      Final result by Marty Willis MD (11/07/23 22:14:10)                   Impression:      Deep venous thrombosis in the left mid femoral vein and the left posterior tibialis veins.    The patient has already known PE on CT scan performed on 11/07/2023.    This report was flagged in Epic as containing an incidental finding.      Electronically signed by: Marty Willis MD  Date:    11/07/2023  Time:    22:14               Narrative:    EXAMINATION:  US LOWER EXTREMITY VEINS BILATERAL    CLINICAL HISTORY:  PE;    TECHNIQUE:  Duplex and color flow Doppler and dynamic compression was performed of the bilateral lower extremity veins was performed.    COMPARISON:  CT chest dated 11/07/2023.    FINDINGS:  Right thigh veins: The common femoral, femoral, popliteal, upper greater saphenous, and deep femoral veins are patent and free of thrombus. The veins are normally compressible and have normal phasic flow and augmentation response.    Right calf veins: The visualized calf veins are patent.    Left thigh veins: The common femoral, proximal/distal femoral, popliteal, upper greater saphenous, and deep femoral veins are patent and free of thrombus.  There is nonocclusive thrombus in the left mid femoral vein.    Left calf veins: There is occlusion of the posterior tibialis veins.  The remaining calf veins are difficult to visualize.    Miscellaneous: None                                       X-Ray Chest AP Portable (Final result)  Result time 11/07/23 16:30:06      Final  result by Derrell Edward MD (11/07/23 16:30:06)                   Impression:      1. Loculated appearing left pleural effusion, slightly smaller than on the previous exam.  There is atelectasis of the left lower lung zone, developing infectious process not excluded.  Correlation is advised.  Please see CT 11/07/2023.      Electronically signed by: Derrell Edward MD  Date:    11/07/2023  Time:    16:30               Narrative:    EXAMINATION:  XR CHEST AP PORTABLE    CLINICAL HISTORY:  Post thoracentesis;    TECHNIQUE:  Single frontal view of the chest was performed.    COMPARISON:  11/07/2023    FINDINGS:  The cardiomediastinal silhouette is not enlarged noting calcification of the aorta..  There is a loculated left pleural effusion, slightly smaller than on the previous examination..  The trachea is midline.  The lungs are symmetrically expanded bilaterally with coarse interstitial attenuation bilaterally noting atelectasis of the left lower lobe.  Underlying left lower lobe consolidation not excluded..  There is no pneumothorax.  The osseous structures are remarkable for degenerative changes.  There is a radiopaque structure projected over the left shoulder..                                        CTA Chest Non-Coronary (PE Studies) (Final result)  Result time 11/07/23 13:01:58      Final result by Josué Murillo MD (11/07/23 13:01:58)                   Impression:      Segmental and subsegmental pulmonary thromboembolism in the right lower lobe.    Some findings of right heart strain are also demonstrated.    Large new left pleural effusion, mildly hyperattenuating to water, with faint rim enhancement, and left-to-right mediastinal shift, compatible with tension hydrothorax.  The leading differential diagnostic considerations include malignant pleural effusion, pleural infection, or (dilute) hemothorax.  Correlate clinically.  Consider left thoracentesis.    Substantial new atelectasis of much of the  left lung.    New 9 x 6 mm right lower lobe pulmonary nodule, possibly representing metastatic disease.  Correlate clinically; and with follow-up chest CT in 3-6 months, if clinically appropriate.    Prominent to enlarged right hilar lymph nodes, up to 16 mm in short axis diameter; thought to be new since the 04/06/2023 noncontrast CT; this could represent lymph node metastasis.    Poorly defined increased fullness in the left adrenal, relative to 04/06/2023, for which metastatic disease is not excluded.    This report was flagged in Epic as abnormal.    SURINDER Murillo MD, first observed the critical findings on 11/07/2023 at 12:45, and reported the results to Bob Rodriguez MD, via epic secure chat message on 11/07/2023 at 13:00.  Patient name and medical record number were specified/linked in the message. The messaging system provided confirmation that the message was immediately opened and seen by the recipient.      Electronically signed by: Josué Murillo  Date:    11/07/2023  Time:    13:01               Narrative:    EXAMINATION:  CTA CHEST NON CORONARY (PE STUDIES)    CLINICAL HISTORY:  Pulmonary embolism (PE) suspected, high prob;    TECHNIQUE:  Low dose axial images, sagittal and coronal reformations were obtained from the thoracic inlet to the lung bases following the IV administration of 100 mL of Omnipaque 350.  Contrast timing was optimized to evaluate the pulmonary arteries.  MIP images were performed.    COMPARISON:  PA and lateral chest x-ray obtained earlier on the same date, 11/07/2023.    Noncontrast chest CT 04/06/2023    FINDINGS:  Pulmonary arteries: Nonocclusive filling subtle type filling defect in a right lower lobe segmental pulmonary artery bifurcation, extending into subsegmental branches.    Thoracic aorta: Mild atherosclerotic changes.  No acute dissection or giuseppe aneurysmal enlargement.    Thyroid: No acute CT abnormality in its visualized portion.  Portions of the gland obscured by  beam hardening artifacts.    Airways: The thoracic aorta and visualized right central bronchi are patent.  In the left lung, several central bronchi are occluded or effaced.    Thoracic esophagus: Normal course and caliber.    Mediastinum/steven: Rightward shift of the mediastinum, a new finding since the comparison CT.    Pre-existing lymph node calcifications inferior to the right mainstem bronchus, and in the right inferior hilum.    Prominent to enlarged right hilar lymph nodes, up to 16 mm in short axis diameter; thought to be new since the 04/06/2023 noncontrast CT.    Heart/pericardium: There is some reflux of contrast from the right atrium into the IVC and hepatic veins,, and from the superior vena cava into the azygous arch, for which some right heart strain is a consideration.  No cardiac chamber enlargement or substantial pericardial effusion is appreciated.    Lungs: Complete or near complete atelectasis of the left lower lobe.  Substantial partial atelectasis of the left upper lobe.  A pre-existing left upper lobe mass is thought to remain, but its margins are not as well delineated as they were on 04/06/2023.    Bilateral centrilobular emphysema.    Right lower lobe calcified granuloma.    There remains a coarse bandlike opacity in the right posterior lung base, most likely scarring or perhaps chronic or recurrent subsegmental atelectasis.  However, a pre-existing nodule is questioned at the apex of this opacity.    Pre-existing scarring or round atelectasis in the anterior inferior aspect of the right upper lobe.    Pre-existing subcentimeter nodular opacity in the right lower lobe, subjacent to the major pleural fissure (series 604, image 43), likely a normal intrapulmonary lymph node.    A 9 x 6 mm right lower lobe lung nodule appears new since 04/06/2023 (series 604, image 54).    Pleura: Large left pleural fluid collection, faintly rim enhancing on these arterial phase images, and likely partially  loculated.  Numerous attenuation measurements of this fluid range from 20-25 HU.    No pneumothorax.    Upper abdomen: Visualized portions of the liver and spleen demonstrate scattered punctate calcifications compatible with granulomas.  Partially visualized gallstones.  Gallbladder is not fully visualized.    Normal right adrenal.  Poorly defined increased fullness in the left adrenal, relative to 04/06/2023, for which metastatic disease is not excluded.    Body wall/musculoskeletal: Scattered degenerative changes in the visualized spine, most pronounced at C6-C7.                                        X-Ray Chest PA And Lateral (Final result)  Result time 11/07/23 10:40:57      Final result by Moy Watson MD (11/07/23 10:40:57)                   Impression:      Large left pleural effusion with near complete whiteout of the left lung.  Further evaluation with dedicated CT chest as deemed clinically necessary.    This report was flagged in Epic as abnormal.      Electronically signed by: Moy Watson  Date:    11/07/2023  Time:    10:40               Narrative:    EXAMINATION:  XR CHEST PA AND LATERAL    CLINICAL HISTORY:  Chest Pain;    TECHNIQUE:  PA and lateral views of the chest were performed.    COMPARISON:  04/06/2023.    FINDINGS:  There is mild pulmonary hyperinflation.  There is a large left pleural effusion with near complete whiteout of the left lung with mild left apical capping.  Right lung is clear.  Heart size indeterminate.  Bony thorax intact.                                       Medications   sodium chloride 0.9% flush 5 mL (has no administration in time range)   melatonin tablet 9 mg (has no administration in time range)   senna-docusate 8.6-50 mg per tablet 1 tablet (has no administration in time range)   LIDOcaine 5 % patch 1 patch (has no administration in time range)   hydrALAZINE injection 10 mg (10 mg Intravenous Given 11/7/23 1547)   carvediloL tablet 6.25 mg (6.25 mg Oral  Given 11/7/23 2124)   NIFEdipine 24 hr tablet 60 mg (60 mg Oral Given 11/7/23 1840)   heparin 25,000 units in dextrose 5% 250 mL (100 units/mL) infusion HIGH INTENSITY nomogram - [facility] ( Intravenous Handoff 11/8/23 0721)   heparin 25,000 units in dextrose 5% (100 units/ml) IV bolus from bag - ADDITIONAL PRN BOLUS - 60 units/kg (has no administration in time range)   heparin 25,000 units in dextrose 5% (100 units/ml) IV bolus from bag - ADDITIONAL PRN BOLUS - 30 units/kg (has no administration in time range)   nicotine 21 mg/24 hr 1 patch (has no administration in time range)   acetaminophen tablet 650 mg (has no administration in time range)   aspirin tablet 325 mg (325 mg Oral Given 11/7/23 0934)   carvediloL tablet 3.125 mg (3.125 mg Oral Given 11/7/23 0934)   nitroGLYCERIN 2% TD oint ointment 2 inch (2 inches Topical (Top) Given 11/7/23 0935)   iohexoL (OMNIPAQUE 350) injection 100 mL (100 mLs Intravenous Given 11/7/23 1038)   perflutren protein-A microsphr 0.22 mg/mL IV susp (0.11 mg Intravenous Given 11/7/23 1630)   heparin 25,000 units in dextrose 5% (100 units/ml) IV bolus from bag INITIAL BOLUS (5,480 Units Intravenous Bolus from Bag 11/7/23 1852)   acetaminophen tablet 500 mg (500 mg Oral Given 11/7/23 2336)   ketorolac injection 15 mg (15 mg Intravenous Given 11/8/23 0139)   magnesium sulfate 2g in water 50mL IVPB (premix) (2 g Intravenous New Bag 11/8/23 0654)     Medical Decision Making  This is an emergent evaluation.  The patient is currently asymptomatic.  However, he has comorbidities.  My concern is for STEMI, NSTEMI, ACS, angina, unstable angina, and PE.  EKG, chest x-ray, aspirin, Coreg, nitroglycerin paste, and laboratory studies have been ordered.  With questionable decreased breath sounds on the left, a chest x-ray has been ordered to assess for potential etiology.  A CT scan of the chest has also been ordered secondary to a high pretest probability for pulmonary embolus associated with the  patient's shortness of breath and chest discomfort.  The patient is mildly tachypneic.  I will reassess.    9:48 a.m.   Even with a normal EKG, and if the troponin is normal, I feel that the initial heart score is calculated to be moderate.  I will reassess once the workup is complete.    1:22 p.m.  The patient's CT scan is significant for multiple abnormalities.  He does have right-sided pulmonary emboli with right heart strain.  For this, the patient will require admission.  He will also require anticoagulation for this.  Significant atelectasis is noted in the left lung fields.  Multiple other abnormalities are noted.  I have significant concern for the patient's clinical status.  I will discuss with Hospital Medicine.      1:30 p.m.   Hospital Medicine states that they are coming to see the patient now.    Amount and/or Complexity of Data Reviewed  Labs: ordered.  Radiology: ordered.    Risk  OTC drugs.  Prescription drug management.  Decision regarding hospitalization.                               Clinical Impression:   Final diagnoses:  [R07.9] Chest pain  [I26.99] Pulmonary embolism, unspecified chronicity, unspecified pulmonary embolism type, unspecified whether acute cor pulmonale present (Primary)  [J90] Pleural effusion  [I20.0] Unstable angina        ED Disposition Condition    Admit Stable                Bob Rodriguez MD  11/08/23 0889

## 2023-11-07 NOTE — ASSESSMENT & PLAN NOTE
Pt has previous evidence of uncharacterized lung mass as he was lost to follow up by [facility] Pulmonology team.  Imaging on admission shows significant left sided pleural effusion, R PE, and new R hilar nodules from previous scan.    History and symptoms worrying for primary lung neoplasm and/or malignant metastasis. Will reassess and possible biopsy.    Pulm team consulted for evaluation and treatment recommendations.   -Thoracentesis of left pleural effusion for analysis. Steven off approx 1500mL of fluid  -Post thoracentesis cxr  -Repeat ECHO

## 2023-11-07 NOTE — HPI
Pt, Eric Soliman, is a 64 yo M with pmh of htn, lung nodule of undetermined character, PAD s/p thrombectomy & PTA, epilepsy, and tobacco and marijuana use here for several months of progressively worsening shortness of breath that has been worse over the last few days. Patient had previously been seen by [facility] pulmonology for workup of this lung mass and respiratory issues, but has limited means and had failure of follow up. At this visit patient describes worsening shortness of breath and fatigue, reduced appetite, and significant unintentional weight loss of up to 25% of body weight. Patient is a current tobacco and marijuana smoker with approximately 50 pack year history of tobacco use, but says he has cut back drastically lately. Patient denies chest pain, nausea/vomiting, diarrhea, night sweats, cough or sputum production, or other symptoms at this time.      In ED, patient was noted to be hypertensive at 228/116. Initial ED BP was 190/90. Patient was given his previous home dose of coreg 3.125 BID and 10mg hydralazine q8h prn. Vitals otherwise stable. Pt's labs showed WBC count of 7.52. Troponin was negative, but will repeat once more to trend. BNP non-elevated. Chest xray showed large left pleural effusion. CTA of chest showed right sided PE and left sided pleural effusion and recommended thoracentesis to clinically correlate. Thoracentesis was performed at bedside by Pulm/Crit fellow. Samples were sent for analysis and approximately 1500mL were drawn off without complication. Patient had repeat CXR for post-thoracentesis lung views. Dr. Martin and [facility]  inpatient team were present with patient to discuss possible diagnosis including malignancy and suggest a consultation with the Palliative medicine team.

## 2023-11-07 NOTE — ASSESSMENT & PLAN NOTE
PE in right lung. Cardiology and Pulm consulted  -Previously on eliquis and clopidogrel, but hasn't taken in months as pt did not have medication refills.  -Start on Heparin 80U/kg  -Resume eliquis and clopidogrel when able

## 2023-11-07 NOTE — CONSULTS
[facility] Pul[facility]/Critical Care Fellow Consult Note:  Primary Attending:  Dr. Martin   Consultant Attending: Dr. Julien Moya         Admit Date: 11/7/2023   Hospital LOS: 0     Subjective:    Mr. Soliman is a 62 yo M with PMH seizure disorder, PAD s/p thrombectomy & PTA, HTN who presents to [facility] ED with c/o intermittent SOB and chest pain. Of note, patient lost to follow up with [facility]monology at [facility] who had wanted to perform a bronchoscopy/EBUS on patient. However, he was unable to show up and it had to be rescheduled multiple times (May and June). He was first seen by [facility] Pulm team on 4/6/23 during a hospitalization. He was noted to have increasing ALO mass with mediastinal lymphadenopathy at that time. He has a long smoking history, started at age 14 1ppd and is still smoking though he states he is smoking less than before. He has noticed significant weight loss with ~30 lbs since his admission in April 2023. Per chart review, patient has unintentionally lost 24 lb since then.     In ED, vitals notable for BP up to 228/116, satting 94-95% on room air. Labs with BUN/CR 9/0.8. WBC 7.52. Na 132. Albumin 2.5. Total protein 8.6. CXR with large left pleural effusion with near complete whiteout of the left lung. CTA with multiple significant findings including segmental and subsegmental PE in right lower lobe, large left pleural effusion, atelectasis of left lung, new pulmonary nodule concerning for metastatic disease, and enlarged right hilar lymph nodes. Patient consented for thoracentesis, performed by Pulmonology fellow Dr. Aviva padilla in ED.     Past Medical History:   Diagnosis Date    Anticoagulant long-term use     DVT (deep venous thrombosis)     Seizures     epilepsy controlled w/ marijuana       Past Surgical History:   Procedure Laterality Date    ANGIOGRAPHY OF LOWER EXTREMITY Left 10/7/2022    Procedure: Angiogram Extremity Unilateral;  Surgeon: Charles Patten III, MD;  Location: [facility] CATH  LAB;  Service: Cardiology;  Laterality: Left;    AORTOGRAPHY WITH SERIALOGRAPHY Bilateral 10/7/2022    Procedure: AORTOGRAM, WITH SERIALOGRAPHY;  Surgeon: Charles Patten III, MD;  Location: [facility] CATH LAB;  Service: Cardiology;  Laterality: Bilateral;    AORTOGRAPHY WITH SERIALOGRAPHY N/A 4/4/2023    Procedure: AORTOGRAM, WITH SERIALOGRAPHY;  Surgeon: Royce Yi MD;  Location: [facility] CATH LAB/EP;  Service: Cardiology;  Laterality: N/A;    ATHERECTOMY, PERIPHERAL BLOOD VESSEL Left 4/4/2023    Procedure: ATHERECTOMY, PERIPHERAL BLOOD VESSEL;  Surgeon: Royce Yi MD;  Location: [facility] CATH LAB/EP;  Service: Cardiology;  Laterality: Left;    CLOSURE DEVICE  4/4/2023    Procedure: Placement of Closure Device;  Surgeon: Royce Yi MD;  Location: [facility] CATH LAB/EP;  Service: Cardiology;;    FILTER PROTECTION, PERIPHERAL  4/4/2023    Procedure: Filter Protection, Peripheral;  Surgeon: Royce Yi MD;  Location: [facility] CATH LAB/EP;  Service: Cardiology;;    IVUS (INTRAVASCULAR ULTRASOUND) Left 4/4/2023    Procedure: ULTRASOUND, INTRAVASCULAR;  Surgeon: Royce Yi MD;  Location: [facility] CATH LAB/EP;  Service: Cardiology;  Laterality: Left;    PTA, ILIAC ARTERY Left 4/4/2023    Procedure: PTA, Iliac Artery;  Surgeon: Royce Yi MD;  Location: [facility] CATH LAB/EP;  Service: Cardiology;  Laterality: Left;    THROMBECTOMY  4/4/2023    Procedure: THROMBECTOMY;  Surgeon: Royce Yi MD;  Location: [facility] CATH LAB/EP;  Service: Cardiology;;       Review of patient's allergies indicates:  No Known Allergies    Social History     Tobacco Use    Smoking status: Every Day     Current packs/day: 2.00     Average packs/day: 2.0 packs/day for 49.8 years (99.7 ttl pk-yrs)     Types: Cigarettes     Start date: 1974    Smokeless tobacco: Never    Tobacco comments:     Pt states that he smoked 2 pk/day cigarettes until approximately one month ago, at which time he cut down to 0.5 pk/day.  He declines referral  "to Ambulatory Smoking Cessation clinic.  Handout provided.    Substance Use Topics    Alcohol use: Not Currently     Comment: rarely 1 beer    Drug use: Yes     Types: Marijuana       Family History   Problem Relation Age of Onset    No Known Problems Mother     Heart disease Father        ROS:  Review of Systems - A 10 point review of systems was negative except where listed above.    Objective:  Last 24 Hour Vital Signs:  BP  Min: 190/90  Max: 228/116  Pulse  Av.2  Min: 81  Max: 102  Resp  Av  Min: 20  Max: 20  SpO2  Av %  Min: 94 %  Max: 99 %  There is no height or weight on file to calculate BMI.  I & O (Last 24H):No intake or output data in the 24 hours ending 23 1517    Physical Exam:  GEN: non-toxic appearing, NAD, in bed  HEENT: MMM, no scleral icterus, EOMI  NECK: Supple, midline trachea, no raised JVP or a-waves notable  CV: RRR, no MRG, pulses equal and symmetric 2+ at radial  PULM: Decreased breath sounds to left lower middle and upper lobe(s)  ABDOMEN: Soft, non-tender, non-distended, no rebound or guarding  SKIN: Warm, dry, intact, no rashes  MSK: No deformity, no clubbing, cyanosis, or lower extremity edema  NEURO: awake and following commands, at neurologic baseline  LINES: Intact, no extravasation or induration, no erythema to PIV or support devices    I have reviewed all labs / images / cultures  Pertinent labs are as follows:   No results for input(s): "PH", "PCO2", "PO2", "HCO3", "POCSATURATED", "BE" in the last 24 hours.  Recent Labs   Lab 23  0934   WBC 7.52   RBC 4.64   HGB 12.3*   HCT 37.8*      MCV 82   MCH 26.5*   MCHC 32.5     Recent Labs   Lab 23  0934   *   K 4.2   CL 99   CO2 24   BUN 9   CREATININE 0.8   CALCIUM 8.9       Meds:   carvediloL  3.125 mg Oral BID       Assessment & Plan: Eric Soliman is a 63 y.o. male with PMH seizure disorder, PAD s/p thrombectomy & PTA, HTN  who presented to [facility] with c/o SOB and chest pain.     #Mass " of upper lobe of left lung  #Mediastinal lymphadenopathy with enlargement of lymph nodes  CTA on 11/7 with large new left pleural effusion, mildly hyperattenuating to water, with faint rim enhancement, and left-to-right mediastinal shift. Substantial new atelectasis of much of the left lung. New 9 x 6 mm right lower lobe pulmonary nodule, possibly representing metastatic disease. New prominent to enlarged right hilar lymph nodes, up to 16 mm. Also with poorly defined increased fullness in the left adrenal, relative to 04/06/2023, for which metastatic disease   Based on these findings, discussed with patient our concern for malignancy. Previously was supposed to get EBUS but was lost to follow up after having missed procedure several times in May and June 2023.     Plan:  -S/p 1.5L removed with thoracentesis on 11/7  -Follow up pleural fluid studies and cytology  -Repeat CXR, monitor for signs of pneumothorax  -Discussed importance of smoking cessation    #Segmental and subsegmental PE  CTA w/ nonocclusive filling subtle type filling defect in a right lower lobe segmental pulmonary artery bifurcation, extending into subsegmental branches. Some evidence of right heart strain, with  reflux of contrast from the right atrium into the IVC and hepatic veins   Patient with likely provoked PE in the setting of left lung mass concerning for lung cancer likely metastatic   Has been off of Eliquis and Plavix for his PAD since April/May 2023  Not on oxygen currently, stable on room air    Plan:  -Follow up ECHO  -Follow up repeat troponin, BNP, and lactic acid   -Continue medical management with therapeutic heparin ggt     Patient seen and examined with Dr. Moya    We will continue to follow with you, thank you for the opportunity to be involved in Mr. Soliman's care.    Jovita Corrigan MD  [facility], PGY-2  [facility] Pulm/Crit Care Service  11/07/2023

## 2023-11-07 NOTE — PHARMACY MED REC
"  [facility]           Pharmacy  Admission Medication History     The home medication history was taken by Quynh Grimm.      Medication history obtained from ;unable to assess patient    Based on information gathered for medication list, you may go to "Admission" then "Reconcile Home Medications" tabs to review and/or act upon those items.     The home medication list has been updated by the Pharmacy department.   Please read ALL comments highlighted in yellow.   Please address this information as you see fit.    Feel free to contact us if you have any questions or require assistance.        Current Facility-Administered Medications on File Prior to Encounter   Medication Dose Route Frequency Provider Last Rate Last Admin    iodixanoL (VISIPAQUE 320) injection    Charles Marquez III, MD   190 mL at 10/07/22 1500     Current Outpatient Medications on File Prior to Encounter   Medication Sig Dispense Refill    acetaminophen (TYLENOL) 500 MG tablet Take 500 mg by mouth every 6 (six) hours as needed for Pain.      apixaban (ELIQUIS) 5 mg Tab Take 1 tablet (5 mg total) by mouth 2 (two) times daily. 60 tablet 2    atorvastatin (LIPITOR) 40 MG tablet Take 1 tablet (40 mg total) by mouth once daily. 30 tablet 2    carvediloL (COREG) 3.125 MG tablet Take 1 tablet (3.125 mg total) by mouth 2 (two) times daily. 60 tablet 2    clopidogreL (PLAVIX) 75 mg tablet Take 1 tablet (75 mg total) by mouth once daily. 30 tablet 11    valACYclovir (VALTREX) 1000 MG tablet Take 1 tablet (1,000 mg total) by mouth 3 (three) times daily. for 7 days 21 tablet 0       Please address this information as you see fit.  Feel free to contact us if you have any questions or require assistance.    Quynh Grimm  [phone]                .          "

## 2023-11-07 NOTE — PROCEDURES
"Eric Soliman is a 63 y.o. male patient.    Pulse: 87 (23 1408)  Resp: 20 (23 1408)  BP: (!) 210/107 (23 1408)  SpO2: 95 % (23 1408)       Thoracentesis    Date/Time: 2023 3:18 PM  Location procedure was performed: [facility] PULMONARY MEDICINE    Performed by: Luiz Chicas MD  Authorized by: Luiz Chicas MD  Pre-operative diagnosis: Pleural Effusion  Post-operative diagnosis: Pleural Effusion  Consent Done: Yes  Consent: Written consent obtained.  Risks and benefits: risks, benefits and alternatives were discussed  Consent given by: patient  Patient understanding: patient states understanding of the procedure being performed  Patient consent: the patient's understanding of the procedure matches consent given  Procedure consent: procedure consent matches procedure scheduled  Relevant documents: relevant documents present and verified  Test results: test results available and properly labeled  Site marked: the operative site was marked  Imaging studies: imaging studies available  Required items: required blood products, implants, devices, and special equipment available  Patient identity confirmed: , MRN and name  Time out: Immediately prior to procedure a "time out" was called to verify the correct patient, procedure, equipment, support staff and site/side marked as required.  Procedure purpose: therapeutic and diagnostic  Indications: pleural effusion  Preparation: Patient was prepped and draped in the usual sterile fashion.  Local anesthesia used: yes    Anesthesia:  Local anesthesia used: yes  Local Anesthetic: lidocaine 1% without epinephrine  Anesthetic total: 9 mL  Preparation: skin prepped with ChloraPrep  Patient position: supported by bedside stand  Location: left posterior  Intercostal space: 7th  Puncture method: over-the-needle catheter  Needle size: 18  Number of attempts: 1  Drainage amount: 1500 ml  Drainage characteristics: serous  Patient tolerance: Patient " tolerated the procedure well with no immediate complications  Post-procedure x-ray ordered: Pending.  Complications: No  Specimens: No  Implants: No  Drainage Tube: removed        11/7/2023

## 2023-11-07 NOTE — ASSESSMENT & PLAN NOTE
Chronic, uncontrolled. Latest blood pressure and vitals reviewed-     Temp:  [98.1 °F (36.7 °C)]   Pulse:  []   Resp:  [20]   BP: (178-228)/()   SpO2:  [94 %-99 %] .   Home meds for hypertension were reviewed and noted below.   Hypertension Medications               carvediloL (COREG) 3.125 MG tablet Take 1 tablet (3.125 mg total) by mouth 2 (two) times daily.          While in the hospital, will manage blood pressure as follows; Adjust home antihypertensive regimen as follows- Increase dosage of carvedilol to 6.25 BID. Add Nifedipine 60mg PO daily.    Will utilize p.r.n. blood pressure medication only if patient's blood pressure greater than 180/110 and he develops symptoms such as worsening chest pain or shortness of breath.    Cardiology consulted

## 2023-11-07 NOTE — ED TRIAGE NOTES
"Patient reports to ED with c/o SOB x1 month. Reports hx of 0.5 pack/day cigarette smoker. Denies chest pain, cough. Was noted to be hypertensive with EMS but patient reports "I was never been put on medicines." Patient states he usually smokes a marijuana cigarette and blood pressure improves. Reports he hasn't followed up with PCP/cardiology "in a while." Extensive hx of PCTA with balloon placement to left iliac, left femoral arteries and also thrombectomy in April. Patient not currently taking anticoagulants.    Two patient identifiers have been checked and are correct.      Appearance: Pt awake, alert & oriented to person, place & time. Pt in no acute distress at present time. Pt is clean and well groomed with clothes appropriately fastened.   Skin: Skin warm, dry & intact. Color consistent with ethnicity. Mucous membranes moist. No breakdown or brusing noted.   Musculoskeletal: Patient moving all extremities well, no obvious swelling or deformities noted.   Respiratory: Respirations spontaneous, even, and non-labored. Visible chest rise noted. Airway is open and patent. No accessory muscle use noted.   Neurologic: Sensation is intact. Speech is clear and appropriate. Eyes open spontaneously, behavior appropriate to situation, follows commands, facial expression symmetrical, bilateral hand grasp equal and even, purposeful motor response noted.  Cardiac: All peripheral pulses present. No Bilateral lower extremity edema. Cap refill is <3 seconds.  Abdomen: Abdomen soft, non-tender to palpation.   : Pt reports no dysuria or hematuria.           "

## 2023-11-07 NOTE — H&P
"[facility]  History & Physical    Patient Name: Eric Soliman  MRN: [id number]  Patient Class: IP- Inpatient  Admission Date: 11/7/2023  Attending Physician: Moy Martin MD   Primary Care Provider: Jesika, Primary Doctor         Patient information was obtained from patient, past medical records, and ER records.     Subjective:     Principal Problem:Shortness of breath    Chief Complaint:   Chief Complaint   Patient presents with    Chest Pain     Mid-sternal, CP on/off x1 month. Pt denies having CP but reports having a stent placed: "A long time ago." He's currently  homeless, no daily meds, and HTN noted upon arrival.         HPI: Pt, Eric Soliman, is a 64 yo M with pmh of htn, lung nodule of undetermined character, PAD s/p thrombectomy & PTA, epilepsy, and tobacco and marijuana use here for several months of progressively worsening shortness of breath that has been worse over the last few days. Patient had previously been seen by [facility] pulmonology for workup of this lung mass and respiratory issues, but has limited means and had failure of follow up. At this visit patient describes worsening shortness of breath and fatigue, reduced appetite, and significant unintentional weight loss of up to 25% of body weight. Patient is a current tobacco and marijuana smoker with approximately 50 pack year history of tobacco use, but says he has cut back drastically lately. Patient denies chest pain, nausea/vomiting, diarrhea, night sweats, cough or sputum production, or other symptoms at this time.      In ED, patient was noted to be hypertensive at 228/116. Initial ED BP was 190/90. Patient was given his previous home dose of coreg 3.125 BID and 10mg hydralazine q8h prn. Vitals otherwise stable. Pt's labs showed WBC count of 7.52. Troponin was negative, but will repeat once more to trend. BNP non-elevated. Chest xray showed large left pleural effusion. CTA of chest showed right sided PE and left " sided pleural effusion and recommended thoracentesis to clinically correlate. Thoracentesis was performed at bedside by Pulm/Crit fellow. Samples were sent for analysis and approximately 1500mL were drawn off without complication. Patient had repeat CXR for post-thoracentesis lung views. Dr. Martin and [facility] inpatient team were present with patient to discuss possible diagnosis including malignancy and suggest a consultation with the Palliative medicine team.    No new subjective & objective note has been filed under this hospital service since the last note was generated.    Past Medical History:   Diagnosis Date    Anticoagulant long-term use      DVT (deep venous thrombosis)      Seizures       epilepsy controlled w/ marijuana               Past Surgical History:   Procedure Laterality Date    ANGIOGRAPHY OF LOWER EXTREMITY Left 10/7/2022     Procedure: Angiogram Extremity Unilateral;  Surgeon: Charles Patten III, MD;  Location: [facility] CATH LAB;  Service: Cardiology;  Laterality: Left;    AORTOGRAPHY WITH SERIALOGRAPHY Bilateral 10/7/2022     Procedure: AORTOGRAM, WITH SERIALOGRAPHY;  Surgeon: Charles Patten III, MD;  Location: [facility] CATH LAB;  Service: Cardiology;  Laterality: Bilateral;    AORTOGRAPHY WITH SERIALOGRAPHY N/A 4/4/2023     Procedure: AORTOGRAM, WITH SERIALOGRAPHY;  Surgeon: Royce Yi MD;  Location: [facility] CATH LAB/EP;  Service: Cardiology;  Laterality: N/A;    ATHERECTOMY, PERIPHERAL BLOOD VESSEL Left 4/4/2023     Procedure: ATHERECTOMY, PERIPHERAL BLOOD VESSEL;  Surgeon: Royec Yi MD;  Location: [facility] CATH LAB/EP;  Service: Cardiology;  Laterality: Left;    CLOSURE DEVICE   4/4/2023     Procedure: Placement of Closure Device;  Surgeon: Royce Yi MD;  Location: [facility] CATH LAB/EP;  Service: Cardiology;;    FILTER PROTECTION, PERIPHERAL   4/4/2023     Procedure: Filter Protection, Peripheral;  Surgeon: Royce Yi MD;  Location: [facility] CATH LAB/EP;  Service:  Cardiology;;    IVUS (INTRAVASCULAR ULTRASOUND) Left 4/4/2023     Procedure: ULTRASOUND, INTRAVASCULAR;  Surgeon: Royce Yi MD;  Location: [facility] CATH LAB/EP;  Service: Cardiology;  Laterality: Left;    PTA, ILIAC ARTERY Left 4/4/2023     Procedure: PTA, Iliac Artery;  Surgeon: Royce Yi MD;  Location: [facility] CATH LAB/EP;  Service: Cardiology;  Laterality: Left;    THROMBECTOMY   4/4/2023     Procedure: THROMBECTOMY;  Surgeon: Royce Yi MD;  Location: [facility] CATH LAB/EP;  Service: Cardiology;;         Review of patient's allergies indicates:  No Known Allergies         Current Facility-Administered Medications on File Prior to Encounter   Medication    iodixanoL (VISIPAQUE 320) injection           Current Outpatient Medications on File Prior to Encounter   Medication Sig    acetaminophen (TYLENOL) 500 MG tablet Take 500 mg by mouth every 6 (six) hours as needed for Pain.    apixaban (ELIQUIS) 5 mg Tab Take 1 tablet (5 mg total) by mouth 2 (two) times daily.    atorvastatin (LIPITOR) 40 MG tablet Take 1 tablet (40 mg total) by mouth once daily.    carvediloL (COREG) 3.125 MG tablet Take 1 tablet (3.125 mg total) by mouth 2 (two) times daily.    clopidogreL (PLAVIX) 75 mg tablet Take 1 tablet (75 mg total) by mouth once daily.    valACYclovir (VALTREX) 1000 MG tablet Take 1 tablet (1,000 mg total) by mouth 3 (three) times daily. for 7 days      Family History         Problem Relation (Age of Onset)     Heart disease Father     No Known Problems Mother                   Tobacco Use    Smoking status: Every Day       Current packs/day: 2.00       Average packs/day: 2.0 packs/day for 49.8 years (99.7 ttl pk-yrs)       Types: Cigarettes       Start date: 1974    Smokeless tobacco: Never    Tobacco comments:       Pt states that he smoked 2 pk/day cigarettes until approximately one month ago, at which time he cut down to 0.5 pk/day.  He declines referral to Ambulatory Smoking Cessation clinic.  Handout  provided.    Substance and Sexual Activity    Alcohol use: Not Currently       Comment: rarely 1 beer    Drug use: Yes       Types: Marijuana    Sexual activity: Not on file      Review of Systems   Constitutional:  Positive for activity change, appetite change, fatigue and unexpected weight change. Negative for chills, diaphoresis and fever.   HENT: Negative.     Respiratory:  Positive for chest tightness and shortness of breath. Negative for apnea, cough and wheezing.    Cardiovascular:  Negative for chest pain, palpitations and leg swelling.   Gastrointestinal: Negative.    Genitourinary: Negative.    Musculoskeletal: Negative.    Skin: Negative.    Psychiatric/Behavioral: Negative.  Negative for confusion.       Objective:      Vital Signs (Most Recent):  Temp: 98.1 °F (36.7 °C) (11/07/23 1639)  Pulse: 93 (11/07/23 1702)  Resp: 20 (11/07/23 1702)  BP: (!) 178/97 (11/07/23 1702)  SpO2: (!) 94 % (11/07/23 1702) Vital Signs (24h Range):  Temp:  [98.1 °F (36.7 °C)] 98.1 °F (36.7 °C)  Pulse:  [] 93  Resp:  [20] 20  SpO2:  [94 %-99 %] 94 %  BP: (178-228)/() 178/97      Weight: 68.5 kg (151 lb)  Body mass index is 22.3 kg/m².     Physical Exam  Vitals and nursing note reviewed.   Constitutional:       General: He is not in acute distress.     Appearance: He is normal weight. He is ill-appearing.   HENT:      Head: Normocephalic and atraumatic.   Cardiovascular:      Rate and Rhythm: Normal rate and regular rhythm.      Pulses: Normal pulses.      Heart sounds: Normal heart sounds.   Pulmonary:      Effort: Pulmonary effort is normal. No accessory muscle usage or respiratory distress.      Breath sounds: No stridor. Examination of the left-upper field reveals decreased breath sounds. Examination of the left-middle field reveals decreased breath sounds. Examination of the right-lower field reveals rales. Examination of the left-lower field reveals decreased breath sounds. Decreased breath sounds and rales  present. No wheezing or rhonchi.   Chest:      Chest wall: No tenderness.   Abdominal:      General: Abdomen is flat. Bowel sounds are normal. There is no distension.      Palpations: Abdomen is soft.      Tenderness: There is no abdominal tenderness.   Musculoskeletal:         General: Normal range of motion.      Cervical back: Normal range of motion and neck supple.   Skin:     General: Skin is warm and dry.      Findings: Rash (bilateral forearms) present. Rash is scaling.      Nails: There is clubbing.   Neurological:      General: No focal deficit present.      Mental Status: He is alert and oriented to person, place, and time.   Psychiatric:         Attention and Perception: Attention normal.         Mood and Affect: Mood normal.         Speech: Speech normal.         Behavior: Behavior normal. Behavior is not agitated. Behavior is cooperative.         Thought Content: Thought content normal.         Cognition and Memory: Cognition normal.         Judgment: Judgment normal.                  Significant Labs: All pertinent labs within the past 24 hours have been reviewed.  Recent Lab Results  (Last 5 results in the past 24 hours)          11/07/23  1552   11/07/23  1543   11/07/23  1538   11/07/23  1520   11/07/23  0934         WBC, Body Fluid             1430  Comment: Reference ranges for body fluids not established.   Correlate clinically.                 Body Fluid Type             Pleural Fluid, Left              Albumin                 2.5          ALP                 90          ALT                 41          Anion Gap                 9          Ascending aorta     3.04                      Ao peak nicolette     0.93                      Ao VTI     20.80                      AST                 34          AV valve area     3.10                      EVAN by Velocity Ratio     3.60                      AV mean gradient     2                      AV index (prosthetic)     0.76                      AV peak  gradient     3                      AV Velocity Ratio     0.88                      Baso #                 0.03          Basophil %                 0.4          BILIRUBIN TOTAL                 0.4  Comment: For infants and newborns, interpretation of results should be based  on gestational age, weight and in agreement with clinical  observations.     Premature Infant recommended reference ranges:  Up to 24 hours.............<8.0 mg/dL  Up to 48 hours............<12.0 mg/dL  3-5 days..................<15.0 mg/dL  6-29 days.................<15.0 mg/dL             BNP         74  Comment: Values of less than 100 pg/ml are consistent with non-CHF populations.                  BSA     1.83                      BUN                 9          Calcium                 8.9          Chloride                 99          CO2                 24          Creatinine                 0.8          Differential Method                 Automated          E/A ratio     0.42                      E/E' ratio     10.86                      eGFR                 >60          Eos #                 0.1          Eosinophil %                 1.5          E wave deceleration time     194.14                      Fluid Appearance             Hazy              Fluid Color             Yellow              Glucose                 79          Gran # (ANC)                 5.5          Gran %                 73.7          Hematocrit                 37.8          Hemoglobin                 12.3          Immature Grans (Abs)                 0.11  Comment: Mild elevation in immature granulocytes is non specific and   can be seen in a variety of conditions including stress response,   acute inflammation, trauma and pregnancy. Correlation with other   laboratory and clinical findings is essential.             Immature Granulocytes                 1.5          IVC diameter     1.25                      LA WIDTH     2.7                      Lactate, Omega  1.4  Comment: Falsely low lactic acid results can be found in samples   containing >=13.0 mg/dL total bilirubin and/or >=3.5 mg/dL   direct bilirubin.                             LVOT area     4.1                      LV LATERAL E/E' RATIO     9.50                      LV SEPTAL E/E' RATIO     12.67                      Left Ventricular Outflow Tract Mean Gradient     1.17                      Left Ventricular Outflow Tract Mean Velocity     0.50                      LVOT diameter     2.28                      LVOT peak christopher     0.82                      LVOT stroke volume     64.48                      LVOT peak VTI     15.80                      Lymph #                 1.3          Lymph %                 17.8          MCH                 26.5          MCHC                 32.5          MCV                 82          Mean e'     0.04                      Mono #                 0.5          Mono %                 6.6          MPV                 9.0          MV valve area p 1/2 method     3.91                      MV valve area by continuity eq     4.71                      MV mean gradient     2                      MV peak gradient     4                      MV Peak A Christopher     0.91                      MV Peak E Christopher     0.38                      MV stenosis pressure 1/2 time     56.30                      MV VTI     13.7                      nRBC                 0          Platelet Count                 350          Potassium                 4.2          PROTEIN TOTAL                 8.6          Est. RA pres     3                      RBC                 4.64          RDW                 15.3          RV S'     12.60                      Sinus     3.11                      Sodium                 132          STJ     2.97                      TAPSE     1.57                      TDI SEPTAL     0.03                      TDI LATERAL     0.04                      Troponin I         0.015  Comment: The reference  interval for Troponin I represents the 99th percentile   cutoff   for our facility and is consistent with 3rd generation assay   performance.           0.014  Comment: The reference interval for Troponin I represents the 99th percentile   cutoff   for our facility and is consistent with 3rd generation assay   performance.             WBC                 7.52                                                 Significant Imaging: I have reviewed all pertinent imaging results/findings within the past 24 hours.      Assessment/Plan:     * Shortness of breath  Chronic, progressive shortness of breath worsening over the last few days.    -CTA- R segmental and subsegmental PE w right heart strain, large L pleural effusion, R hilar nodules  -Heparin bridge for PE- will discuss long term anticoagulation. Pt previously on eliquis and clopidogrel  -Pulmonology consulted  -Thoracentesis performed- approx 1.5L drawn off, sent for analysis  -LE US- evidence of DVT in left mid femoral and left posterior tibial veins  -ECHO GIDD, EF 55-60%, small posterior pericardial effusion    Asymptomatic hypertensive urgency  Patient has a current diagnosis of hypertensive urgency (without evidence of end organ damage) which is controlled.  Latest blood pressure and vitals reviewed-   Temp:  [97.5 °F (36.4 °C)-98.2 °F (36.8 °C)]   Pulse:  []   Resp:  [16-20]   BP: (112-228)/()   SpO2:  [93 %-99 %] .   Patient currently off IV antihypertensives.   Home meds for hypertension were reviewed and noted below.   Hypertension Medications               carvediloL (COREG) 3.125 MG tablet Take 1 tablet (3.125 mg total) by mouth 2 (two) times daily.            Medication adjustment for hospital antihypertensives is as follows- Coreg 6.25mg BID, Nifedipine 60mg daily, PRN Hydralazine IV    Will aim for controlled BP reduction by medications noted above. Monitor and mitigate end organ damage as indicated.    Single subsegmental pulmonary embolism  "without acute cor pulmonale  See shortness of breath      Pleural effusion  Patient found to have large pleural effusion on imaging. I have personally reviewed and interpreted the following imaging: Xray, CT, and Ultrasound. A thoracentesis was performed. Pleural fluid was sent for analysis. Labs reviewed, including Serum LDH   LD   Date Value Ref Range Status   11/08/2023 194 110 - 260 U/L Final     Comment:     Results are increased in hemolyzed samples.   , Pleural Fluid LDH   LD, Fluid   Date Value Ref Range Status   11/07/2023 3004 Not established U/L Final     Comment:     Reference intervals have not been established for body fluids.  Comparison of this result with the concentration in the blood,   serum,or plasma is recommended.  The reference interval for LDH in serum/plasma is   110-260 U/L. Please interpret in context with the clinical  picture.      , Serum Protein   Total Protein   Date Value Ref Range Status   11/08/2023 7.9 6.0 - 8.4 g/dL Final    , Pleural Protein No results found for: "PROTEINBODYF" , Cell Count and Differential No results found for: "BFCELLCNT" , Cytology No results found for: "CYTOFLUID" , and Fluid Cultures No results found for: "BODYFLUIDCUL" . Fluid most consistent with Pending.  . Most likely etiology includes  Pending . Management to include  Pending      Lung mass  Pt has previous evidence of uncharacterized lung mass as he was lost to follow up by [facility]ology team.  Imaging on admission shows significant left sided pleural effusion, R PE, and new R hilar nodules from previous scan.    History and symptoms worrying for primary lung neoplasm and/or malignant metastasis. Will reassess and possible biopsy.    Pulm team consulted for evaluation and treatment recommendations.   -Thoracentesis of left pleural effusion for analysis. Steven off approx 1500mL of fluid  -Post thoracentesis cxr  -Repeat ECHO    Thrombosis  PE in right lung. Cardiology and Pulm consulted  -Previously " on eliquis and clopidogrel, but hasn't taken in months as pt did not have medication refills.  -Start on Heparin 80U/kg  -Resume eliquis and clopidogrel when able    Primary hypertension  Chronic, uncontrolled. Latest blood pressure and vitals reviewed-     Temp:  [97.5 °F (36.4 °C)-98.2 °F (36.8 °C)]   Pulse:  []   Resp:  [16-20]   BP: (112-228)/()   SpO2:  [93 %-99 %] .   Home meds for hypertension were reviewed and noted below.   Hypertension Medications               carvediloL (COREG) 3.125 MG tablet Take 1 tablet (3.125 mg total) by mouth 2 (two) times daily.          While in the hospital, will manage blood pressure as follows; Adjust home antihypertensive regimen as follows- Increase dosage of carvedilol to 6.25 BID. Add Nifedipine 60mg PO daily.    Will utilize p.r.n. blood pressure medication only if patient's blood pressure greater than 180/110 and he develops symptoms such as worsening chest pain or shortness of breath.    Cardiology consulted    Tobacco abuse  21mg Nicotine patch    Refer patient to smoking cessation services          VTE Risk Mitigation (From admission, onward)           Ordered     heparin 25,000 units in dextrose 5% (100 units/ml) IV bolus from bag - ADDITIONAL PRN BOLUS - 60 units/kg  As needed (PRN)        Question:  Heparin Infusion Adjustment (DO NOT MODIFY ANSWER)  Answer:  [email or web address]\Images\[email or web address]\HeparinInfusions\heparin HIGH INTENSITY nomogram for [facility] [email or web address]    11/07/23 1729     heparin 25,000 units in dextrose 5% (100 units/ml) IV bolus from bag - ADDITIONAL PRN BOLUS - 30 units/kg  As needed (PRN)        Question:  Heparin Infusion Adjustment (DO NOT MODIFY ANSWER)  Answer:  \[email or web address]\[email or web address]\Images\Pharmacy\HeparinInfusions\heparin HIGH INTENSITY nomogram for [facility] [id number]    11/07/23 1729     heparin 25,000 units in dextrose 5% 250 mL (100 units/mL) infusion HIGH INTENSITY nomogram - [facility]  Continuous        Question:  Begin at (units/kg/hr)   Answer:  18    11/07/23 1729     Place ENOCH hose  Until discontinued         11/07/23 1425     IP VTE HIGH RISK PATIENT  Once         11/07/23 1425     Place sequential compression device  Until discontinued         11/07/23 1425                                   Bob Mata MD  Department of [facility]  [place] [facility]

## 2023-11-07 NOTE — SUBJECTIVE & OBJECTIVE
Past Medical History:   Diagnosis Date    Anticoagulant long-term use     DVT (deep venous thrombosis)     Seizures     epilepsy controlled w/ marijuana       Past Surgical History:   Procedure Laterality Date    ANGIOGRAPHY OF LOWER EXTREMITY Left 10/7/2022    Procedure: Angiogram Extremity Unilateral;  Surgeon: Charles Patten III, MD;  Location: [facility] CATH LAB;  Service: Cardiology;  Laterality: Left;    AORTOGRAPHY WITH SERIALOGRAPHY Bilateral 10/7/2022    Procedure: AORTOGRAM, WITH SERIALOGRAPHY;  Surgeon: Charles Patten III, MD;  Location: [facility] CATH LAB;  Service: Cardiology;  Laterality: Bilateral;    AORTOGRAPHY WITH SERIALOGRAPHY N/A 4/4/2023    Procedure: AORTOGRAM, WITH SERIALOGRAPHY;  Surgeon: Royce Yi MD;  Location: [facility] CATH LAB/EP;  Service: Cardiology;  Laterality: N/A;    ATHERECTOMY, PERIPHERAL BLOOD VESSEL Left 4/4/2023    Procedure: ATHERECTOMY, PERIPHERAL BLOOD VESSEL;  Surgeon: Royce Yi MD;  Location: [facility] CATH LAB/EP;  Service: Cardiology;  Laterality: Left;    CLOSURE DEVICE  4/4/2023    Procedure: Placement of Closure Device;  Surgeon: Royce Yi MD;  Location: [facility] CATH LAB/EP;  Service: Cardiology;;    FILTER PROTECTION, PERIPHERAL  4/4/2023    Procedure: Filter Protection, Peripheral;  Surgeon: Royce Yi MD;  Location: [facility] CATH LAB/EP;  Service: Cardiology;;    IVUS (INTRAVASCULAR ULTRASOUND) Left 4/4/2023    Procedure: ULTRASOUND, INTRAVASCULAR;  Surgeon: Royce Yi MD;  Location: [facility] CATH LAB/EP;  Service: Cardiology;  Laterality: Left;    PTA, ILIAC ARTERY Left 4/4/2023    Procedure: PTA, Iliac Artery;  Surgeon: Royce Yi MD;  Location: [facility] CATH LAB/EP;  Service: Cardiology;  Laterality: Left;    THROMBECTOMY  4/4/2023    Procedure: THROMBECTOMY;  Surgeon: Royce Yi MD;  Location: [facility] CATH LAB/EP;  Service: Cardiology;;       Review of patient's allergies indicates:  No Known Allergies    Current Facility-Administered  Medications on File Prior to Encounter   Medication    iodixanoL (VISIPAQUE 320) injection     Current Outpatient Medications on File Prior to Encounter   Medication Sig    acetaminophen (TYLENOL) 500 MG tablet Take 500 mg by mouth every 6 (six) hours as needed for Pain.    apixaban (ELIQUIS) 5 mg Tab Take 1 tablet (5 mg total) by mouth 2 (two) times daily.    atorvastatin (LIPITOR) 40 MG tablet Take 1 tablet (40 mg total) by mouth once daily.    carvediloL (COREG) 3.125 MG tablet Take 1 tablet (3.125 mg total) by mouth 2 (two) times daily.    clopidogreL (PLAVIX) 75 mg tablet Take 1 tablet (75 mg total) by mouth once daily.    valACYclovir (VALTREX) 1000 MG tablet Take 1 tablet (1,000 mg total) by mouth 3 (three) times daily. for 7 days     Family History       Problem Relation (Age of Onset)    Heart disease Father    No Known Problems Mother          Tobacco Use    Smoking status: Every Day     Current packs/day: 2.00     Average packs/day: 2.0 packs/day for 49.8 years (99.7 ttl pk-yrs)     Types: Cigarettes     Start date: 1974    Smokeless tobacco: Never    Tobacco comments:     Pt states that he smoked 2 pk/day cigarettes until approximately one month ago, at which time he cut down to 0.5 pk/day.  He declines referral to Ambulatory Smoking Cessation clinic.  Handout provided.    Substance and Sexual Activity    Alcohol use: Not Currently     Comment: rarely 1 beer    Drug use: Yes     Types: Marijuana    Sexual activity: Not on file     Review of Systems   Constitutional:  Positive for activity change, appetite change, fatigue and unexpected weight change. Negative for chills, diaphoresis and fever.   HENT: Negative.     Respiratory:  Positive for chest tightness and shortness of breath. Negative for apnea, cough and wheezing.    Cardiovascular:  Negative for chest pain, palpitations and leg swelling.   Gastrointestinal: Negative.    Genitourinary: Negative.    Musculoskeletal: Negative.    Skin: Negative.     Psychiatric/Behavioral: Negative.  Negative for confusion.      Objective:     Vital Signs (Most Recent):  Temp: 98.1 °F (36.7 °C) (11/07/23 1639)  Pulse: 93 (11/07/23 1702)  Resp: 20 (11/07/23 1702)  BP: (!) 178/97 (11/07/23 1702)  SpO2: (!) 94 % (11/07/23 1702) Vital Signs (24h Range):  Temp:  [98.1 °F (36.7 °C)] 98.1 °F (36.7 °C)  Pulse:  [] 93  Resp:  [20] 20  SpO2:  [94 %-99 %] 94 %  BP: (178-228)/() 178/97     Weight: 68.5 kg (151 lb)  Body mass index is 22.3 kg/m².     Physical Exam  Vitals and nursing note reviewed.   Constitutional:       General: He is not in acute distress.     Appearance: He is normal weight. He is ill-appearing.   HENT:      Head: Normocephalic and atraumatic.   Cardiovascular:      Rate and Rhythm: Normal rate and regular rhythm.      Pulses: Normal pulses.      Heart sounds: Normal heart sounds.   Pulmonary:      Effort: Pulmonary effort is normal. No accessory muscle usage or respiratory distress.      Breath sounds: No stridor. Examination of the left-upper field reveals decreased breath sounds. Examination of the left-middle field reveals decreased breath sounds. Examination of the right-lower field reveals rales. Examination of the left-lower field reveals decreased breath sounds. Decreased breath sounds and rales present. No wheezing or rhonchi.   Chest:      Chest wall: No tenderness.   Abdominal:      General: Abdomen is flat. Bowel sounds are normal. There is no distension.      Palpations: Abdomen is soft.      Tenderness: There is no abdominal tenderness.   Musculoskeletal:         General: Normal range of motion.      Cervical back: Normal range of motion and neck supple.   Skin:     General: Skin is warm and dry.      Findings: Rash (bilateral forearms) present. Rash is scaling.      Nails: There is clubbing.   Neurological:      General: No focal deficit present.      Mental Status: He is alert and oriented to person, place, and time.   Psychiatric:          Attention and Perception: Attention normal.         Mood and Affect: Mood normal.         Speech: Speech normal.         Behavior: Behavior normal. Behavior is not agitated. Behavior is cooperative.         Thought Content: Thought content normal.         Cognition and Memory: Cognition normal.         Judgment: Judgment normal.                Significant Labs: All pertinent labs within the past 24 hours have been reviewed.  Recent Lab Results  (Last 5 results in the past 24 hours)        11/07/23  1552   11/07/23  1543   11/07/23  1538   11/07/23  1520   11/07/23  0934        WBC, Body Fluid       1430  Comment: Reference ranges for body fluids not established.   Correlate clinically.           Body Fluid Type       Pleural Fluid, Left         Albumin         2.5       ALP         90       ALT         41       Anion Gap         9       Ascending aorta   3.04             Ao peak nicolette   0.93             Ao VTI   20.80             AST         34       AV valve area   3.10             EVAN by Velocity Ratio   3.60             AV mean gradient   2             AV index (prosthetic)   0.76             AV peak gradient   3             AV Velocity Ratio   0.88             Baso #         0.03       Basophil %         0.4       BILIRUBIN TOTAL         0.4  Comment: For infants and newborns, interpretation of results should be based  on gestational age, weight and in agreement with clinical  observations.    Premature Infant recommended reference ranges:  Up to 24 hours.............<8.0 mg/dL  Up to 48 hours............<12.0 mg/dL  3-5 days..................<15.0 mg/dL  6-29 days.................<15.0 mg/dL         BNP     74  Comment: Values of less than 100 pg/ml are consistent with non-CHF populations.           BSA   1.83             BUN         9       Calcium         8.9       Chloride         99       CO2         24       Creatinine         0.8       Differential Method         Automated       E/A ratio   0.42              E/E' ratio   10.86             eGFR         >60       Eos #         0.1       Eosinophil %         1.5       E wave deceleration time   194.14             Fluid Appearance       Hazy         Fluid Color       Yellow         Glucose         79       Gran # (ANC)         5.5       Gran %         73.7       Hematocrit         37.8       Hemoglobin         12.3       Immature Grans (Abs)         0.11  Comment: Mild elevation in immature granulocytes is non specific and   can be seen in a variety of conditions including stress response,   acute inflammation, trauma and pregnancy. Correlation with other   laboratory and clinical findings is essential.         Immature Granulocytes         1.5       IVC diameter   1.25             LA WIDTH   2.7             Lactate, Omega 1.4  Comment: Falsely low lactic acid results can be found in samples   containing >=13.0 mg/dL total bilirubin and/or >=3.5 mg/dL   direct bilirubin.                 LVOT area   4.1             LV LATERAL E/E' RATIO   9.50             LV SEPTAL E/E' RATIO   12.67             Left Ventricular Outflow Tract Mean Gradient   1.17             Left Ventricular Outflow Tract Mean Velocity   0.50             LVOT diameter   2.28             LVOT peak christopher   0.82             LVOT stroke volume   64.48             LVOT peak VTI   15.80             Lymph #         1.3       Lymph %         17.8       MCH         26.5       MCHC         32.5       MCV         82       Mean e'   0.04             Mono #         0.5       Mono %         6.6       MPV         9.0       MV valve area p 1/2 method   3.91             MV valve area by continuity eq   4.71             MV mean gradient   2             MV peak gradient   4             MV Peak A Christopher   0.91             MV Peak E Christopher   0.38             MV stenosis pressure 1/2 time   56.30             MV VTI   13.7             nRBC         0       Platelet Count         350       Potassium         4.2       PROTEIN TOTAL          8.6       Est. RA pres   3             RBC         4.64       RDW         15.3       RV S'   12.60             Sinus   3.11             Sodium         132       STJ   2.97             TAPSE   1.57             TDI SEPTAL   0.03             TDI LATERAL   0.04             Troponin I     0.015  Comment: The reference interval for Troponin I represents the 99th percentile   cutoff   for our facility and is consistent with 3rd generation assay   performance.       0.014  Comment: The reference interval for Troponin I represents the 99th percentile   cutoff   for our facility and is consistent with 3rd generation assay   performance.         WBC         7.52                              Significant Imaging: I have reviewed all pertinent imaging results/findings within the past 24 hours.

## 2023-11-08 PROBLEM — R06.02 SHORTNESS OF BREATH: Status: ACTIVE | Noted: 2023-01-01

## 2023-11-08 PROBLEM — I16.0 ASYMPTOMATIC HYPERTENSIVE URGENCY: Status: ACTIVE | Noted: 2023-01-01

## 2023-11-08 PROBLEM — I26.93 SINGLE SUBSEGMENTAL PULMONARY EMBOLISM WITHOUT ACUTE COR PULMONALE: Status: ACTIVE | Noted: 2023-01-01

## 2023-11-08 PROBLEM — E44.1 MILD MALNUTRITION: Status: ACTIVE | Noted: 2023-01-01

## 2023-11-08 PROBLEM — F17.200 TOBACCO DEPENDENCY: Status: ACTIVE | Noted: 2023-01-01

## 2023-11-08 PROBLEM — Z51.5 PALLIATIVE CARE ENCOUNTER: Status: ACTIVE | Noted: 2023-01-01

## 2023-11-08 PROBLEM — J90 PLEURAL EFFUSION: Status: ACTIVE | Noted: 2023-01-01

## 2023-11-08 NOTE — ASSESSMENT & PLAN NOTE
Chronic, progressive shortness of breath worsening over the last few days.    -CTA- R segmental and subsegmental PE w right heart strain, large L pleural effusion, R hilar nodules  -Heparin bridge for PE- will discuss long term anticoagulation. Pt previously on eliquis and clopidogrel  -Pulmonology consulted  -Thoracentesis performed- approx 1.5L drawn off, sent for analysis  -LE US- evidence of DVT in left mid femoral and left posterior tibial veins  -ECHO GIDD, EF 55-60%, small posterior pericardial effusion

## 2023-11-08 NOTE — PROGRESS NOTES
"[facility] Consult Note:  Primary Attending:  Dr. Martin   Consultant Attending: Dr. Julien Moya         Admit Date: 2023   Hospital LOS: 1     Subjective:    Mr. Soliman is a 64 yo M with PMH seizure disorder, PAD s/p thrombectomy & PTA, HTN who presents to [facility] ED with c/o intermittent SOB and chest pain. Of note, patient lost to follow up with [facility] at [facility] who had wanted to perform a bronchoscopy/EBUS on patient. However, he was unable to show up and it had to be rescheduled multiple times (May and ). He was first seen by [facility] Pulm team on 23 during a hospitalization. He was noted to have increasing ALO mass with mediastinal lymphadenopathy at that time. He has a long smoking history, started at age 14 1ppd and is still smoking though he states he is smoking less than before. He has noticed significant weight loss with ~30 lbs since his admission in 2023. Per chart review, patient has unintentionally lost 24 lb since then.       Interval:  Patient reports a little improvement in SOB post throacentesis (1.5 L removed).  He reports his main complaint was foot pain.  He has had SOB for months but does not report any signifcant change prior to admission.  Patient was stible able to bear weight.  He reports his difficulty taking his medications was due to cost and currently lives in his car.  He has also had weight loss, loss of appetite, loss of desire to smoke/drink alcohol.  Denies CP, n/v, weakness, numbness.      Objective:  Last 24 Hour Vital Signs:  BP  Min: 112/58  Max: 228/116  Temp  Av.9 °F (36.6 °C)  Min: 97.5 °F (36.4 °C)  Max: 98.2 °F (36.8 °C)  Pulse  Av.5  Min: 69  Max: 102  Resp  Av.3  Min: 16  Max: 20  SpO2  Av.4 %  Min: 93 %  Max: 99 %  Height  Av' 9" (175.3 cm)  Min: 5' 9" (175.3 cm)  Max: 5' 9" (175.3 cm)  Weight  Av.1 kg (141 lb 4 oz)  Min: 59.6 kg (131 lb 8 oz)  Max: 68.5 kg (151 lb)  Body mass index is " "19.42 kg/m².  I & O (Last 24H):  Intake/Output Summary (Last 24 hours) at 11/8/2023 0847  Last data filed at 11/7/2023 2133  Gross per 24 hour   Intake --   Output 275 ml   Net -275 ml       Physical Exam:  GEN: non-toxic appearing, NAD, in bed  HEENT: MMM, no scleral icterus, EOMI  NECK: Supple, midline trachea, no raised JVP or a-waves notable  CV: RRR, no MRG, pulses equal and symmetric 2+ at radial  PULM: Decreased breath sounds to left lower middle and upper lobe(s)  ABDOMEN: Soft, non-tender, non-distended, no rebound or guarding  SKIN: Warm, dry, intact, no rashes  MSK: No deformity, no clubbing, cyanosis, or lower extremity edema  NEURO: awake and following commands, at neurologic baseline  LINES: Intact, no extravasation or induration, no erythema to PIV or support devices  Extremities: LE symmetric, no erythema, warmth, or swelling, mild TTP on L calf    I have reviewed all labs / images / cultures  Pertinent labs are as follows:   No results for input(s): "PH", "PCO2", "PO2", "HCO3", "POCSATURATED", "BE" in the last 24 hours.  Recent Labs   Lab 11/08/23  0256   WBC 8.39  8.39   RBC 4.74  4.74   HGB 12.6*  12.6*   HCT 37.8*  37.8*     358   MCV 80*  80*   MCH 26.6*  26.6*   MCHC 33.3  33.3     Recent Labs   Lab 11/08/23  0256   *   K 4.6   CL 97   CO2 17*   BUN 14   CREATININE 1.0   CALCIUM 8.5*   MG 1.8       Meds:   carvediloL  6.25 mg Oral BID    magnesium sulfate IVPB  2 g Intravenous Once    nicotine  1 patch Transdermal Daily    NIFEdipine  60 mg Oral Daily       Assessment & Plan: Eric Soliman is a 63 y.o. male with PMH seizure disorder, PAD s/p thrombectomy & PTA, HTN  who presented to [facility] with c/o SOB and chest pain.     #Mass of upper lobe of left lung  #Mediastinal lymphadenopathy with enlargement of lymph nodes  CTA on 11/7 with large new left pleural effusion, mildly hyperattenuating to water, with faint rim enhancement, and left-to-right mediastinal shift. " Substantial new atelectasis of much of the left lung. New 9 x 6 mm right lower lobe pulmonary nodule, possibly representing metastatic disease. New prominent to enlarged right hilar lymph nodes, up to 16 mm. Also with poorly defined increased fullness in the left adrenal, relative to 04/06/2023, for which metastatic disease   Based on these findings, discussed with patient our concern for malignancy. Previously was supposed to get EBUS but was lost to follow up after having missed procedure several times in May and June 2023.     Plan:  -S/p 1.5L removed with thoracentesis on 11/7, exudative effusion  -Repeat CXR without pneumothorax  -Discussed importance of smoking cessation  - Will ultrasound and plan for thoracentesis today and monitor for reexpansion/reaccumulation of fluid for consideration of further tpas vs pleurX.  - Will follow up cytology, may need tissue biopsy    #Segmental and subsegmental PE  CTA w/ nonocclusive filling subtle type filling defect in a right lower lobe segmental pulmonary artery bifurcation, extending into subsegmental branches. Some evidence of right heart strain, with  reflux of contrast from the right atrium into the IVC and hepatic veins   Patient with likely provoked PE in the setting of left lung mass concerning for lung cancer likely metastatic   Has been off of Eliquis and Plavix for his PAD since April/May 2023  Not on oxygen currently, stable on room air  LE US with L sided DVT    Plan:  -Continue medical management with therapeutic heparin ggt     Patient seen and examined with Dr. Moya    We will continue to follow with you, thank you for the opportunity to be involved in Mr. Soliman's care.    Cirilo Lopez MD  [facility]  [facility]

## 2023-11-08 NOTE — PROGRESS NOTES
[facility] Medicine  Progress Note    Patient Name: Eric Soliman  MRN: [id number]  Patient Class: IP- Inpatient   Admission Date: 11/7/2023  Length of Stay: 1 days  Attending Physician: Moy Martin MD  Primary Care Provider: Jesika, Primary Doctor        Subjective:     Principal Problem:Shortness of breath        HPI:  Pt, Eric Soliman, is a 64 yo M with pmh of htn, lung nodule of undetermined character, PAD s/p thrombectomy & PTA, epilepsy, and tobacco and marijuana use here for several months of progressively worsening shortness of breath that has been worse over the last few days. Patient had previously been seen by [facility] pulmonology for workup of this lung mass and respiratory issues, but has limited means and had failure of follow up. At this visit patient describes worsening shortness of breath and fatigue, reduced appetite, and significant unintentional weight loss of up to 25% of body weight. Patient is a current tobacco and marijuana smoker with approximately 50 pack year history of tobacco use, but says he has cut back drastically lately. Patient denies chest pain, nausea/vomiting, diarrhea, night sweats, cough or sputum production, or other symptoms at this time.      In ED, patient was noted to be hypertensive at 228/116. Initial ED BP was 190/90. Patient was given his previous home dose of coreg 3.125 BID and 10mg hydralazine q8h prn. Vitals otherwise stable. Pt's labs showed WBC count of 7.52. Troponin was negative, but will repeat once more to trend. BNP non-elevated. Chest xray showed large left pleural effusion. CTA of chest showed right sided PE and left sided pleural effusion and recommended thoracentesis to clinically correlate. Thoracentesis was performed at bedside by Pulm/Crit fellow. Samples were sent for analysis and approximately 1500mL were drawn off without complication. Patient had repeat CXR for post-thoracentesis lung views. Dr. Martin and [facility]  inpatient team were  present with patient to discuss possible diagnosis including malignancy and suggest a consultation with the Palliative medicine team.    Overview/Hospital Course:  64 yo male PMH HTN, Lung nodule, PVD s/p thrombectomy, tobacco use presented to ED with shortness of breath and weight loss x 1 month that has worsened over the past few days.  Pt has been seen by previously by [facility] pulmonology for lung nodule and respiratory problems.  Initially hypertensive to 200s systolic in ED; resolved with home medications.  CTA shows large left pleural effusion and PE to right lower lobe.  US shows DVT to left femoral and posterior tibialis veins.  Pt previously on Eliquis and Plavix but non-adherent x 1 month.  Treated with Heparin drip.  Pulmonology consulted and performed thoracentesis.  1.5L fluid drawn off and analysis consisted with exudative pleural effusion.      Interval History: No adverse events overnight. Patient is eating. He reports some foot pain that was treated with Tylenol and Ibuprofen.    Review of Systems   Constitutional:  Positive for activity change, appetite change, fatigue and unexpected weight change. Negative for chills, diaphoresis and fever.   HENT: Negative.     Respiratory:  Positive for chest tightness and shortness of breath. Negative for apnea, cough and wheezing.    Cardiovascular:  Negative for chest pain, palpitations and leg swelling.   Gastrointestinal: Negative.    Genitourinary: Negative.    Musculoskeletal: Negative.    Skin: Negative.    Psychiatric/Behavioral: Negative.  Negative for confusion.      Objective:     Vital Signs (Most Recent):  Temp: 97.9 °F (36.6 °C) (11/08/23 0740)  Pulse: 69 (11/08/23 0740)  Resp: 18 (11/08/23 0740)  BP: 128/69 (11/08/23 0740)  SpO2: (!) 93 % (11/08/23 0740) Vital Signs (24h Range):  Temp:  [97.5 °F (36.4 °C)-98.2 °F (36.8 °C)] 97.9 °F (36.6 °C)  Pulse:  [69-99] 69  Resp:  [16-20] 18  SpO2:  [93 %-96 %] 93 %  BP: (112-228)/() 128/69     Weight:  59.6 kg (131 lb 8 oz)  Body mass index is 19.42 kg/m².    Intake/Output Summary (Last 24 hours) at 11/8/2023 0927  Last data filed at 11/7/2023 2133  Gross per 24 hour   Intake --   Output 275 ml   Net -275 ml         Physical Exam  Vitals and nursing note reviewed.   Constitutional:       General: He is not in acute distress.     Appearance: He is normal weight. He is ill-appearing.   HENT:      Head: Normocephalic and atraumatic.   Cardiovascular:      Rate and Rhythm: Normal rate and regular rhythm.      Pulses: Normal pulses.      Heart sounds: Normal heart sounds.   Pulmonary:      Effort: Pulmonary effort is normal. No accessory muscle usage or respiratory distress.      Breath sounds: No stridor. Examination of the left-middle field reveals decreased breath sounds. Examination of the right-lower field reveals rales. Examination of the left-lower field reveals decreased breath sounds. Decreased breath sounds and rales present. No wheezing or rhonchi.      Comments: Improved breath sounds in left lung fields today from initial exam  Chest:      Chest wall: No tenderness.   Abdominal:      General: Abdomen is flat. Bowel sounds are normal. There is no distension.      Palpations: Abdomen is soft.      Tenderness: There is no abdominal tenderness.   Musculoskeletal:         General: Normal range of motion.      Cervical back: Normal range of motion and neck supple.   Skin:     General: Skin is warm and dry.      Findings: Rash (bilateral forearms) present. Rash is scaling.      Nails: There is clubbing.   Neurological:      General: No focal deficit present.      Mental Status: He is alert and oriented to person, place, and time.   Psychiatric:         Attention and Perception: Attention normal.         Mood and Affect: Mood normal.         Speech: Speech normal.         Behavior: Behavior normal. Behavior is not agitated. Behavior is cooperative.         Thought Content: Thought content normal.         Cognition  and Memory: Cognition normal.         Judgment: Judgment normal.             Significant Labs: All pertinent labs within the past 24 hours have been reviewed.  Recent Lab Results  (Last 5 results in the past 24 hours)        11/08/23  0649   11/08/23  0256   11/08/23  0029   11/07/23  1740   11/07/23  1552        Albumin   2.3             ALP   100             ALT   44             Anion Gap   14             aPTT 40.6  Comment: Refer to local heparin nomogram for intensity/dose specific   therapeutic   range.  LOT^050^APTT FSL^[id number]       40.4  Comment: Refer to local heparin nomogram for intensity/dose specific   therapeutic   range.  LOT^050^APTT FSL^[id number]     28.3  Comment: Refer to local heparin nomogram for intensity/dose specific   therapeutic   range.  LOT^050^APTT FSL^[id number]           AST   34             Baso #   0.08     0.05            0.08             Basophil %   1.0     0.6            1.0             BILIRUBIN TOTAL   0.4  Comment: For infants and newborns, interpretation of results should be based  on gestational age, weight and in agreement with clinical  observations.    Premature Infant recommended reference ranges:  Up to 24 hours.............<8.0 mg/dL  Up to 48 hours............<12.0 mg/dL  3-5 days..................<15.0 mg/dL  6-29 days.................<15.0 mg/dL               BUN   14             Calcium   8.5             Chloride   97             CO2   17             Creatinine   1.0             Differential Method   Automated     Automated            Automated             eGFR   >60             Eos #   0.2     0.1            0.2             Eosinophil %   2.1     1.0            2.1             Glucose   93             Gran # (ANC)   5.5     5.9            5.5             Gran %   66.0     72.4            66.0             Hematocrit   37.8     38.7            37.8             Hemoglobin   12.6     12.9            12.6             Immature Grans (Abs)   0.21  Comment: Mild elevation in  immature granulocytes is non specific and   can be seen in a variety of conditions including stress response,   acute inflammation, trauma and pregnancy. Correlation with other   laboratory and clinical findings is essential.       0.13  Comment: Mild elevation in immature granulocytes is non specific and   can be seen in a variety of conditions including stress response,   acute inflammation, trauma and pregnancy. Correlation with other   laboratory and clinical findings is essential.              0.21  Comment: Mild elevation in immature granulocytes is non specific and   can be seen in a variety of conditions including stress response,   acute inflammation, trauma and pregnancy. Correlation with other   laboratory and clinical findings is essential.               Immature Granulocytes   2.5     1.6            2.5             INR       1.0  Comment: Coumadin Therapy:  2.0 - 3.0 for INR for all indicators except mechanical heart valves  and antiphospholipid syndromes which should use 2.5 - 3.5.  LOT^040^PT Inn^[id number]           Lactate, Omega         1.4  Comment: Falsely low lactic acid results can be found in samples   containing >=13.0 mg/dL total bilirubin and/or >=3.5 mg/dL   direct bilirubin.         LD   194  Comment: Results are increased in hemolyzed samples.             Lymph #   1.9     1.7            1.9             Lymph %   22.6     20.3            22.6             Magnesium    1.8             MCH   26.6     26.8            26.6             MCHC   33.3     33.3            33.3             MCV   80     81            80             Mono #   0.7     0.5            0.7             Mono %   8.3     5.7            8.3             MPV   8.8     8.7            8.8             nRBC   0     0            0             Phosphorus Level   4.4             Platelet Count   358     368            358             Potassium   4.6             PROTEIN TOTAL   7.9             Protime       11.4         RBC   4.74     4.81             4.74             RDW   15.4     15.1            15.4             Sodium   128             WBC   8.39     8.12            8.39                                    Significant Imaging: I have reviewed all pertinent imaging results/findings within the past 24 hours.    Assessment/Plan:      * Shortness of breath  Chronic, progressive shortness of breath worsening over the last few days.    -CTA- R segmental and subsegmental PE w right heart strain, large L pleural effusion, R hilar nodules  -Heparin bridge for PE- will discuss long term anticoagulation. Pt previously on eliquis and clopidogrel  -Pulmonology consulted  -Thoracentesis performed- approx 1.5L drawn off, sent for analysis  -LE US- evidence of DVT in left mid femoral and left posterior tibial veins  -ECHO GIDD, EF 55-60%, small posterior pericardial effusion    Asymptomatic hypertensive urgency  Patient has a current diagnosis of hypertensive urgency (without evidence of end organ damage) which is controlled.  Latest blood pressure and vitals reviewed-   Temp:  [97.5 °F (36.4 °C)-98.2 °F (36.8 °C)]   Pulse:  []   Resp:  [16-20]   BP: (112-228)/()   SpO2:  [93 %-99 %] .   Patient currently off IV antihypertensives.   Home meds for hypertension were reviewed and noted below.   Hypertension Medications               carvediloL (COREG) 3.125 MG tablet Take 1 tablet (3.125 mg total) by mouth 2 (two) times daily.            Medication adjustment for hospital antihypertensives is as follows- Coreg 6.25mg BID, Nifedipine 60mg daily, PRN Hydralazine IV    Will aim for controlled BP reduction by medications noted above. Monitor and mitigate end organ damage as indicated.    Single subsegmental pulmonary embolism without acute cor pulmonale  See shortness of breath      Pleural effusion  Patient found to have large pleural effusion on imaging. I have personally reviewed and interpreted the following imaging: Xray, CT, and Ultrasound. A thoracentesis  "was performed. Pleural fluid was sent for analysis. Labs reviewed, including Serum LDH   LD   Date Value Ref Range Status   11/08/2023 194 110 - 260 U/L Final     Comment:     Results are increased in hemolyzed samples.   , Pleural Fluid LDH   LD, Fluid   Date Value Ref Range Status   11/07/2023 3004 Not established U/L Final     Comment:     Reference intervals have not been established for body fluids.  Comparison of this result with the concentration in the blood,   serum,or plasma is recommended.  The reference interval for LDH in serum/plasma is   110-260 U/L. Please interpret in context with the clinical  picture.      , Serum Protein   Total Protein   Date Value Ref Range Status   11/08/2023 7.9 6.0 - 8.4 g/dL Final    , Pleural Protein No results found for: "PROTEINBODYF" , Cell Count and Differential No results found for: "BFCELLCNT" , Cytology No results found for: "CYTOFLUID" , and Fluid Cultures No results found for: "BODYFLUIDCUL" . Fluid most consistent with Exudate.  . Most likely etiology includes  Lung malignancy or metastatic neoplasm . Management to include  drained 1500mL. Will reassess.  Pulmonology plans to re-tap and drain pleural effusion. Repeat cytology.      Lung mass  Pt has previous evidence of uncharacterized lung mass as he was lost to follow up by [facility] Pulmonology team.  Imaging on admission shows significant left sided pleural effusion, R PE, and new R hilar nodules from previous scan.    History and symptoms worrying for primary lung neoplasm and/or malignant metastasis. Will reassess and possible biopsy/EBUS per pulm recs and patient preferences.    Pulm team consulted for evaluation and treatment recommendations.   -Thoracentesis of left pleural effusion for analysis. Steven off approx 1500mL of fluid  -Post thoracentesis cxr  -Repeat ECHO    -Palliative team consulted and following    Thrombosis  PE in right lung. Cardiology and Pulm consulted  -Previously on eliquis and " clopidogrel, but hasn't taken in months as pt did not have medication refills.  -Start on Heparin 80U/kg  -Resume eliquis and clopidogrel when able    Primary hypertension  Chronic, uncontrolled. Latest blood pressure and vitals reviewed-     Temp:  [97.5 °F (36.4 °C)-98.2 °F (36.8 °C)]   Pulse:  []   Resp:  [16-20]   BP: (112-228)/()   SpO2:  [93 %-99 %] .   Home meds for hypertension were reviewed and noted below.   Hypertension Medications               carvediloL (COREG) 3.125 MG tablet Take 1 tablet (3.125 mg total) by mouth 2 (two) times daily.          While in the hospital, will manage blood pressure as follows; Adjust home antihypertensive regimen as follows- Increase dosage of carvedilol to 6.25 BID. Add Nifedipine 60mg PO daily.    Will utilize p.r.n. blood pressure medication only if patient's blood pressure greater than 180/110 and he develops symptoms such as worsening chest pain or shortness of breath.    Cardiology consulted    Tobacco abuse  21mg Nicotine patch    Refer patient to smoking cessation services          VTE Risk Mitigation (From admission, onward)           Ordered     heparin 25,000 units in dextrose 5% (100 units/ml) IV bolus from bag - ADDITIONAL PRN BOLUS - 60 units/kg  As needed (PRN)        Question:  Heparin Infusion Adjustment (DO NOT MODIFY ANSWER)  Answer:  \[email or web address]\Images\Pharmacy\HeparinInfusions\heparin HIGH INTENSITY nomogram for [facility] [id number]    11/07/23 1729     heparin 25,000 units in dextrose 5% (100 units/ml) IV bolus from bag - ADDITIONAL PRN BOLUS - 30 units/kg  As needed (PRN)        Question:  Heparin Infusion Adjustment (DO NOT MODIFY ANSWER)  Answer:  \[email or web address]\Images\Pharmacy\HeparinInfusions\heparin HIGH INTENSITY nomogram for [facility] [id number]    11/07/23 1729     heparin 25,000 units in dextrose 5% 250 mL (100 units/mL) infusion HIGH INTENSITY nomogram - [facility]  Continuous        Question:  Begin at (units/kg/hr)  Answer:  18     11/07/23 1729     Place ENOCH hose  Until discontinued         11/07/23 1425     IP VTE HIGH RISK PATIENT  Once         11/07/23 1425     Place sequential compression device  Until discontinued         11/07/23 1425                    Discharge Planning   [place]:      Code Status: Full Code   Is the patient medically ready for discharge?:     Reason for patient still in hospital (select all that apply): Patient trending condition, Laboratory test, and Consult recommendations                     Bob Mata MD  Department of [facility] Medicine   [facility]

## 2023-11-08 NOTE — SUBJECTIVE & OBJECTIVE
Interval History: No adverse events overnight. Patient is eating. He reports some foot pain that was treated with Tylenol and Ibuprofen.    Review of Systems   Constitutional:  Positive for activity change, appetite change, fatigue and unexpected weight change. Negative for chills, diaphoresis and fever.   HENT: Negative.     Respiratory:  Positive for chest tightness and shortness of breath. Negative for apnea, cough and wheezing.    Cardiovascular:  Negative for chest pain, palpitations and leg swelling.   Gastrointestinal: Negative.    Genitourinary: Negative.    Musculoskeletal: Negative.    Skin: Negative.    Psychiatric/Behavioral: Negative.  Negative for confusion.      Objective:     Vital Signs (Most Recent):  Temp: 97.9 °F (36.6 °C) (11/08/23 0740)  Pulse: 69 (11/08/23 0740)  Resp: 18 (11/08/23 0740)  BP: 128/69 (11/08/23 0740)  SpO2: (!) 93 % (11/08/23 0740) Vital Signs (24h Range):  Temp:  [97.5 °F (36.4 °C)-98.2 °F (36.8 °C)] 97.9 °F (36.6 °C)  Pulse:  [69-99] 69  Resp:  [16-20] 18  SpO2:  [93 %-96 %] 93 %  BP: (112-228)/() 128/69     Weight: 59.6 kg (131 lb 8 oz)  Body mass index is 19.42 kg/m².    Intake/Output Summary (Last 24 hours) at 11/8/2023 0907  Last data filed at 11/7/2023 2133  Gross per 24 hour   Intake --   Output 275 ml   Net -275 ml         Physical Exam  Vitals and nursing note reviewed.   Constitutional:       General: He is not in acute distress.     Appearance: He is normal weight. He is ill-appearing.   HENT:      Head: Normocephalic and atraumatic.   Cardiovascular:      Rate and Rhythm: Normal rate and regular rhythm.      Pulses: Normal pulses.      Heart sounds: Normal heart sounds.   Pulmonary:      Effort: Pulmonary effort is normal. No accessory muscle usage or respiratory distress.      Breath sounds: No stridor. Examination of the left-middle field reveals decreased breath sounds. Examination of the right-lower field reveals rales. Examination of the left-lower field  reveals decreased breath sounds. Decreased breath sounds and rales present. No wheezing or rhonchi.      Comments: Improved breath sounds in left lung fields today from initial exam  Chest:      Chest wall: No tenderness.   Abdominal:      General: Abdomen is flat. Bowel sounds are normal. There is no distension.      Palpations: Abdomen is soft.      Tenderness: There is no abdominal tenderness.   Musculoskeletal:         General: Normal range of motion.      Cervical back: Normal range of motion and neck supple.   Skin:     General: Skin is warm and dry.      Findings: Rash (bilateral forearms) present. Rash is scaling.      Nails: There is clubbing.   Neurological:      General: No focal deficit present.      Mental Status: He is alert and oriented to person, place, and time.   Psychiatric:         Attention and Perception: Attention normal.         Mood and Affect: Mood normal.         Speech: Speech normal.         Behavior: Behavior normal. Behavior is not agitated. Behavior is cooperative.         Thought Content: Thought content normal.         Cognition and Memory: Cognition normal.         Judgment: Judgment normal.             Significant Labs: All pertinent labs within the past 24 hours have been reviewed.  Recent Lab Results  (Last 5 results in the past 24 hours)        11/08/23  0649   11/08/23  0256   11/08/23  0029   11/07/23  1740   11/07/23  1552        Albumin   2.3             ALP   100             ALT   44             Anion Gap   14             aPTT 40.6  Comment: Refer to local heparin nomogram for intensity/dose specific   therapeutic   range.  LOT^050^APTT FSL^[id number]       40.4  Comment: Refer to local heparin nomogram for intensity/dose specific   therapeutic   range.  LOT^050^APTT FSL^[id number]     28.3  Comment: Refer to local heparin nomogram for intensity/dose specific   therapeutic   range.  LOT^050^APTT FSL^[id number]           AST   34             Baso #   0.08     0.05            0.08              Basophil %   1.0     0.6            1.0             BILIRUBIN TOTAL   0.4  Comment: For infants and newborns, interpretation of results should be based  on gestational age, weight and in agreement with clinical  observations.    Premature Infant recommended reference ranges:  Up to 24 hours.............<8.0 mg/dL  Up to 48 hours............<12.0 mg/dL  3-5 days..................<15.0 mg/dL  6-29 days.................<15.0 mg/dL               BUN   14             Calcium   8.5             Chloride   97             CO2   17             Creatinine   1.0             Differential Method   Automated     Automated            Automated             eGFR   >60             Eos #   0.2     0.1            0.2             Eosinophil %   2.1     1.0            2.1             Glucose   93             Gran # (ANC)   5.5     5.9            5.5             Gran %   66.0     72.4            66.0             Hematocrit   37.8     38.7            37.8             Hemoglobin   12.6     12.9            12.6             Immature Grans (Abs)   0.21  Comment: Mild elevation in immature granulocytes is non specific and   can be seen in a variety of conditions including stress response,   acute inflammation, trauma and pregnancy. Correlation with other   laboratory and clinical findings is essential.       0.13  Comment: Mild elevation in immature granulocytes is non specific and   can be seen in a variety of conditions including stress response,   acute inflammation, trauma and pregnancy. Correlation with other   laboratory and clinical findings is essential.              0.21  Comment: Mild elevation in immature granulocytes is non specific and   can be seen in a variety of conditions including stress response,   acute inflammation, trauma and pregnancy. Correlation with other   laboratory and clinical findings is essential.               Immature Granulocytes   2.5     1.6            2.5             INR       1.0  Comment: Coumadin  Therapy:  2.0 - 3.0 for INR for all indicators except mechanical heart valves  and antiphospholipid syndromes which should use 2.5 - 3.5.  LOT^040^PT Inn^[id number]           Lactate, Omega         1.4  Comment: Falsely low lactic acid results can be found in samples   containing >=13.0 mg/dL total bilirubin and/or >=3.5 mg/dL   direct bilirubin.         LD   194  Comment: Results are increased in hemolyzed samples.             Lymph #   1.9     1.7            1.9             Lymph %   22.6     20.3            22.6             Magnesium    1.8             MCH   26.6     26.8            26.6             MCHC   33.3     33.3            33.3             MCV   80     81            80             Mono #   0.7     0.5            0.7             Mono %   8.3     5.7            8.3             MPV   8.8     8.7            8.8             nRBC   0     0            0             Phosphorus Level   4.4             Platelet Count   358     368            358             Potassium   4.6             PROTEIN TOTAL   7.9             Protime       11.4         RBC   4.74     4.81            4.74             RDW   15.4     15.1            15.4             Sodium   128             WBC   8.39     8.12            8.39                                    Significant Imaging: I have reviewed all pertinent imaging results/findings within the past 24 hours.

## 2023-11-08 NOTE — ASSESSMENT & PLAN NOTE
Patient has a current diagnosis of hypertensive urgency (without evidence of end organ damage) which is controlled.  Latest blood pressure and vitals reviewed-   Temp:  [97.5 °F (36.4 °C)-98.2 °F (36.8 °C)]   Pulse:  []   Resp:  [16-20]   BP: (112-228)/()   SpO2:  [93 %-99 %] .   Patient currently off IV antihypertensives.   Home meds for hypertension were reviewed and noted below.   Hypertension Medications               carvediloL (COREG) 3.125 MG tablet Take 1 tablet (3.125 mg total) by mouth 2 (two) times daily.            Medication adjustment for hospital antihypertensives is as follows- Coreg 6.25mg BID, Nifedipine 60mg daily, PRN Hydralazine IV    Will aim for controlled BP reduction by medications noted above. Monitor and mitigate end organ damage as indicated.

## 2023-11-08 NOTE — CONSULTS
[facility] - Telemetry  Palliative Medicine  Consult Note    Patient Name: Eric Soliman  MRN: [id number]  Admission Date: 11/7/2023  Hospital Length of Stay: 1 days  Code Status: DNR   Attending Provider: Moy Martin MD  Consulting Provider: Adriana Cardoza NP  Primary Care Physician: Jesika, Primary Doctor  Principal Problem:Shortness of breath    Patient information was obtained from patient and primary team.      Inpatient consult to Palliative Care  Consult performed by: Adriana Cardoza NP  Consult ordered by: Bob Mata MD  Reason for consult: goals of care/ advanced care planning         Assessment/Plan:     Palliative Care  Palliative care encounter  Mr. Soliman is a 64 yo M with PMH seizure disorder, PAD s/p thrombectomy & PTA, HTN who presents to [facility] ED with c/o intermittent SOB and chest pain. Of note, patient lost to follow up with [facility] at [facility] who had wanted to perform a bronchoscopy/EBUS on patient. However, he was unable to show up and it had to be rescheduled multiple times (May and June). He was first seen by [facility] team on 4/6/23 during a hospitalization. He was noted to have increasing ALO mass with mediastinal lymphadenopathy at that time. He has a long smoking history, started at age 14 1ppd and is still smoking though he states he is smoking less than before. He has noticed significant weight loss with ~30 lbs since his admission in April 2023. Per chart review, patient has unintentionally lost 24 lb since then. Palliative care was consulted for advanced care planning and goals of care.     -At time of initial encounter, pt resting in bed. AAO.  Engages in conversation appropriately.  Introduced the topic of palliative care, pt receptive to visit.   -Pt reports living in his car, struggles with food, shelter and finances.    -At this time, pt is focused on lower leg pain.  Reports no improvement with prn tylenol.  Upon chart review, pt has Norco ordered and has not yet  "utilized medication.  RN to administer dose now.  Will follow up with pt in the am to monitor symptom burden.   -Pt reports wishing to move forward with initial biopsy as previously discussed if pending cytology does not reveal malignancy. However, pt reports that he would not accept chemotherapy if cancer is found.   -Pt reports having no family involved in his care.  Reports having a friend, Linda Grey, that he has not spoken to "in a while".  Discussed MPOA and importance of establishing. Pt will reach out to Linda to see if she is agreeable to be his MPOA.  Pt wishes to discuss with her prior to filling out paper.  Document left at the bedside.   -Code status discussed.  Pt would not accept intubation/ compressions.  Code status discussed, pt in agreement with DNR.  Order entered into the pts chart.   -Pt largely focused on his leg pain at this time.  Will defer remainder of goals of care conversation until pts symptoms are more controlled.         Thank you for your consult. I will follow-up with patient. Please contact us if you have any additional questions.    Subjective:     HPI:   Per chart, "Pt, Eric Soliman, is a 64 yo M with pmh of htn, lung nodule of undetermined character, PAD s/p thrombectomy & PTA, epilepsy, and tobacco and marijuana use here for several months of progressively worsening shortness of breath that has been worse over the last few days. [facility] for workup of this lung mass and respiratory issues, but has limited means and had failure of follow up. At this visit patient describes worsening shortness of breath and fatigue, reduced appetite, and significant unintentional weight loss of up to 25% of body weight. Patient is a current tobacco and marijuana smoker with approximately 50 pack year history of tobacco use, but says he has cut back drastically lately. Patient denies chest pain, nausea/vomiting, diarrhea, night sweats, cough or " "sputum production, or other symptoms at this time.        In ED, patient was noted to be hypertensive at 228/116. Initial ED BP was 190/90. Patient was given his previous home dose of coreg 3.125 BID and 10mg hydralazine q8h prn. Vitals otherwise stable. Pt's labs showed WBC count of 7.52. Troponin was negative, but will repeat once more to trend. BNP non-elevated. Chest xray showed large left pleural effusion. CTA of chest showed right sided PE and left sided pleural effusion and recommended thoracentesis to clinically correlate. Thoracentesis was performed at bedside by Pulm/Crit fellow. Samples were sent for analysis and approximately 1500mL were drawn off without complication. Patient had repeat CXR for post-thoracentesis lung views. Dr. Martin and [facility] inpatient team were present with patient to discuss possible diagnosis including malignancy and suggest a consultation with the Palliative medicine team."      Interval History:   Past Medical History:   Diagnosis Date    Anticoagulant long-term use     DVT (deep venous thrombosis)     Seizures     epilepsy controlled w/ marijuana       Past Surgical History:   Procedure Laterality Date    ANGIOGRAPHY OF LOWER EXTREMITY Left 10/7/2022    Procedure: Angiogram Extremity Unilateral;  Surgeon: Charles Patten III, MD;  [facility]: [facility];  Service: Cardiology;  Laterality: Left;    AORTOGRAPHY WITH SERIALOGRAPHY Bilateral 10/7/2022    Procedure: AORTOGRAM, WITH SERIALOGRAPHY;  Surgeon: Charles Patten III, MD;  Location: [facility] C[facility];  Service: Cardiology;  Laterality: Bilateral;    AORTOGRAPHY WITH SERIALOGRAPHY N/A 4/4/2023    Procedure: AORTOGRAM, WITH SERIALOGRAPHY;  Surgeon: Royce Yi MD;  Location: [facility] CATH LAB/EP;  Service: Cardiology;  Laterality: N/A;    ATHERECTOMY, PERIPHERAL BLOOD VESSEL Left 4/4/2023    Procedure: ATHERECTOMY, PERIPHERAL BLOOD VESSEL;  Surgeon: Royce Yi MD;  Location: [facility] CATH LAB/EP;  Service: " Cardiology;  Laterality: Left;    CLOSURE DEVICE  4/4/2023    Procedure: Placement of Closure Device;  Surgeon: Royce Yi MD;  Location: [facility] CATH LAB/EP;  Service: Cardiology;;    FILTER PROTECTION, PERIPHERAL  4/4/2023    Procedure: Filter Protection, Peripheral;  Surgeon: Royce Yi MD;  Location: [facility] CATH LAB/EP;  Service: Cardiology;;    IVUS (INTRAVASCULAR ULTRASOUND) Left 4/4/2023    Procedure: ULTRASOUND, INTRAVASCULAR;  Surgeon: Royce Yi MD;  Location: [facility] CATH LAB/EP;  Service: Cardiology;  Laterality: Left;    PTA, ILIAC ARTERY Left 4/4/2023    Procedure: PTA, Iliac Artery;  Surgeon: Royce Yi MD;  Location: [facility] CATH LAB/EP;  Service: Cardiology;  Laterality: Left;    THROMBECTOMY  4/4/2023    Procedure: THROMBECTOMY;  Surgeon: Royce Yi MD;  Location: [facility] CATH LAB/EP;  Service: Cardiology;;       Review of patient's allergies indicates:  No Known Allergies    Medications:  Continuous Infusions:   heparin (porcine) in D5W 18 Units/kg/hr (11/08/23 0720)     Scheduled Meds:   carvediloL  6.25 mg Oral BID    nicotine  1 patch Transdermal Daily    NIFEdipine  60 mg Oral Daily     PRN Meds:acetaminophen, heparin (PORCINE), heparin (PORCINE), hydrALAZINE, HYDROcodone-acetaminophen, LIDOcaine, melatonin, senna-docusate 8.6-50 mg, sodium chloride 0.9%    Family History       Problem Relation (Age of Onset)    Heart disease Father    No Known Problems Mother          Tobacco Use    Smoking status: Every Day     Current packs/day: 2.00     Average packs/day: 2.0 packs/day for 48.9 years (97.7 ttl pk-yrs)     Types: Cigarettes     Start date: 1975    Smokeless tobacco: Never    Tobacco comments:     Pt is a 2 pk/day cigarette smoker x 49 yrs. 21 mg nicotine patch ordered Q day, and order requested upon discharge for 21 mg nicotine patch Q day. Pt states ready to quit. Ambulatory referral to Smoking Cessation clinic.   Substance and Sexual Activity    Alcohol  use: Not Currently     Comment: rarely 1 beer    Drug use: Yes     Types: Marijuana    Sexual activity: Not on file       Review of Systems   Constitutional:  Positive for activity change, fatigue and unexpected weight change. Negative for fever.   Respiratory:  Positive for cough and shortness of breath.    Cardiovascular:  Negative for chest pain.   Musculoskeletal:  Positive for myalgias.   Psychiatric/Behavioral: Negative.       Objective:     Vital Signs (Most Recent):  Temp: 97.5 °F (36.4 °C) (11/08/23 1117)  Pulse: 78 (11/08/23 1117)  Resp: 16 (11/08/23 1434)  BP: 125/67 (11/08/23 1117)  SpO2: (!) 93 % (11/08/23 1117) Vital Signs (24h Range):  Temp:  [97.5 °F (36.4 °C)-98.2 °F (36.8 °C)] 97.5 °F (36.4 °C)  Pulse:  [65-99] 78  Resp:  [16-20] 16  SpO2:  [93 %-96 %] 93 %  BP: (112-178)/(58-97) 125/67     Weight: 59.6 kg (131 lb 8 oz)  Body mass index is 19.42 kg/m².       Physical Exam  Vitals and nursing note reviewed.   Constitutional:       Appearance: He is underweight. He is ill-appearing (chronically).      Comments: Unkept appearance    HENT:      Head: Normocephalic.      Nose: Nose normal.      Mouth/Throat:      Mouth: Mucous membranes are moist.   Cardiovascular:      Rate and Rhythm: Normal rate.      Pulses: Normal pulses.   Pulmonary:      Effort: Pulmonary effort is normal.      Breath sounds: Decreased air movement present. Decreased breath sounds present.   Abdominal:      General: Abdomen is flat.      Palpations: Abdomen is soft.   Skin:     General: Skin is warm and dry.      Capillary Refill: Capillary refill takes less than 2 seconds.      Coloration: Skin is pale.      Findings: Rash present.   Neurological:      Mental Status: He is alert and oriented to person, place, and time.   Psychiatric:         Mood and Affect: Mood normal.         Behavior: Behavior normal. Behavior is cooperative.         Thought Content: Thought content normal.         Judgment: Judgment normal.             Review of Symptoms      Symptom Assessment (ESAS 0-10 Scale)  Pain:  0  Dyspnea:  5  Anxiety:  0  Nausea:  0  Depression:  0  Anorexia:  0  Fatigue:  5  Insomnia:  0  Restlessness:  0  Agitation:  0         Performance Status:  40    Living Arrangements:  Lives alone (lives in his car)    Psychosocial/Cultural:   See Palliative Psychosocial Note: No  Social Issues Identified: Coping deficit pt/family and Financial Issues  Bereavement Risk: No  Caregiver Needs Discussed. Caregiver Distress: No:   Cultural:   **Primary  to Follow**  Palliative Care  Consult: Yes     Time-Based Charting:  Yes  Chart Review: 22 minutes  Face to Face: 18 minutes  Symptom Assessment: 13 minutes  Coordination of Care: 15 minutes  Discharge Planning: 10 minutes  Advance Care Planning: 10 minutes  Goals of Care: 15 minutes    Total Time Spent: 103 minutes        Advance Care Planning  Advance Directives:   Living Will: No    LaPOST: No    Do Not Resuscitate Status: Yes    Medical Power of : No      Decision Making:  Patient answered questions  Goals of Care: The patient endorses that what is most important right now is to focus on remaining as independent as possible, symptom/pain control, quality of life, even if it means sacrificing a little time, and improvement in condition but with limits to invasive therapies    Accordingly, we have decided that the best plan to meet the patient's goals includes continuing with treatment         Significant Labs: All pertinent labs within the past 24 hours have been reviewed.  CBC:   Recent Labs   Lab 11/08/23 0256   WBC 8.39  8.39   HGB 12.6*  12.6*   HCT 37.8*  37.8*   MCV 80*  80*     358     BMP:  Recent Labs   Lab 11/08/23 0256   GLU 93   *   K 4.6   CL 97   CO2 17*   BUN 14   CREATININE 1.0   CALCIUM 8.5*   MG 1.8     LFT:  Lab Results   Component Value Date    AST 34 11/08/2023    ALKPHOS 100 11/08/2023    BILITOT 0.4 11/08/2023      Albumin:   Albumin   Date Value Ref Range Status   11/08/2023 2.3 (L) 3.5 - 5.2 g/dL Final     Protein:   Total Protein   Date Value Ref Range Status   11/08/2023 7.9 6.0 - 8.4 g/dL Final     Lactic acid:   Lab Results   Component Value Date    LACTATE 1.4 11/07/2023    LACTATE 1.2 05/13/2023       Significant Imaging: I have reviewed all pertinent imaging results/findings within the past 24 hours.      Adriana Cardoza NP  Palliative Medicine  [facility]    Advance Care Planning     Date: 11/08/2023    Code Status  In light of the patients advanced and life limiting illness,I engaged the the patient in a voluntary conversation about the patient's preferences for care  at the very end of life. The patient wishes to have a natural, peaceful death.  Along those lines, the patient does not wish to have CPR or other invasive treatments performed when his heart and/or breathing stops. I communicated to the patient that a DNR order would be placed in his medical record to reflect this preference.  I spent a total of 22 minutes engaging the patient in this advance care planning discussion.

## 2023-11-08 NOTE — ASSESSMENT & PLAN NOTE
Chronic, uncontrolled. Latest blood pressure and vitals reviewed-     Temp:  [97.5 °F (36.4 °C)-98.2 °F (36.8 °C)]   Pulse:  []   Resp:  [16-20]   BP: (112-228)/()   SpO2:  [93 %-99 %] .   Home meds for hypertension were reviewed and noted below.   Hypertension Medications               carvediloL (COREG) 3.125 MG tablet Take 1 tablet (3.125 mg total) by mouth 2 (two) times daily.          While in the hospital, will manage blood pressure as follows; Adjust home antihypertensive regimen as follows- Increase dosage of carvedilol to 6.25 BID. Add Nifedipine 60mg PO daily.    Will utilize p.r.n. blood pressure medication only if patient's blood pressure greater than 180/110 and he develops symptoms such as worsening chest pain or shortness of breath.    Cardiology consulted

## 2023-11-08 NOTE — ASSESSMENT & PLAN NOTE
"Patient found to have large pleural effusion on imaging. I have personally reviewed and interpreted the following imaging: Xray, CT, and Ultrasound. A thoracentesis was performed. Pleural fluid was sent for analysis. Labs reviewed, including Serum LDH   LD   Date Value Ref Range Status   11/08/2023 194 110 - 260 U/L Final     Comment:     Results are increased in hemolyzed samples.   , Pleural Fluid LDH   LD, Fluid   Date Value Ref Range Status   11/07/2023 3004 Not established U/L Final     Comment:     Reference intervals have not been established for body fluids.  Comparison of this result with the concentration in the blood,   serum,or plasma is recommended.  The reference interval for LDH in serum/plasma is   110-260 U/L. Please interpret in context with the clinical  picture.      , Serum Protein   Total Protein   Date Value Ref Range Status   11/08/2023 7.9 6.0 - 8.4 g/dL Final    , Pleural Protein No results found for: "PROTEINBODYF" , Cell Count and Differential No results found for: "BFCELLCNT" , Cytology No results found for: "CYTOFLUID" , and Fluid Cultures No results found for: "BODYFLUIDCUL" . Fluid most consistent with Pending.  . Most likely etiology includes  Pending . Management to include  Pending    "

## 2023-11-08 NOTE — PROGRESS NOTES
I saw and evaluated the patient. I have reviewed and agree with the residents findings, including all diagnostic interpretations, and plans as written. This is an attestation of a separate note written by the ICU resident today.  As the teaching physician, I was present for and have confirmed the key portions of the service performed by the resident today.  In addition to the resident's note, I add the following:    Plan:  Pleural Effusion: exudative, lymphocytic; Likely malignant; s/p thoracentesis on 11/7 w/ 1500cc fluid drained. Follow up fluid studies; cytology pending; May try to drain to completion to then gauge timing of re-accumulation; if malignant, quickly reaccumulating, and/or lung not re-expanded; may best be suited for Indwelling Pleural Catheter  Lung Mass: spiculated; 4.9cm on previous CT (unable to visualize on current CT d/t effusion and atelectasis); if fluid positive would likely be at least Stage Tanvir disease; however, would likely need to pursue tissue sampling to better guide tx if he is willing to pursue tx  PE: segmental and subsegmental; echo w/ normal RV function; BNP and trop negative; hemodynamically stable; continue AC  DVT ppx: Full AC  GI ppx: n/a  Code status: Full  Dispo: Floor        Julien Moya MD  [facility]

## 2023-11-08 NOTE — PLAN OF CARE
CM met with pt - CM spoke at length with pt.   Pt has been homeless for about a year - lives in his car (Ford Explorer) - regularly [place] in [place] at an area near a cemetary and a housing project - he reports he feels safe there.     Listed address on face sheet ([place]) is the address of a friend where he receives mail.   No local family - no friends he can stay with - he states he has some friends in [place] - last name:  Que - he can't recall his friend's first name or phone #.   No phone at this time.  Pt reports he has a straight cane.    Pt reports his mother and sister live in [place].  He did not give CM the name/# of anyone he wanted CM to contact for him.      When asked if he has ever stayed at a homeless shelter - pt states he has but they have a very early time that residents must vacate the premises.      Dx:  SOB, pleural effusion   s/p thoracentesis today     CM called Tram Coates RN  [phone] -- [place] with the [facility].  She will send CM an email with an intake checklist to see if pt qualifies for this program at d/c.  There is currently a resident with a CA dx living at the facility.  Pt must be able to walk about 200 ft to get to elevator and cafeteria.  Facility is a dorm-like setting.  Pt can be transported to [facility] [place].     In addition, Tram will check with her supervisor tomorrow to see if pt qualifies for a Respite program sponsored by [facility] -- pt's Medicaid may cover placement there (LPN on the premises).   CM to continue to arrange post d/c shelter/help for pt.       11/08/23 8096   Discharge Assessment   Assessment Type Discharge Planning Assessment   Confirmed/corrected address, phone number and insurance   (address on facesheet is where pt receives mail - pt is homeless  -lives in his vehicle.)   If unable to respond/provide information was family/caregiver contacted?   (pt doesn't want CM to contact family)   Do you expect to return to your  current living situation?   (tbd)   Equipment Currently Used at Home cane, straight   Patient currently being followed by outpatient case management? No   Do you have any problems affording any of your prescribed medications? TBD   How do you get to doctors appointments?   (non compliant with f/u)   Are you on dialysis? No   Do you take coumadin? No   DME Needed Upon Discharge    (tbd)   Discharge Plan discussed with: Patient   Transition of Care Barriers No family/friends to help;Homeless

## 2023-11-08 NOTE — HOSPITAL COURSE
64 yo male PMH HTN, Lung nodule, PVD s/p thrombectomy, tobacco use presented to ED with shortness of breath and weight loss x 1 month that has worsened over the past few days.  Pt had been seen by previously by [facility] for lung nodule and respiratory problems.  Initially hypertensive to 200s systolic in ED; resolved with home medications.  CTA shows large left pleural effusion and PE to right lower lobe. US shows DVT to left femoral and posterior tibialis veins.  Pt previously on Eliquis and Plavix but non-adherent x 1 month. Treated with Heparin drip.  Pulmonology consulted and performed thoracentesis. 1.5L fluid drawn off and analysis consisted with exudative pleural effusion. On hospital day one, based on results of labs, imaging, and clinical presentation, patient was informed that he had a serious illness and likely had a malignant cancer. Goals of care discussions were started. One hospital day two, pt had one large episode of bright red bloody stool with associated generalized weakness. Discontinued heparin and oral anticoagulants. GI consulted. Treated with Protonix. Colonoscopy showed segmental mucosal ulceration in the ascending colon with maroon stool througout the entire colon.  Findings consistent with ischemic colitis which likely occured in the setting of hypotension in a vasculopath.  Held BP meds and patient remained normotensive. Stable BP, H&H throughout hospital course.  Pleural catheter inserted with initial drainage of approximately 1L dark brown fluid.  Drained to completion for total of 3.5L sanguinous fluid. No pleurodesis offered due to trapped lung on CXR and patient being a poor candidate. IR consulted and IVC filter placed to prevent further cardiopulmonic emboli from DVT. Fluid cytology resulted adenocarcinoma Stage 4. Patient informed of diagnosis. HemeOnc consulted; During hospitalization, pt's main concern was left lower leg pain, numbness, and weakness. US of the LE A&V  showed distal stenosis and occlusion at the L popliteal artery and emboli in the deep veins. Patient was given opiate analgesia to ameliorate the pain. PT/OT worked with him throughout his stay and worked to rehab strength and mobility, without much success. Patient remained debilitated and had mobility issues. Throughout stay, patient had several goals of care discussions with the primary team, specialist teams, and his Palliative Medicine team. He communicated that he did not want to pursue any chemotherapy or invasive treatment and so was deemed to be a candidate for hospice with placement at [facility] under [facility] care with [facility] hospice attending. Pt was discharged with medications and orders to [facility].

## 2023-11-08 NOTE — HPI
"Per chart, "Pt, Eric Soliman, is a 64 yo M with pmh of htn, lung nodule of undetermined character, PAD s/p thrombectomy & PTA, epilepsy, and tobacco and marijuana use here for several months of progressively worsening shortness of breath that has been worse over the last few days. Patient had previously been seen by [facility] for workup of this lung mass and respiratory issues, but has limited means and had failure of follow up. At this visit patient describes worsening shortness of breath and fatigue, reduced appetite, and significant unintentional weight loss of up to 25% of body weight. Patient is a current tobacco and marijuana smoker with approximately 50 pack year history of tobacco use, but says he has cut back drastically lately. Patient denies chest pain, nausea/vomiting, diarrhea, night sweats, cough or sputum production, or other symptoms at this time.        In ED, patient was noted to be hypertensive at 228/116. Initial ED BP was 190/90. Patient was given his previous home dose of coreg 3.125 BID and 10mg hydralazine q8h prn. Vitals otherwise stable. Pt's labs showed WBC count of 7.52. Troponin was negative, but will repeat once more to trend. BNP non-elevated. Chest xray showed large left pleural effusion. CTA of chest showed right sided PE and left sided pleural effusion and recommended thoracentesis to clinically correlate. Thoracentesis was performed at bedside by Pulm/Crit fellow. Samples were sent for analysis and approximately 1500mL were drawn off without complication. Patient had repeat CXR for post-thoracentesis lung views. Dr. Martin and [facility]  inpatient team were present with patient to discuss possible diagnosis including malignancy and suggest a consultation with the Palliative medicine team."  "

## 2023-11-08 NOTE — ASSESSMENT & PLAN NOTE
"Patient found to have large pleural effusion on imaging. I have personally reviewed and interpreted the following imaging: Xray, CT, and Ultrasound. A thoracentesis was performed. Pleural fluid was sent for analysis. Labs reviewed, including Serum LDH   LD   Date Value Ref Range Status   11/08/2023 194 110 - 260 U/L Final     Comment:     Results are increased in hemolyzed samples.   , Pleural Fluid LDH   LD, Fluid   Date Value Ref Range Status   11/07/2023 3004 Not established U/L Final     Comment:     Reference intervals have not been established for body fluids.  Comparison of this result with the concentration in the blood,   serum,or plasma is recommended.  The reference interval for LDH in serum/plasma is   110-260 U/L. Please interpret in context with the clinical  picture.      , Serum Protein   Total Protein   Date Value Ref Range Status   11/08/2023 7.9 6.0 - 8.4 g/dL Final    , Pleural Protein No results found for: "PROTEINBODYF" , Cell Count and Differential No results found for: "BFCELLCNT" , Cytology No results found for: "CYTOFLUID" , and Fluid Cultures No results found for: "BODYFLUIDCUL" . Fluid most consistent with Exudate.  . Most likely etiology includes  Lung malignancy or metastatic neoplasm . Management to include  drained 1500mL. Will reassess.  Pulmonology plans to re-tap and drain pleural effusion. Repeat cytology.    "

## 2023-11-08 NOTE — ASSESSMENT & PLAN NOTE
Pt has previous evidence of uncharacterized lung mass as he was lost to follow up by [facility] team.  Imaging on admission shows significant left sided pleural effusion, R PE, and new R hilar nodules from previous scan.    History and symptoms worrying for primary lung neoplasm and/or malignant metastasis. Will reassess and possible biopsy/EBUS per pulm recs and patient preferences.    Pulm team consulted for evaluation and treatment recommendations.   -Thoracentesis of left pleural effusion for analysis. Steven off approx 1500mL of fluid  -Post thoracentesis cxr  -Repeat ECHO    -Palliative team consulted and following

## 2023-11-08 NOTE — ASSESSMENT & PLAN NOTE
"Mr. Soliman is a 62 yo M with PMH seizure disorder, PAD s/p thrombectomy & PTA, HTN who presents to [facility] ED with c/o intermittent SOB and chest pain. Of note, patient lost to follow up with [facility] at [facility] who had wanted to perform a bronchoscopy/EBUS on patient. However, he was unable to show up and it had to be rescheduled multiple times (May and June). He was first seen by [facility] team on 4/6/23 during a hospitalization. He was noted to have increasing ALO mass with mediastinal lymphadenopathy at that time. He has a long smoking history, started at age 14 1ppd and is still smoking though he states he is smoking less than before. He has noticed significant weight loss with ~30 lbs since his admission in April 2023. Per chart review, patient has unintentionally lost 24 lb since then. Palliative care was consulted for advanced care planning and goals of care.     -At time of initial encounter, pt resting in bed. AAO.  Engages in conversation appropriately.  Introduced the topic of palliative care, pt receptive to visit.   -Pt reports living in his car, struggles with food, shelter and finances.    -At this time, pt is focused on lower leg pain.  Reports no improvement with prn tylenol.  Upon chart review, pt has Norco ordered and has not yet utilized medication.  RN to administer dose now.  Will follow up with pt in the am to monitor symptom burden.   -Pt reports wishing to move forward with initial biopsy as previously discussed if pending cytology does not reveal malignancy. However, pt reports that he would not accept chemotherapy if cancer is found.   -Pt reports having no family involved in his care.  Reports having a friend, Linda Mejiakland, that he has not spoken to "in a while".  Discussed MPOA and importance of establishing. Pt will reach out to Linda to see if she is agreeable to be his MPOA.  Pt wishes to discuss with her prior to filling out paper.  Document left at the bedside.   -Code status " discussed.  Pt would not accept intubation/ compressions.  Code status discussed, pt in agreement with DNR.  Order entered into the pts chart.   -Pt largely focused on his leg pain at this time.  Will defer remainder of goals of care conversation until pts symptoms are more controlled.

## 2023-11-08 NOTE — PLAN OF CARE
11/07/23 1935   Admission   Initial VN Admission Questions Complete   Communication Issues? None   Shift   Virtual Nurse - Rounding Complete   Virtual Nurse - Patient Verbalized Approval Of Camera Use;VN Rounding   Type of Frequent Check   Type Patient Rounds;Telemetry Monitoring   Safety/Activity   Patient Rounds bed in low position;call light in patient/parent reach;bed wheels locked;clutter free environment maintained;ID band on;visualized patient;placement of personal items at bedside   Safety Promotion/Fall Prevention assistive device/personal item within reach;Fall Risk reviewed with patient/family;medications reviewed;instructed to call staff for mobility   Positioning   Body Position supine   Head of Bed (HOB) Positioning HOB elevated   Cardiac   Cardiac/Telemetry Monitor On Yes     Admission questions completed. Introduced patient to VIP model, patient verbalized understanding. Educated patient on fall prevention protocol, updated on plan of care. Opportunity given for pt's questions. All questions answered. Denies needs at this time.

## 2023-11-08 NOTE — NURSING
Patient arrived to unit from ED. Vitals obtained and assessment performed. MD notified of patient's arrival to unit. VN also notified to complete admit assessment.

## 2023-11-08 NOTE — PROGRESS NOTES
Smoking cessation education note: Pt is a 2 pk/day cigarette smoker x 49 yrs. (He states that he has cut down slightly, to 1.5 to 2 pk/day). 21 mg nicotine patch ordered Q day, and order requested upon discharge for 21 mg nicotine patch Q day. Pt states ready to quit. Ambulatory referral to Smoking Cessation clinic following hospital discharge.

## 2023-11-09 PROBLEM — K92.2 GI BLEED: Status: ACTIVE | Noted: 2023-01-01

## 2023-11-09 NOTE — ASSESSMENT & PLAN NOTE
Malnutrition Type:  Context: social/environmental circumstances  Level: mild    Related to (etiology):   homelessness    Signs and Symptoms (as evidenced by):   Weight loss, poor intake, mild muscle/fat wasting     Malnutrition Characteristic Summary:  Weight Loss (Malnutrition): greater than 10% in 6 months  Energy Intake (Malnutrition): less than 75% for greater than or equal to 1 month  Subcutaneous Fat (Malnutrition): mild depletion  Muscle Mass (Malnutrition): mild depletion    Interventions:  Commercial beverage  Collaboration with other providers    Nutrition Diagnosis Status:   New

## 2023-11-09 NOTE — ANESTHESIA PREPROCEDURE EVALUATION
11/09/2023  Eric Soliman is a 63 y.o., male.      Pre-op Assessment    I have reviewed the Patient Summary Reports.     I have reviewed the Nursing Notes. I have reviewed the NPO Status.   I have reviewed the Medications.     Review of Systems  Anesthesia Hx:             Denies Family Hx of Anesthesia complications.    Denies Personal Hx of Anesthesia complications.                    Cardiovascular:     Hypertension                                            Physical Exam    Airway:  Mallampati: II   Mouth Opening: Normal  Neck ROM: Normal ROM        Anesthesia Plan  Type of Anesthesia, risks & benefits discussed:    Anesthesia Type: Gen Natural Airway  Intra-op Monitoring Plan: Standard ASA Monitors  Post Op Pain Control Plan: multimodal analgesia  Induction:  IV  Informed Consent: Informed consent signed with the Patient and all parties understand the risks and agree with anesthesia plan.  All questions answered.   ASA Score: 4  Day of Surgery Review of History & Physical: H&P Update referred to the surgeon/provider.  Anesthesia Plan Notes: Probable lung cancer. S/P thoracentesis yesterday    Ready For Surgery From Anesthesia Perspective.     .

## 2023-11-09 NOTE — PROGRESS NOTES
Smoking cessation education follow-up: Order requested upon discharge for 21 mg nicotine patch Q day.  Pt's initial Ambulatory Smoking Cessation clinic appointment scheduled 11/20/23 at 1400, [facility].

## 2023-11-09 NOTE — CONSULTS
"[facility]     CC: GI bleed    HPI 63 y.o. male with PMH seizure disorder, PAD s/p thrombectomy and PTA and HTN who presented with shortness of breath and chest pain. Patient was noted to have segmental and subsegmental PE on CTA as well as a left upper lobe lung mass. Patient underwent Thoracentesis 11/7 with 1.5L fluid removal. Patient was placed on heparin gtt for PE. 11/9 AM patient started to have profuse bright red blood per rectum. He denies abdominal pain, nausea, vomiting, diarrhea or constipation prior to onset of bleeding this morning. Patient has never had colonoscopy in the past.      Past Medical History  Past Medical History:   Diagnosis Date    Anticoagulant long-term use     DVT (deep venous thrombosis)     Seizures     epilepsy controlled w/ marijuana         Physical Examination  /62 (BP Location: Left arm, Patient Position: Lying)   Pulse 75   Temp 97.8 °F (36.6 °C) (Oral)   Resp 18   Ht 5' 9" (1.753 m)   Wt 59.6 kg (131 lb 6.3 oz)   SpO2 (!) 93%   BMI 19.40 kg/m²   General appearace: no acute distress, alert   Abdomen: soft, non-tender, non-distended abdomen with no rebound or guarding  : bright red blood present on buttocks and in bed pan        Labs:  Recent Labs   Lab 11/07/23  0934 11/07/23  1740 11/08/23  0256 11/09/23  0436 11/09/23  0852   WBC 7.52 8.12 8.39  8.39 9.60  9.60  --    HGB 12.3* 12.9* 12.6*  12.6* 12.1*  12.1* 11.9*   HCT 37.8* 38.7* 37.8*  37.8* 36.3*  36.3* 35.4*    368 358  358 356  356  --    *  --  128* 123*  --    K 4.2  --  4.6 4.3  --    CL 99  --  97 96  --    CREATININE 0.8  --  1.0 0.8  --    BUN 9  --  14 12  --    CO2 24  --  17* 16*  --    ALT 41  --  44 38  --    AST 34  --  34 24  --        Imaging:  CTA chest 11/7: Segmental and subsegmental pulmonary thromboembolism in the right lower lobe. Some findings of right heart strain are also demonstrated. Large new left pleural effusion, mildly hyperattenuating to " water, with faint rim enhancement, and left-to-right mediastinal shift, compatible with tension hydrothorax. New 9 x 6 mm right lower lobe pulmonary nodule, possibly representing metastatic disease.  Correlate clinically; and with follow-up chest CT in 3-6 months, if clinically appropriate. Prominent to enlarged right hilar lymph nodes, up to 16 mm in short axis diameter; thought to be new since the 04/06/2023 noncontrast CT; this could represent lymph node metastasis. Poorly defined increased fullness in the left adrenal, relative to 04/06/2023, for which metastatic disease is not excluded.    External medical records reviewed including external documents       Endoscopic History:  No prior endoscopic history.      Assessment:  64 yo male with PMH of seizure disorder, PAD s/p thrombectomy and PTA and HTN, now with diagnosis of segmental and subsegmental PE, as well as left upper lobe lung mass with pleural effusion. Patient has now developed hematochezia while on heparin gtt. This is an acute illness requiring hospitalization and poses a threat to life or bodily function.      Plan:  - EGD/flex sig today  - Hold anticoagulation   - Would check CBC later this afternoon and again tomorrow morning   - Continue IV PPI BID  - Monitor hgb and transfuse as needed     Case discussed with Dr. Holcomb.     Dalila Salamanca, DO  [facility]

## 2023-11-09 NOTE — TRANSFER OF CARE
"Anesthesia Transfer of Care Note    Patient: Eric Soliman    Procedure(s) Performed: Procedure(s) (LRB):  COLONOSCOPY (N/A)    Patient location: GI    Anesthesia Type: general    Transport from OR: Transported from OR on 2-3 L/min O2 by NC with adequate spontaneous ventilation    Post pain: adequate analgesia    Post assessment: no apparent anesthetic complications and tolerated procedure well    Post vital signs: stable    Level of consciousness: sedated    Nausea/Vomiting: no nausea/vomiting    Complications: none    Transfer of care protocol was followed      Last vitals: Visit Vitals  /62 (BP Location: Left arm, Patient Position: Lying)   Pulse 75   Temp 36.6 °C (97.8 °F) (Oral)   Resp 18   Ht 5' 9" (1.753 m)   Wt 59.6 kg (131 lb 6.3 oz)   SpO2 (!) 93%   BMI 19.40 kg/m²     "

## 2023-11-09 NOTE — PLAN OF CARE
GI Plan of Care:    Colonoscopy today showed:  -Segmental mucosal ulceration in the ascending colon with maroon stool througout the entire colon   -63M admitted for lung mass c/b pleural effusion (drained 1.5 L) and PE who was started on heparin gtt precipitating BRBPR. Findings consistent with ischemic colitis which likely occured in the setting of hypotension in a vasculopath.     Recommendation:          - Advise refraining from anticoagulation; supportive care otherwise

## 2023-11-09 NOTE — PLAN OF CARE
Problem: Adult Inpatient Plan of Care  Goal: Plan of Care Review  Outcome: Ongoing, Progressing     Problem: Adult Inpatient Plan of Care  Goal: Optimal Comfort and Wellbeing  Outcome: Ongoing, Progressing     Problem: VTE (Venous Thromboembolism)  Goal: VTE (Venous Thromboembolism) Symptom Resolution  Outcome: Ongoing, Progressing     Problem: Chest Pain  Goal: Resolution of Chest Pain Symptoms  Outcome: Ongoing, Progressing     Problem: Gas Exchange Impaired (Pulmonary Impairment)  Goal: Optimal Gas Exchange  Outcome: Ongoing, Progressing      Wound Care: Petrolatum

## 2023-11-09 NOTE — PROVATION PATIENT INSTRUCTIONS
Discharge Summary/Instructions after an Endoscopic Procedure  Patient Name: Eric Soliman  Patient MRN: [id number]  Patient YOB: 1960 Thursday, November 9, 2023  Cedrick Holcomb MD  Dear patient,  As a result of recent federal legislation (The Federal Cures Act), you may   receive lab or pathology results from your procedure in your [facility]   account before your physician is able to contact you. Your physician or   their representative will relay the results to you with their   recommendations at their soonest availability.  Thank you,  Your health is very important to us during the Covid Crisis. Following your   procedure today, you will receive a daily text for 2 weeks asking about   signs or symptoms of Covid 19.  Please respond to this text when you   receive it so we can follow up and keep you as safe as possible.   RESTRICTIONS:  During your procedure today, you received medications for sedation.  These   medications may affect your judgment, balance and coordination.  Therefore,   for 24 hours, you have the following restrictions:   - DO NOT drive a car, operate machinery, make legal/financial decisions,   sign important papers or drink alcohol.    ACTIVITY:  Today: no heavy lifting, straining or running due to procedural   sedation/anesthesia.  The following day: return to full activity including work.  DIET:  Eat and drink normally unless instructed otherwise.     TREATMENT FOR COMMON SIDE EFFECTS:  - Mild abdominal pain, nausea, belching, bloating or excessive gas:  rest,   eat lightly and use a heating pad.  - Sore Throat: treat with throat lozenges and/or gargle with warm salt   water.  - Because air was used during the procedure, expelling large amounts of air   from your rectum or belching is normal.  - If a bowel prep was taken, you may not have a bowel movement for 1-3 days.    This is normal.  SYMPTOMS TO WATCH FOR AND REPORT TO YOUR PHYSICIAN:  1. Abdominal pain or bloating, other than  gas cramps.  2. Chest pain.  3. Back pain.  4. Signs of infection such as: chills or fever occurring within 24 hours   after the procedure.  5. Rectal bleeding, which would show as bright red, maroon, or black stools.   (A tablespoon of blood from the rectum is not serious, especially if   hemorrhoids are present.)  6. Vomiting.  7. Weakness or dizziness.  GO DIRECTLY TO THE NEAREST EMERGENCY ROOM IF YOU HAVE ANY OF THE FOLLOWING:      Difficulty breathing              Chills and/or fever over 101 F   Persistent vomiting and/or vomiting blood   Severe abdominal pain   Severe chest pain   Black, tarry stools   Bleeding- more than one tablespoon   Any other symptom or condition that you feel may need urgent attention  Your doctor recommends these additional instructions:  If any biopsies were taken, your doctors clinic will contact you in 1 to 2   weeks with any results.  - Return patient to [facility] for ongoing care.   - Advise refraining from anticoagulation; supportive care otherwise  - Resume previous diet.   - Continue present medications.  - Repeat colonoscopy as previously indicated by last colon cancer   screening.  For questions, problems or results please call your physician - Cedrick Holcomb MD.  EMERGENCY PHONE NUMBER: [phone],  LAB RESULTS: [phone]  IF A COMPLICATION OR EMERGENCY SITUATION ARISES AND YOU ARE UNABLE TO REACH   YOUR PHYSICIAN - GO DIRECTLY TO THE EMERGENCY ROOM.  MD Cedrick Esparza MD  11/9/2023 11:14:17 AM  This report has been verified and signed electronically.  Dear patient,  As a result of recent federal legislation (The Federal Cures Act), you may   receive lab or pathology results from your procedure in your [facility]   account before your physician is able to contact you. Your physician or   their representative will relay the results to you with their   recommendations at their soonest availability.  Thank you,  PROVATION

## 2023-11-09 NOTE — PROGRESS NOTES
[facility] Medicine  Progress Note    Patient Name: Eric Soliman  MRN: [id number]  Patient Class: IP- Inpatient   Admission Date: 11/7/2023  Length of Stay: 2 days  Attending Physician: Moy Martin MD  Primary Care Provider: Jesika, Primary Doctor        Subjective:     Principal Problem:Shortness of breath        HPI:  Pt, Eric Soliman, is a 64 yo M with pmh of htn, lung nodule of undetermined character, PAD s/p thrombectomy & PTA, epilepsy, and tobacco and marijuana use here for several months of progressively worsening shortness of breath that has been worse over the last few days. Patient had previously been seen by [facility] pulmonology for workup of this lung mass and respiratory issues, but has limited means and had failure of follow up. At this visit patient describes worsening shortness of breath and fatigue, reduced appetite, and significant unintentional weight loss of up to 25% of body weight. Patient is a current tobacco and marijuana smoker with approximately 50 pack year history of tobacco use, but says he has cut back drastically lately. Patient denies chest pain, nausea/vomiting, diarrhea, night sweats, cough or sputum production, or other symptoms at this time.      In ED, patient was noted to be hypertensive at 228/116. Initial ED BP was 190/90. Patient was given his previous home dose of coreg 3.125 BID and 10mg hydralazine q8h prn. Vitals otherwise stable. Pt's labs showed WBC count of 7.52. Troponin was negative, but will repeat once more to trend. BNP non-elevated. Chest xray showed large left pleural effusion. CTA of chest showed right sided PE and left sided pleural effusion and recommended thoracentesis to clinically correlate. Thoracentesis was performed at bedside by Pulm/Crit fellow. Samples were sent for analysis and approximately 1500mL were drawn off without complication. Patient had repeat CXR for post-thoracentesis lung views. Dr. Martin and [facility]  inpatient team were  present with patient to discuss possible diagnosis including malignancy and suggest a consultation with the Palliative medicine team.    Overview/Hospital Course:  64 yo male PMH HTN, Lung nodule, PVD s/p thrombectomy, tobacco use presented to ED with shortness of breath and weight loss x 1 month that has worsened over the past few days.  Pt has been seen by previously by [facility] pulmonology for lung nodule and respiratory problems.  Initially hypertensive to 200s systolic in ED; resolved with home medications.  CTA shows large left pleural effusion and PE to right lower lobe.  US shows DVT to left femoral and posterior tibialis veins.  Pt previously on Eliquis and Plavix but non-adherent x 1 month.  Treated with Heparin drip.  Pulmonology consulted and performed thoracentesis.  1.5L fluid drawn off and analysis consisted with exudative pleural effusion.  One episode of bright red bloody stool with associated generalized weakness on hospital day 2.  Discontinued heparin. Treated with Protonix.  GI consulted; NPO for sigmoidoscopy.   CTA abdomen pending.      Interval History: Patient had some worsening shortness of breath this morning and feels more tired, despite sleeping well. He has been eating and tolerating PO. *After pt was seen by FM team, he went to the bathroom and had stool BRBR. Was taken by GI for emergency endoscopy.     Review of Systems   Constitutional:  Positive for activity change, appetite change, fatigue and unexpected weight change. Negative for chills, diaphoresis and fever.   HENT: Negative.     Respiratory:  Positive for shortness of breath. Negative for apnea, cough and wheezing.    Cardiovascular:  Negative for chest pain, palpitations and leg swelling.   Gastrointestinal:  Positive for blood in stool. Negative for vomiting.   Genitourinary: Negative.    Musculoskeletal: Negative.    Skin:  Positive for rash (Rash on bilateral forearms present on admission).   Psychiatric/Behavioral: Negative.   Negative for confusion.      Objective:     Vital Signs (Most Recent):  Temp: 97.8 °F (36.6 °C) (11/09/23 1110)  Pulse: 85 (11/09/23 1132)  Resp: 15 (11/09/23 1132)  BP: (!) 159/82 (11/09/23 1132)  SpO2: 95 % (11/09/23 1132) Vital Signs (24h Range):  Temp:  [96 °F (35.6 °C)-97.8 °F (36.6 °C)] 97.8 °F (36.6 °C)  Pulse:  [66-86] 85  Resp:  [10-20] 15  SpO2:  [91 %-96 %] 95 %  BP: (100-159)/(59-82) 159/82     Weight: 59.6 kg (131 lb 6.3 oz)  Body mass index is 19.4 kg/m².    Intake/Output Summary (Last 24 hours) at 11/9/2023 1313  Last data filed at 11/9/2023 1107  Gross per 24 hour   Intake 350 ml   Output 1025 ml   Net -675 ml         Physical Exam  Vitals and nursing note reviewed.   Constitutional:       Appearance: He is underweight. He is ill-appearing (chronically).      Comments: Unkept appearance    HENT:      Head: Normocephalic.      Nose: Nose normal.      Mouth/Throat:      Mouth: Mucous membranes are moist.   Cardiovascular:      Rate and Rhythm: Normal rate.      Pulses: Normal pulses.   Pulmonary:      Breath sounds: Decreased air movement present. Examination of the left-middle field reveals decreased breath sounds. Examination of the left-lower field reveals decreased breath sounds. Decreased breath sounds present. No wheezing or rhonchi.      Comments: Easily develops shortness of breath  Abdominal:      General: Abdomen is flat.      Palpations: Abdomen is soft.   Skin:     General: Skin is warm and dry.      Capillary Refill: Capillary refill takes less than 2 seconds.      Coloration: Skin is pale.      Findings: Rash present.   Neurological:      Mental Status: He is alert and oriented to person, place, and time.   Psychiatric:         Mood and Affect: Mood normal.         Behavior: Behavior normal. Behavior is cooperative.         Thought Content: Thought content normal.         Judgment: Judgment normal.             Significant Labs: All pertinent labs within the past 24 hours have been  reviewed.  Recent Lab Results         11/09/23  1323   11/09/23  0852   11/09/23  0436        Albumin     2.3       ALP     81       ALT     38       Anion Gap     11       aPTT     41.7  Comment: Refer to local heparin nomogram for intensity/dose specific   therapeutic   range.  LOT^050^APTT [id number]         AST     24       Baso #     0.08            0.08       Basophil %     0.8            0.8       BILIRUBIN TOTAL     0.2  Comment: For infants and newborns, interpretation of results should be based  on gestational age, weight and in agreement with clinical  observations.    Premature Infant recommended reference ranges:  Up to 24 hours.............<8.0 mg/dL  Up to 48 hours............<12.0 mg/dL  3-5 days..................<15.0 mg/dL  6-29 days.................<15.0 mg/dL         BUN     12       Calcium     8.3       Chloride     96       CO2     16       Creatinine     0.8       Differential Method     Automated            Automated       eGFR     >60       Eos #     0.3            0.3       Eosinophil %     3.0            3.0       Glucose     107       Gran # (ANC)     6.2            6.2       Gran %     64.5            64.5       Hematocrit 32.7   35.4   36.3            36.3       Hemoglobin 10.8   11.9   12.1            12.1       Immature Grans (Abs)     0.26  Comment: Mild elevation in immature granulocytes is non specific and   can be seen in a variety of conditions including stress response,   acute inflammation, trauma and pregnancy. Correlation with other   laboratory and clinical findings is essential.              0.26  Comment: Mild elevation in immature granulocytes is non specific and   can be seen in a variety of conditions including stress response,   acute inflammation, trauma and pregnancy. Correlation with other   laboratory and clinical findings is essential.         Immature Granulocytes     2.7            2.7       Lymph #     2.2            2.2       Lymph %     23.2             23.2       Magnesium      2.0       MCH     26.5            26.5       MCHC     33.3            33.3       MCV     80            80       Mono #     0.8            0.8       Mono %     8.5            8.5       MPV     8.8            8.8       nRBC     0            0       Phosphorus Level     4.0       Platelet Count     356            356       Potassium     4.3       PROTEIN TOTAL     7.8       RBC     4.56            4.56       RDW     15.4            15.4       Sodium     123       WBC     9.60            9.60               Significant Imaging: I have reviewed all pertinent imaging results/findings within the past 24 hours.    Assessment/Plan:      * Shortness of breath  Chronic, progressive shortness of breath worsening over the last few days.    -CTA- R segmental and subsegmental PE w right heart strain, large L pleural effusion, R hilar nodules  -Heparin bridge for PE- will discuss long term anticoagulation. Pt previously on eliquis and clopidogrel  -Pulmonology consulted  -Thoracentesis performed- approx 1.5L drawn off, sent for analysis  -LE US- evidence of DVT in left mid femoral and left posterior tibial veins  -ECHO GIDD, EF 55-60%, small posterior pericardial effusion    GI bleed  Pt had bout of hematochezia this morning. Taken by GI for endoscopy. They believe etiology is hypotension of splanchnic circulation in a vasculopath.    -H&H stable at this time  -Holding antihypertensives  -Holding anticoagulation and antiplatelets  -Continue to monitor and reassess      Asymptomatic hypertensive urgency  Patient has a current diagnosis of hypertensive urgency (without evidence of end organ damage) which is controlled.  Latest blood pressure and vitals reviewed-   Temp:  [97.5 °F (36.4 °C)-98.2 °F (36.8 °C)]   Pulse:  []   Resp:  [16-20]   BP: (112-228)/()   SpO2:  [93 %-99 %] .   Patient currently off IV antihypertensives.   Home meds for hypertension were reviewed and noted below.   Hypertension  "Medications               carvediloL (COREG) 3.125 MG tablet Take 1 tablet (3.125 mg total) by mouth 2 (two) times daily.            Medication adjustment for hospital antihypertensives is as follows- Coreg 6.25mg BID, Nifedipine 60mg daily, PRN Hydralazine IV    Will aim for controlled BP reduction by medications noted above. Monitor and mitigate end organ damage as indicated.    Single subsegmental pulmonary embolism without acute cor pulmonale  See shortness of breath      Pleural effusion  Patient found to have large pleural effusion on imaging. I have personally reviewed and interpreted the following imaging: Xray, CT, and Ultrasound. A thoracentesis was performed. Pleural fluid was sent for analysis. Labs reviewed, including Serum LDH   LD   Date Value Ref Range Status   11/08/2023 194 110 - 260 U/L Final     Comment:     Results are increased in hemolyzed samples.   , Pleural Fluid LDH   LD, Fluid   Date Value Ref Range Status   11/07/2023 3004 Not established U/L Final     Comment:     Reference intervals have not been established for body fluids.  Comparison of this result with the concentration in the blood,   serum,or plasma is recommended.  The reference interval for LDH in serum/plasma is   110-260 U/L. Please interpret in context with the clinical  picture.      , Serum Protein   Total Protein   Date Value Ref Range Status   11/08/2023 7.9 6.0 - 8.4 g/dL Final    , Pleural Protein No results found for: "PROTEINBODYF" , Cell Count and Differential No results found for: "BFCELLCNT" , Cytology No results found for: "CYTOFLUID" , and Fluid Cultures No results found for: "BODYFLUIDCUL" . Fluid most consistent with Exudate.  . Most likely etiology includes  Lung malignancy or metastatic neoplasm . Management to include  drained 1500mL. Will reassess.  Pulmonology plans to re-tap and drain pleural effusion. Repeat cytology.      Lung mass  Pt has previous evidence of uncharacterized lung mass as he was lost " to follow up by [facility] Pulmonology team.  Imaging on admission shows significant left sided pleural effusion, R PE, and new R hilar nodules from previous scan.    History and symptoms worrying for primary lung neoplasm and/or malignant metastasis. Will reassess and possible biopsy/EBUS per pulm recs and patient preferences.    Pulm team consulted for evaluation and treatment recommendations.   -Thoracentesis of left pleural effusion for analysis. Steven off approx 1500mL of fluid  -Post thoracentesis cxr  -Repeat ECHO    -Palliative team consulted and following    Thrombosis  PE in right lung. Cardiology and Pulm consulted  -Previously on eliquis and clopidogrel, but hasn't taken in months as pt did not have medication refills.  -Start on Heparin 80U/kg  -Resume eliquis and clopidogrel when able    Primary hypertension  Chronic, uncontrolled. Latest blood pressure and vitals reviewed-     Temp:  [97.5 °F (36.4 °C)-98.2 °F (36.8 °C)]   Pulse:  []   Resp:  [16-20]   BP: (112-228)/()   SpO2:  [93 %-99 %] .   Home meds for hypertension were reviewed and noted below.   Hypertension Medications               carvediloL (COREG) 3.125 MG tablet Take 1 tablet (3.125 mg total) by mouth 2 (two) times daily.          While in the hospital, will manage blood pressure as follows; Adjust home antihypertensive regimen as follows- Increase dosage of carvedilol to 6.25 BID. Add Nifedipine 60mg PO daily.    Will utilize p.r.n. blood pressure medication only if patient's blood pressure greater than 180/110 and he develops symptoms such as worsening chest pain or shortness of breath.    Cardiology consulted    Tobacco abuse  21mg Nicotine patch    Refer patient to smoking cessation services          VTE Risk Mitigation (From admission, onward)           Ordered     Place ENOCH hose  Until discontinued         11/07/23 1425     IP VTE HIGH RISK PATIENT  Once         11/07/23 1425     Place sequential compression device  Until  discontinued         11/07/23 1425                    Discharge Planning   [place]:      Code Status: DNR   Is the patient medically ready for discharge?:     Reason for patient still in hospital (select all that apply): Patient unstable, Patient trending condition, Treatment, Consult recommendations, and Pending disposition                     Bob Mata MD  Department of [facility] Medicine   [facility]

## 2023-11-09 NOTE — PROGRESS NOTES
CM met with pt -   Pt is awake but quite weak - GIB today - underwent Colonoscopy   Pt stated he is open to placement at the [facility] resp[facility] - however pt is quite weak and ill at this time - he might not meet qualification of being able to walk to dining room.  BSC not permitted at facility.    Pt has needed documentation for admission - 's License, birth certificate, SS card, medical cards -- NO phone.   Discharge disposition TBD.

## 2023-11-09 NOTE — PROGRESS NOTES
"[facility] Pulmonary/Critical Care Fellow Consult Note:  Primary Attending:  Dr. Martin   Consultant Attending: Dr. Julien Tesfayef         Admit Date: 2023   Hospital LOS: 2     Subjective:    Mr. Soliman is a 62 yo M with PMH seizure disorder, PAD s/p thrombectomy & PTA, HTN who presents to [facility] ED with c/o intermittent SOB and chest pain. Of note, patient lost to follow up with [facility] Pulmonology at [facility] who had wanted to perform a bronchoscopy/EBUS on patient. However, he was unable to show up and it had to be rescheduled multiple times (May and ). He was first seen by [facility] Pulm team on 23 during a hospitalization. He was noted to have increasing ALO mass with mediastinal lymphadenopathy at that time. He has a long smoking history, started at age 14 1ppd and is still smoking though he states he is smoking less than before. He has noticed significant weight loss with ~30 lbs since his admission in 2023. Per chart review, patient has unintentionally lost 24 lb since then.       Interval:  Patient reports a little improvement in SOB post throacentesis.  Does not think SOB has reworsened, he is breathing okay on RA.  Denies CP, n/v, dysuria, weakness, numbness.  He continues to have lower leg pain on the L and says norco has helped him so far.    Objective:  Last 24 Hour Vital Signs:  BP  Min: 103/59  Max: 125/67  Temp  Av.3 °F (36.3 °C)  Min: 96 °F (35.6 °C)  Max: 97.8 °F (36.6 °C)  Pulse  Av.5  Min: 75  Max: 83  Resp  Av.2  Min: 16  Max: 20  SpO2  Av.5 %  Min: 91 %  Max: 95 %  Height  Av' 9" (175.3 cm)  Min: 5' 9" (175.3 cm)  Max: 5' 9" (175.3 cm)  Weight  Av.6 kg (131 lb 6.3 oz)  Min: 59.6 kg (131 lb 6.3 oz)  Max: 59.6 kg (131 lb 6.3 oz)  Body mass index is 19.4 kg/m².  I & O (Last 24H):  Intake/Output Summary (Last 24 hours) at 2023 0818  Last data filed at 2023 0445  Gross per 24 hour   Intake --   Output 1925 ml   Net -1925 ml         Physical Exam:  GEN: " "non-toxic appearing, NAD, in bed  HEENT: MMM, no scleral icterus, EOMI  NECK: Supple, midline trachea, no raised JVP or a-waves notable  CV: RRR, no MRG, pulses equal and symmetric 2+ at radial  PULM: Decreased breath sounds to left lower middle and upper lobe(s), R side of   ABDOMEN: Soft, non-tender, non-distended, no rebound or guarding  SKIN: Warm, dry, intact, no rashes  MSK: No deformity, +clubbing, no cyanosis, or lower extremity edema  NEURO: awake and following commands, at neurologic baseline  LINES: Intact, no extravasation or induration, no erythema to PIV or support devices  Extremities: LE symmetric, no erythema, warmth, or swelling, mild TTP on L calf    I have reviewed all labs / images / cultures  Pertinent labs are as follows:   No results for input(s): "PH", "PCO2", "PO2", "HCO3", "POCSATURATED", "BE" in the last 24 hours.  Recent Labs   Lab 11/09/23  0436   WBC 9.60  9.60   RBC 4.56*  4.56*   HGB 12.1*  12.1*   HCT 36.3*  36.3*     356   MCV 80*  80*   MCH 26.5*  26.5*   MCHC 33.3  33.3       Recent Labs   Lab 11/09/23  0436   *   K 4.3   CL 96   CO2 16*   BUN 12   CREATININE 0.8   CALCIUM 8.3*   MG 2.0         Meds:   carvediloL  6.25 mg Oral BID    nicotine  1 patch Transdermal Daily    NIFEdipine  60 mg Oral Daily       Assessment & Plan: Eric Soliman is a 63 y.o. male with PMH seizure disorder, PAD s/p thrombectomy & PTA, HTN  who presented to [facility] with c/o SOB and chest pain.     #Mass of upper lobe of left lung  #Mediastinal lymphadenopathy with enlargement of lymph nodes  CTA on 11/7 with large new left pleural effusion, mildly hyperattenuating to water, with faint rim enhancement, and left-to-right mediastinal shift. Substantial new atelectasis of much of the left lung. New 9 x 6 mm right lower lobe pulmonary nodule, possibly representing metastatic disease. New prominent to enlarged right hilar lymph nodes, up to 16 mm. Also with poorly defined increased " fullness in the left adrenal, relative to 04/06/2023, for which metastatic disease   Based on these findings, discussed with patient our concern for malignancy. Previously was supposed to get EBUS but was lost to follow up after having missed procedure several times in May and June 2023. Patient maintains that he does not wish to pursue chemotherapy.    Plan:  -S/p 1.5L removed with thoracentesis on 11/7, exudative effusion  -Repeat CXR without pneumothorax  -Discussed importance of smoking cessation  - Will follow up cytology, may need tissue biopsy  - Ultrasound showed significant effusion.  Will plan for chest tube placement likely tomorrow to drain over next few days.  Will monitor for reexapansion post drainage.    #Segmental and subsegmental PE  CTA w/ nonocclusive filling subtle type filling defect in a right lower lobe segmental pulmonary artery bifurcation, extending into subsegmental branches. Some evidence of right heart strain, with  reflux of contrast from the right atrium into the IVC and hepatic veins   Patient with likely provoked PE in the setting of left lung mass concerning for lung cancer likely metastatic   Has been off of Eliquis and Plavix for his PAD since April/May 2023  Not on oxygen currently, stable on room air  LE US with L sided DVT    Plan:  -Continue medical management with therapeutic heparin ggt, pending scheduling of CT insertion may need to hold heparin tomorrow AM, will discuss with primary team.    Patient seen and examined with Dr. Moya    We will continue to follow with you, thank you for the opportunity to be involved in Mr. Soliman's care.    Cirilo Lopze MD  [facility]

## 2023-11-09 NOTE — PLAN OF CARE
Patient recovered to baseline.  VSS.  Patient seen by MD at bedside.  Patient escorted back to hospital room w/transporter.  Report given to bedside nurse WIll.  Nurse verbalized understanding.  All questions answered.

## 2023-11-09 NOTE — ANESTHESIA POSTPROCEDURE EVALUATION
Anesthesia Post Evaluation    Patient: Eric Soliman    Procedure(s) Performed: Procedure(s) (LRB):  COLONOSCOPY (N/A)    Final Anesthesia Type: general      Patient location during evaluation: PACU  Patient participation: Yes- Able to Participate  Level of consciousness: awake and alert  Post-procedure vital signs: reviewed and stable  Pain management: adequate  Airway patency: patent    PONV status at discharge: No PONV  Anesthetic complications: no      Cardiovascular status: stable  Respiratory status: spontaneous ventilation  Hydration status: euvolemic  Follow-up not needed.          Vitals Value Taken Time   /82 11/09/23 1132   Temp 36.6 °C (97.8 °F) 11/09/23 1110   Pulse 85 11/09/23 1132   Resp 15 11/09/23 1132   SpO2 95 % 11/09/23 1132         Event Time   Out of Recovery 11/09/2023 11:36:45         Pain/Ziyad Score: Pain Rating Prior to Med Admin: 10 (11/9/2023  9:48 AM)  Pain Rating Post Med Admin: 7 (11/9/2023 10:15 AM)  Ziyad Score: 9 (11/9/2023 11:32 AM)

## 2023-11-09 NOTE — PROGRESS NOTES
[facility] - Telemetry  Adult Nutrition  Progress Note    SUMMARY       Recommendations    Recommendation:  1. Add Boost Plus with every meals.   2. Pt needs assisstance with access to food 2/2 homelessness.   3. Monitor weight/labs.   4. RD to follow to monitor po intake    Goals:  Pt will tolerate diet with at least 50-75% intake at meals by RD follow up  Nutrition Goal Status: new  Communication of RD Recs: reviewed with RN    Assessment and Plan  Endocrine  Mild malnutrition  Malnutrition Type:  Context: social/environmental circumstances  Level: mild    Related to (etiology):   homelessness    Signs and Symptoms (as evidenced by):   Weight loss, poor intake, mild muscle/fat wasting     Malnutrition Characteristic Summary:  Weight Loss (Malnutrition): greater than 10% in 6 months  Energy Intake (Malnutrition): less than 75% for greater than or equal to 1 month  Subcutaneous Fat (Malnutrition): mild depletion  Muscle Mass (Malnutrition): mild depletion    Interventions:  Commercial beverage  Collaboration with other providers    Nutrition Diagnosis Status:   New       Malnutrition Assessment  Malnutrition Context: social/environmental circumstances  Malnutrition Level: mild      Weight Loss (Malnutrition): greater than 10% in 6 months  Energy Intake (Malnutrition): less than 75% for greater than or equal to 1 month  Subcutaneous Fat (Malnutrition): mild depletion  Muscle Mass (Malnutrition): mild depletion   Upper Arm Region (Subcutaneous Fat Loss): mild depletion  Thoracic and Lumbar Region: mild depletion   [place] Region (Muscle Loss): mild depletion  Clavicle Bone Region (Muscle Loss): mild depletion  Patellar Region (Muscle Loss): mild depletion  Anterior Thigh Region (Muscle Loss): mild depletion  Posterior Calf Region (Muscle Loss): mild depletion       Reason for Assessment  Reason For Assessment: identified at risk by screening criteria (MST)  Diagnosis:  (chest pain)  Relevant Medical History: DVT, seizures,  "thrombectomy  General Information Comments: Pt on Regular diet. Reports poor appetite. Also reports poor access to food. Is homeless and lives in his car. No family nearby. Noted 20lb weight loss x 6 months. s/p thoracentesis with 1500ml removed . NFPE completed today -mild wasting of legs, arms, thoracic region, clavicles, & temples.  Nutrition Discharge Planning: pt to d/c on Regular diet    Nutrition Risk Screen  Nutrition Risk Screen: no indicators present    Nutrition/Diet History  Food Preferences: no Jainism or cultural food prefs identified  Factors Affecting Nutritional Intake: socio-economic    Anthropometrics  Temp: 96 °F (35.6 °C)  Height Method: Stated  Height: 5' 9" (175.3 cm)  Height (inches): 69 in  Weight Method: Bed Scale  Weight: 59.6 kg (131 lb 6.3 oz)  Weight (lb): 131.4 lb  Ideal Body Weight (IBW), Male: 160 lb  % Ideal Body Weight, Male (lb): 82.13 %  BMI (Calculated): 19.4  BMI Grade: 18.5-24.9 - normal  Usual Body Weight (UBW), k.7 kg ()  % Usual Body Weight: 86.94  % Weight Change From Usual Weight: -13.25 %     Lab/Procedures/Meds  Pertinent Labs Reviewed: reviewed  Pertinent Labs Comments: Na 128L, Ca 8.5L, Alb 2.3L  Pertinent Medications Reviewed: reviewed  Pertinent Medications Comments: carvedilol, heparin    Estimated/Assessed Needs  Weight Used For Calorie Calculations: 59.6 kg (131 lb 6.3 oz)  Energy Calorie Requirements (kcal):  (35 kcal/kg)  Energy Need Method: Kcal/kg  Protein Requirements: 71g (1.2g/kg)  Weight Used For Protein Calculations: 59.6 kg (131 lb 6.3 oz)  Estimated Fluid Requirement Method: RDA Method  RDA Method (mL):      Nutrition Prescription Ordered  Current Diet Order: Regular    Evaluation of Received Nutrient/Fluid Intake  I/O: 0/275  Energy Calories Required: not meeting needs  Protein Required: not meeting needs  Fluid Required: not meeting needs  Comments: LBM 11/6  % Intake of Estimated Energy Needs: 25 - 50 %  % Meal Intake: 25 " - 50 %    Nutrition Risk  Level of Risk/Frequency of Follow-up:  (2xweekly)     Monitor and Evaluation  Food and Nutrient Intake: food and beverage intake  Food and Nutrient Adminstration: diet order  Physical Activity and Function: nutrition-related ADLs and IADLs  Anthropometric Measurements: weight  Biochemical Data, Medical Tests and Procedures: electrolyte and renal panel  Nutrition-Focused Physical Findings: overall appearance     Nutrition Follow-Up  RD Follow-up?: Yes

## 2023-11-09 NOTE — PLAN OF CARE
Recommendation:  1. Add Boost Plus with every meals.   2. Pt needs assisstance with access to food 2/2 homelessness.   3. Monitor weight/labs.   4. RD to follow to monitor po intake    Goals:  Pt will tolerate diet with at least 50-75% intake at meals by RD follow up  Nutrition Goal Status: new

## 2023-11-09 NOTE — SUBJECTIVE & OBJECTIVE
Interval History: Patient had some worsening shortness of breath this morning and feels more tired, despite sleeping well. He has been eating and tolerating PO. *After pt was seen by FM team, he went to the bathroom and had stool BRBR. Was taken by GI for emergency endoscopy.     Review of Systems   Constitutional:  Positive for activity change, appetite change, fatigue and unexpected weight change. Negative for chills, diaphoresis and fever.   HENT: Negative.     Respiratory:  Positive for shortness of breath. Negative for apnea, cough and wheezing.    Cardiovascular:  Negative for chest pain, palpitations and leg swelling.   Gastrointestinal:  Positive for blood in stool. Negative for vomiting.   Genitourinary: Negative.    Musculoskeletal: Negative.    Skin:  Positive for rash (Rash on bilateral forearms present on admission).   Psychiatric/Behavioral: Negative.  Negative for confusion.      Objective:     Vital Signs (Most Recent):  Temp: 97.8 °F (36.6 °C) (11/09/23 1110)  Pulse: 85 (11/09/23 1132)  Resp: 15 (11/09/23 1132)  BP: (!) 159/82 (11/09/23 1132)  SpO2: 95 % (11/09/23 1132) Vital Signs (24h Range):  Temp:  [96 °F (35.6 °C)-97.8 °F (36.6 °C)] 97.8 °F (36.6 °C)  Pulse:  [66-86] 85  Resp:  [10-20] 15  SpO2:  [91 %-96 %] 95 %  BP: (100-159)/(59-82) 159/82     Weight: 59.6 kg (131 lb 6.3 oz)  Body mass index is 19.4 kg/m².    Intake/Output Summary (Last 24 hours) at 11/9/2023 1313  Last data filed at 11/9/2023 1107  Gross per 24 hour   Intake 350 ml   Output 1025 ml   Net -675 ml         Physical Exam  Vitals and nursing note reviewed.   Constitutional:       Appearance: He is underweight. He is ill-appearing (chronically).      Comments: Unkept appearance    HENT:      Head: Normocephalic.      Nose: Nose normal.      Mouth/Throat:      Mouth: Mucous membranes are moist.   Cardiovascular:      Rate and Rhythm: Normal rate.      Pulses: Normal pulses.   Pulmonary:      Breath sounds: Decreased air movement  present. Examination of the left-middle field reveals decreased breath sounds. Examination of the left-lower field reveals decreased breath sounds. Decreased breath sounds present. No wheezing or rhonchi.      Comments: Easily develops shortness of breath  Abdominal:      General: Abdomen is flat.      Palpations: Abdomen is soft.   Skin:     General: Skin is warm and dry.      Capillary Refill: Capillary refill takes less than 2 seconds.      Coloration: Skin is pale.      Findings: Rash present.   Neurological:      Mental Status: He is alert and oriented to person, place, and time.   Psychiatric:         Mood and Affect: Mood normal.         Behavior: Behavior normal. Behavior is cooperative.         Thought Content: Thought content normal.         Judgment: Judgment normal.             Significant Labs: All pertinent labs within the past 24 hours have been reviewed.  Recent Lab Results         11/09/23  1323   11/09/23  0852   11/09/23  0436        Albumin     2.3       ALP     81       ALT     38       Anion Gap     11       aPTT     41.7  Comment: Refer to local heparin nomogram for intensity/dose specific   therapeutic   range.  LOT^050^APTT [id number]         AST     24       Baso #     0.08            0.08       Basophil %     0.8            0.8       BILIRUBIN TOTAL     0.2  Comment: For infants and newborns, interpretation of results should be based  on gestational age, weight and in agreement with clinical  observations.    Premature Infant recommended reference ranges:  Up to 24 hours.............<8.0 mg/dL  Up to 48 hours............<12.0 mg/dL  3-5 days..................<15.0 mg/dL  6-29 days.................<15.0 mg/dL         BUN     12       Calcium     8.3       Chloride     96       CO2     16       Creatinine     0.8       Differential Method     Automated            Automated       eGFR     >60       Eos #     0.3            0.3       Eosinophil %     3.0            3.0       Glucose     107        Gran # (ANC)     6.2            6.2       Gran %     64.5            64.5       Hematocrit 32.7   35.4   36.3            36.3       Hemoglobin 10.8   11.9   12.1            12.1       Immature Grans (Abs)     0.26  Comment: Mild elevation in immature granulocytes is non specific and   can be seen in a variety of conditions including stress response,   acute inflammation, trauma and pregnancy. Correlation with other   laboratory and clinical findings is essential.              0.26  Comment: Mild elevation in immature granulocytes is non specific and   can be seen in a variety of conditions including stress response,   acute inflammation, trauma and pregnancy. Correlation with other   laboratory and clinical findings is essential.         Immature Granulocytes     2.7            2.7       Lymph #     2.2            2.2       Lymph %     23.2            23.2       Magnesium      2.0       MCH     26.5            26.5       MCHC     33.3            33.3       MCV     80            80       Mono #     0.8            0.8       Mono %     8.5            8.5       MPV     8.8            8.8       nRBC     0            0       Phosphorus Level     4.0       Platelet Count     356            356       Potassium     4.3       PROTEIN TOTAL     7.8       RBC     4.56            4.56       RDW     15.4            15.4       Sodium     123       WBC     9.60            9.60               Significant Imaging: I have reviewed all pertinent imaging results/findings within the past 24 hours.

## 2023-11-09 NOTE — ASSESSMENT & PLAN NOTE
Pt had bout of hematochezia this morning. Taken by GI for endoscopy. They believe etiology is hypotension of splanchnic circulation in a vasculopath.    -H&H stable at this time  -Holding antihypertensives  -Holding anticoagulation and antiplatelets  -Continue to monitor and reassess

## 2023-11-10 NOTE — ASSESSMENT & PLAN NOTE
Pt had bout of hematochezia this morning. Taken by GI for endoscopy. They believe etiology is hypotension of splanchnic circulation in a vasculopath.    -GI consulted  -H&H stable at this time  -Holding antihypertensives  -Holding anticoagulation and antiplatelets  -Continue to monitor and reassess

## 2023-11-10 NOTE — PLAN OF CARE
Problem: Adult Inpatient Plan of Care  Goal: Plan of Care Review  Outcome: Ongoing, Progressing  Goal: Optimal Comfort and Wellbeing  Outcome: Ongoing, Progressing     Problem: Fall Injury Risk  Goal: Absence of Fall and Fall-Related Injury  Outcome: Ongoing, Progressing     Problem: Gas Exchange Impaired (Pulmonary Impairment)  Goal: Optimal Gas Exchange  Outcome: Ongoing, Progressing

## 2023-11-10 NOTE — ASSESSMENT & PLAN NOTE
Mr. Soliman is a 62 yo M with PMH seizure disorder, PAD s/p thrombectomy & PTA, HTN who presents to [facility] ED with c/o intermittent SOB and chest pain. Of note, patient lost to follow up with [facility] at [facility] who had wanted to perform a bronchoscopy/EBUS on patient. However, he was unable to show up and it had to be rescheduled multiple times (May and June). He was first seen by [facility] Pulm team on 4/6/23 during a hospitalization. He was noted to have increasing ALO mass with mediastinal lymphadenopathy at that time. He has a long smoking history, started at age 14 1ppd and is still smoking though he states he is smoking less than before. He has noticed significant weight loss with ~30 lbs since his admission in April 2023. Per chart review, patient has unintentionally lost 24 lb since then. Palliative care was consulted for advanced care planning and goals of care.     11/10/2023  -Pt seen at bedside today following chest tube insertion. Pt reports increased pain to generalized lung area, worse with each breath, focused around chest tube insertion site.  Reports that Norco was improving leg pain however is not controlling current pain. Pt appears more withdrawn today during conversation, however still appropriate.  Discussed adding 1 day of IV medication to control pain. Discussed plan with pt, pt   in agreement. 2mg IV morphine Q4H PRN break through pain refractory to Norco for the next 24 hours ordered.    -Pt agreeable to housing assistance and appreciative of staff.  -Pt remains a DNR.   -Still considering who he would elect as MPOA if he were to become able to make decisions for himself.     11/9/2023  -At time of initial encounter, pt resting in bed. AAO.  Engages in conversation appropriately.  Introduced the topic of palliative care, pt receptive to visit.   -Pt reports living in his car, struggles with food, shelter and finances.    -At this time, pt is focused on lower leg pain.  Reports no improvement  "with prn tylenol.  Upon chart review, pt has Norco ordered and has not yet utilized medication.  RN to administer dose now.  Will follow up with pt in the am to monitor symptom burden.   -Pt reports wishing to move forward with initial biopsy as previously discussed if pending cytology does not reveal malignancy. However, pt reports that he would not accept chemotherapy if cancer is found.   -Pt reports having no family involved in his care.  Reports having a friend, Linda Grey, that he has not spoken to "in a while".  Discussed MPOA and importance of establishing. Pt will reach out to Linda to see if she is agreeable to be his MPOA.  Pt wishes to discuss with her prior to filling out paper.  Document left at the bedside.   -Code status discussed.  Pt would not accept intubation/ compressions.  Code status discussed, pt in agreement with DNR.  Order entered into the pts chart.   -Pt largely focused on his leg pain at this time.  Will defer remainder of goals of care conversation until pts symptoms are more controlled.   "

## 2023-11-10 NOTE — PLAN OF CARE
Patient in restroom having bowel movement with noticeable large amount of bright red blood in commode. Patient also complaining of severe leg pain.  Notified [facility] team to come for bedside evaluation. Dr. Job Cr and Yuliet Diaz in room for evaluation of patient. New orders to d/c heprin at this time along with stat labs and GI consult.

## 2023-11-10 NOTE — PROGRESS NOTES
[facility] Fellow Consult Note:  Primary Attending:  Dr. Martin   Consultant Attending: Dr. Julien Moya         Admit Date: 2023   Hospital LOS: 3     Subjective:    Mr. Soliman is a 62 yo M with PMH seizure disorder, PAD s/p thrombectomy & PTA, HTN who presents to [facility] ED with c/o intermittent SOB and chest pain. Of note, patient lost to follow up with [facility] at [facility] who had wanted to perform a bronchoscopy/EBUS on patient. However, he was unable to show up and it had to be rescheduled multiple times (May and ). He was first seen by [facility]m team on 23 during a hospitalization. He was noted to have increasing ALO mass with mediastinal lymphadenopathy at that time. He has a long smoking history, started at age 14 1ppd and is still smoking though he states he is smoking less than before. He has noticed significant weight loss with ~30 lbs since his admission in 2023. Per chart review, patient has unintentionally lost 24 lb since then.       Interval:  Patient does not comlain of any SOB. Denies CP, n/v, dysuria, weakness, numbness.  He continues to have lower leg pain on the L and says norco has helped him so far.  He has not had any further bowel movements/bleeding.  He went for colonoscopy yesterday due to GIB and found to have ischemic colitis.  Denies any abdominal pain.  Patient still not eating much.    Objective:  Last 24 Hour Vital Signs:  BP  Min: 100/67  Max: 159/82  Temp  Av °F (36.7 °C)  Min: 97.6 °F (36.4 °C)  Max: 98.3 °F (36.8 °C)  Pulse  Av  Min: 74  Max: 95  Resp  Av  Min: 10  Max: 20  SpO2  Av.4 %  Min: 92 %  Max: 96 %  Body mass index is 19.4 kg/m².  I & O (Last 24H):  Intake/Output Summary (Last 24 hours) at 11/10/2023 0825  Last data filed at 11/10/2023 0649  Gross per 24 hour   Intake 350 ml   Output 1000 ml   Net -650 ml         Physical Exam:  GEN: non-toxic appearing, NAD, in bed  HEENT: MMM, no scleral icterus,  "EOMI  NECK: Supple, midline trachea, no raised JVP or a-waves notable  CV: RRR, no MRG, pulses equal and symmetric 2+ at radial  PULM: Decreased breath sounds to left lower middle and upper lobe(s)  ABDOMEN: Soft, non-tender, non-distended, no rebound or guarding  SKIN: Warm, dry, intact, no rashes  MSK: No deformity, +clubbing, no cyanosis, or lower extremity edema  NEURO: awake and following commands, at neurologic baseline  LINES: Intact, no extravasation or induration, no erythema to PIV or support devices  Extremities: LE symmetric, no erythema, warmth, or swelling, mild TTP on L calf    I have reviewed all labs / images / cultures  Pertinent labs are as follows:   No results for input(s): "PH", "PCO2", "PO2", "HCO3", "POCSATURATED", "BE" in the last 24 hours.  Recent Labs   Lab 11/10/23  0623   WBC 8.28   RBC 3.74*   HGB 9.7*  10.0*   HCT 28.9*  30.4*      MCV 81*   MCH 26.7*   MCHC 32.9       Recent Labs   Lab 11/10/23  0623   *   K 4.3   CL 96   CO2 20*   BUN 14   CREATININE 0.9   CALCIUM 8.2*   MG 1.8         Meds:   gabapentin  300 mg Oral TID    lactated ringers  500 mL Intravenous Once    magnesium sulfate IVPB  2 g Intravenous Once    nicotine  1 patch Transdermal Daily    pantoprazole  40 mg Intravenous BID       Assessment & Plan: Eric Soliman is a 63 y.o. male with PMH seizure disorder, PAD s/p thrombectomy & PTA, HTN  who presented to [facility] with c/o SOB and chest pain. Patient found to have PE, L sided DVT, and large ALO lung mass and large effusion.  Effusion was drained on 11/7 and pleural drain placed 11/10.    #Mass of upper lobe of left lung  #Mediastinal lymphadenopathy with enlargement of lymph nodes  CTA on 11/7 with large new left pleural effusion, mildly hyperattenuating to water, with faint rim enhancement, and left-to-right mediastinal shift. Substantial new atelectasis of much of the left lung. New 9 x 6 mm right lower lobe pulmonary nodule, possibly representing " metastatic disease. New prominent to enlarged right hilar lymph nodes, up to 16 mm. Also with poorly defined increased fullness in the left adrenal, relative to 04/06/2023, for which metastatic disease   Based on these findings, discussed with patient our concern for malignancy. Previously was supposed to get EBUS but was lost to follow up after having missed procedure several times in May and June 2023. Patient maintains that he does not wish to pursue chemotherapy.    Plan:  -S/p 1.5L removed with thoracentesis on 11/7, exudative effusion  -Repeat CXR without pneumothorax  -Discussed importance of smoking cessation  - Will follow up cytology, may need tissue biopsy  - Ultrasound showed significant effusion.    - 11/10 L sided pleural drain placed with 600 cc of output.  Will drain to gravity.  Additional cytology sample collected and pending.  -  Will monitor for reexapansion post drainage.    #Segmental and subsegmental PE  CTA w/ nonocclusive filling subtle type filling defect in a right lower lobe segmental pulmonary artery bifurcation, extending into subsegmental branches. Some evidence of right heart strain, with  reflux of contrast from the right atrium into the IVC and hepatic veins   Patient with likely provoked PE in the setting of left lung mass concerning for lung cancer likely metastatic   Has been off of Eliquis and Plavix for his PAD since April/May 2023  Not on oxygen currently, stable on room air  LE US with L sided DVT  Now complicated by GIB 11/9 with no hemodynamic stability.  Colonoscopy 11/9 showed ischemic colitis.    Plan:  -Can continue to hold anticoagulation in setting of GIB.  - Would recommend evaluation for IVC filter.    Patient seen and examined with Dr. Moya    We will continue to follow with you, thank you for the opportunity to be involved in Mr. Soliman's care.    Cirilo Lopez MD  [facility] [place]

## 2023-11-10 NOTE — ASSESSMENT & PLAN NOTE
Chronic, uncontrolled. Latest blood pressure and vitals reviewed-     Temp:  [97.6 °F (36.4 °C)-98.3 °F (36.8 °C)]   Pulse:  [74-95]   Resp:  [10-20]   BP: (100-159)/(64-84)   SpO2:  [92 %-96 %] .   Home meds for hypertension were reviewed and noted below.   Hypertension Medications               carvediloL (COREG) 3.125 MG tablet Take 1 tablet (3.125 mg total) by mouth 2 (two) times daily.          While in the hospital, will manage blood pressure as follows; Adjust home antihypertensive regimen as follows- hold for now as hypotension may have caused/contributed to mesenteric ischemia and the hematochezia.    Will utilize p.r.n. blood pressure medication only if patient's blood pressure greater than 180/110 and he develops symptoms such as worsening chest pain or shortness of breath.    Cardiology consulted  GI consulted for GI bleed

## 2023-11-10 NOTE — ASSESSMENT & PLAN NOTE
"Patient found to have large pleural effusion on imaging. I have personally reviewed and interpreted the following imaging: Xray, CT, and Ultrasound. A thoracentesis was performed. Pleural fluid was sent for analysis. Labs reviewed, including Serum LDH   LD   Date Value Ref Range Status   11/08/2023 194 110 - 260 U/L Final     Comment:     Results are increased in hemolyzed samples.   , Pleural Fluid LDH   LD, Fluid   Date Value Ref Range Status   11/07/2023 3004 Not established U/L Final     Comment:     Reference intervals have not been established for body fluids.  Comparison of this result with the concentration in the blood,   serum,or plasma is recommended.  The reference interval for LDH in serum/plasma is   110-260 U/L. Please interpret in context with the clinical  picture.      , Serum Protein   Total Protein   Date Value Ref Range Status   11/10/2023 7.4 6.0 - 8.4 g/dL Final    , Pleural Protein No results found for: "PROTEINBODYF" , Cell Count and Differential No results found for: "BFCELLCNT" , Cytology No results found for: "CYTOFLUID" , and Fluid Cultures No results found for: "BODYFLUIDCUL" . Fluid most consistent with Exudate.  . Most likely etiology includes  Lung malignancy or metastatic neoplasm . Management to include  drained 1500mL. Will reassess.  Pulmonology plans to re-tap and drain pleural effusion. Repeat cytology.  Pulm draining bedside today    "

## 2023-11-10 NOTE — PLAN OF CARE
CM spoke today with Tram with the [facility]    (Tram Coates, CM  [phone])   She is monitoring pt status - pt experienced episode of GI Bleeding yesterday -   Pt is weak and might not meet guidelines for admission to [facility] - Pt must be able to walk about 200 ft to get to cafeteria.    Pt may be more appropriate for NH admission  -- she will monitor therapy notes - Therapy has been ordered.     Pt now with chest tube.     CM will continue to monitor pt status to determine d/c needs.           11/10/23 1416   Post-Acute Status   Post-Acute Authorization   (TBD)

## 2023-11-10 NOTE — PROGRESS NOTES
I saw and evaluated the patient. I have reviewed and agree with the residents findings, including all diagnostic interpretations, and plans as written. This is an attestation of a separate note written by the ICU resident today.  As the teaching physician, I was present for and have confirmed the key portions of the service performed by the resident today.  In addition to the resident's note, I add the following:    Plan:  Pleural Effusion: exudative, lymphocytic; Likely malignant; s/p thoracentesis on 11/7 w/ 1500cc fluid drained. cytology pending; Plan for chest tube drainage today.   Lung Mass: spiculated; 4.9cm on previous CT (unable to visualize on current CT d/t effusion and atelectasis); if fluid positive would likely be at least Stage Tanvir disease; however, would likely need to pursue tissue sampling to better guide tx if he is willing to pursue tx  PE: segmental and subsegmental; echo w/ normal RV function; BNP and trop negative; hemodynamically stable; with proximal DVTs. Should be considered for IVC filter given absolute contraindication for AC.   DVT ppx: SCDs  GI ppx: n/a  Code status: Full  Dispo: Floor        Julien Moya MD  [facility]

## 2023-11-10 NOTE — SUBJECTIVE & OBJECTIVE
Interval History: Patient says he slept well overnight and is eating. No additional bloody bowel movements. Patient's condition appears better and he says he feels less short of breath today. Pt had bouts of sudden, severe  pain in left leg, and so was started on gabapentin and given analgesics.     Review of Systems   Constitutional:  Positive for activity change, appetite change, fatigue and unexpected weight change. Negative for chills, diaphoresis and fever.   HENT: Negative.     Respiratory:  Positive for shortness of breath. Negative for apnea, cough and wheezing.    Cardiovascular:  Negative for chest pain, palpitations and leg swelling.   Gastrointestinal:  Negative for blood in stool and vomiting.   Genitourinary: Negative.    Musculoskeletal: Negative.    Skin:  Positive for pallor and rash (Rash on bilateral forearms present on admission).   Neurological: Negative.    Psychiatric/Behavioral: Negative.  Negative for confusion.      Objective:     Vital Signs (Most Recent):  Temp: 98 °F (36.7 °C) (11/10/23 0740)  Pulse: 74 (11/10/23 0740)  Resp: 18 (11/10/23 0740)  BP: 119/64 (11/10/23 0740)  SpO2: (!) 93 % (11/10/23 0740) Vital Signs (24h Range):  Temp:  [97.6 °F (36.4 °C)-98.3 °F (36.8 °C)] 98 °F (36.7 °C)  Pulse:  [74-95] 74  Resp:  [10-20] 18  SpO2:  [92 %-96 %] 93 %  BP: (100-159)/(64-84) 119/64     Weight: 59.6 kg (131 lb 6.3 oz)  Body mass index is 19.4 kg/m².    Intake/Output Summary (Last 24 hours) at 11/10/2023 1009  Last data filed at 11/10/2023 0649  Gross per 24 hour   Intake 350 ml   Output 1000 ml   Net -650 ml         Physical Exam  Vitals and nursing note reviewed.   Constitutional:       Appearance: He is underweight. He is ill-appearing (chronically).      Comments: Unkept appearance    HENT:      Head: Normocephalic.      Nose: Nose normal.      Mouth/Throat:      Mouth: Mucous membranes are moist.   Cardiovascular:      Rate and Rhythm: Normal rate.      Pulses: Normal pulses.    Pulmonary:      Breath sounds: Decreased air movement present. Examination of the left-middle field reveals decreased breath sounds. Examination of the left-lower field reveals decreased breath sounds. Decreased breath sounds present. No wheezing or rhonchi.      Comments: Easily develops shortness of breath  Abdominal:      General: Abdomen is flat.      Palpations: Abdomen is soft.   Skin:     General: Skin is warm and dry.      Capillary Refill: Capillary refill takes less than 2 seconds.      Coloration: Skin is pale.      Findings: Rash present.   Neurological:      Mental Status: He is alert and oriented to person, place, and time.   Psychiatric:         Mood and Affect: Mood normal.         Behavior: Behavior normal. Behavior is cooperative.         Thought Content: Thought content normal.         Judgment: Judgment normal.             Significant Labs: All pertinent labs within the past 24 hours have been reviewed.  Recent Lab Results         11/10/23  0623   11/10/23  0002   11/09/23  1806   11/09/23  1323        Albumin 2.4             ALP 66             ALT 24             Anion Gap 7             AST 11             Baso # 0.04             Basophil % 0.5             BILIRUBIN TOTAL 0.5  Comment: For infants and newborns, interpretation of results should be based  on gestational age, weight and in agreement with clinical  observations.    Premature Infant recommended reference ranges:  Up to 24 hours.............<8.0 mg/dL  Up to 48 hours............<12.0 mg/dL  3-5 days..................<15.0 mg/dL  6-29 days.................<15.0 mg/dL               BUN 14             Calcium 8.2             Chloride 96             CO2 20             Creatinine 0.9             Differential Method Automated             eGFR >60             Eos # 0.2             Eosinophil % 2.7             Glucose 86             Gran # (ANC) 5.5             Gran % 66.4             Hematocrit 28.9   29.3   30.0   32.7        30.4              Hemoglobin 9.7   9.9   10.0   10.8        10.0             Immature Grans (Abs) 0.23  Comment: Mild elevation in immature granulocytes is non specific and   can be seen in a variety of conditions including stress response,   acute inflammation, trauma and pregnancy. Correlation with other   laboratory and clinical findings is essential.               Immature Granulocytes 2.8             Lymph # 1.7             Lymph % 20.9             Magnesium  1.8             MCH 26.7             MCHC 32.9             MCV 81             Mono # 0.8             Mono % 9.5             MPV 8.9             nRBC 0             Phosphorus Level 3.8             Platelet Count 349             Potassium 4.3             PROTEIN TOTAL 7.4             RBC 3.74             RDW 15.6             Sodium 123             WBC 8.28                     Significant Imaging: I have reviewed all pertinent imaging results/findings within the past 24 hours.

## 2023-11-10 NOTE — SUBJECTIVE & OBJECTIVE
Interval History: Chest tube placed today and clamped after 1900cc of fluid removed, being managed by pulm, Pt went for colonoscopy yesterday due to GIB and found to have ischemic colitis.    Medications:  Continuous Infusions:  Scheduled Meds:   gabapentin  600 mg Oral TID    lactated ringers  500 mL Intravenous Once    nicotine  1 patch Transdermal Daily    pantoprazole  40 mg Intravenous BID     PRN Meds:acetaminophen, HYDROcodone-acetaminophen, LIDOcaine, melatonin, morphine, senna-docusate 8.6-50 mg, sodium chloride 0.9%    Objective:     Vital Signs (Most Recent):  Temp: 98 °F (36.7 °C) (11/10/23 1207)  Pulse: 70 (11/10/23 1207)  Resp: 18 (11/10/23 1207)  BP: (!) 147/79 (11/10/23 1207)  SpO2: 98 % (11/10/23 1207) Vital Signs (24h Range):  Temp:  [97.6 °F (36.4 °C)-98.3 °F (36.8 °C)] 98 °F (36.7 °C)  Pulse:  [70-95] 70  Resp:  [16-20] 18  SpO2:  [92 %-98 %] 98 %  BP: (119-147)/(64-84) 147/79     Weight: 59.6 kg (131 lb 6.3 oz)  Body mass index is 19.4 kg/m².       Physical Exam  Vitals and nursing note reviewed.   Constitutional:       Appearance: He is underweight. He is ill-appearing (chronically).      Comments: Unkept appearance    HENT:      Head: Normocephalic.      Nose: Nose normal.      Mouth/Throat:      Mouth: Mucous membranes are moist.   Cardiovascular:      Rate and Rhythm: Normal rate.      Pulses: Normal pulses.   Pulmonary:      Effort: Pulmonary effort is normal.      Breath sounds: Decreased air movement present. Decreased breath sounds present.  Chest tube present.   Abdominal:      General: Abdomen is flat.      Palpations: Abdomen is soft.   Skin:     General: Skin is warm and dry.      Capillary Refill: Capillary refill takes less than 2 seconds.      Coloration: Skin is pale.      Findings: Rash present.   Neurological:      Mental Status: He is alert and oriented to person, place, and time. Withdrawn today   Psychiatric:         Mood and Affect: Mood normal.         Behavior: Behavior  normal. Behavior is cooperative.         Thought Content: Thought content normal.     Judgment: Judgment normal.      Review of Symptoms        Symptom Assessment (ESAS 0-10 Scale)  Pain:  7  Dyspnea:  7  Anxiety:  3  Nausea:  0  Depression:  0  Anorexia:  0  Fatigue:  5  Insomnia:  0  Restlessness:  0  Agitation:  0            Performance Status:  40     Living Arrangements:  Lives alone (lives in his car)     Psychosocial/Cultural:   See Palliative Psychosocial Note: No  Social Issues Identified: Coping deficit pt/family and Financial Issues  Bereavement Risk: No  Caregiver Needs Discussed. Caregiver Distress: No:   Cultural:   **Primary  to Follow**  Palliative Care  Consult: Yes     Time-Based Charting:  Yes  Chart Review: 20 minutes  Face to Face: 11 minutes  Symptom Assessment: 9 minutes  Coordination of Care: 10 minutes  Discharge Plannin minutes    Total Time Spent: 57 minutes        Advance Care Planning  Advance Directives:   Living Will: No    LaPOST: No    Do Not Resuscitate Status: Yes    Medical Power of : No       Decision Making:  Patient answered questions  Goals of Care: The patient endorses that what is most important right now is to focus on remaining as independent as possible, symptom/pain control, quality of life, even if it means sacrificing a little time, and improvement in condition but with limits to invasive therapies     Accordingly, we have decided that the best plan to meet the patient's goals includes continuing with treatment  Significant Labs: All pertinent labs within the past 24 hours have been reviewed.  CBC:   Recent Labs   Lab 11/10/23  0623   WBC 8.28   HGB 9.7*  10.0*   HCT 28.9*  30.4*   MCV 81*        BMP:  Recent Labs   Lab 11/10/23  0623   GLU 86   *   K 4.3   CL 96   CO2 20*   BUN 14   CREATININE 0.9   CALCIUM 8.2*   MG 1.8     LFT:  Lab Results   Component Value Date    AST 11 11/10/2023    ALKPHOS 66 11/10/2023     BILITOT 0.5 11/10/2023     Albumin:   Albumin   Date Value Ref Range Status   11/10/2023 2.4 (L) 3.5 - 5.2 g/dL Final     Protein:   Total Protein   Date Value Ref Range Status   11/10/2023 7.4 6.0 - 8.4 g/dL Final     Lactic acid:   Lab Results   Component Value Date    LACTATE 1.4 11/07/2023    LACTATE 1.2 05/13/2023       Significant Imaging: I have reviewed all pertinent imaging results/findings within the past 24 hours.

## 2023-11-10 NOTE — PROCEDURES
"Eric Soliman is a 63 y.o. male patient.    Temp: 98 °F (36.7 °C) (11/10/23 0740)  Pulse: 74 (11/10/23 0740)  Resp: 18 (11/10/23 0740)  BP: 119/64 (11/10/23 0740)  SpO2: (!) 93 % (11/10/23 0740)  Weight: 59.6 kg (131 lb 6.3 oz) (23)  Height: 5' 9" (175.3 cm) (23)       Pleural Drainage Catheter Insertion    Date/Time: 11/10/2023 10:46 AM  Location procedure was performed: [facility] PULMON[facility]    Performed by: Luiz Chicas MD  Authorized by: Luiz Chicas MD  Consent Done: Yes  Consent: Written consent obtained.  Risks and benefits: risks, benefits and alternatives were discussed  Consent given by: patient  Patient understanding: patient states understanding of the procedure being performed  Patient consent: the patient's understanding of the procedure matches consent given  Procedure consent: procedure consent matches procedure scheduled  Relevant documents: relevant documents present and verified  Test results: test results available and properly labeled  Site marked: the operative site was marked  Imaging studies: imaging studies available  Required items: required blood products, implants, devices, and special equipment available  Patient identity confirmed: , MRN, name and verbally with patient  Time out: Immediately prior to procedure a "time out" was called to verify the correct patient, procedure, equipment, support staff and site/side marked as required.  Indications: pleural effusion    Patient sedated: no  Anesthesia: local infiltration    Anesthesia:  Local Anesthetic: lidocaine 1% without epinephrine  Anesthetic total: 10 mL  Preparation: skin prepped with ChloraPrep  Placement location: left lateral  Scalpel size: 11  Tube size (Icelandic): 14.  Tube connected to: water seal  Drainage characteristics: yellow  Drainage amount: 600 ml  Suture material: 2-0 silk  Dressing: 4x4 sterile gauze  Post-insertion x-ray comments: Pending          11/10/2023    "

## 2023-11-10 NOTE — PROGRESS NOTES
[place] [facility]  Palliative Medicine  Progress Note    Patient Name: Eric Soliman  MRN: [id number]  Admission Date: 11/7/2023  Hospital Length of Stay: 3 days  Code Status: DNR   Attending Provider: Moy Martin MD  Consulting Provider: Adriana Cardoza NP  Primary Care Physician: Jesika, Primary Doctor  Principal Problem:Shortness of breath    Patient information was obtained from patient, past medical records and primary team.      Assessment/Plan:     Palliative Care  Palliative care encounter  Mr. Soliman is a 64 yo M with PMH seizure disorder, PAD s/p thrombectomy & PTA, HTN who presents to [facility] ED with c/o intermittent SOB and chest pain. Of note, patient lost to follow up with [facility] at [facility] who had wanted to perform a bronchoscopy/EBUS on patient. However, he was unable to show up and it had to be rescheduled multiple times (May and June). He was first seen by [facility] team on 4/6/23 during a hospitalization. He was noted to have increasing ALO mass with mediastinal lymphadenopathy at that time. He has a long smoking history, started at age 14 1ppd and is still smoking though he states he is smoking less than before. He has noticed significant weight loss with ~30 lbs since his admission in April 2023. Per chart review, patient has unintentionally lost 24 lb since then. Palliative care was consulted for advanced care planning and goals of care.     11/10/2023  -Pt seen at bedside today following chest tube insertion. Pt reports increased pain to generalized lung area, worse with each breath, focused around chest tube insertion site.  Reports that Norco was improving leg pain however is not controlling current pain. Pt appears more withdrawn today during conversation, however still appropriate.  Discussed adding 1 day of IV medication to control pain. Discussed plan with pt, pt   in agreement. 2mg IV morphine Q4H PRN break through pain refractory to Norco for the next 24 hours ordered.    -Pt  "agreeable to housing assistance and appreciative of staff.  -Pt remains a DNR.   -Still considering who he would elect as MPOA if he were to become able to make decisions for himself.     11/9/2023  -At time of initial encounter, pt resting in bed. AAO.  Engages in conversation appropriately.  Introduced the topic of palliative care, pt receptive to visit.   -Pt reports living in his car, struggles with food, shelter and finances.    -At this time, pt is focused on lower leg pain.  Reports no improvement with prn tylenol.  Upon chart review, pt has Norco ordered and has not yet utilized medication.  RN to administer dose now.  Will follow up with pt in the am to monitor symptom burden.   -Pt reports wishing to move forward with initial biopsy as previously discussed if pending cytology does not reveal malignancy. However, pt reports that he would not accept chemotherapy if cancer is found.   -Pt reports having no family involved in his care.  Reports having a friend, Linda Grey, that he has not spoken to "in a while".  Discussed MPOA and importance of establishing. Pt will reach out to Linda to see if she is agreeable to be his MPOA.  Pt wishes to discuss with her prior to filling out paper.  Document left at the bedside.   -Code status discussed.  Pt would not accept intubation/ compressions.  Code status discussed, pt in agreement with DNR.  Order entered into the pts chart.   -Pt largely focused on his leg pain at this time.  Will defer remainder of goals of care conversation until pts symptoms are more controlled.       I will follow-up with patient. Please contact us if you have any additional questions.    Subjective:     Chief Complaint:   Chief Complaint   Patient presents with    Chest Pain     Mid-sternal, CP on/off x1 month. Pt denies having CP but reports having a stent placed: "A long time ago." He's currently  homeless, no daily meds, and HTN noted upon arrival.        HPI:   Per chart, "Pt, " "Eric Soliman, is a 64 yo M with pmh of htn, lung nodule of undetermined character, PAD s/p thrombectomy & PTA, epilepsy, and tobacco and marijuana use here for several months of progressively worsening shortness of breath that has been worse over the last few days. Patient had previously been seen by [facility] for workup of this lung mass and respiratory issues, but has limited means and had failure of follow up. At this visit patient describes worsening shortness of breath and fatigue, reduced appetite, and significant unintentional weight loss of up to 25% of body weight. Patient is a current tobacco and marijuana smoker with approximately 50 pack year history of tobacco use, but says he has cut back drastically lately. Patient denies chest pain, nausea/vomiting, diarrhea, night sweats, cough or sputum production, or other symptoms at this time.        In ED, patient was noted to be hypertensive at 228/116. Initial ED BP was 190/90. Patient was given his previous home dose of coreg 3.125 BID and 10mg hydralazine q8h prn. Vitals otherwise stable. Pt's labs showed WBC count of 7.52. Troponin was negative, but will repeat once more to trend. BNP non-elevated. Chest xray showed large left pleural effusion. CTA of chest showed right sided PE and left sided pleural effusion and recommended thoracentesis to clinically correlate. Thoracentesis was performed at bedside by Pulm/Crit fellow. Samples were sent for analysis and approximately 1500mL were drawn off without complication. Patient had repeat CXR for post-thoracentesis lung views. Dr. Martin and [facility]  inpatient team were present with patient to discuss possible diagnosis including malignancy and suggest a consultation with the Palliative medicine team."      Interval History: Chest tube placed today and clamped after 1900cc of fluid removed, being managed by pulm, Pt went for colonoscopy yesterday due to GIB and found to have ischemic " colitis.    Medications:  Continuous Infusions:  Scheduled Meds:   gabapentin  600 mg Oral TID    lactated ringers  500 mL Intravenous Once    nicotine  1 patch Transdermal Daily    pantoprazole  40 mg Intravenous BID     PRN Meds:acetaminophen, HYDROcodone-acetaminophen, LIDOcaine, melatonin, morphine, senna-docusate 8.6-50 mg, sodium chloride 0.9%    Objective:     Vital Signs (Most Recent):  Temp: 98 °F (36.7 °C) (11/10/23 1207)  Pulse: 70 (11/10/23 1207)  Resp: 18 (11/10/23 1207)  BP: (!) 147/79 (11/10/23 1207)  SpO2: 98 % (11/10/23 1207) Vital Signs (24h Range):  Temp:  [97.6 °F (36.4 °C)-98.3 °F (36.8 °C)] 98 °F (36.7 °C)  Pulse:  [70-95] 70  Resp:  [16-20] 18  SpO2:  [92 %-98 %] 98 %  BP: (119-147)/(64-84) 147/79     Weight: 59.6 kg (131 lb 6.3 oz)  Body mass index is 19.4 kg/m².       Physical Exam  Vitals and nursing note reviewed.   Constitutional:       Appearance: He is underweight. He is ill-appearing (chronically).      Comments: Unkept appearance    HENT:      Head: Normocephalic.      Nose: Nose normal.      Mouth/Throat:      Mouth: Mucous membranes are moist.   Cardiovascular:      Rate and Rhythm: Normal rate.      Pulses: Normal pulses.   Pulmonary:      Effort: Pulmonary effort is normal.      Breath sounds: Decreased air movement present. Decreased breath sounds present.  Chest tube present.   Abdominal:      General: Abdomen is flat.      Palpations: Abdomen is soft.   Skin:     General: Skin is warm and dry.      Capillary Refill: Capillary refill takes less than 2 seconds.      Coloration: Skin is pale.      Findings: Rash present.   Neurological:      Mental Status: He is alert and oriented to person, place, and time. Withdrawn today   Psychiatric:         Mood and Affect: Mood normal.         Behavior: Behavior normal. Behavior is cooperative.         Thought Content: Thought content normal.     Judgment: Judgment normal.      Review of Symptoms        Symptom Assessment (ESAS 0-10  Scale)  Pain:  7  Dyspnea:  7  Anxiety:  3  Nausea:  0  Depression:  0  Anorexia:  0  Fatigue:  5  Insomnia:  0  Restlessness:  0  Agitation:  0            Performance Status:  40     Living Arrangements:  Lives alone (lives in his car)     Psychosocial/Cultural:   See Palliative Psychosocial Note: No  Social Issues Identified: Coping deficit pt/family and Financial Issues  Bereavement Risk: No  Caregiver Needs Discussed. Caregiver Distress: No:   Cultural:   **Primary  to Follow**  Palliative Care  Consult: Yes     Time-Based Charting:  Yes  Chart Review: 20 minutes  Face to Face: 11 minutes  Symptom Assessment: 9 minutes  Coordination of Care: 10 minutes  Discharge Plannin minutes    Total Time Spent: 57 minutes        Advance Care Planning  Advance Directives:   Living Will: No    LaPOST: No    Do Not Resuscitate Status: Yes    Medical Power of : No       Decision Making:  Patient answered questions  Goals of Care: The patient endorses that what is most important right now is to focus on remaining as independent as possible, symptom/pain control, quality of life, even if it means sacrificing a little time, and improvement in condition but with limits to invasive therapies     Accordingly, we have decided that the best plan to meet the patient's goals includes continuing with treatment  Significant Labs: All pertinent labs within the past 24 hours have been reviewed.  CBC:   Recent Labs   Lab 11/10/23  0623   WBC 8.28   HGB 9.7*  10.0*   HCT 28.9*  30.4*   MCV 81*        BMP:  Recent Labs   Lab 11/10/23  0623   GLU 86   *   K 4.3   CL 96   CO2 20*   BUN 14   CREATININE 0.9   CALCIUM 8.2*   MG 1.8     LFT:  Lab Results   Component Value Date    AST 11 11/10/2023    ALKPHOS 66 11/10/2023    BILITOT 0.5 11/10/2023     Albumin:   Albumin   Date Value Ref Range Status   11/10/2023 2.4 (L) 3.5 - 5.2 g/dL Final     Protein:   Total Protein   Date Value Ref Range Status    11/10/2023 7.4 6.0 - 8.4 g/dL Final     Lactic acid:   Lab Results   Component Value Date    LACTATE 1.4 11/07/2023    LACTATE 1.2 05/13/2023       Significant Imaging: I have reviewed all pertinent imaging results/findings within the past 24 hours.      Adriana Cardoza NP  Palliative Medicine  [place] [facility]

## 2023-11-10 NOTE — PROGRESS NOTES
[facility] Medicine  Progress Note    Patient Name: Eric Soliman  MRN: [id number]  Patient Class: IP- Inpatient   Admission Date: 11/7/2023  Length of Stay: 3 days  Attending Physician: Moy Martin MD  Primary Care Provider: Jesika, Primary Doctor        Subjective:     Principal Problem:Shortness of breath        HPI:  Pt, Eric Soliman, is a 64 yo M with pmh of htn, lung nodule of undetermined character, PAD s/p thrombectomy & PTA, epilepsy, and tobacco and marijuana use here for several months of progressively worsening shortness of breath that has been worse over the last few days. Patient had previously been seen by [facility] pulmonology for workup of this lung mass and respiratory issues, but has limited means and had failure of follow up. At this visit patient describes worsening shortness of breath and fatigue, reduced appetite, and significant unintentional weight loss of up to 25% of body weight. Patient is a current tobacco and marijuana smoker with approximately 50 pack year history of tobacco use, but says he has cut back drastically lately. Patient denies chest pain, nausea/vomiting, diarrhea, night sweats, cough or sputum production, or other symptoms at this time.      In ED, patient was noted to be hypertensive at 228/116. Initial ED BP was 190/90. Patient was given his previous home dose of coreg 3.125 BID and 10mg hydralazine q8h prn. Vitals otherwise stable. Pt's labs showed WBC count of 7.52. Troponin was negative, but will repeat once more to trend. BNP non-elevated. Chest xray showed large left pleural effusion. CTA of chest showed right sided PE and left sided pleural effusion and recommended thoracentesis to clinically correlate. Thoracentesis was performed at bedside by Pulm/Crit fellow. Samples were sent for analysis and approximately 1500mL were drawn off without complication. Patient had repeat CXR for post-thoracentesis lung views. Dr. Martin and [facility]  inpatient team were  present with patient to discuss possible diagnosis including malignancy and suggest a consultation with the Palliative medicine team.    Overview/Hospital Course:  64 yo male PMH HTN, Lung nodule, PVD s/p thrombectomy, tobacco use presented to ED with shortness of breath and weight loss x 1 month that has worsened over the past few days.  Pt has been seen by previously by [facility] pulmonology for lung nodule and respiratory problems.  Initially hypertensive to 200s systolic in ED; resolved with home medications.  CTA shows large left pleural effusion and PE to right lower lobe.  US shows DVT to left femoral and posterior tibialis veins.  Pt previously on Eliquis and Plavix but non-adherent x 1 month.  Treated with Heparin drip.  Pulmonology consulted and performed thoracentesis.  1.5L fluid drawn off and analysis consisted with exudative pleural effusion.  One episode of bright red bloody stool with associated generalized weakness on hospital day 2.  Discontinued heparin. Treated with Protonix.  GI consulted.  Colonoscopy showed segmental mucosal ulceration in the ascending colon with maroon stool througout the entire colon.  Findings consistent with ischemic colitis which likely occured in the setting of hypotension in a vasculopath.  Stable BP, H&H throughout hospital course.  Pending chest tube/pleurodesis.      Interval History: Patient says he slept well overnight and is eating. No additional bloody bowel movements. Patient's condition appears better and he says he feels less short of breath today. Pt had bouts of sudden, severe  pain in left leg, and so was started on gabapentin and given analgesics.     Review of Systems   Constitutional:  Positive for activity change, appetite change, fatigue and unexpected weight change. Negative for chills, diaphoresis and fever.   HENT: Negative.     Respiratory:  Positive for shortness of breath. Negative for apnea, cough and wheezing.    Cardiovascular:  Negative for chest  pain, palpitations and leg swelling.   Gastrointestinal:  Negative for blood in stool and vomiting.   Genitourinary: Negative.    Musculoskeletal: Negative.    Skin:  Positive for pallor and rash (Rash on bilateral forearms present on admission).   Neurological: Negative.    Psychiatric/Behavioral: Negative.  Negative for confusion.      Objective:     Vital Signs (Most Recent):  Temp: 98 °F (36.7 °C) (11/10/23 0740)  Pulse: 74 (11/10/23 0740)  Resp: 18 (11/10/23 0740)  BP: 119/64 (11/10/23 0740)  SpO2: (!) 93 % (11/10/23 0740) Vital Signs (24h Range):  Temp:  [97.6 °F (36.4 °C)-98.3 °F (36.8 °C)] 98 °F (36.7 °C)  Pulse:  [74-95] 74  Resp:  [10-20] 18  SpO2:  [92 %-96 %] 93 %  BP: (100-159)/(64-84) 119/64     Weight: 59.6 kg (131 lb 6.3 oz)  Body mass index is 19.4 kg/m².    Intake/Output Summary (Last 24 hours) at 11/10/2023 1009  Last data filed at 11/10/2023 0649  Gross per 24 hour   Intake 350 ml   Output 1000 ml   Net -650 ml         Physical Exam  Vitals and nursing note reviewed.   Constitutional:       Appearance: He is underweight. He is ill-appearing (chronically).      Comments: Unkept appearance    HENT:      Head: Normocephalic.      Nose: Nose normal.      Mouth/Throat:      Mouth: Mucous membranes are moist.   Cardiovascular:      Rate and Rhythm: Normal rate.      Pulses: Normal pulses.   Pulmonary:      Breath sounds: Decreased air movement present. Examination of the left-middle field reveals decreased breath sounds. Examination of the left-lower field reveals decreased breath sounds. Decreased breath sounds present. No wheezing or rhonchi.      Comments: Easily develops shortness of breath  Abdominal:      General: Abdomen is flat.      Palpations: Abdomen is soft.   Skin:     General: Skin is warm and dry.      Capillary Refill: Capillary refill takes less than 2 seconds.      Coloration: Skin is pale.      Findings: Rash present.   Neurological:      Mental Status: He is alert and oriented to  person, place, and time.   Psychiatric:         Mood and Affect: Mood normal.         Behavior: Behavior normal. Behavior is cooperative.         Thought Content: Thought content normal.         Judgment: Judgment normal.             Significant Labs: All pertinent labs within the past 24 hours have been reviewed.  Recent Lab Results         11/10/23  0623   11/10/23  0002   11/09/23  1806   11/09/23  1323        Albumin 2.4             ALP 66             ALT 24             Anion Gap 7             AST 11             Baso # 0.04             Basophil % 0.5             BILIRUBIN TOTAL 0.5  Comment: For infants and newborns, interpretation of results should be based  on gestational age, weight and in agreement with clinical  observations.    Premature Infant recommended reference ranges:  Up to 24 hours.............<8.0 mg/dL  Up to 48 hours............<12.0 mg/dL  3-5 days..................<15.0 mg/dL  6-29 days.................<15.0 mg/dL               BUN 14             Calcium 8.2             Chloride 96             CO2 20             Creatinine 0.9             Differential Method Automated             eGFR >60             Eos # 0.2             Eosinophil % 2.7             Glucose 86             Gran # (ANC) 5.5             Gran % 66.4             Hematocrit 28.9   29.3   30.0   32.7        30.4             Hemoglobin 9.7   9.9   10.0   10.8        10.0             Immature Grans (Abs) 0.23  Comment: Mild elevation in immature granulocytes is non specific and   can be seen in a variety of conditions including stress response,   acute inflammation, trauma and pregnancy. Correlation with other   laboratory and clinical findings is essential.               Immature Granulocytes 2.8             Lymph # 1.7             Lymph % 20.9             Magnesium  1.8             MCH 26.7             MCHC 32.9             MCV 81             Mono # 0.8             Mono % 9.5             MPV 8.9             nRBC 0              Phosphorus Level 3.8             Platelet Count 349             Potassium 4.3             PROTEIN TOTAL 7.4             RBC 3.74             RDW 15.6             Sodium 123             WBC 8.28                     Significant Imaging: I have reviewed all pertinent imaging results/findings within the past 24 hours.    Assessment/Plan:      * Shortness of breath  Chronic, progressive shortness of breath worsening over the last few days.    -CTA- R segmental and subsegmental PE w right heart strain, large L pleural effusion, R hilar nodules  -Heparin bridge for PE- will discuss long term anticoagulation. Pt previously on eliquis and clopidogrel  -Pulmonology consulted  -Thoracentesis performed- approx 1.5L drawn off, sent for analysis  -LE US- evidence of DVT in left mid femoral and left posterior tibial veins  -ECHO GIDD, EF 55-60%, small posterior pericardial effusion    GI bleed  Pt had bout of hematochezia this morning. Taken by GI for endoscopy. They believe etiology is hypotension of splanchnic circulation in a vasculopath.    -GI consulted  -H&H stable at this time  -Holding antihypertensives  -Holding anticoagulation and antiplatelets  -Continue to monitor and reassess      Asymptomatic hypertensive urgency  Patient has a current diagnosis of hypertensive urgency (without evidence of end organ damage) which is controlled.  Latest blood pressure and vitals reviewed-   Temp:  [97.5 °F (36.4 °C)-98.2 °F (36.8 °C)]   Pulse:  []   Resp:  [16-20]   BP: (112-228)/()   SpO2:  [93 %-99 %] .   Patient currently off IV antihypertensives.   Home meds for hypertension were reviewed and noted below.   Hypertension Medications               carvediloL (COREG) 3.125 MG tablet Take 1 tablet (3.125 mg total) by mouth 2 (two) times daily.            Medication adjustment for hospital antihypertensives is as follows- Coreg 6.25mg BID, Nifedipine 60mg daily, PRN Hydralazine IV    Will aim for controlled BP reduction by  "medications noted above. Monitor and mitigate end organ damage as indicated.    Single subsegmental pulmonary embolism without acute cor pulmonale  See shortness of breath      Pleural effusion  Patient found to have large pleural effusion on imaging. I have personally reviewed and interpreted the following imaging: Xray, CT, and Ultrasound. A thoracentesis was performed. Pleural fluid was sent for analysis. Labs reviewed, including Serum LDH   LD   Date Value Ref Range Status   11/08/2023 194 110 - 260 U/L Final     Comment:     Results are increased in hemolyzed samples.   , Pleural Fluid LDH   LD, Fluid   Date Value Ref Range Status   11/07/2023 3004 Not established U/L Final     Comment:     Reference intervals have not been established for body fluids.  Comparison of this result with the concentration in the blood,   serum,or plasma is recommended.  The reference interval for LDH in serum/plasma is   110-260 U/L. Please interpret in context with the clinical  picture.      , Serum Protein   Total Protein   Date Value Ref Range Status   11/10/2023 7.4 6.0 - 8.4 g/dL Final    , Pleural Protein No results found for: "PROTEINBODYF" , Cell Count and Differential No results found for: "BFCELLCNT" , Cytology No results found for: "CYTOFLUID" , and Fluid Cultures No results found for: "BODYFLUIDCUL" . Fluid most consistent with Exudate.  . Most likely etiology includes  Lung malignancy or metastatic neoplasm . Management to include  drained 1500mL. Will reassess.  Pulmonology plans to re-tap and drain pleural effusion. Repeat cytology.  Pulm draining bedside today      Lung mass  Pt has previous evidence of uncharacterized lung mass as he was lost to follow up by [facility] Pulmonology team.  Imaging on admission shows significant left sided pleural effusion, R PE, and new R hilar nodules from previous scan.    History and symptoms worrying for primary lung neoplasm and/or malignant metastasis. Will reassess and possible " biopsy/EBUS per pulm recs and patient preferences.    Pulm team consulted for evaluation and treatment recommendations.   -Thoracentesis of left pleural effusion for analysis. Steven off approx 1500mL of fluid  -Post thoracentesis cxr  -Repeat ECHO    -Palliative team consulted and following    Thrombosis  PE in right lung. Cardiology and Pulm consulted  -Previously on eliquis and clopidogrel, but hasn't taken in months as pt did not have medication refills.  -Start on Heparin 80U/kg  -Resume eliquis and clopidogrel when able    Primary hypertension  Chronic, uncontrolled. Latest blood pressure and vitals reviewed-     Temp:  [97.6 °F (36.4 °C)-98.3 °F (36.8 °C)]   Pulse:  [74-95]   Resp:  [10-20]   BP: (100-159)/(64-84)   SpO2:  [92 %-96 %] .   Home meds for hypertension were reviewed and noted below.   Hypertension Medications               carvediloL (COREG) 3.125 MG tablet Take 1 tablet (3.125 mg total) by mouth 2 (two) times daily.          While in the hospital, will manage blood pressure as follows; Adjust home antihypertensive regimen as follows- hold for now as hypotension may have caused/contributed to mesenteric ischemia and the hematochezia.    Will utilize p.r.n. blood pressure medication only if patient's blood pressure greater than 180/110 and he develops symptoms such as worsening chest pain or shortness of breath.    Cardiology consulted  GI consulted for GI bleed    Tobacco abuse  21mg Nicotine patch    Refer patient to smoking cessation services          VTE Risk Mitigation (From admission, onward)           Ordered     Place ENOCH hose  Until discontinued         11/07/23 1425     IP VTE HIGH RISK PATIENT  Once         11/07/23 1425     Place sequential compression device  Until discontinued         11/07/23 1425                    Discharge Planning   [place]:      Code Status: DNR   Is the patient medically ready for discharge?:     Reason for patient still in hospital (select all that apply): Patient  new problem, Patient trending condition, Consult recommendations, and Pending disposition                     Bob Mata MD  Department of [facility] Medicine   [facility]

## 2023-11-10 NOTE — PROGRESS NOTES
"[facility]    CC: Rectal bleeding     HPI: 63 y.o. male with PMH seizure disorder, PAD s/p thrombectomy and PTA and HTN who presented with shortness of breath and chest pain. Patient was noted to have segmental and subsegmental PE on CTA as well as a left upper lobe lung mass. Patient underwent Thoracentesis 11/7 with 1.5L fluid removal. Patient was placed on heparin gtt for PE. 11/9 AM patient started to have profuse bright red blood per rectum. He denies abdominal pain, nausea, vomiting, diarrhea or constipation prior to onset of bleeding this morning. Patient has never had colonoscopy in the past.       Interval history: Patient denies having any bowel movements or rectal bleeding since he underwent scope yesterday. Denies abdominal pain, N/V.      Physical Examination  /64   Pulse 74   Temp 98 °F (36.7 °C)   Resp 18   Ht 5' 9" (1.753 m)   Wt 59.6 kg (131 lb 6.3 oz)   SpO2 (!) 93%   BMI 19.40 kg/m²   General appearance: alert, cooperative, no distress  Abdomen: soft, non-tender; bowel sounds normoactive; no organomegaly      Assessment:   62 yo male admitted for lung mass complicated by pleural effusion and PE who was started on heparin gtt precipitating rectal bleeding. Colonoscopy 11/9 with segmental ulcerated mucosa with recent stigmata of bleeding present in the ascending colon. Findings are consistent with ischemic colitis which likely occurred in the setting of hypotension in a vasculopath. Patient without recurrence of rectal bleeding after scope and stable hemoglobin.      Plan:  - Continue to monitor for overt signs of GI bleeding  - Advise refraining from anticoagulation   - Repeat colonoscopy as previously indicated by last colon cancer screening      Patient discussed with attending Dr Castillo.    Dalila Salamanca, DO  [facility]    "

## 2023-11-11 NOTE — PT/OT/SLP PROGRESS
Physical Therapy      Patient Name:  Eric Soliman   MRN:  [id number]    Patient not seen today secondary to Nurse/ JJ hold. Will follow-up as able. NSG asked to hold for today as patient was undergoing tests for possible clotting in the legs, will follow up Sunday if cleared

## 2023-11-11 NOTE — SUBJECTIVE & OBJECTIVE
"Interval History: No adverse events overnight. Pt still having pain in his left foot. This morning, pt states his left foot is numb and can't move his toes. He says it feels like his foot is "asleep." Was unable to find Left DP or PT pulse with doppler, so will order further studies.    Review of Systems   Constitutional:  Positive for activity change, appetite change, fatigue and unexpected weight change. Negative for chills, diaphoresis and fever.   HENT: Negative.     Respiratory:  Positive for shortness of breath. Negative for apnea, cough and wheezing.    Cardiovascular:  Negative for chest pain, palpitations and leg swelling.   Gastrointestinal:  Negative for blood in stool and vomiting.   Genitourinary: Negative.    Musculoskeletal: Negative.    Skin:  Positive for pallor and rash (Rash on bilateral forearms present on admission).   Neurological:  Positive for weakness and numbness.   Psychiatric/Behavioral: Negative.  Negative for confusion.      Objective:     Vital Signs (Most Recent):  Temp: 97.9 °F (36.6 °C) (11/11/23 0751)  Pulse: 80 (11/11/23 1201)  Resp: 18 (11/11/23 1352)  BP: (!) 175/81 (11/11/23 0751)  SpO2: 96 % (11/11/23 0751) Vital Signs (24h Range):  Temp:  [97.2 °F (36.2 °C)-97.9 °F (36.6 °C)] 97.9 °F (36.6 °C)  Pulse:  [71-87] 80  Resp:  [16-20] 18  SpO2:  [94 %-98 %] 96 %  BP: (112-175)/(61-81) 175/81     Weight: 60 kg (132 lb 3.2 oz)  Body mass index is 19.52 kg/m².    Intake/Output Summary (Last 24 hours) at 11/11/2023 1504  Last data filed at 11/11/2023 0900  Gross per 24 hour   Intake --   Output 825 ml   Net -825 ml         Physical Exam  Vitals and nursing note reviewed.   Constitutional:       Appearance: He is underweight. He is ill-appearing (chronically).      Comments: Unkept appearance    HENT:      Head: Normocephalic.      Nose: Nose normal.      Mouth/Throat:      Mouth: Mucous membranes are moist.   Cardiovascular:      Rate and Rhythm: Normal rate.      Pulses:           " Dorsalis pedis pulses are 0 on the left side.        Posterior tibial pulses are 0 on the left side.   Pulmonary:      Breath sounds: Decreased air movement present. Examination of the left-middle field reveals decreased breath sounds. Examination of the left-lower field reveals decreased breath sounds. Decreased breath sounds present. No wheezing or rhonchi.      Comments: Easily develops shortness of breath  Abdominal:      General: Abdomen is flat.      Palpations: Abdomen is soft.   Musculoskeletal:         General: Tenderness present. No swelling.      Left foot: Decreased range of motion. Tenderness present. No swelling or deformity. Abnormal pulse.      Comments: Pt states foot his numb and he is unable to move his toes.   Feet:      Right foot:      Toenail Condition: Right toenails are long. Fungal disease present.     Left foot:      Toenail Condition: Left toenails are long. Fungal disease present.     Comments: Left foot cold and pale compared to right. No additional swelling in left leg.  in the calf. DP non-palpable and unable to find even with doppler.  Skin:     General: Skin is warm and dry.      Capillary Refill: Capillary refill takes less than 2 seconds.      Coloration: Skin is pale.      Findings: Rash present.   Neurological:      Mental Status: He is alert and oriented to person, place, and time.      Motor: Weakness present.   Psychiatric:         Mood and Affect: Mood normal.         Behavior: Behavior normal. Behavior is cooperative.         Thought Content: Thought content normal.         Judgment: Judgment normal.             Significant Labs: All pertinent labs within the past 24 hours have been reviewed.  Recent Lab Results         11/11/23  0821   11/11/23  0500   11/10/23  2044        Albumin   2.4         ALP   63         ALT   23         Anion Gap   7         AST   15         Baso #   0.06         Basophil %   0.7         BILIRUBIN TOTAL   0.1  Comment: For infants and  newborns, interpretation of results should be based  on gestational age, weight and in agreement with clinical  observations.    Premature Infant recommended reference ranges:  Up to 24 hours.............<8.0 mg/dL  Up to 48 hours............<12.0 mg/dL  3-5 days..................<15.0 mg/dL  6-29 days.................<15.0 mg/dL           BUN   12         Calcium   8.2         Chloride   97         CO2   23         Creatinine   0.8         Differential Method   Automated         eGFR   >60         Eos #   0.2         Eosinophil %   2.7         Glucose   102         Gran # (ANC)   5.4         Gran %   66.1         Hematocrit 29.7   28.9   29.7       Hemoglobin 9.6   9.7   9.9       Immature Grans (Abs)   0.24  Comment: Mild elevation in immature granulocytes is non specific and   can be seen in a variety of conditions including stress response,   acute inflammation, trauma and pregnancy. Correlation with other   laboratory and clinical findings is essential.           Immature Granulocytes   3.0         Lymph #   1.7         Lymph %   21.0         Magnesium    2.0         MCH   27.1         MCHC   33.6         MCV   81         Mono #   0.8         Mono %   9.5         MPV   8.8         nRBC   0         Phosphorus Level   3.8         Platelet Count   295         Potassium   4.2         PROTEIN TOTAL   7.5         RBC   3.58         RDW   15.4         Sodium   127         WBC   8.13                 Significant Imaging: I have reviewed all pertinent imaging results/findings within the past 24 hours.

## 2023-11-11 NOTE — ASSESSMENT & PLAN NOTE
Patient has a current diagnosis of hypertensive urgency (without evidence of end organ damage) which is controlled.  Latest blood pressure and vitals reviewed-   Temp:  [97.2 °F (36.2 °C)-97.9 °F (36.6 °C)]   Pulse:  [71-87]   Resp:  [16-20]   BP: (112-175)/(61-81)   SpO2:  [94 %-98 %] .   Patient currently off IV antihypertensives.   Home meds for hypertension were reviewed and noted below.   Hypertension Medications               carvediloL (COREG) 3.125 MG tablet Take 1 tablet (3.125 mg total) by mouth 2 (two) times daily.            Medication adjustment for hospital antihypertensives is as follows- Coreg 6.25mg BID, Nifedipine 60mg daily, PRN Hydralazine IV    Will aim for controlled BP reduction by medications noted above. Monitor and mitigate end organ damage as indicated.

## 2023-11-11 NOTE — ACP (ADVANCE CARE PLANNING)
Advance Care Planning     Date: 11/11/2023    Today a voluntary meeting took place: bedside    Patient Participation: Patient is able to participate    ACP Conversation (General):   Understanding of current condition   Experience with serious illness   Personal beliefs that would affect decisions for future care     Code Status: DNR; status confirmed/order placed in chart      Goals of care: The patient endorses that what is most important right now is to focus on remaining as independent as possible, symptom/pain control, extending life as long as possible, even it it means sacrificing quality, curative/life-prolongation (regardless of treatment burdens), and improvement in condition but with limits to invasive therapies    Accordingly, we have decided that the best plan to meet the patient's goals includes continuing with treatment and no chemotherapy      Recommendations/  Follow-up tasks: Follow up in the future pending further diagnostic results / treatment recommendations      Length of ACP   conversation in minutes: 35 minutes

## 2023-11-11 NOTE — ASSESSMENT & PLAN NOTE
PE in right lung. Cardiology and Pulm consulted  -Previously on eliquis and clopidogrel, but hasn't taken in months as pt did not have medication refills.  -Start on Heparin 80U/kg--> Heparin held now due to GI bleed  -Resume eliquis and clopidogrel when able. Currently held due to GI bleed

## 2023-11-11 NOTE — PROGRESS NOTES
RAPID RESPONSE NURSE PROACTIVE ROUNDING NOTE       Time of Visit: 2300    Admit Date: 2023  LOS: 3  Code Status: DNR   Date of Visit: 11/10/2023  : 1960  Age: 63 y.o.  Sex: male  Race: White  Bed: K422/K422 A:   MRN: [id number]  Was the patient discharged from an ICU this admission? No   Was the patient discharged from a PACU within last 24 hours? No   Did the patient receive conscious sedation/general anesthesia in last 24 hours? No   Was the patient in the ED within the past 24 hours? No   Was the patient on NIPPV within the past 24 hours? No   Attending Physician: Moy Martin MD  Primary Service: Hospitalist,Family Medicine   Time spent at the bedside: < 15 min    SITUATION    Notified by previous RRN during handoff  Reason for alert: Follow u    Diagnosis: Shortness of breath   has a past medical history of Anticoagulant long-term use, DVT (deep venous thrombosis), and Seizures.    24 Hour Vitals Range:  Temp:  [97.7 °F (36.5 °C)-98.3 °F (36.8 °C)]   Pulse:  [70-95]   Resp:  [16-20]   BP: (112-147)/(61-82)   SpO2:  [92 %-98 %]     Clinical Issues: Circulatory    ASSESSMENT/INTERVENTIONS    Pt alert and oriented. Withdrawn during assessment, but cooperative and following commands appropriately. Chest tube to left lateral side of chest. Tubing clamped, per team. Gauze with Tegaderm dressing to CT insertion site, clean, dry and intact. Pt reports soreness to left side of chest near insertion site.    Vitals: 112/61, HR 76, 96% on room air, temp 97.8    RECOMMENDATIONS    Chart, vitals, lab, and orders reviewed  Keep CT clamped until tomorrow AM per team.    Discussed plan of care with bedside RNMonika    PROVIDER ESCALATION    Physician escalation: No    Orders received and case discussed with NA.    Disposition:Remain in room 422    FOLLOW UP    Call back the Rapid Response NurseCharis at [phone] for additional questions or concerns.

## 2023-11-11 NOTE — PROGRESS NOTES
[facility] Medicine  Progress Note    Patient Name: Eric Soliman  MRN: [id number]  Patient Class: IP- Inpatient   Admission Date: 11/7/2023  Length of Stay: 4 days  Attending Physician: Moy Martin MD  Primary Care Provider: Jesika, Primary Doctor        Subjective:     Principal Problem:Shortness of breath        HPI:  Pt, Eric Soliman, is a 64 yo M with pmh of htn, lung nodule of undetermined character, PAD s/p thrombectomy & PTA, epilepsy, and tobacco and marijuana use here for several months of progressively worsening shortness of breath that has been worse over the last few days. Patient had previously been seen by [facility] pul[facility] for workup of this lung mass and respiratory issues, but has limited means and had failure of follow up. At this visit patient describes worsening shortness of breath and fatigue, reduced appetite, and significant unintentional weight loss of up to 25% of body weight. Patient is a current tobacco and marijuana smoker with approximately 50 pack year history of tobacco use, but says he has cut back drastically lately. Patient denies chest pain, nausea/vomiting, diarrhea, night sweats, cough or sputum production, or other symptoms at this time.      In ED, patient was noted to be hypertensive at 228/116. Initial ED BP was 190/90. Patient was given his previous home dose of coreg 3.125 BID and 10mg hydralazine q8h prn. Vitals otherwise stable. Pt's labs showed WBC count of 7.52. Troponin was negative, but will repeat once more to trend. BNP non-elevated. Chest xray showed large left pleural effusion. CTA of chest showed right sided PE and left sided pleural effusion and recommended thoracentesis to clinically correlate. Thoracentesis was performed at bedside by Pulm/Crit fellow. Samples were sent for analysis and approximately 1500mL were drawn off without complication. Patient had repeat CXR for post-thoracentesis lung views. Dr. Martin and [facility]  inpatient team were  "present with patient to discuss possible diagnosis including malignancy and suggest a consultation with the Palliative medicine team.    Overview/Hospital Course:  62 yo male PMH HTN, Lung nodule, PVD s/p thrombectomy, tobacco use presented to ED with shortness of breath and weight loss x 1 month that has worsened over the past few days.  Pt has been seen by previously by [facility] pulmonology for lung nodule and respiratory problems.  Initially hypertensive to 200s systolic in ED; resolved with home medications.  CTA shows large left pleural effusion and PE to right lower lobe.  US shows DVT to left femoral and posterior tibialis veins.  Pt previously on Eliquis and Plavix but non-adherent x 1 month.  Treated with Heparin drip.  Pulmonology consulted and performed thoracentesis.  1.5L fluid drawn off and analysis consisted with exudative pleural effusion.  One episode of bright red bloody stool with associated generalized weakness on hospital day 2.  Discontinued heparin. Treated with Protonix.  GI consulted.  Colonoscopy showed segmental mucosal ulceration in the ascending colon with maroon stool througout the entire colon.  Findings consistent with ischemic colitis which likely occured in the setting of hypotension in a vasculopath.  Stable BP, H&H throughout hospital course.  Pleural catheter inserted with initial drainage of approximately 1L dark brown fluid.    Interval History: No adverse events overnight. Pt still having pain in his left foot. This morning, pt states his left foot is numb and can't move his toes. He says it feels like his foot is "asleep." Was unable to find Left DP or PT pulse with doppler, so will order further studies.    Review of Systems   Constitutional:  Positive for activity change, appetite change, fatigue and unexpected weight change. Negative for chills, diaphoresis and fever.   HENT: Negative.     Respiratory:  Positive for shortness of breath. Negative for apnea, cough and wheezing.  "   Cardiovascular:  Negative for chest pain, palpitations and leg swelling.   Gastrointestinal:  Negative for blood in stool and vomiting.   Genitourinary: Negative.    Musculoskeletal: Negative.    Skin:  Positive for pallor and rash (Rash on bilateral forearms present on admission).   Neurological:  Positive for weakness and numbness.   Psychiatric/Behavioral: Negative.  Negative for confusion.      Objective:     Vital Signs (Most Recent):  Temp: 97.9 °F (36.6 °C) (11/11/23 0751)  Pulse: 80 (11/11/23 1201)  Resp: 18 (11/11/23 1352)  BP: (!) 175/81 (11/11/23 0751)  SpO2: 96 % (11/11/23 0751) Vital Signs (24h Range):  Temp:  [97.2 °F (36.2 °C)-97.9 °F (36.6 °C)] 97.9 °F (36.6 °C)  Pulse:  [71-87] 80  Resp:  [16-20] 18  SpO2:  [94 %-98 %] 96 %  BP: (112-175)/(61-81) 175/81     Weight: 60 kg (132 lb 3.2 oz)  Body mass index is 19.52 kg/m².    Intake/Output Summary (Last 24 hours) at 11/11/2023 1504  Last data filed at 11/11/2023 0900  Gross per 24 hour   Intake --   Output 825 ml   Net -825 ml         Physical Exam  Vitals and nursing note reviewed.   Constitutional:       Appearance: He is underweight. He is ill-appearing (chronically).      Comments: Unkept appearance    HENT:      Head: Normocephalic.      Nose: Nose normal.      Mouth/Throat:      Mouth: Mucous membranes are moist.   Cardiovascular:      Rate and Rhythm: Normal rate.      Pulses:           Dorsalis pedis pulses are 0 on the left side.        Posterior tibial pulses are 0 on the left side.   Pulmonary:      Breath sounds: Decreased air movement present. Examination of the left-middle field reveals decreased breath sounds. Examination of the left-lower field reveals decreased breath sounds. Decreased breath sounds present. No wheezing or rhonchi.      Comments: Easily develops shortness of breath  Abdominal:      General: Abdomen is flat.      Palpations: Abdomen is soft.   Musculoskeletal:         General: Tenderness present. No swelling.      Left  foot: Decreased range of motion. Tenderness present. No swelling or deformity. Abnormal pulse.      Comments: Pt states foot his numb and he is unable to move his toes.   Feet:      Right foot:      Toenail Condition: Right toenails are long. Fungal disease present.     Left foot:      Toenail Condition: Left toenails are long. Fungal disease present.     Comments: Left foot cold and pale compared to right. No additional swelling in left leg.  in the calf. DP non-palpable and unable to find even with doppler.  Skin:     General: Skin is warm and dry.      Capillary Refill: Capillary refill takes less than 2 seconds.      Coloration: Skin is pale.      Findings: Rash present.   Neurological:      Mental Status: He is alert and oriented to person, place, and time.      Motor: Weakness present.   Psychiatric:         Mood and Affect: Mood normal.         Behavior: Behavior normal. Behavior is cooperative.         Thought Content: Thought content normal.         Judgment: Judgment normal.             Significant Labs: All pertinent labs within the past 24 hours have been reviewed.  Recent Lab Results         11/11/23  0821   11/11/23  0500   11/10/23  2044        Albumin   2.4         ALP   63         ALT   23         Anion Gap   7         AST   15         Baso #   0.06         Basophil %   0.7         BILIRUBIN TOTAL   0.1  Comment: For infants and newborns, interpretation of results should be based  on gestational age, weight and in agreement with clinical  observations.    Premature Infant recommended reference ranges:  Up to 24 hours.............<8.0 mg/dL  Up to 48 hours............<12.0 mg/dL  3-5 days..................<15.0 mg/dL  6-29 days.................<15.0 mg/dL           BUN   12         Calcium   8.2         Chloride   97         CO2   23         Creatinine   0.8         Differential Method   Automated         eGFR   >60         Eos #   0.2         Eosinophil %   2.7         Glucose   102          Gran # (ANC)   5.4         Gran %   66.1         Hematocrit 29.7   28.9   29.7       Hemoglobin 9.6   9.7   9.9       Immature Grans (Abs)   0.24  Comment: Mild elevation in immature granulocytes is non specific and   can be seen in a variety of conditions including stress response,   acute inflammation, trauma and pregnancy. Correlation with other   laboratory and clinical findings is essential.           Immature Granulocytes   3.0         Lymph #   1.7         Lymph %   21.0         Magnesium    2.0         MCH   27.1         MCHC   33.6         MCV   81         Mono #   0.8         Mono %   9.5         MPV   8.8         nRBC   0         Phosphorus Level   3.8         Platelet Count   295         Potassium   4.2         PROTEIN TOTAL   7.5         RBC   3.58         RDW   15.4         Sodium   127         WBC   8.13                 Significant Imaging: I have reviewed all pertinent imaging results/findings within the past 24 hours.    Assessment/Plan:      * Shortness of breath  Chronic, progressive shortness of breath worsening over the last few days.    -CTA- R segmental and subsegmental PE w right heart strain, large L pleural effusion, R hilar nodules  -Heparin bridge for PE- will discuss long term anticoagulation. Pt previously on eliquis and clopidogrel  -Pulmonology consulted  -Thoracentesis performed- approx 1.5L drawn off, sent for analysis  -LE US- evidence of DVT in left mid femoral and left posterior tibial veins  -ECHO GIDD, EF 55-60%, small posterior pericardial effusion    -Pulmonology recommends IVC filter placement. Will discuss with IR    GI bleed  Pt had bout of hematochezia this morning. Taken by GI for endoscopy. They believe etiology is hypotension of splanchnic circulation in a vasculopath.    -GI consulted  -H&H stable at this time  -Holding antihypertensives  -Holding anticoagulation and antiplatelets  -Continue to monitor and reassess      Asymptomatic hypertensive urgency  Patient  "has a current diagnosis of hypertensive urgency (without evidence of end organ damage) which is controlled.  Latest blood pressure and vitals reviewed-   Temp:  [97.2 °F (36.2 °C)-97.9 °F (36.6 °C)]   Pulse:  [71-87]   Resp:  [16-20]   BP: (112-175)/(61-81)   SpO2:  [94 %-98 %] .   Patient currently off IV antihypertensives.   Home meds for hypertension were reviewed and noted below.   Hypertension Medications               carvediloL (COREG) 3.125 MG tablet Take 1 tablet (3.125 mg total) by mouth 2 (two) times daily.            Medication adjustment for hospital antihypertensives is as follows- Coreg 6.25mg BID, Nifedipine 60mg daily, PRN Hydralazine IV    Will aim for controlled BP reduction by medications noted above. Monitor and mitigate end organ damage as indicated.    Single subsegmental pulmonary embolism without acute cor pulmonale  See shortness of breath      Pleural effusion  Patient found to have large pleural effusion on imaging. I have personally reviewed and interpreted the following imaging: Xray, CT, and Ultrasound. A thoracentesis was performed. Pleural fluid was sent for analysis. Labs reviewed, including Serum LDH   LD   Date Value Ref Range Status   11/08/2023 194 110 - 260 U/L Final     Comment:     Results are increased in hemolyzed samples.   , Pleural Fluid LDH   LD, Fluid   Date Value Ref Range Status   11/07/2023 3004 Not established U/L Final     Comment:     Reference intervals have not been established for body fluids.  Comparison of this result with the concentration in the blood,   serum,or plasma is recommended.  The reference interval for LDH in serum/plasma is   110-260 U/L. Please interpret in context with the clinical  picture.      , Serum Protein   Total Protein   Date Value Ref Range Status   11/11/2023 7.5 6.0 - 8.4 g/dL Final    , Pleural Protein No results found for: "PROTEINBODYF" , Cell Count and Differential No results found for: "BFCELLCNT" , Cytology No results found " "for: "CYTOFLUID" , and Fluid Cultures No results found for: "BODYFLUIDCUL" . Fluid most consistent with Exudate.  . Most likely etiology includes  Lung malignancy or metastatic neoplasm . Management to include  drained 1500mL. Will reassess.  Pulmonology plans to re-tap and drain pleural effusion. Repeat cytology-results pending  Pulm drained bedside- chest tube in place, currently clamped      Lung mass  Pt has previous evidence of uncharacterized lung mass as he was lost to follow up by [facility] Pulmonology team.  Imaging on admission shows significant left sided pleural effusion, R PE, and new R hilar nodules from previous scan.    History and symptoms worrying for primary lung neoplasm and/or malignant metastasis. Will reassess and possible biopsy/EBUS per pulm recs and patient preferences.    Pulm team consulted for evaluation and treatment recommendations.   -Thoracentesis of left pleural effusion for analysis. Steven off approx 1500mL of fluid  -Post thoracentesis cxr  -Repeat ECHO    -Palliative team consulted and following    PAD (peripheral artery disease)  History of bilateral PAD and critical limb ischemia in lower extremities. History of DVT in lower extremities. Current segmental and subsegmental PE in R lung.  Pt describes foot as numb and unable to move toes. Feels like it is "asleep".  -Anticoagulation held following ischemic colitis induced GI bleed  -BLE arterial and venous US for limb perfusion/occlusion             Thrombosis  PE in right lung. Cardiology and Pulm consulted  -Previously on eliquis and clopidogrel, but hasn't taken in months as pt did not have medication refills.  -Start on Heparin 80U/kg--> Heparin held now due to GI bleed  -Resume eliquis and clopidogrel when able. Currently held due to GI bleed    Primary hypertension  Chronic, uncontrolled. Latest blood pressure and vitals reviewed-     Temp:  [97.6 °F (36.4 °C)-98.3 °F (36.8 °C)]   Pulse:  [74-95]   Resp:  [10-20]   BP: " (100-159)/(64-84)   SpO2:  [92 %-96 %] .   Home meds for hypertension were reviewed and noted below.   Hypertension Medications               carvediloL (COREG) 3.125 MG tablet Take 1 tablet (3.125 mg total) by mouth 2 (two) times daily.          While in the hospital, will manage blood pressure as follows; Adjust home antihypertensive regimen as follows- hold for now as hypotension may have caused/contributed to mesenteric ischemia and the hematochezia.    Will utilize p.r.n. blood pressure medication only if patient's blood pressure greater than 180/110 and he develops symptoms such as worsening chest pain or shortness of breath.    Cardiology consulted  GI consulted for GI bleed    Tobacco abuse  21mg Nicotine patch    Refer patient to smoking cessation services          VTE Risk Mitigation (From admission, onward)           Ordered     Place ENOCH hose  Until discontinued         11/07/23 1425     IP VTE HIGH RISK PATIENT  Once         11/07/23 1425     Place sequential compression device  Until discontinued         11/07/23 1425                    Discharge Planning   [place]:      Code Status: DNR   Is the patient medically ready for discharge?:     Reason for patient still in hospital (select all that apply): Patient unstable, Imaging, Consult recommendations, and PT / OT recommendations                     Bob Mata MD  Department of Hospital Medicine   [facility]

## 2023-11-11 NOTE — ASSESSMENT & PLAN NOTE
Chronic, progressive shortness of breath worsening over the last few days.    -CTA- R segmental and subsegmental PE w right heart strain, large L pleural effusion, R hilar nodules  -Heparin bridge for PE- will discuss long term anticoagulation. Pt previously on eliquis and clopidogrel  -Pulmonology consulted  -Thoracentesis performed- approx 1.5L drawn off, sent for analysis  -LE US- evidence of DVT in left mid femoral and left posterior tibial veins  -ECHO GIDD, EF 55-60%, small posterior pericardial effusion    -Pulmonology recommends IVC filter placement. Will discuss with IR

## 2023-11-11 NOTE — ASSESSMENT & PLAN NOTE
"Patient found to have large pleural effusion on imaging. I have personally reviewed and interpreted the following imaging: Xray, CT, and Ultrasound. A thoracentesis was performed. Pleural fluid was sent for analysis. Labs reviewed, including Serum LDH   LD   Date Value Ref Range Status   11/08/2023 194 110 - 260 U/L Final     Comment:     Results are increased in hemolyzed samples.   , Pleural Fluid LDH   LD, Fluid   Date Value Ref Range Status   11/07/2023 3004 Not established U/L Final     Comment:     Reference intervals have not been established for body fluids.  Comparison of this result with the concentration in the blood,   serum,or plasma is recommended.  The reference interval for LDH in serum/plasma is   110-260 U/L. Please interpret in context with the clinical  picture.      , Serum Protein   Total Protein   Date Value Ref Range Status   11/11/2023 7.5 6.0 - 8.4 g/dL Final    , Pleural Protein No results found for: "PROTEINBODYF" , Cell Count and Differential No results found for: "BFCELLCNT" , Cytology No results found for: "CYTOFLUID" , and Fluid Cultures No results found for: "BODYFLUIDCUL" . Fluid most consistent with Exudate.  . Most likely etiology includes  Lung malignancy or metastatic neoplasm . Management to include  drained 1500mL. Will reassess.  Pulmonology plans to re-tap and drain pleural effusion. Repeat cytology-results pending  Pulm drained bedside- chest tube in place, currently clamped    "

## 2023-11-11 NOTE — PROGRESS NOTES
[facility] Pulmonary/Critical Care Fellow Consult Note:  Primary Attending:  Dr. Martin   Consultant Attending: Dr. Julien Moya         Admit Date: 2023   Hospital LOS: 4     Subjective:    Mr. Soliman is a 64 yo M with PMH seizure disorder, PAD s/p thrombectomy & PTA, HTN who presents to [facility] ED with c/o intermittent SOB and chest pain. Of note, patient lost to follow up with [facility] at [facility] who had wanted to perform a bronchoscopy/EBUS on patient. However, he was unable to show up and it had to be rescheduled multiple times (May and ). He was first seen by [facility] Pulm team on 23 during a hospitalization. He was noted to have increasing ALO mass with mediastinal lymphadenopathy at that time. He has a long smoking history, started at age 14 1ppd and is still smoking though he states he is smoking less than before. He has noticed significant weight loss with ~30 lbs since his admission in 2023. Per chart review, patient has unintentionally lost 24 lb since then.     Interval:  Thoracentesis on  with 1500cc serosanguinous fluid. Drain placed 11/10 with additional 2L removed. He continues to endorse left lower leg pain that has worsened since admission. He has not had any further bowel movements/bleeding.  He went for colonoscopy yesterday due to GIB and found to have ischemic colitis. Patient does not comlain of any SOB. Denies CP, n/v, dysuria, weakness, numbness.    Objective:  Last 24 Hour Vital Signs:  BP  Min: 112/61  Max: 175/81  Temp  Av.7 °F (36.5 °C)  Min: 97.2 °F (36.2 °C)  Max: 98 °F (36.7 °C)  Pulse  Av.4  Min: 70  Max: 87  Resp  Av.6  Min: 16  Max: 20  SpO2  Av %  Min: 94 %  Max: 98 %  Weight  Av kg (132 lb 3.2 oz)  Min: 60 kg (132 lb 3.2 oz)  Max: 60 kg (132 lb 3.2 oz)  Body mass index is 19.52 kg/m².  I & O (Last 24H):  Intake/Output Summary (Last 24 hours) at 2023 0958  Last data filed at 2023 0009  Gross per 24 hour   Intake --  "  Output 2225 ml   Net -2225 ml       Physical Exam:  GEN: non-toxic appearing, NAD, in bed  HEENT: MMM, no scleral icterus, EOMI  NECK: Supple, midline trachea, no raised JVP or a-waves notable  CV: RRR, no MRG, pulses equal and symmetric 2+ at radial  PULM: Decreased breath sounds to left lower middle and upper lobe(s)  ABDOMEN: Soft, non-tender, non-distended, no rebound or guarding  SKIN: Warm, dry, intact, no rashes  MSK: No deformity, +clubbing, no cyanosis, or lower extremity edema  NEURO: awake and following commands, at neurologic baseline  LINES: Intact, no extravasation or induration, no erythema to PIV or support devices  Extremities: LE symmetric, no erythema, warmth, or swelling, mild TTP on L calf    I have reviewed all labs / images / cultures  Pertinent labs are as follows:   No results for input(s): "PH", "PCO2", "PO2", "HCO3", "POCSATURATED", "BE" in the last 24 hours.  Recent Labs   Lab 11/11/23  0500 11/11/23  0821   WBC 8.13  --    RBC 3.58*  --    HGB 9.7* 9.6*   HCT 28.9* 29.7*     --    MCV 81*  --    MCH 27.1  --    MCHC 33.6  --      Recent Labs   Lab 11/11/23  0500   *   K 4.2   CL 97   CO2 23   BUN 12   CREATININE 0.8   CALCIUM 8.2*   MG 2.0       Meds:   gabapentin  600 mg Oral TID    lactated ringers  500 mL Intravenous Once    nicotine  1 patch Transdermal Daily    pantoprazole  40 mg Intravenous BID       Assessment & Plan: Eric Soliman is a 63 y.o. male with PMH seizure disorder, PAD s/p thrombectomy & PTA, HTN  who presented to [facility] with c/o SOB and chest pain. Patient found to have PE, L sided DVT, and large ALO lung mass and large effusion.  Effusion was drained on 11/7 and pleural drain placed 11/10.    #Mass of upper lobe of left lung  #Mediastinal lymphadenopathy with enlargement of lymph nodes  CTA on 11/7 with large new left pleural effusion, mildly hyperattenuating to water, with faint rim enhancement, and left-to-right mediastinal shift. Substantial new " atelectasis of much of the left lung. New 9 x 6 mm right lower lobe pulmonary nodule, possibly representing metastatic disease. New prominent to enlarged right hilar lymph nodes, up to 16 mm. Also with poorly defined increased fullness in the left adrenal, relative to 04/06/2023, for which metastatic disease   Based on these findings, discussed with patient our concern for malignancy. Previously was supposed to get EBUS but was lost to follow up after having missed procedure several times in May and June 2023. Patient maintains that he does not wish to pursue chemotherapy.    Plan:  -S/p 1.5L removed with thoracentesis on 11/7, exudative effusion  -Repeat CXR without pneumothorax  -Discussed importance of smoking cessation  - Will follow up cytology, may need tissue biopsy  - Ultrasound showed significant effusion.    - 11/10 L sided pleural drain placed with 2000 cc of output. Container replaced 11/11. Will continue drain to gravity.    -  cytology sample collected and pending.  -  Will monitor for reexapansion post drainage. Repeat CXR today vs tomorrow.   - Patient will likely need pleurodesis vs PleurX    #Segmental and subsegmental PE  CTA w/ nonocclusive filling subtle type filling defect in a right lower lobe segmental pulmonary artery bifurcation, extending into subsegmental branches. Some evidence of right heart strain, with  reflux of contrast from the right atrium into the IVC and hepatic veins   Patient with likely provoked PE in the setting of left lung mass concerning for lung cancer likely metastatic   Has been off of Eliquis and Plavix for his PAD since April/May 2023  Not on oxygen currently, stable on room air  LE US with L sided DVT  Now complicated by GIB 11/9 with no hemodynamic stability.  Colonoscopy 11/9 showed ischemic colitis.    Plan:  -Can continue to hold anticoagulation in setting of GIB.  - Would recommend evaluation for IVC filter.    We will continue to follow with you, thank you for  the opportunity to be involved in Mr. Soliman's care.    Case discussed with Dr. Moya. Attestation may differ from plan. Please appreciate.     Damien Mckenzie MD  [facility] Internal Medicine PGY-I

## 2023-11-11 NOTE — PT/OT/SLP PROGRESS
Occupational Therapy      Patient Name:  Eric Soliman   MRN:  [id number]    11:11 AM Patient not seen today secondary to Nurse/ JJ hold (concerns for possible DVT). Will follow-up as able.    11/11/2023

## 2023-11-11 NOTE — ASSESSMENT & PLAN NOTE
"History of bilateral PAD and critical limb ischemia in lower extremities. History of DVT in lower extremities. Current segmental and subsegmental PE in R lung.  Pt describes foot as numb and unable to move toes. Feels like it is "asleep".  -Anticoagulation held following ischemic colitis induced GI bleed  -BLE arterial and venous US for limb perfusion/occlusion           "

## 2023-11-11 NOTE — NURSING
"Rapid Response Nurse Follow-up Note     Followed up with patient for proactive rounding.     - VS stable, BP slightly elevated this AM. May be due to severe pain in left leg. He was evaluated by primary team. Dr. Mata has placed orders for LE U/S to rule out possible DVT. He was given PRN medication for his pain. Upon assessment, his pain has improved slightly. He still endorses moderate pain to his calf.   - Chest tube unclamped and atrium replaced by pulmonary/critical care team this AM. Serosanguineous output noted. Site appears clean, dry, and intact. Respirations are even and unlabored. No chest pain or SOB reported.     BP (!) 175/81   Pulse 87   Temp 97.9 °F (36.6 °C)   Resp 18   Ht 5' 9" (1.753 m)   Wt 60 kg (132 lb 3.2 oz)   SpO2 96%   BMI 19.52 kg/m²        Reviewed plan of care with bedside RNTd .   Team will continue to follow.  Please call Rapid Response RNRosa Maria RN with any questions or concerns at [phone]     "

## 2023-11-12 PROBLEM — I26.99 PULMONARY EMBOLISM: Status: ACTIVE | Noted: 2023-01-01

## 2023-11-12 NOTE — ASSESSMENT & PLAN NOTE
Chronic, progressive shortness of breath worsening over the last few days.    -CTA- R segmental and subsegmental PE w right heart strain, large L pleural effusion, R hilar nodules  -Heparin bridge for PE- will discuss long term anticoagulation. Pt previously on eliquis and clopidogrel  -Pulmonology consulted  -Thoracentesis performed- approx 1.5L drawn off, sent for analysis  -LE US- evidence of DVT in left mid femoral and left posterior tibial veins  -ECHO GIDD, EF 55-60%, small posterior pericardial effusion    -Pulmonology recommends IVC filter placement. Will discuss with IR  - Will follow up repeat CXR

## 2023-11-12 NOTE — PROGRESS NOTES
[facility] Pulmonary/Critical Care Fellow Consult Note:  Primary Attending:  Dr. Martin   Consultant Attending: Dr. Julien Moya         Admit Date: 2023   Hospital LOS: 5     Subjective:    Mr. Sloiman is a 62 yo M with PMH seizure disorder, PAD s/p thrombectomy & PTA, HTN who presents to [facility] ED with c/o intermittent SOB and chest pain. Of note, patient lost to follow up with [facility] at [facility] who had wanted to perform a bronchoscopy/EBUS on patient. However, he was unable to show up and it had to be rescheduled multiple times (May and ). He was first seen by [facility] Pulm team on 23 during a hospitalization. He was noted to have increasing ALO mass with mediastinal lymphadenopathy at that time. He has a long smoking history, started at age 14 1ppd and is still smoking though he states he is smoking less than before. He has noticed significant weight loss with ~30 lbs since his admission in 2023. Per chart review, patient has unintentionally lost 24 lb since then.     Interval:  Drain placed 11/10 with additional 330 ccs out since yesterday.  He continues to endorse left lower leg pain that has worsened since admission. He has not had any further bowel movements/bleeding. He believes SOB is improving.  Patient does not comlain of any SOB. Denies CP, n/v, dysuria, weakness, numbness.    Objective:  Last 24 Hour Vital Signs:  BP  Min: 135/81  Max: 161/79  Temp  Av.9 °F (36.6 °C)  Min: 97.6 °F (36.4 °C)  Max: 98.3 °F (36.8 °C)  Pulse  Av.2  Min: 77  Max: 87  Resp  Av.2  Min: 17  Max: 20  SpO2  Av.8 %  Min: 92 %  Max: 95 %  Weight  Av.6 kg (292 lb 5.3 oz)  Min: 132.6 kg (292 lb 5.3 oz)  Max: 132.6 kg (292 lb 5.3 oz)  Body mass index is 43.17 kg/m².  I & O (Last 24H):  Intake/Output Summary (Last 24 hours) at 2023 0817  Last data filed at 2023 0641  Gross per 24 hour   Intake --   Output 2270 ml   Net -2270 ml         Physical Exam:  GEN: non-toxic appearing,  "NAD, in bed  HEENT: MMM, no scleral icterus, EOMI  NECK: Supple, midline trachea, no raised JVP or a-waves notable  CV: RRR, no MRG, pulses equal and symmetric 2+ at radial  PULM: Decreased breath sounds to left lower middle and upper lobe(s); CT insertion site mildly tender, gauze in place, no drainage around tube  ABDOMEN: Soft, non-tender, non-distended, no rebound or guarding  SKIN: Warm, dry, intact, no rashes  MSK: No deformity, +clubbing, no cyanosis, or lower extremity edema  NEURO: awake and following commands, at neurologic baseline  LINES: Intact, no extravasation or induration, no erythema to PIV or support devices  Extremities: LE symmetric, no erythema, warmth, or swelling, TTP to light touch over LLE    I have reviewed all labs / images / cultures  Pertinent labs are as follows:   No results for input(s): "PH", "PCO2", "PO2", "HCO3", "POCSATURATED", "BE" in the last 24 hours.  Recent Labs   Lab 11/12/23  0319   WBC 7.89   RBC 3.63*   HGB 9.7*   HCT 29.2*      MCV 80*   MCH 26.7*   MCHC 33.2       Recent Labs   Lab 11/12/23  0319   *   K 4.2   CL 96   CO2 23   BUN 11   CREATININE 0.8   CALCIUM 8.2*   MG 1.9         Meds:   gabapentin  600 mg Oral TID    lactated ringers  500 mL Intravenous Once    nicotine  1 patch Transdermal Daily    pantoprazole  40 mg Intravenous BID       Assessment & Plan: Eric Soliman is a 63 y.o. male with PMH seizure disorder, PAD s/p thrombectomy & PTA, HTN  who presented to [facility] with c/o SOB and chest pain. Patient found to have PE, L sided DVT, and large ALO lung mass and large effusion.  Effusion was drained on 11/7 and pleural drain placed 11/10.    #Mass of upper lobe of left lung  #Mediastinal lymphadenopathy with enlargement of lymph nodes  CTA on 11/7 with large new left pleural effusion, mildly hyperattenuating to water, with faint rim enhancement, and left-to-right mediastinal shift. Substantial new atelectasis of much of the left lung. New 9 x 6 " mm right lower lobe pulmonary nodule, possibly representing metastatic disease. New prominent to enlarged right hilar lymph nodes, up to 16 mm. Also with poorly defined increased fullness in the left adrenal, relative to 04/06/2023, for which metastatic disease   Based on these findings, discussed with patient our concern for malignancy. Previously was supposed to get EBUS but was lost to follow up after having missed procedure several times in May and June 2023. Patient maintains that he does not wish to pursue chemotherapy.    Plan:  -S/p 1.5L removed with thoracentesis on 11/7, exudative effusion  -Repeat CXR without pneumothorax  -Discussed importance of smoking cessation  - Will follow up cytology, may need tissue biopsy  - Ultrasound showed significant effusion.    - 11/10 L sided pleural drain placed with 2000 cc of output. Container replaced 11/11. Will continue drain to gravity.    -  cytology sample collected and pending.   - Micro with no growth.  -  Will monitor for reexapansion post drainage. Repeat CXR showed trapped lung (lung has not reexpanded to chest wall).  Will clamp chest tube and monitor for reaaccumulation of fluid.  - Patient will likely need pleur X    #Segmental and subsegmental PE  CTA w/ nonocclusive filling subtle type filling defect in a right lower lobe segmental pulmonary artery bifurcation, extending into subsegmental branches. Some evidence of right heart strain, with  reflux of contrast from the right atrium into the IVC and hepatic veins   Patient with likely provoked PE in the setting of left lung mass concerning for lung cancer likely metastatic   Has been off of Eliquis and Plavix for his PAD since April/May 2023  Not on oxygen currently, stable on room air  LE  with L sided DVT  Now complicated by GIB 11/9 with no hemodynamic stability.  Colonoscopy 11/9 showed ischemic colitis.    Plan:  -Can continue to hold anticoagulation in setting of GIB.  - Would recommend evaluation  for IVC filter.    We will continue to follow with you, thank you for the opportunity to be involved in Mr. Soliman's care.    Case discussed with Dr. Moya. Attestation may differ from plan. Please appreciate.     Cirilo Lopez MD  [facility] Internal Medicine PGY-2

## 2023-11-12 NOTE — PLAN OF CARE
"  Problem: Occupational Therapy  Goal: Occupational Therapy Goal  Description: Goals to be met by: 12/3/2023     Patient will increase functional independence with ADLs by performing:    UE Dressing with Modified [place].  LE Dressing with Modified [place].  Grooming while standing at sink with Modified [place].  Toileting from toilet with Modified [place] for hygiene and clothing management.   Bathing from  shower chair/bench with Modified [place].  Toilet transfer to toilet with Modified [place].    Outcome: Ongoing, Progressing   OT initial eval completed, tx initiated. Pt c/o L foot pain 5/10 at rest and with Wb'ing. Pt was very aggravated by pain and expressed his aggravation throughout session and apologized to OT. Pt performed bed mobility Min A supine>sit, sit<>stand Min A with RW, side stepped Min A with RW ~4 lateral steps 2/2 pain in L foot. Pt declined to ambulate to bathroom due to his pain.  A BSC was introduced to pt for bedside toileting as a modification for not being able to ambulate short distance to the bathroom at this time. Pt instructed in use but declined to perform transfer to BSC. Pt. SBA for LE dressing donning and doffing socks seated EOB with increased time and LLE pain . Pt. Reported that he was living in his car, unemployed since the COVID 19 pandemic and hasn't been able to "walk"" due to his LLE pain. Pt would benefit from moderate intensity therapy post acute. Continue with OT POC while in acute to maximize Indep and safety to live in the least resistive environment.    "

## 2023-11-12 NOTE — PROGRESS NOTES
I saw and evaluated the patient. I have reviewed and agree with the residents findings, including all diagnostic interpretations, and plans as written. This is an attestation of a separate note written by the ICU resident today.  As the teaching physician, I was present for and have confirmed the key portions of the service performed by the resident today.  In addition to the resident's note, I add the following:    Plan:  Pleural Effusion: exudative, lymphocytic; Likely malignant; s/p thoracentesis on 11/7 w/ 1500cc fluid drained. Drained to completion following chest tube placement; cytology still pending; CXR repeated and shows trapped lung. Therefore, not a candidate for pleurodesis. Best option would be PleurX catheter. Will need to have discussions with patient to see if he is amenable to this procedure and home drainage and will need to consult  for insurance approval. For today, we will clamp chest tube and see how rapid fluid re-accumulates.   Lung Mass: spiculated; 4.9cm on previous CT (unable to visualize on current CT d/t effusion and atelectasis); if fluid positive would likely be at least Stage Tanvir disease; however, would likely need to pursue tissue sampling to better guide tx if he is willing to pursue tx. Continue ongoing talks with patient and palliative care.   PE: segmental and subsegmental; echo w/ normal RV function; BNP and trop negative; hemodynamically stable; Left DVTs seen on US on 11/7 no longer seen on repeat US on 11/11. Likely embolized. Would consider IVC filter placement.   GI bleed: c/w ischemic colitis on Flex sig  DVT ppx: SCDs  GI ppx: n/a  Code status: DNR  Dispo: Floor        Julien Moya MD  [facility] Pulmonary & Critical Care Medicine

## 2023-11-12 NOTE — NURSING
Rapid Response Nurse Follow-up Note     Followed up with patient for proactive rounding.   Visualized pt, AAOx4. VSS. CT in place to water seal, unclamped. Connections checked and tightened. Small amt serosanguinous drainage noted in tubing. Dressing C/D/I. Pt reporting pain to L leg especially the foot and calf, BLE US results pending. DP pulse 2+, PT 1+. Bedside RN notified, will administer PRN pain med.     Reviewed plan of care with bedside RNArvind .   Team will continue to follow.  Please call Rapid Response RN, ZAINAB SOLIS, RN with any questions or concerns at [facility] Phone: [phone].

## 2023-11-12 NOTE — NURSING
"Rapid Response Nurse Follow-up Note     Followed up with patient for proactive rounding. Chest tube clamped by pulm/critical care team. Site appears clean, dry, and intact. No SOB or chest pain reported. VS stable.   No acute issues at this time.   BP (!) 154/73   Pulse 84   Temp 97.6 °F (36.4 °C)   Resp 16   Ht 5' 9" (1.753 m)   Wt 132.6 kg (292 lb 5.3 oz)   SpO2 96%   BMI 43.17 kg/m²      Team will continue to follow.  Please call Rapid Response RN, Rosa Maria Oreilly, RN with any questions or concerns at [phone]  "

## 2023-11-12 NOTE — SUBJECTIVE & OBJECTIVE
Interval History: NAEON. VSS. Pt's lower extremity venous ultrasound showed no new DVT and a stable left posterior tibial vein occlusion. Arterial U/S read pending.    Review of Systems   Constitutional:  Negative for chills and fever.   Musculoskeletal:  Positive for myalgias.     Objective:     Vital Signs (Most Recent):  Temp: 97.6 °F (36.4 °C) (11/12/23 0727)  Pulse: 82 (11/12/23 0727)  Resp: 18 (11/12/23 0727)  BP: (!) 147/83 (11/12/23 0727)  SpO2: 95 % (11/12/23 0727) Vital Signs (24h Range):  Temp:  [97.6 °F (36.4 °C)-98.3 °F (36.8 °C)] 97.6 °F (36.4 °C)  Pulse:  [77-87] 82  Resp:  [17-20] 18  SpO2:  [92 %-95 %] 95 %  BP: (135-161)/(74-84) 147/83     Weight: 132.6 kg (292 lb 5.3 oz)  Body mass index is 43.17 kg/m².    Intake/Output Summary (Last 24 hours) at 11/12/2023 0841  Last data filed at 11/12/2023 0641  Gross per 24 hour   Intake --   Output 2270 ml   Net -2270 ml         Physical Exam  Constitutional:       General: He is not in acute distress.  HENT:      Head: Normocephalic.   Eyes:      Extraocular Movements: Extraocular movements intact.   Cardiovascular:      Pulses: Normal pulses.      Heart sounds: Normal heart sounds.   Pulmonary:      Effort: Pulmonary effort is normal. No respiratory distress.      Breath sounds: No wheezing.   Musculoskeletal:      Cervical back: Normal range of motion.   Neurological:      Mental Status: He is alert.             Significant Labs: All pertinent labs within the past 24 hours have been reviewed.  CBC:   Recent Labs   Lab 11/11/23  0500 11/11/23 0821 11/11/23 1938 11/12/23  0319   WBC 8.13  --   --  7.89   HGB 9.7* 9.6* 9.5* 9.7*   HCT 28.9* 29.7* 28.7* 29.2*     --   --  338     CMP:   Recent Labs   Lab 11/11/23  0500 11/12/23  0319   * 127*   K 4.2 4.2   CL 97 96   CO2 23 23    97   BUN 12 11   CREATININE 0.8 0.8   CALCIUM 8.2* 8.2*   PROT 7.5 7.6   ALBUMIN 2.4* 2.4*   BILITOT 0.1 0.1   ALKPHOS 63 60   AST 15 28   ALT 23 33   ANIONGAP  7* 8       Significant Imaging: I have reviewed all pertinent imaging results/findings within the past 24 hours.

## 2023-11-12 NOTE — ASSESSMENT & PLAN NOTE
Pt has previous evidence of uncharacterized lung mass as he was lost to follow up by [facility] Pulmonology team.  Imaging on admission shows significant left sided pleural effusion, R PE, and new R hilar nodules from previous scan.    History and symptoms worrying for primary lung neoplasm and/or malignant metastasis. Will reassess and possible biopsy/EBUS per pulm recs and patient preferences.    Plan:  -Palliative team consulted, recommendations welcomed

## 2023-11-12 NOTE — PLAN OF CARE
Problem: Physical Therapy  Goal: Physical Therapy Goal  Description: Goals to be met by: 2023     Patient will increase functional independence with mobility by performin. Supine to sit with Modified [place]  2. Sit to supine with Modified [place]  3. Sit to stand transfer with Modified [place]  4. Gait  x 100 feet with Modified [place] using Rolling Walker.   5. Stand for 10 minutes with Modified [place] using Rolling Walker    Outcome: Ongoing, Progressing     Patient was able to perform bed mobility and stood but when attempting to take a step to the head of the bed, he was not able to safely shift weight enough to take a step due to pain. He will benefit from additional acute therapy to progress ambulation and will benefit from RW upon DC as he will need assist for gait progressions upon DC. May benefit from placement as well, as pt is not sure about where he will go upon DC

## 2023-11-12 NOTE — PT/OT/SLP EVAL
Physical Therapy Evaluation    Patient Name:  Eric Soliman   MRN:  [id number]    Recommendations:     Discharge Recommendations: Moderate Intensity Therapy   Discharge Equipment Recommendations: walker, rolling   Barriers to discharge:  pt not sure where he will go upon DC    Assessment:     Eric Soliman is a 63 y.o. male admitted with a medical diagnosis of Shortness of breath.  He presents with the following impairments/functional limitations: weakness, impaired endurance, impaired functional mobility, gait instability, impaired balance, decreased coordination, decreased lower extremity function, decreased safety awareness, pain, decreased ROM Patient was able to perform bed mobility and stood but when attempting to take a step to the head of the bed, he was not able to safely shift weight enough to take a step due to pain. He will benefit from additional acute therapy to progress ambulation and will benefit from RW upon DC as he will need assist for gait progressions upon DC. May benefit from placement as well, as pt is not sure about where he will go upon DC.    Rehab Prognosis: Fair; patient would benefit from acute skilled PT services to address these deficits and reach maximum level of function.    Recent Surgery: Procedure(s) (LRB):  COLONOSCOPY (N/A) 3 Days Post-Op    Plan:     During this hospitalization, patient to be seen 4 x/week to address the identified rehab impairments via gait training, therapeutic activities, therapeutic exercises, neuromuscular re-education and progress toward the following goals:    Plan of Care Expires:  12/12/23    Subjective     Chief Complaint: left leg hurts, wants to know why the pain will not go away  Patient/Family Comments/goals: none present  Pain/Comfort:  Pain Rating 1: 5/10  Location - Side 1: Left  Location - Orientation 1: lower  Location 1: leg    Patients cultural, spiritual, [employer] conflicts given the current situation:      Living Environment:  Pt  homeless- not sure where to go upon DC  Prior to admission, patients level of function was indep.  Equipment used at home: cane, straight.  DME owned (not currently used): none.  Upon discharge, patient will have assistance from TBD.    Objective:     Communicated with nsg prior to session.  Patient found supine with peripheral IV, chest tube  upon PT entry to room.    General Precautions: Standard, fall  Orthopedic Precautions:N/A   Braces: N/A  Respiratory Status: Room air    Exams:  Gross Motor Coordination:  WFL  Postural Exam:  Patient presented with the following abnormalities: -       Rounded shoulders  -       Abnormal trunk flexion  Skin Integrity/Edema:  -       chest tube placement without drainage at insertion site  RLE ROM: WFL  RLE Strength: WFL  LLE ROM: Deficits: limited in knee extension and hip flexion secondary to pain increasing  LLE Strength: Deficits: 4-/5 globally secondary to pain level    Patient donned non slip socks and gait belt for OOB mobility    Functional Mobility:  Bed Mobility:  Supine to Sit: minimum assistance  Sit to Supine: minimum assistance  Transfers:  Sit to Stand:  minimum assistance with rolling walker  Gait: attempted side stepping but patient was not able to shift weight onto the left enough to unweight the right side, unable to ambulate at this time and will need continued gait training      AM-PAC 6 CLICK MOBILITY  Total Score:        Treatment & Education:   PT educated patient:  PT plan of care/role of PT  Safety with OOB mobility  Use of RW for household and community ambulation.   Energy conservation techniques   Discharge disposition    Pt  verbalized understanding       Patient left supine with call button in reach and NSG notified.    GOALS:   Multidisciplinary Problems       Physical Therapy Goals          Problem: Physical Therapy    Goal Priority Disciplines Outcome Goal Variances Interventions   Physical Therapy Goal     PT, PT/OT Ongoing, Progressing      Description: Goals to be met by: 2023     Patient will increase functional independence with mobility by performin. Supine to sit with Modified [place]  2. Sit to supine with Modified [place]  3. Sit to stand transfer with Modified [place]  4. Gait  x 100 feet with Modified [place] using Rolling Walker.   5. Stand for 10 minutes with Modified [place] using Rolling Walker                         History:     Past Medical History:   Diagnosis Date    Anticoagulant long-term use     DVT (deep venous thrombosis)     Seizures     epilepsy controlled w/ marijuana       Past Surgical History:   Procedure Laterality Date    ANGIOGRAPHY OF LOWER EXTREMITY Left 10/7/2022    Procedure: Angiogram Extremity Unilateral;  Surgeon: Charles Patten III, MD;  Location: [facility] CATH LAB;  Service: Cardiology;  Laterality: Left;    AORTOGRAPHY WITH SERIALOGRAPHY Bilateral 10/7/2022    Procedure: AORTOGRAM, WITH SERIALOGRAPHY;  Surgeon: Charles Patten III, MD;  Location: [facility] CATH LAB;  Service: Cardiology;  Laterality: Bilateral;    AORTOGRAPHY WITH SERIALOGRAPHY N/A 2023    Procedure: AORTOGRAM, WITH SERIALOGRAPHY;  Surgeon: Royce Yi MD;  Location: [facility] CATH LAB/EP;  Service: Cardiology;  Laterality: N/A;    ATHERECTOMY, PERIPHERAL BLOOD VESSEL Left 2023    Procedure: ATHERECTOMY, PERIPHERAL BLOOD VESSEL;  Surgeon: Royce Yi MD;  Location: [facility] CATH LAB/EP;  Service: Cardiology;  Laterality: Left;    CLOSURE DEVICE  2023    Procedure: Placement of Closure Device;  Surgeon: Royce Yi MD;  Location: [facility] CATH LAB/EP;  Service: Cardiology;;    COLONOSCOPY N/A 2023    Procedure: COLONOSCOPY;  Surgeon: Cedrick Holcomb MD;  Location: [facility] ENDO;  Service: Endoscopy;  Laterality: N/A;    FILTER PROTECTION, PERIPHERAL  2023    Procedure: Filter Protection, Peripheral;  Surgeon: Royce Yi MD;  Location: [facility] CATH LAB/EP;  Service: Cardiology;;     IVUS (INTRAVASCULAR ULTRASOUND) Left 4/4/2023    Procedure: ULTRASOUND, INTRAVASCULAR;  Surgeon: Royce Yi MD;  Location: [facility] CATH LAB/EP;  Service: Cardiology;  Laterality: Left;    PTA, ILIAC ARTERY Left 4/4/2023    Procedure: PTA, Iliac Artery;  Surgeon: Royce Yi MD;  Location: [facility] CATH LAB/EP;  Service: Cardiology;  Laterality: Left;    THROMBECTOMY  4/4/2023    Procedure: THROMBECTOMY;  Surgeon: Royce Yi MD;  Location: [facility] CATH LAB/EP;  Service: Cardiology;;       Time Tracking:     PT Received On: 11/12/23  PT Start Time: 1118     PT Stop Time: 1130  PT Total Time (min): 12 min     Billable Minutes: Evaluation 12      11/12/2023

## 2023-11-12 NOTE — PROGRESS NOTES
[facility] Medicine  Progress Note    Patient Name: Eric Soliman  MRN: [id number]  Patient Class: IP- Inpatient   Admission Date: 11/7/2023  Length of Stay: 5 days  Attending Physician: Moy Martin MD  Primary Care Provider: Jesika, Primary Doctor        Subjective:     Principal Problem:Shortness of breath        HPI:  Pt, Eric Soliman, is a 62 yo M with pmh of htn, lung nodule of undetermined character, PAD s/p thrombectomy & PTA, epilepsy, and tobacco and marijuana use here for several months of progressively worsening shortness of breath that has been worse over the last few days. Patient had previously been seen by [facility] pulmonology for workup of this lung mass and respiratory issues, but has limited means and had failure of follow up. At this visit patient describes worsening shortness of breath and fatigue, reduced appetite, and significant unintentional weight loss of up to 25% of body weight. Patient is a current tobacco and marijuana smoker with approximately 50 pack year history of tobacco use, but says he has cut back drastically lately. Patient denies chest pain, nausea/vomiting, diarrhea, night sweats, cough or sputum production, or other symptoms at this time.      In ED, patient was noted to be hypertensive at 228/116. Initial ED BP was 190/90. Patient was given his previous home dose of coreg 3.125 BID and 10mg hydralazine q8h prn. Vitals otherwise stable. Pt's labs showed WBC count of 7.52. Troponin was negative, but will repeat once more to trend. BNP non-elevated. Chest xray showed large left pleural effusion. CTA of chest showed right sided PE and left sided pleural effusion and recommended thoracentesis to clinically correlate. Thoracentesis was performed at bedside by Pulm/Crit fellow. Samples were sent for analysis and approximately 1500mL were drawn off without complication. Patient had repeat CXR for post-thoracentesis lung views. Dr. Martin and [facility]  inpatient team were  present with patient to discuss possible diagnosis including malignancy and suggest a consultation with the Palliative medicine team.    Overview/Hospital Course:  62 yo male PMH HTN, Lung nodule, PVD s/p thrombectomy, tobacco use presented to ED with shortness of breath and weight loss x 1 month that has worsened over the past few days.  Pt has been seen by previously by [facility] pulmonology for lung nodule and respiratory problems.  Initially hypertensive to 200s systolic in ED; resolved with home medications.  CTA shows large left pleural effusion and PE to right lower lobe.  US shows DVT to left femoral and posterior tibialis veins.  Pt previously on Eliquis and Plavix but non-adherent x 1 month.  Treated with Heparin drip.  Pulmonology consulted and performed thoracentesis.  1.5L fluid drawn off and analysis consisted with exudative pleural effusion.  One episode of bright red bloody stool with associated generalized weakness on hospital day 2.  Discontinued heparin. Treated with Protonix.  GI consulted.  Colonoscopy showed segmental mucosal ulceration in the ascending colon with maroon stool througout the entire colon.  Findings consistent with ischemic colitis which likely occured in the setting of hypotension in a vasculopath.  Stable BP, H&H throughout hospital course.  Pleural catheter inserted with initial drainage of approximately 1L dark brown fluid.    Interval History: NAEON. VSS. Pt's lower extremity venous ultrasound showed no new DVT and a stable left posterior tibial vein occlusion. Arterial U/S read pending.    Review of Systems   Constitutional:  Negative for chills and fever.   Musculoskeletal:  Positive for myalgias.     Objective:     Vital Signs (Most Recent):  Temp: 97.6 °F (36.4 °C) (11/12/23 0727)  Pulse: 82 (11/12/23 0727)  Resp: 18 (11/12/23 0727)  BP: (!) 147/83 (11/12/23 0727)  SpO2: 95 % (11/12/23 0727) Vital Signs (24h Range):  Temp:  [97.6 °F (36.4 °C)-98.3 °F (36.8 °C)] 97.6 °F (36.4  °C)  Pulse:  [77-87] 82  Resp:  [17-20] 18  SpO2:  [92 %-95 %] 95 %  BP: (135-161)/(74-84) 147/83     Weight: 132.6 kg (292 lb 5.3 oz)  Body mass index is 43.17 kg/m².    Intake/Output Summary (Last 24 hours) at 11/12/2023 0841  Last data filed at 11/12/2023 0641  Gross per 24 hour   Intake --   Output 2270 ml   Net -2270 ml         Physical Exam  Constitutional:       General: He is not in acute distress.  HENT:      Head: Normocephalic.   Eyes:      Extraocular Movements: Extraocular movements intact.   Cardiovascular:      Pulses: Normal pulses.      Heart sounds: Normal heart sounds.   Pulmonary:      Effort: Pulmonary effort is normal. No respiratory distress.      Breath sounds: No wheezing.   Musculoskeletal:      Cervical back: Normal range of motion.   Neurological:      Mental Status: He is alert.             Significant Labs: All pertinent labs within the past 24 hours have been reviewed.  CBC:   Recent Labs   Lab 11/11/23  0500 11/11/23  0821 11/11/23  1938 11/12/23  0319   WBC 8.13  --   --  7.89   HGB 9.7* 9.6* 9.5* 9.7*   HCT 28.9* 29.7* 28.7* 29.2*     --   --  338     CMP:   Recent Labs   Lab 11/11/23  0500 11/12/23  0319   * 127*   K 4.2 4.2   CL 97 96   CO2 23 23    97   BUN 12 11   CREATININE 0.8 0.8   CALCIUM 8.2* 8.2*   PROT 7.5 7.6   ALBUMIN 2.4* 2.4*   BILITOT 0.1 0.1   ALKPHOS 63 60   AST 15 28   ALT 23 33   ANIONGAP 7* 8       Significant Imaging: I have reviewed all pertinent imaging results/findings within the past 24 hours.    Assessment/Plan:      * Shortness of breath  Chronic, progressive shortness of breath worsening over the last few days.    -CTA- R segmental and subsegmental PE w right heart strain, large L pleural effusion, R hilar nodules  -Heparin bridge for PE- will discuss long term anticoagulation. Pt previously on eliquis and clopidogrel  -Pulmonology consulted  -Thoracentesis performed- approx 1.5L drawn off, sent for analysis  -LE US- evidence of DVT  in left mid femoral and left posterior tibial veins  -ECHO GIDD, EF 55-60%, small posterior pericardial effusion    -Pulmonology recommends IVC filter placement. Will discuss with IR  - Will follow up repeat CXR    GI bleed  Pt had bout of hematochezia this morning. Taken by GI for endoscopy. They believe etiology is hypotension of splanchnic circulation in a vasculopath.    -GI consulted  -H&H stable at this time  -Holding antihypertensives  -Holding anticoagulation and antiplatelets  -Continue to monitor and reassess      Asymptomatic hypertensive urgency  Patient has a current diagnosis of hypertensive urgency (without evidence of end organ damage) which is controlled.  Latest blood pressure and vitals reviewed-   Temp:  [97.2 °F (36.2 °C)-97.9 °F (36.6 °C)]   Pulse:  [71-87]   Resp:  [16-20]   BP: (112-175)/(61-81)   SpO2:  [94 %-98 %] .   Patient currently off IV antihypertensives.   Home meds for hypertension were reviewed and noted below.   Hypertension Medications               carvediloL (COREG) 3.125 MG tablet Take 1 tablet (3.125 mg total) by mouth 2 (two) times daily.            Medication adjustment for hospital antihypertensives is as follows- Coreg 6.25mg BID, Nifedipine 60mg daily, PRN Hydralazine IV    Will aim for controlled BP reduction by medications noted above. Monitor and mitigate end organ damage as indicated.    Pulmonary embolism  See shortness of breath      Pleural effusion  Patient found to have large pleural effusion on imaging. I have personally reviewed and interpreted the following imaging: Xray, CT, and Ultrasound. A thoracentesis was performed. Pleural fluid was sent for analysis. Labs reviewed, including Serum LDH   LD   Date Value Ref Range Status   11/08/2023 194 110 - 260 U/L Final     Comment:     Results are increased in hemolyzed samples.   , Pleural Fluid LDH   LD, Fluid   Date Value Ref Range Status   11/07/2023 3004 Not established U/L Final     Comment:     Reference  "intervals have not been established for body fluids.  Comparison of this result with the concentration in the blood,   serum,or plasma is recommended.  The reference interval for LDH in serum/plasma is   110-260 U/L. Please interpret in context with the clinical  picture.      , Serum Protein   Total Protein   Date Value Ref Range Status   11/11/2023 7.5 6.0 - 8.4 g/dL Final    , Pleural Protein No results found for: "PROTEINBODYF" , Cell Count and Differential No results found for: "BFCELLCNT" , Cytology No results found for: "CYTOFLUID" , and Fluid Cultures No results found for: "BODYFLUIDCUL" . Fluid most consistent with Exudate.  . Most likely etiology includes  Lung malignancy or metastatic neoplasm . Management to include  drained 1500mL. Will reassess.  Pulmonology plans to re-tap and drain pleural effusion. Repeat cytology-results pending  Pulm drained bedside- chest tube in place, currently clamped      Lung mass  Pt has previous evidence of uncharacterized lung mass as he was lost to follow up by [facility] Pulmonology team.  Imaging on admission shows significant left sided pleural effusion, R PE, and new R hilar nodules from previous scan.    History and symptoms worrying for primary lung neoplasm and/or malignant metastasis. Will reassess and possible biopsy/EBUS per pulm recs and patient preferences.    Plan:  -Palliative team consulted, recommendations welcomed    PAD (peripheral artery disease)  History of bilateral PAD and critical limb ischemia in lower extremities. History of DVT in lower extremities. Current segmental and subsegmental PE in R lung.  Pt describes foot as numb and unable to move toes. Feels like it is "asleep".  -Anticoagulation held following ischemic colitis induced GI bleed  BLE venous U/S showed no new DVT    Plan:   -BLE arterial US ordered, will follow for results              Thrombosis  PE in right lung. Cardiology and Pulm consulted  -Previously on eliquis and clopidogrel, but " hasn't taken in months as pt did not have medication refills.  -Start on Heparin 80U/kg--> Heparin held now due to GI bleed  -Resume eliquis and clopidogrel when able. Currently held due to GI bleed    Primary hypertension  Chronic, uncontrolled. Latest blood pressure and vitals reviewed-     Temp:  [97.6 °F (36.4 °C)-98.3 °F (36.8 °C)]   Pulse:  [74-95]   Resp:  [10-20]   BP: (100-159)/(64-84)   SpO2:  [92 %-96 %] .   Home meds for hypertension were reviewed and noted below.   Hypertension Medications               carvediloL (COREG) 3.125 MG tablet Take 1 tablet (3.125 mg total) by mouth 2 (two) times daily.          While in the hospital, will manage blood pressure as follows; Adjust home antihypertensive regimen as follows- hold for now as hypotension may have caused/contributed to mesenteric ischemia and the hematochezia.    Will utilize p.r.n. blood pressure medication only if patient's blood pressure greater than 180/110 and he develops symptoms such as worsening chest pain or shortness of breath.    Cardiology consulted  GI consulted for GI bleed    Tobacco abuse  21mg Nicotine patch    Refer patient to smoking cessation services          VTE Risk Mitigation (From admission, onward)           Ordered     Place ENOCH hose  Until discontinued         11/07/23 1425     IP VTE HIGH RISK PATIENT  Once         11/07/23 1425     Place sequential compression device  Until discontinued         11/07/23 1425                    Discharge Planning   [place]:      Code Status: DNR   Is the patient medically ready for discharge?:     Reason for patient still in hospital (select all that apply): Treatment and Imaging                     Florentin Thomas MD  Department of Hospital Medicine   [place] [facility]

## 2023-11-12 NOTE — ASSESSMENT & PLAN NOTE
"History of bilateral PAD and critical limb ischemia in lower extremities. History of DVT in lower extremities. Current segmental and subsegmental PE in R lung.  Pt describes foot as numb and unable to move toes. Feels like it is "asleep".  -Anticoagulation held following ischemic colitis induced GI bleed  BLE venous U/S showed no new DVT    Plan:   -BLE arterial US ordered, will follow for results            "

## 2023-11-12 NOTE — PT/OT/SLP EVAL
"Occupational Therapy   Evaluation, tx    Name: Eric Soliman  MRN: [id number]  Admitting Diagnosis: Shortness of breath  Recent Surgery: Procedure(s) (LRB):  COLONOSCOPY (N/A) 3 Days Post-Op  The primary encounter diagnosis was Pulmonary embolism, unspecified chronicity, unspecified pulmonary embolism type, unspecified whether acute cor pulmonale present. Diagnoses of Chest pain, Pleural effusion, Unstable angina, SOB (shortness of breath), Shortness of breath, and Rectal bleeding were also pertinent to this visit.    Recommendations:     Discharge Recommendations: Moderate Intensity Therapy  Discharge Equipment Recommendations:   (TBD)  Barriers to discharge:  Decreased caregiver support, Inaccessible home environment    Assessment:     Eric Soliman is a 63 y.o. male with a medical diagnosis of Shortness of breath.  He presents with L chest tube. Performance deficits affecting function: weakness, impaired endurance, impaired self care skills, impaired functional mobility, gait instability, impaired balance, decreased lower extremity function, decreased safety awareness, pain, decreased ROM.         OT initial eval completed, tx initiated. Pt c/o L foot pain 5/10 at rest and with Wb'ing. Pt was very aggravated by pain and expressed his aggravation throughout session and apologized to OT. Pt performed bed mobility Min A supine>sit, sit<>stand Min A with RW, side stepped Min A with RW ~4 lateral steps 2/2 pain in L foot. Pt declined to ambulate to bathroom due to his pain.  A BSC was introduced to pt for bedside toileting as a modification for not being able to ambulate short distance to the bathroom at this time. Pt instructed in use but declined to perform transfer to BSC. Pt. SBA for LE dressing donning and doffing socks seated EOB with increased time and LLE pain . Pt. reported that he was living in his car, unemployed since the COVID 19 pandemic and hasn't been able to "walk"" due to his LLE pain. Pt would benefit " from moderate intensity therapy post acute.  referral. Continue with OT POC while in acute to maximize Indep and safety to live in the least resistive environment.        Rehab Prognosis: Good; patient would benefit from acute skilled OT services to address these deficits and reach maximum level of function.       Plan:     Patient to be seen 5 x/week to address the above listed problems via self-care/home management, therapeutic activities, therapeutic exercises  Plan of Care Expires: 12/12/23  Plan of Care Reviewed with: patient    Subjective     Chief Complaint: LLE pain  Patient/Family Comments/goals: The pain medication only takes the edge off, not get rid of my pain.     Occupational Profile:  Living Environment: pt reports living out of his car.  Previous level of function: Indep-Lisa with SPC mostly recently 2/2 LLE pain; drives; unemployed since COVID 19 pandemic; says he steals food sometimes in order to eat.   Roles and Routines: homeless  Equipment Used at Home: cane, straight  Assistance upon Discharge: none    Pain/Comfort:  Pain Rating 1: 5/10  Location - Side 1: Left  Location - Orientation 1: lower  Location 1: leg  Pain Addressed 1: Reposition, Distraction, Cessation of Activity, Pre-medicate for activity  Pain Rating Post-Intervention 1: 5/10    Patients cultural, spiritual, Sikhism conflicts given the current situation: no    Objective:     Communicated with: nurse prior to session.  Patient found HOB elevated with bed alarm, chest tube, telemetry upon OT entry to room.    General Precautions: Standard, fall, seizure  Orthopedic Precautions: N/A  Braces: N/A  Respiratory Status: Room air    Occupational Performance:    Bed Mobility:  extra time needed to moved 2/2 LLE pain; instructed in segmental tech  Patient completed Rolling/Turning to Left with  stand by assistance and with side rail  Patient completed Scooting/Bridging with contact guard assistance  Patient completed  Supine to Sit with minimum assistance and with side rail  Patient completed Sit to Supine with stand by assistance    Functional Mobility/Transfers:  Patient completed Sit <> Stand Transfer with minimum assistance  with  rolling walker   Functional Mobility: ambulated ~4 side steps with min A walker management and CGA for balance 2/2 LLE pain.    Activities of Daily Living:  Feeding:  independence .  Grooming: independence seated  Lower Body Dressing: stand by assistance donned/doffed socks seated EOB; ankle over opposite with extra time and effort 2/2 LLE pain and pt aggravated.  Toileting: pt reported using urinal Indep; declined to use BSC; unable to ambulate to bathroom 2/2 LLE pain     Cognitive/Visual Perceptual:  Cognitive/Psychosocial Skills:     -       Oriented to: Person, Place, and Situation ; year not month  -       Follows Commands/attention:Follows two-step commands  -       Communication: clear/fluent  -       Memory: Poor immediate recall  -       Safety awareness/insight to disability: impaired   -       Mood/Affect/Coping skills/emotional control: Anxious and Agitated and cooperative  Visual/Perceptual:      -Intact .    Physical Exam:  Balance:    -       sitting: good  dynamic: good-   standing: fair  dynamic: poor plus  Postural examination/scapula alignment:    -       Rounded shoulders  Edema:  Mild L calf  Dominant hand:    -       right  Upper Extremity Range of Motion:     -       Right Upper Extremity: WFL  -       Left Upper Extremity: WFL  Upper Extremity Strength:    -       Right Upper Extremity: WFL  -       Left Upper Extremity: WFL   Strength:    -       Right Upper Extremity: WFL  -       Left Upper Extremity: WFL    AMPAC 6 Click ADL:  AMPAC Total Score: 21    Treatment & Education:  Role of OT and POC   Pt. seen for self care and fx mobility retraining with adapted tech and modifications as stated above.   All questions/concerns addressed within scope.  Introduced pt to BSC  for bedside toileting 2/2 inability to ambulated to bathroom at this time due to LLE pain. Pt instructed to call staff for OOB assist to BSC and back to bed for fall prevention. He verbalized understanding.        Patient left HOB elevated with all lines intact, call button in reach, and bed alarm on    GOALS:   Multidisciplinary Problems       Occupational Therapy Goals          Problem: Occupational Therapy    Goal Priority Disciplines Outcome Interventions   Occupational Therapy Goal     OT, PT/OT Ongoing, Progressing    Description: Goals to be met by: 12/3/2023     Patient will increase functional independence with ADLs by performing:    UE Dressing with Modified Baxter.  LE Dressing with Modified Baxter.  Grooming while standing at sink with Modified Baxter.  Toileting from toilet with Modified Baxter for hygiene and clothing management.   Bathing from  shower chair/bench with Modified Baxter.  Toilet transfer to toilet with Modified Baxter.                         History:     Past Medical History:   Diagnosis Date    Anticoagulant long-term use     DVT (deep venous thrombosis)     Seizures     epilepsy controlled w/ marijuana         Past Surgical History:   Procedure Laterality Date    ANGIOGRAPHY OF LOWER EXTREMITY Left 10/7/2022    Procedure: Angiogram Extremity Unilateral;  Surgeon: Charles Patten III, MD;  Location: [facility] CATH LAB;  Service: Cardiology;  Laterality: Left;    AORTOGRAPHY WITH SERIALOGRAPHY Bilateral 10/7/2022    Procedure: AORTOGRAM, WITH SERIALOGRAPHY;  Surgeon: Charles Patten III, MD;  Location: [facility] CATH LAB;  Service: Cardiology;  Laterality: Bilateral;    AORTOGRAPHY WITH SERIALOGRAPHY N/A 4/4/2023    Procedure: AORTOGRAM, WITH SERIALOGRAPHY;  Surgeon: Royce Yi MD;  Location: [facility] CATH LAB/EP;  Service: Cardiology;  Laterality: N/A;    ATHERECTOMY, PERIPHERAL BLOOD VESSEL Left 4/4/2023    Procedure: ATHERECTOMY, PERIPHERAL BLOOD  VESSEL;  Surgeon: Royce Yi MD;  Location: [facility] CATH LAB/EP;  Service: Cardiology;  Laterality: Left;    CLOSURE DEVICE  4/4/2023    Procedure: Placement of Closure Device;  Surgeon: Royce Yi MD;  Location: [facility] CATH LAB/EP;  Service: Cardiology;;    COLONOSCOPY N/A 11/9/2023    Procedure: COLONOSCOPY;  Surgeon: Cedrick Holcomb MD;  Location: [facility] ENDO;  Service: Endoscopy;  Laterality: N/A;    FILTER PROTECTION, PERIPHERAL  4/4/2023    Procedure: Filter Protection, Peripheral;  Surgeon: Royce Yi MD;  Location: [facility] CATH LAB/EP;  Service: Cardiology;;    IVUS (INTRAVASCULAR ULTRASOUND) Left 4/4/2023    Procedure: ULTRASOUND, INTRAVASCULAR;  Surgeon: Royce Yi MD;  Location: [facility] CATH LAB/EP;  Service: Cardiology;  Laterality: Left;    PTA, ILIAC ARTERY Left 4/4/2023    Procedure: PTA, Iliac Artery;  Surgeon: Royce Yi MD;  Location: [facility] CATH LAB/EP;  Service: Cardiology;  Laterality: Left;    THROMBECTOMY  4/4/2023    Procedure: THROMBECTOMY;  Surgeon: Royce Yi MD;  Location: [facility] CATH LAB/EP;  Service: Cardiology;;       Time Tracking:     OT Date of Treatment: 11/12/23  OT Start Time: 1322  OT Stop Time: 1340  OT Total Time (min): 18 min    Billable Minutes:Evaluation 10  Therapeutic Activity 8  Total Time 18    11/12/2023

## 2023-11-12 NOTE — PLAN OF CARE
VN note: progress notes, labs and vital signs reviewed. Will be available to intervene if needed.     Problem: Adult Inpatient Plan of Care  Goal: Plan of Care Review  Outcome: Ongoing, Progressing      Post-Care Instructions: I reviewed with the patient in detail post-care instructions. Patient is not to engage in any heavy lifting, exercise, or swimming for the next 14 days. Should the patient develop any fevers, chills, bleeding, severe pain patient will contact the office immediately.

## 2023-11-13 NOTE — SUBJECTIVE & OBJECTIVE
Interval History: Still having leg pain overnight. No adverse events.    Review of Systems   Constitutional:  Positive for activity change, appetite change, fatigue and unexpected weight change. Negative for chills, diaphoresis and fever.   HENT: Negative.     Respiratory:  Positive for shortness of breath. Negative for apnea, cough and wheezing.    Cardiovascular:  Negative for chest pain, palpitations and leg swelling.   Gastrointestinal:  Negative for blood in stool and vomiting.   Genitourinary: Negative.    Musculoskeletal: Negative.    Skin:  Positive for pallor and rash (Rash on bilateral forearms present on admission).   Neurological:  Positive for weakness and numbness. Negative for facial asymmetry and light-headedness.   Psychiatric/Behavioral: Negative.  Negative for agitation and confusion. The patient is not hyperactive.      Objective:     Vital Signs (Most Recent):  Temp: 97.6 °F (36.4 °C) (11/13/23 1132)  Pulse: 93 (11/13/23 1308)  Resp: 16 (11/13/23 1308)  BP: (!) 162/81 (11/13/23 1308)  SpO2: (!) 92 % (11/13/23 1308) Vital Signs (24h Range):  Temp:  [97.5 °F (36.4 °C)-98 °F (36.7 °C)] 97.6 °F (36.4 °C)  Pulse:  [] 93  Resp:  [16-20] 16  SpO2:  [92 %-97 %] 92 %  BP: (129-176)/(69-85) 162/81     Weight: 60.4 kg (133 lb 3.2 oz)  Body mass index is 19.67 kg/m².    Intake/Output Summary (Last 24 hours) at 11/13/2023 1419  Last data filed at 11/13/2023 0946  Gross per 24 hour   Intake 147.46 ml   Output 1840 ml   Net -1692.54 ml         Physical Exam  Vitals and nursing note reviewed.   Constitutional:       Appearance: He is underweight. He is ill-appearing (chronically).      Comments: Unkept appearance    HENT:      Head: Normocephalic.      Nose: Nose normal.      Mouth/Throat:      Mouth: Mucous membranes are moist.   Cardiovascular:      Rate and Rhythm: Normal rate.      Pulses:           Dorsalis pedis pulses are 0 on the left side.        Posterior tibial pulses are 0 on the left side.    Pulmonary:      Breath sounds: Decreased air movement present. Examination of the left-middle field reveals decreased breath sounds. Examination of the left-lower field reveals decreased breath sounds. Decreased breath sounds present. No wheezing or rhonchi.      Comments: Easily develops shortness of breath  Abdominal:      General: Abdomen is flat.      Palpations: Abdomen is soft.   Musculoskeletal:         General: Tenderness present. No swelling.      Left foot: Decreased range of motion. Tenderness present. No swelling or deformity. Abnormal pulse.      Comments: Pt states foot his numb and he is unable to move his toes.   Feet:      Right foot:      Toenail Condition: Right toenails are long. Fungal disease present.     Left foot:      Toenail Condition: Left toenails are long. Fungal disease present.     Comments: Left foot cold and pale compared to right. No additional swelling in left leg.  in the calf. DP non-palpable and unable to find even with doppler.  Skin:     General: Skin is warm and dry.      Capillary Refill: Capillary refill takes less than 2 seconds.      Coloration: Skin is pale.      Findings: Rash present.   Neurological:      Mental Status: He is alert and oriented to person, place, and time.      Motor: Weakness present.   Psychiatric:         Mood and Affect: Mood normal.         Behavior: Behavior normal. Behavior is cooperative.         Thought Content: Thought content normal.         Judgment: Judgment normal.             Significant Labs: All pertinent labs within the past 24 hours have been reviewed.  Recent Lab Results         11/13/23  0748   11/13/23  0330   11/12/23 2022        Albumin   2.4         ALP   71         ALT   42         Anion Gap   8         AST   41         Baso #   0.06         Basophil %   0.5         BILIRUBIN TOTAL   0.1  Comment: For infants and newborns, interpretation of results should be based  on gestational age, weight and in agreement with  clinical  observations.    Premature Infant recommended reference ranges:  Up to 24 hours.............<8.0 mg/dL  Up to 48 hours............<12.0 mg/dL  3-5 days..................<15.0 mg/dL  6-29 days.................<15.0 mg/dL           BUN   11         Calcium   8.4         Chloride   90         CO2   22         Creatinine   0.9         Differential Method   Automated         eGFR   >60         Eos #   0.2         Eosinophil %   1.3         Glucose   92         Gran # (ANC)   8.9         Gran %   71.8         Hematocrit 30.4   30.2   28.5       Hemoglobin 10.2   9.9   9.6       Immature Grans (Abs)   0.19  Comment: Mild elevation in immature granulocytes is non specific and   can be seen in a variety of conditions including stress response,   acute inflammation, trauma and pregnancy. Correlation with other   laboratory and clinical findings is essential.           Immature Granulocytes   1.5         Lymph #   2.4         Lymph %   19.0         Magnesium    1.7         MCH   26.6         MCHC   32.8         MCV   81         Mono #   0.9         Mono %   7.4         MPV   9.4         nRBC   0         Phosphorus Level   3.7         Platelet Count   406         Potassium   4.5         PROTEIN TOTAL   7.9         RBC   3.72         RDW   15.6         Sodium   120         WBC   12.36                 Significant Imaging: I have reviewed all pertinent imaging results/findings within the past 24 hours.

## 2023-11-13 NOTE — ASSESSMENT & PLAN NOTE
See shortness of breath    Patient taken for IVC placement today. Will resume anticoagulation as soon as able.

## 2023-11-13 NOTE — NURSING
RAPID RESPONSE NURSE PROACTIVE ROUNDING NOTE         Admit Date: 2023  LOS: 5  Code Status: DNR   Date of Visit: 2023  : 1960  Age: 63 y.o.  Sex: male  Race: White  Bed: K422/K422 A:   MRN: [id number]  Was the patient discharged from an ICU this admission? No   Was the patient discharged from a PACU within last 24 hours? No   Did the patient receive conscious sedation/general anesthesia in last 24 hours? No   Was the patient in the ED within the past 24 hours? No   Was the patient on NIPPV within the past 24 hours? No   Attending Physician: Moy Martin MD  Primary Service: Hospitalist,Family Medicine       SITUATION      Diagnosis: Shortness of breath   has a past medical history of Anticoagulant long-term use, DVT (deep venous thrombosis), and Seizures.    Last Vitals:  Temp: 98 °F (36.7 °C) (1928)  Pulse: 105 (1928)  Resp: 20 (2112)  BP: 142/76 (1928)  SpO2: 94 % (1928)    24 Hour Vitals Range:  Temp:  [97.6 °F (36.4 °C)-98 °F (36.7 °C)]   Pulse:  []   Resp:  [16-20]   BP: (135-176)/(73-85)   SpO2:  [93 %-96 %]     Chart reviewed, no interventions necessary at this time.    FOLLOW UP    Call back the Rapid Response NurseSami at [phone] for additional questions or concerns.

## 2023-11-13 NOTE — PROGRESS NOTES
[facility] Pulmonary/Critical Care Fellow Consult Note:  Primary Attending:  Dr. Martin   Consultant Attending: Dr. Julien Moya         Admit Date: 2023   Hospital LOS: 6     Subjective:    Mr. Soliman is a 62 yo M with PMH seizure disorder, PAD s/p thrombectomy & PTA, HTN who presents to [facility] ED with c/o intermittent SOB and chest pain. Of note, patient lost to follow up with [facility]ology at [facility] who had wanted to perform a bronchoscopy/EBUS on patient. However, he was unable to show up and it had to be rescheduled multiple times (May and ). He was first seen by [facility] Pulm team on 23 during a hospitalization. He was noted to have increasing ALO mass with mediastinal lymphadenopathy at that time. He has a long smoking history, started at age 14 1ppd and is still smoking though he states he is smoking less than before. He has noticed significant weight loss with ~30 lbs since his admission in 2023. Per chart review, patient has unintentionally lost 24 lb since then.     Interval:  Drain clamped yesterday to monitor for reaccumulation.  No difference per patient in SOB.  Repeat CXR with increase in lung trapping.  Patient does not comlain of any SOB. Denies CP, n/v, dysuria, weakness, numbness.  Patient NPO for IVC filter.    Objective:  Last 24 Hour Vital Signs:  BP  Min: 129/69  Max: 176/85  Temp  Av.8 °F (36.6 °C)  Min: 97.5 °F (36.4 °C)  Max: 98 °F (36.7 °C)  Pulse  Av.8  Min: 84  Max: 105  Resp  Av.2  Min: 16  Max: 20  SpO2  Av.5 %  Min: 93 %  Max: 96 %  Weight  Av.4 kg (133 lb 3.2 oz)  Min: 60.4 kg (133 lb 3.2 oz)  Max: 60.4 kg (133 lb 3.2 oz)  Body mass index is 19.67 kg/m².  I & O (Last 24H):  Intake/Output Summary (Last 24 hours) at 2023 1053  Last data filed at 2023 0946  Gross per 24 hour   Intake 507.46 ml   Output 2315 ml   Net -1807.54 ml         Physical Exam:  GEN: non-toxic appearing, NAD, in bed  HEENT: MMM, no scleral icterus, EOMI  NECK:  "Supple, midline trachea, no raised JVP or a-waves notable  CV: RRR, no MRG, pulses equal and symmetric 2+ at radial  PULM: Decreased breath sounds to left lower middle and upper lobe(s); CT insertion site mildly tender, gauze in place, no drainage around tube, no air leak when unclamped CT  ABDOMEN: Soft, non-tender, non-distended, no rebound or guarding  SKIN: Warm, dry, intact, no rashes  MSK: No deformity, +clubbing, no cyanosis, or lower extremity edema  NEURO: awake and following commands, at neurologic baseline  LINES: Intact, no extravasation or induration, no erythema to PIV or support devices  Extremities: LE symmetric, no erythema, warmth, or swelling, TTP to light touch over LLE    I have reviewed all labs / images / cultures  Pertinent labs are as follows:   No results for input(s): "PH", "PCO2", "PO2", "HCO3", "POCSATURATED", "BE" in the last 24 hours.  Recent Labs   Lab 11/13/23  0330 11/13/23  0748   WBC 12.36  --    RBC 3.72*  --    HGB 9.9* 10.2*   HCT 30.2* 30.4*     --    MCV 81*  --    MCH 26.6*  --    MCHC 32.8  --        Recent Labs   Lab 11/13/23  0330   *   K 4.5   CL 90*   CO2 22*   BUN 11   CREATININE 0.9   CALCIUM 8.4*   MG 1.7         Meds:   gabapentin  600 mg Oral TID    HYDROcodone-acetaminophen  1 tablet Oral Q4H    nicotine  1 patch Transdermal Daily    pantoprazole  40 mg Intravenous BID    polyethylene glycol  17 g Oral BID    senna-docusate 8.6-50 mg  1 tablet Oral Daily       Assessment & Plan: Eric Soliman is a 63 y.o. male with PMH seizure disorder, PAD s/p thrombectomy & PTA, HTN  who presented to [facility] with c/o SOB and chest pain. Patient found to have PE, L sided DVT, and large ALO lung mass and large effusion.  Effusion was drained on 11/7 and pleural drain placed 11/10.  CXR showed lung trapping.      #Mass of upper lobe of left lung  #Mediastinal lymphadenopathy with enlargement of lymph nodes  CTA on 11/7 with large new left pleural effusion, mildly " hyperattenuating to water, with faint rim enhancement, and left-to-right mediastinal shift. Substantial new atelectasis of much of the left lung. New 9 x 6 mm right lower lobe pulmonary nodule, possibly representing metastatic disease. New prominent to enlarged right hilar lymph nodes, up to 16 mm. Also with poorly defined increased fullness in the left adrenal, relative to 04/06/2023, for which metastatic disease   Based on these findings, discussed with patient our concern for malignancy. Previously was supposed to get EBUS but was lost to follow up after having missed procedure several times in May and June 2023. Patient maintains that he does not wish to pursue chemotherapy.    Plan:  -S/p 1.5L removed with thoracentesis on 11/7, exudative effusion  -Repeat CXR without pneumothorax  -Discussed importance of smoking cessation  - Will follow up cytology, may need tissue biopsy  - Ultrasound showed significant effusion.    - 11/10 L sided pleural drain placed with 2000 cc of output. Container replaced 11/11. Will continue drain to gravity.    -  cytology sample collected and pending.   - Micro with no growth.  -  Repeat CXR showed trapped lung (lung has not reexpanded to chest wall).  Repeat CXR with additional air noted after clamping tube yesterday  - Patient will likely need pleur X, recommend consult to social work to evaluate if patient can get pleur X and drainage bottles.  - Repeat CXR this afternoon    #Segmental and subsegmental PE  CTA w/ nonocclusive filling subtle type filling defect in a right lower lobe segmental pulmonary artery bifurcation, extending into subsegmental branches. Some evidence of right heart strain, with  reflux of contrast from the right atrium into the IVC and hepatic veins   Patient with likely provoked PE in the setting of left lung mass concerning for lung cancer likely metastatic   Has been off of Eliquis and Plavix for his PAD since April/May 2023  Not on oxygen currently,  stable on room air  LE US with L sided DVT  Now complicated by GIB 11/9 with no hemodynamic stability.  Colonoscopy 11/9 showed ischemic colitis.    Plan:  -Can continue to hold anticoagulation in setting of GIB.  - Patient to go for IVC filter with IR today    We will continue to follow with you, thank you for the opportunity to be involved in Mr. Soliman's care.    Case discussed with Dr. Moya. Attestation may differ from plan. Please appreciate.     Cirilo Lopez MD  [facility] Internal Medicine PGY-2

## 2023-11-13 NOTE — PLAN OF CARE
Chart reviewed / case discussed in am MDR   Stage III Lung CA, GIB, now with PD   Chest Tube in place     Pt is homeless; PT eval pending -   DC disposition pending pt's physical condition.    [facility] has been contacted about pt.        11/13/23 9488   Post-Acute Status   Post-Acute Authorization Other  (d/c disp TBD)   Discharge Delays   (pending medical stability)

## 2023-11-13 NOTE — NURSING
Transported pt to floor via bed, RIJ dressing Tess CHAMPAGNE, Matilde at bedside to receive care back of pt, IR care complete

## 2023-11-13 NOTE — NURSING
Rapid Response Nurse Follow-up Note     Followed up with patient for proactive rounding.   No acute issues at this time. Reviewed plan of care with bedside RNTess .   Team will continue to follow.  Please call Rapid Response RN, Radha Meléndez RN with any questions or concerns at [phone].

## 2023-11-13 NOTE — PROGRESS NOTES
I saw and evaluated the patient. I have reviewed and agree with the residents findings, including all diagnostic interpretations, and plans as written. This is an attestation of a separate note written by the ICU resident today.  As the teaching physician, I was present for and have confirmed the key portions of the service performed by the resident today.  In addition to the resident's note, I add the following:    Plan:  Pleural Effusion: exudative, lymphocytic; Likely malignant; s/p thoracentesis on 11/7 w/ 1500cc fluid drained. Drained to completion following chest tube placement; cytology still pending; CXR repeated and shows trapped lung. Therefore, not a candidate for pleurodesis. Best option would be PleurX catheter. Will need to have discussions with patient to see if he is amenable to this procedure and home drainage and will need to consult  for insurance approval. Unclamp chest tube today. Follow up CXR later today.   Lung Mass: spiculated; 4.9cm on previous CT (unable to visualize on current CT d/t effusion and atelectasis); if fluid positive would likely be at least Stage Tanvir disease; however, would likely need to pursue tissue sampling to better guide tx if he is willing to pursue tx. Continue ongoing talks with patient and palliative care.   PE: segmental and subsegmental; echo w/ normal RV function; BNP and trop negative; hemodynamically stable; Left DVTs seen on US on 11/7 no longer seen on repeat US on 11/11. Likely embolized. Would consider IVC filter placement.   GI bleed: c/w ischemic colitis on Flex sig  DVT ppx: SCDs  Code status: DNR  Dispo: Floor        Julien Moya MD  [facility]

## 2023-11-13 NOTE — CONSULTS
"Inferior Vena Cava Stent Placement Consult Note  Interventional Radiology      Reason for Consult: IVC filter    SUBJECTIVE:     Chief Complaint:  SOB    History of Present Illness:  Eric Soliman is a 63 y.o. male with a PMHx of HTN, lung nodule of undetermined character (lost to follow up from [facility]), PAD s/p thrombectomy & PTA (off eliquis and plavix x 1 month), epilepsy, and tobacco and marijuana use who was admitted on 11/7 for PE and DVT. Hospital course notable for CTA with large left pleural effusion and PE to right lower lobe. US shows DVT to left femoral and posterior tibialis veins. Pt previously on Eliquis and Plavix but non-adherent x 1 month. Treated with Heparin drip. Interventional Radiology has been consulted for IVC filter placement for management of  PE and DVT . Pt has had recent imaging including a US on 11/7 noting Deep venous thrombosis in the left mid femoral vein and the left posterior tibialis veins followed by US 11/11 noting stable left posterior tibial vein occlusion. No new DVT.  CTA on admit shows "large left pleural effusion and PE to right lower lobe."    Pt is not a candidate for anticoagulation due to recent GIB. Pt has been off heparin since 11/9.  The pt has not undergone IVC filter placement in the past. The pt is hemodynamically stable.     Review of Systems   Constitutional:  Negative for chills, fever, malaise/fatigue and weight loss.   Respiratory:  Negative for cough and shortness of breath.    Cardiovascular:  Negative for chest pain, palpitations and leg swelling.   Gastrointestinal:  Negative for abdominal pain, diarrhea, nausea and vomiting.   Genitourinary:  Negative for dysuria and flank pain.   Musculoskeletal:  Positive for myalgias. Negative for back pain.   Neurological:  Negative for weakness and headaches.       Scheduled Meds:   gabapentin  600 mg Oral TID    magnesium sulfate IVPB  2 g Intravenous Once    nicotine  1 patch Transdermal Daily    pantoprazole  40 " mg Intravenous BID     Continuous Infusions:  PRN Meds:acetaminophen, HYDROcodone-acetaminophen, HYDROmorphone, LIDOcaine, melatonin, polyethylene glycol, senna-docusate 8.6-50 mg, sodium chloride 0.9%    Review of patient's allergies indicates:  No Known Allergies    Past Medical History:   Diagnosis Date    Anticoagulant long-term use     DVT (deep venous thrombosis)     Seizures     epilepsy controlled w/ marijuana     Past Surgical History:   Procedure Laterality Date    ANGIOGRAPHY OF LOWER EXTREMITY Left 10/7/2022    Procedure: Angiogram Extremity Unilateral;  Surgeon: Charles Patten III, MD;  Location: [facility] CATH LAB;  Service: Cardiology;  Laterality: Left;    AORTOGRAPHY WITH SERIALOGRAPHY Bilateral 10/7/2022    Procedure: AORTOGRAM, WITH SERIALOGRAPHY;  Surgeon: Charles Patten III, MD;  Location: [facility] CATH LAB;  Service: Cardiology;  Laterality: Bilateral;    AORTOGRAPHY WITH SERIALOGRAPHY N/A 4/4/2023    Procedure: AORTOGRAM, WITH SERIALOGRAPHY;  Surgeon: Royce Yi MD;  Location: [facility] CATH LAB/EP;  Service: Cardiology;  Laterality: N/A;    ATHERECTOMY, PERIPHERAL BLOOD VESSEL Left 4/4/2023    Procedure: ATHERECTOMY, PERIPHERAL BLOOD VESSEL;  Surgeon: Royce Yi MD;  Location: [facility] CATH LAB/EP;  Service: Cardiology;  Laterality: Left;    CLOSURE DEVICE  4/4/2023    Procedure: Placement of Closure Device;  Surgeon: Royce Yi MD;  Location: [facility] CATH LAB/EP;  Service: Cardiology;;    COLONOSCOPY N/A 11/9/2023    Procedure: COLONOSCOPY;  Surgeon: Cedrick Holcomb MD;  Location: [facility] ENDO;  Service: Endoscopy;  Laterality: N/A;    FILTER PROTECTION, PERIPHERAL  4/4/2023    Procedure: Filter Protection, Peripheral;  Surgeon: Royce Yi MD;  Location: [facility] CATH LAB/EP;  Service: Cardiology;;    IVUS (INTRAVASCULAR ULTRASOUND) Left 4/4/2023    Procedure: ULTRASOUND, INTRAVASCULAR;  Surgeon: Royce Yi MD;  Location: [facility] CATH LAB/EP;  Service: Cardiology;  Laterality:  Left;    PTA, ILIAC ARTERY Left 4/4/2023    Procedure: PTA, Iliac Artery;  Surgeon: Royce Yi MD;  Location: [facility] CATH LAB/EP;  Service: Cardiology;  Laterality: Left;    THROMBECTOMY  4/4/2023    Procedure: THROMBECTOMY;  Surgeon: Royce Yi MD;  Location: [facility] CATH LAB/EP;  Service: Cardiology;;     Family History   Problem Relation Age of Onset    No Known Problems Mother     Heart disease Father      Social History     Tobacco Use    Smoking status: Every Day     Current packs/day: 2.00     Average packs/day: 2.0 packs/day for 48.9 years (97.7 ttl pk-yrs)     Types: Cigarettes     Start date: 1975    Smokeless tobacco: Never    Tobacco comments:     Pt is a 2 pk/day cigarette smoker x 49 yrs. 21 mg nicotine patch ordered Q day, and order requested upon discharge for 21 mg nicotine patch Q day. Pt states ready to quit. Ambulatory referral to Smoking Cessation clinic.   Substance Use Topics    Alcohol use: Not Currently     Comment: rarely 1 beer    Drug use: Yes     Types: Marijuana       OBJECTIVE:     Vital Signs (Most Recent)  Temp: 97.8 °F (36.6 °C) (11/13/23 0740)  Pulse: 91 (11/13/23 0740)  Resp: 18 (11/13/23 0740)  BP: 129/69 (11/13/23 0740)  SpO2: (!) 93 % (11/13/23 0740)    Physical Exam:  Physical Exam  Vitals and nursing note reviewed.   Constitutional:       Appearance: Normal appearance. He is ill-appearing.      Comments: disheveled   HENT:      Head: Normocephalic and atraumatic.   Eyes:      General: No scleral icterus.     Extraocular Movements: Extraocular movements intact.   Cardiovascular:      Rate and Rhythm: Normal rate.   Pulmonary:      Effort: Pulmonary effort is normal.   Abdominal:      General: Abdomen is flat.   Musculoskeletal:         General: Normal range of motion.      Cervical back: Normal range of motion.   Skin:     General: Skin is warm and dry.      Coloration: Skin is not jaundiced.   Neurological:      Mental Status: He is alert and oriented to person,  place, and time.   Psychiatric:         Mood and Affect: Mood normal.         Behavior: Behavior normal.         Thought Content: Thought content normal.         Judgment: Judgment normal.         Laboratory  I have reviewed all pertinent lab results within the past 24 hours.  CBC:   Recent Labs   Lab 11/13/23  0330 11/13/23  0748   WBC 12.36  --    RBC 3.72*  --    HGB 9.9* 10.2*   HCT 30.2* 30.4*     --    MCV 81*  --    MCH 26.6*  --    MCHC 32.8  --      CMP:   Recent Labs   Lab 11/13/23  0330   GLU 92   CALCIUM 8.4*   ALBUMIN 2.4*   PROT 7.9   *   K 4.5   CO2 22*   CL 90*   BUN 11   CREATININE 0.9   ALKPHOS 71   ALT 42   AST 41*   BILITOT 0.1     Coagulation:   Recent Labs   Lab 11/07/23  1740 11/08/23  0029 11/09/23  0436   LABPROT 11.4  --   --    INR 1.0  --   --    APTT 28.3   < > 41.7*    < > = values in this interval not displayed.       ASA/Mallampati  ASA: 3  Mallampati: 2    Imaging:  Recent imaging studies including  CTA and US  on 11/7 and US 11/11 which was independently reviewed by Tram Matias PA-C and Arvin Mandel MD.     ASSESSMENT/PLAN:     Assessment:  63 y.o. male with a PMHx of HTN, lung nodule of undetermined character (lost to follow up from [facility]), PAD s/p thrombectomy & PTA (off eliquis and plavix x 1 month), epilepsy, and tobacco and marijuana use who has been referred to IR for IVC filter placement for management of  PE and DVT  Pt with hypercoagulable state, previously noted DVT in LEFT mid femoral vein suspected to have traveled, and now subsegmental PE w right heart strain, large L pleural effusion.  Concern for worsening cardiac function and HF if not treated.    The procedure was discussed in great detail with the patient including thorough explanations of the potential risks and benefits of IVC filter placement. Risks include bleeding or infection at the puncture site, allergic reaction to contrast used during the procedure, malposition of the filter, migration of the  filter, IVC thrombosis, and damage to the IVC and surrounding structures. The patient is a candidate for IVC filter placement under moderate sedation. Plan discussed with ordering physician.The pt verbalized understanding of the plan and would like to proceed.    Plan:  Will proceed with IVC filter placement under moderate sedation on 11/13/23.   Please keep pt NPO.  Anticoagulation history reviewed. Heparin last given 11/9.  Coagulation labs reviewed.  Thank you for the consult. Please contact with questions via [employer] secure chat.    Tram Matias PA-C  Interventional Radiology

## 2023-11-13 NOTE — PT/OT/SLP PROGRESS
"Physical Therapy      Patient Name:  Eric Soliman   MRN:  [id number]    Patient not seen today secondary to  (Patient not seen today secondary to Therapist assessment -- U/S noted with occluded popliteal artery in LLE; per yesterday's MD note:                     "Pulmonology recommends IVC filter placement   Holding anticoagulation and antiplatelets").  Will follow-up .    "

## 2023-11-13 NOTE — PROGRESS NOTES
[facility] Medicine  Progress Note    Patient Name: Eric Soliman  MRN: [id number]  Patient Class: IP- Inpatient   Admission Date: 11/7/2023  Length of Stay: 6 days  Attending Physician: Moy Martin MD  Primary Care Provider: Jesika, Primary Doctor        Subjective:     Principal Problem:Shortness of breath        HPI:  Pt, Eirc Soliman, is a 62 yo M with pmh of htn, lung nodule of undetermined character, PAD s/p thrombectomy & PTA, epilepsy, and tobacco and marijuana use here for several months of progressively worsening shortness of breath that has been worse over the last few days. Patient had previously been seen by [facility] pulmonology for workup of this lung mass and respiratory issues, but has limited means and had failure of follow up. At this visit patient describes worsening shortness of breath and fatigue, reduced appetite, and significant unintentional weight loss of up to 25% of body weight. Patient is a current tobacco and marijuana smoker with approximately 50 pack year history of tobacco use, but says he has cut back drastically lately. Patient denies chest pain, nausea/vomiting, diarrhea, night sweats, cough or sputum production, or other symptoms at this time.      In ED, patient was noted to be hypertensive at 228/116. Initial ED BP was 190/90. Patient was given his previous home dose of coreg 3.125 BID and 10mg hydralazine q8h prn. Vitals otherwise stable. Pt's labs showed WBC count of 7.52. Troponin was negative, but will repeat once more to trend. BNP non-elevated. Chest xray showed large left pleural effusion. CTA of chest showed right sided PE and left sided pleural effusion and recommended thoracentesis to clinically correlate. Thoracentesis was performed at bedside by Pulm/Crit fellow. Samples were sent for analysis and approximately 1500mL were drawn off without complication. Patient had repeat CXR for post-thoracentesis lung views. Dr. Martin and [facility]  inpatient team were  present with patient to discuss possible diagnosis including malignancy and suggest a consultation with the Palliative medicine team.    Overview/Hospital Course:  64 yo male PMH HTN, Lung nodule, PVD s/p thrombectomy, tobacco use presented to ED with shortness of breath and weight loss x 1 month that has worsened over the past few days.  Pt has been seen by previously by [facility] pulmonology for lung nodule and respiratory problems.  Initially hypertensive to 200s systolic in ED; resolved with home medications.  CTA shows large left pleural effusion and PE to right lower lobe.  US shows DVT to left femoral and posterior tibialis veins.  Pt previously on Eliquis and Plavix but non-adherent x 1 month.  Treated with Heparin drip.  Pulmonology consulted and performed thoracentesis.  1.5L fluid drawn off and analysis consisted with exudative pleural effusion.  One episode of bright red bloody stool with associated generalized weakness on hospital day 2.  Discontinued heparin. Treated with Protonix.  GI consulted.  Colonoscopy showed segmental mucosal ulceration in the ascending colon with maroon stool througout the entire colon.  Findings consistent with ischemic colitis which likely occured in the setting of hypotension in a vasculopath.  Stable BP, H&H throughout hospital course.  Pleural catheter inserted with initial drainage of approximately 1L dark brown fluid.  Drained to completion for total of 3.5L  No pleurodesis due to trapped lung on CXR. Plan for Pleurex catheter.  IR consulted for IVC filter placement.    Interval History: Still having leg pain overnight. No adverse events.    Review of Systems   Constitutional:  Positive for activity change, appetite change, fatigue and unexpected weight change. Negative for chills, diaphoresis and fever.   HENT: Negative.     Respiratory:  Positive for shortness of breath. Negative for apnea, cough and wheezing.    Cardiovascular:  Negative for chest pain, palpitations and  leg swelling.   Gastrointestinal:  Negative for blood in stool and vomiting.   Genitourinary: Negative.    Musculoskeletal: Negative.    Skin:  Positive for pallor and rash (Rash on bilateral forearms present on admission).   Neurological:  Positive for weakness and numbness. Negative for facial asymmetry and light-headedness.   Psychiatric/Behavioral: Negative.  Negative for agitation and confusion. The patient is not hyperactive.      Objective:     Vital Signs (Most Recent):  Temp: 97.6 °F (36.4 °C) (11/13/23 1132)  Pulse: 93 (11/13/23 1308)  Resp: 16 (11/13/23 1308)  BP: (!) 162/81 (11/13/23 1308)  SpO2: (!) 92 % (11/13/23 1308) Vital Signs (24h Range):  Temp:  [97.5 °F (36.4 °C)-98 °F (36.7 °C)] 97.6 °F (36.4 °C)  Pulse:  [] 93  Resp:  [16-20] 16  SpO2:  [92 %-97 %] 92 %  BP: (129-176)/(69-85) 162/81     Weight: 60.4 kg (133 lb 3.2 oz)  Body mass index is 19.67 kg/m².    Intake/Output Summary (Last 24 hours) at 11/13/2023 1419  Last data filed at 11/13/2023 0946  Gross per 24 hour   Intake 147.46 ml   Output 1840 ml   Net -1692.54 ml         Physical Exam  Vitals and nursing note reviewed.   Constitutional:       Appearance: He is underweight. He is ill-appearing (chronically).      Comments: Unkept appearance    HENT:      Head: Normocephalic.      Nose: Nose normal.      Mouth/Throat:      Mouth: Mucous membranes are moist.   Cardiovascular:      Rate and Rhythm: Normal rate.      Pulses:           Dorsalis pedis pulses are 0 on the left side.        Posterior tibial pulses are 0 on the left side.   Pulmonary:      Breath sounds: Decreased air movement present. Examination of the left-middle field reveals decreased breath sounds. Examination of the left-lower field reveals decreased breath sounds. Decreased breath sounds present. No wheezing or rhonchi.      Comments: Easily develops shortness of breath  Abdominal:      General: Abdomen is flat.      Palpations: Abdomen is soft.   Musculoskeletal:          General: Tenderness present. No swelling.      Left foot: Decreased range of motion. Tenderness present. No swelling or deformity. Abnormal pulse.      Comments: Pt states foot his numb and he is unable to move his toes.   Feet:      Right foot:      Toenail Condition: Right toenails are long. Fungal disease present.     Left foot:      Toenail Condition: Left toenails are long. Fungal disease present.     Comments: Left foot cold and pale compared to right. No additional swelling in left leg.  in the calf. DP non-palpable and unable to find even with doppler.  Skin:     General: Skin is warm and dry.      Capillary Refill: Capillary refill takes less than 2 seconds.      Coloration: Skin is pale.      Findings: Rash present.   Neurological:      Mental Status: He is alert and oriented to person, place, and time.      Motor: Weakness present.   Psychiatric:         Mood and Affect: Mood normal.         Behavior: Behavior normal. Behavior is cooperative.         Thought Content: Thought content normal.         Judgment: Judgment normal.             Significant Labs: All pertinent labs within the past 24 hours have been reviewed.  Recent Lab Results         11/13/23  0748   11/13/23  0330   11/12/23 2022        Albumin   2.4         ALP   71         ALT   42         Anion Gap   8         AST   41         Baso #   0.06         Basophil %   0.5         BILIRUBIN TOTAL   0.1  Comment: For infants and newborns, interpretation of results should be based  on gestational age, weight and in agreement with clinical  observations.    Premature Infant recommended reference ranges:  Up to 24 hours.............<8.0 mg/dL  Up to 48 hours............<12.0 mg/dL  3-5 days..................<15.0 mg/dL  6-29 days.................<15.0 mg/dL           BUN   11         Calcium   8.4         Chloride   90         CO2   22         Creatinine   0.9         Differential Method   Automated         eGFR   >60         Eos #    0.2         Eosinophil %   1.3         Glucose   92         Gran # (ANC)   8.9         Gran %   71.8         Hematocrit 30.4   30.2   28.5       Hemoglobin 10.2   9.9   9.6       Immature Grans (Abs)   0.19  Comment: Mild elevation in immature granulocytes is non specific and   can be seen in a variety of conditions including stress response,   acute inflammation, trauma and pregnancy. Correlation with other   laboratory and clinical findings is essential.           Immature Granulocytes   1.5         Lymph #   2.4         Lymph %   19.0         Magnesium    1.7         MCH   26.6         MCHC   32.8         MCV   81         Mono #   0.9         Mono %   7.4         MPV   9.4         nRBC   0         Phosphorus Level   3.7         Platelet Count   406         Potassium   4.5         PROTEIN TOTAL   7.9         RBC   3.72         RDW   15.6         Sodium   120         WBC   12.36                 Significant Imaging: I have reviewed all pertinent imaging results/findings within the past 24 hours.    Assessment/Plan:      * Shortness of breath  Chronic, progressive shortness of breath worsening over the last few days.    -CTA- R segmental and subsegmental PE w right heart strain, large L pleural effusion, R hilar nodules  -Heparin bridge for PE- will discuss long term anticoagulation. Pt previously on eliquis and clopidogrel  -Pulmonology consulted  -Thoracentesis performed- approx 1.5L drawn off, sent for analysis  -LE US- evidence of DVT in left mid femoral and left posterior tibial veins  -ECHO GIDD, EF 55-60%, small posterior pericardial effusion    -Pulmonology recommends IVC filter placement. Will discuss with IR  - Will follow up repeat CXR    GI bleed  Pt had bout of hematochezia this morning. Taken by GI for endoscopy. They believe etiology is hypotension of splanchnic circulation in a vasculopath.    -GI consulted  -H&H stable at this time  -Holding antihypertensives  -Holding anticoagulation and  antiplatelets  -Continue to monitor and reassess      Mild malnutrition  Replenish electrolytes as needed    Full adult diet    Boost supplements as tolerated      Asymptomatic hypertensive urgency  Patient has a current diagnosis of hypertensive urgency (without evidence of end organ damage) which is controlled.  Latest blood pressure and vitals reviewed-   Temp:  [97.2 °F (36.2 °C)-97.9 °F (36.6 °C)]   Pulse:  [71-87]   Resp:  [16-20]   BP: (112-175)/(61-81)   SpO2:  [94 %-98 %] .   Patient currently off IV antihypertensives.   Home meds for hypertension were reviewed and noted below.   Hypertension Medications               carvediloL (COREG) 3.125 MG tablet Take 1 tablet (3.125 mg total) by mouth 2 (two) times daily.            Medication adjustment for hospital antihypertensives is as follows- Coreg 6.25mg BID, Nifedipine 60mg daily, PRN Hydralazine IV    Will aim for controlled BP reduction by medications noted above. Monitor and mitigate end organ damage as indicated.    Pulmonary embolism  See shortness of breath    Patient taken for IVC placement today. Will resume anticoagulation as soon as able.    Pleural effusion  Patient found to have large pleural effusion on imaging. I have personally reviewed and interpreted the following imaging: Xray, CT, and Ultrasound. A thoracentesis was performed. Pleural fluid was sent for analysis. Labs reviewed, including Serum LDH   LD   Date Value Ref Range Status   11/08/2023 194 110 - 260 U/L Final     Comment:     Results are increased in hemolyzed samples.   , Pleural Fluid LDH   LD, Fluid   Date Value Ref Range Status   11/07/2023 3004 Not established U/L Final     Comment:     Reference intervals have not been established for body fluids.  Comparison of this result with the concentration in the blood,   serum,or plasma is recommended.  The reference interval for LDH in serum/plasma is   110-260 U/L. Please interpret in context with the clinical  picture.      ,  "Serum Protein   Total Protein   Date Value Ref Range Status   11/11/2023 7.5 6.0 - 8.4 g/dL Final    , Pleural Protein No results found for: "PROTEINBODYF" , Cell Count and Differential No results found for: "BFCELLCNT" , Cytology No results found for: "CYTOFLUID" , and Fluid Cultures No results found for: "BODYFLUIDCUL" . Fluid most consistent with Exudate.  . Most likely etiology includes  Lung malignancy or metastatic neoplasm . Management to include  drained 1500mL. Will reassess.  Pulmonology plans to re-tap and drain pleural effusion. Repeat cytology-results pending  Pulm drained bedside- chest tube in place, currently clamped      Lung mass  Pt has previous evidence of uncharacterized lung mass as he was lost to follow up by [facility] Pulmonology team.  Imaging on admission shows significant left sided pleural effusion, R PE, and new R hilar nodules from previous scan.    History and symptoms worrying for primary lung neoplasm and/or malignant metastasis. Will reassess and possible biopsy/EBUS per pulm recs and patient preferences.    Plan:  -Palliative team consulted, recommendations welcomed    PAD (peripheral artery disease)  History of bilateral PAD and critical limb ischemia in lower extremities. History of DVT in lower extremities. Current segmental and subsegmental PE in R lung.  Pt describes foot as numb and unable to move toes. Feels like it is "asleep".  -Anticoagulation held following ischemic colitis induced GI bleed  BLE venous U/S showed no new DVT    Plan:   -BLE arterial US ordered, will follow for results              Thrombosis  PE in right lung. Cardiology and Pulm consulted  -Previously on eliquis and clopidogrel, but hasn't taken in months as pt did not have medication refills.  -Start on Heparin 80U/kg--> Heparin held now due to GI bleed  -Resume eliquis and clopidogrel when able. Currently held due to GI bleed can resume if patient agrees to risk/benefit  -IVC filter placed 11/13    Primary " hypertension  Chronic, uncontrolled. Latest blood pressure and vitals reviewed-     Temp:  [97.6 °F (36.4 °C)-98.3 °F (36.8 °C)]   Pulse:  [74-95]   Resp:  [10-20]   BP: (100-159)/(64-84)   SpO2:  [92 %-96 %] .   Home meds for hypertension were reviewed and noted below.   Hypertension Medications               carvediloL (COREG) 3.125 MG tablet Take 1 tablet (3.125 mg total) by mouth 2 (two) times daily.          While in the hospital, will manage blood pressure as follows; Adjust home antihypertensive regimen as follows- hold for now as hypotension may have caused/contributed to mesenteric ischemia and the hematochezia.    Will utilize p.r.n. blood pressure medication only if patient's blood pressure greater than 180/110 and he develops symptoms such as worsening chest pain or shortness of breath.    Cardiology consulted  GI consulted for GI bleed    Tobacco abuse  21mg Nicotine patch    Refer patient to smoking cessation services          VTE Risk Mitigation (From admission, onward)           Ordered     IP VTE HIGH RISK PATIENT  Once         11/12/23 0904     Place ENOCH hose  Until discontinued         11/07/23 1425     Place sequential compression device  Until discontinued         11/07/23 1425                    Discharge Planning   [place]:      Code Status: DNR   Is the patient medically ready for discharge?:     Reason for patient still in hospital (select all that apply): Patient trending condition, Laboratory test, Consult recommendations, and PT / OT recommendations                     Bob Mata MD  Department of Hospital Medicine   [facility]

## 2023-11-13 NOTE — PT/OT/SLP PROGRESS
"Occupational Therapy  Hold OT    Patient Name:  Eric Soliman   MRN:  [id number]    Patient not seen today secondary to Therapist assessment -- U/S noted with occluded popliteal artery in LLE; per yesterday's MD note:    "Pulmonology recommends IVC filter placement   Holding anticoagulation and antiplatelets"    11/13/2023  "

## 2023-11-13 NOTE — ASSESSMENT & PLAN NOTE
PE in right lung. Cardiology and Pulm consulted  -Previously on eliquis and clopidogrel, but hasn't taken in months as pt did not have medication refills.  -Start on Heparin 80U/kg--> Heparin held now due to GI bleed  -Resume eliquis and clopidogrel when able. Currently held due to GI bleed can resume if patient agrees to risk/benefit  -IVC filter placed 11/13

## 2023-11-13 NOTE — PLAN OF CARE
Problem: Adult Inpatient Plan of Care  Goal: Plan of Care Review  Outcome: Ongoing, Progressing     Problem: Adult Inpatient Plan of Care  Goal: Optimal Comfort and Wellbeing  Outcome: Ongoing, Progressing     Problem: Adult Inpatient Plan of Care  Goal: Readiness for Transition of Care  Outcome: Ongoing, Progressing     Problem: Seizure, Active Management  Goal: Absence of Seizure/Seizure-Related Injury  Outcome: Ongoing, Progressing

## 2023-11-13 NOTE — PROCEDURES
Radiology Post-Procedure Note    Pre Op Diagnosis: DVT and GI hemorrhage  Post Op Diagnosis: Same    Procedure: IVC filter placement    Procedure performed by: Arvin Mandel MD    Written Informed Consent Obtained: Yes  Specimen Removed: NO  Estimated Blood Loss: Minimal    Findings:   Widely patent IVC, option elite IVC filter placed in the infrarenal IVC without complication via the right internal jugular vein.     Patient tolerated procedure well.    The filter is retrievable. Please refer patient back to IR when filter is no longer needed so that it can be removed.     Arvin Mandel MD  Interventional Radiology  Department of Radiology

## 2023-11-14 PROBLEM — K62.5 RECTAL BLEEDING: Status: ACTIVE | Noted: 2023-01-01

## 2023-11-14 NOTE — PROGRESS NOTES
[facility] - Telemetry  Adult Nutrition  Progress Note    SUMMARY       Recommendations    Recommendation:   1. Continue to encourage intake of meals & Boost as tolerated.   2. Monitor weight/labs.  3. RD to continue to follow to monitor po intake    Goals:  Pt will tolerate diet with at least 50-75% intake at meals by RD follow up  Nutrition Goal Status: progressing towards goal  Communication of RD Recs: reviewed with RN    Assessment and Plan  Endocrine  Mild malnutrition  Malnutrition Type:  Context: social/environmental circumstances  Level: mild    Related to (etiology):   homelessness    Signs and Symptoms (as evidenced by):   Weight loss, poor intake, mild muscle/fat wasting     Malnutrition Characteristic Summary:  Weight Loss (Malnutrition): greater than 10% in 6 months  Energy Intake (Malnutrition): less than 75% for greater than or equal to 1 month  Subcutaneous Fat (Malnutrition): mild depletion  Muscle Mass (Malnutrition): mild depletion    Interventions:  Commercial beverage  Collaboration with other providers    Nutrition Diagnosis Status:   Continues    Malnutrition Assessment  Malnutrition Context: social/environmental circumstances  Malnutrition Level: mild  Weight Loss (Malnutrition): greater than 10% in 6 months  Energy Intake (Malnutrition): less than 75% for greater than or equal to 1 month  Subcutaneous Fat (Malnutrition): mild depletion  Muscle Mass (Malnutrition): mild depletion   Upper Arm Region (Subcutaneous Fat Loss): mild depletion  Thoracic and Lumbar Region: mild depletion   Orthodoxy Region (Muscle Loss): mild depletion  Clavicle Bone Region (Muscle Loss): mild depletion  Patellar Region (Muscle Loss): mild depletion  Anterior Thigh Region (Muscle Loss): mild depletion  Posterior Calf Region (Muscle Loss): mild depletion       Reason for Assessment  Reason For Assessment: RD follow-up  Diagnosis:  (chest pain)  Relevant Medical History: DVT, seizures, thrombectomy  General Information  "Comments: Pt s/p IVC filter placement today. sp colonoscopy . Pt on Regular diet with Boost Plus. Chest tube placed 11/10. Noted 18lb weight loss x 6 months. Truong 20-skin intact. Pt homeless with poor access to food. NFPE completed - mild wasting of legs, arms, thoracic region, clavicles, & temples.  Nutrition Discharge Planning: pt to d/c on Regular diet    Nutrition Risk Screen  Nutrition Risk Screen: no indicators present    Nutrition/Diet History  Food Preferences: no Restoration or cultural food prefs identified  Spiritual, Cultural Beliefs, Christianity Practices, Values that Affect Care: no  Factors Affecting Nutritional Intake: socio-economic    Anthropometrics  Temp: 97 °F (36.1 °C)  Height Method: Stated  Height: 5' 9" (175.3 cm)  Height (inches): 69 in  Weight Method: Bed Scale  Weight: 60.4 kg (133 lb 2.5 oz)  Weight (lb): 133.16 lb  Ideal Body Weight (IBW), Male: 160 lb  % Ideal Body Weight, Male (lb): 83.23 %  BMI (Calculated): 19.7  BMI Grade: 18.5-24.9 - normal  Usual Body Weight (UBW), k.7 kg ()  % Usual Body Weight: 88.1  % Weight Change From Usual Weight: -12.08 %     Lab/Procedures/Meds  Pertinent Labs Reviewed: reviewed  Pertinent Labs Comments: Na 120L, Ca 8.4L, Alb 2.4L  Pertinent Medications Reviewed: reviewed  Pertinent Medications Comments: gabapentin, pantoprazole    Estimated/Assessed Needs  Weight Used For Calorie Calculations: 59.6 kg (131 lb 6.3 oz)  Energy Calorie Requirements (kcal):  (35 kcal/kg)  Energy Need Method: Kcal/kg  Protein Requirements: 71g (1.2g/kg)  Weight Used For Protein Calculations: 59.6 kg (131 lb 6.3 oz)  Estimated Fluid Requirement Method: RDA Method  RDA Method (mL):      Nutrition Prescription Ordered  Current Diet Order: Regular  Oral Nutrition Supplement: Boost Plus    Evaluation of Received Nutrient/Fluid Intake  I/O: 600/2865  Energy Calories Required: not meeting needs  Protein Required: not meeting needs  Fluid Required: not meeting " needs  Comments: LBM 11/9  % Intake of Estimated Energy Needs: 50 - 75 %  % Meal Intake: 50 - 75 %    Nutrition Risk  Level of Risk/Frequency of Follow-up:  (2xweekly)     Monitor and Evaluation  Food and Nutrient Intake: food and beverage intake  Food and Nutrient Adminstration: diet order  Physical Activity and Function: nutrition-related ADLs and IADLs  Anthropometric Measurements: weight  Biochemical Data, Medical Tests and Procedures: electrolyte and renal panel  Nutrition-Focused Physical Findings: overall appearance     Nutrition Follow-Up  RD Follow-up?: Yes

## 2023-11-14 NOTE — PROGRESS NOTES
"[facility] Pulmonary/Critical Care Fellow Consult Note:  Primary Attending:  Dr. Keene   Consultant Attending: Dr. Mullins         Admit Date: 2023   Hospital LOS: 7     Subjective:    Mr. Soliman is a 62 yo M with PMH seizure disorder, PAD s/p thrombectomy & PTA, HTN who presents to [facility] ED with c/o intermittent SOB and chest pain. Of note, patient lost to follow up with [facility] at [facility] who had wanted to perform a bronchoscopy/EBUS on patient. However, he was unable to show up and it had to be rescheduled multiple times (May and ). He was first seen by [facility] Pulm team on 23 during a hospitalization. He was noted to have increasing ALO mass with mediastinal lymphadenopathy at that time. He has a long smoking history, started at age 14 1ppd and is still smoking though he states he is smoking less than before. He has noticed significant weight loss with ~30 lbs since his admission in 2023. Per chart review, patient has unintentionally lost 24 lb since then.     Interval:  Drain unclamped with  100 cc of drainage yesterday.  No difference per patient in SOB.   Patient does not comlain of any SOB. Denies CP, n/v, dysuria, weakness, numbness.  No further episodes of bloody BM.  Pt went for IVC filter placement with IR yesterday without complication.    Objective:  Last 24 Hour Vital Signs:  BP  Min: 120/75  Max: 171/83  Temp  Av.8 °F (36.6 °C)  Min: 97 °F (36.1 °C)  Max: 98.6 °F (37 °C)  Pulse  Av.3  Min: 84  Max: 100  Resp  Av.5  Min: 16  Max: 20  SpO2  Av.9 %  Min: 92 %  Max: 97 %  Height  Av' 9" (175.3 cm)  Min: 5' 9" (175.3 cm)  Max: 5' 9" (175.3 cm)  Weight  Av.4 kg (133 lb 2.5 oz)  Min: 60.4 kg (133 lb 2.5 oz)  Max: 60.4 kg (133 lb 2.5 oz)  Body mass index is 19.66 kg/m².  I & O (Last 24H):  Intake/Output Summary (Last 24 hours) at 2023 0756  Last data filed at 2023 0620  Gross per 24 hour   Intake 387.46 ml   Output 1000 ml   Net -612.54 ml " "        Physical Exam:  GEN: non-toxic appearing, NAD, in bed  HEENT: MMM, no scleral icterus, EOMI  NECK: Supple, midline trachea, no raised JVP or a-waves notable, gauze overlying R neck  CV: RRR, no MRG, pulses equal and symmetric 2+ at radial  PULM: Decreased breath sounds to left lower middle and upper lobe(s); CT insertion site mildly tender, gauze in place, no drainage around tube  ABDOMEN: Soft, non-tender, non-distended, no rebound or guarding  SKIN: Warm, dry, intact, no rashes  MSK: No deformity, +clubbing, no cyanosis, or lower extremity edema  NEURO: awake and following commands, at neurologic baseline  LINES: Intact, no extravasation or induration, no erythema to PIV or support devices  Extremities: LE symmetric, no erythema, warmth, or swelling, TTP to light touch over LLE    I have reviewed all labs / images / cultures  Pertinent labs are as follows:   No results for input(s): "PH", "PCO2", "PO2", "HCO3", "POCSATURATED", "BE" in the last 24 hours.  Recent Labs   Lab 11/14/23  0418   WBC 12.10   RBC 3.43*   HGB 9.1*   HCT 27.1*      MCV 79*   MCH 26.5*   MCHC 33.6       Recent Labs   Lab 11/14/23  0418   *   K 4.2   CL 88*   CO2 25   BUN 14   CREATININE 0.8   CALCIUM 8.3*   MG 1.9         Meds:   gabapentin  600 mg Oral TID    HYDROcodone-acetaminophen  1 tablet Oral Q4H    magnesium sulfate IVPB  2 g Intravenous Once    nicotine  1 patch Transdermal Daily    pantoprazole  40 mg Intravenous BID    polyethylene glycol  17 g Oral BID    senna-docusate 8.6-50 mg  1 tablet Oral Daily       Assessment & Plan: Eric Soliman is a 63 y.o. male with PMH seizure disorder, PAD s/p thrombectomy & PTA, HTN  who presented to [facility] with c/o SOB and chest pain. Patient found to have PE, L sided DVT, and large ALO lung mass and large effusion.  Effusion was drained on 11/7 and pleural drain placed 11/10.  CXR showed lung trapping.      #Mass of upper lobe of left lung  #Mediastinal lymphadenopathy " with enlargement of lymph nodes  CTA on 11/7 with large new left pleural effusion, mildly hyperattenuating to water, with faint rim enhancement, and left-to-right mediastinal shift. Substantial new atelectasis of much of the left lung. New 9 x 6 mm right lower lobe pulmonary nodule, possibly representing metastatic disease. New prominent to enlarged right hilar lymph nodes, up to 16 mm. Also with poorly defined increased fullness in the left adrenal, relative to 04/06/2023, for which metastatic disease   Based on these findings, discussed with patient our concern for malignancy. Previously was supposed to get EBUS but was lost to follow up after having missed procedure several times in May and June 2023. Patient maintains that he does not wish to pursue chemotherapy.    Plan:  -S/p 1.5L removed with thoracentesis on 11/7, exudative effusion  -Repeat CXR without pneumothorax  -Discussed importance of smoking cessation  - Will follow up cytology, may need tissue biopsy  - Ultrasound showed significant effusion.    - 11/10 L sided pleural drain placed with 2000 cc of output. Container replaced 11/11. Will continue drain to gravity.    -  cytology sample collected and pending.   - Micro with no growth.  -  Repeat CXR showed trapped lung (lung has not reexpanded to chest wall).  Repeat CXR with additional air noted after clamping tube yesterday  - Would consider pembrolizumab if able, recommend consult to oncology  - Minimal drainage yesterday, chest tube pulled 11/13/23  - Palliative on board, hospice care a reasonable option    #Segmental and subsegmental PE  CTA w/ nonocclusive filling subtle type filling defect in a right lower lobe segmental pulmonary artery bifurcation, extending into subsegmental branches. Some evidence of right heart strain, with  reflux of contrast from the right atrium into the IVC and hepatic veins   Patient with likely provoked PE in the setting of left lung mass concerning for lung cancer  likely metastatic   Has been off of Eliquis and Plavix for his PAD since April/May 2023  Not on oxygen currently, stable on room air  LE US with L sided DVT  Now complicated by GIB 11/9 with no hemodynamic stability.  Colonoscopy 11/9 showed ischemic colitis.    Plan:  -Can continue to hold anticoagulation due to high risk for bleed  - IVC filter placed 11/13    We will continue to follow with you, thank you for the opportunity to be involved in Mr. Soliman's care.    Case discussed with Dr. Mast  Attestation may differ from plan. Please appreciate.     Cirilo Lopez MD  [facility] Internal Medicine PGY-2    Pt seen and examined with Pulmonary/Critical Care team and this note reviewed and validated with the following additional comments: Chest tube pulled because 1) lung was trapped and not expanding, 2) pt was not symptomatic from his PTX, and 3) it seems like the plan will be largely palliative.    Medical Decision Making (MDM) was high  The number and complexity of problems addressed was high.  The amount and complexity of data reviewed was high.  The risk of complications and/or morbidity/mortality was high.  The tests ordered were none.  I communicated with the following providers FM.  Time spent in the care of this patient was 20minutes    Alden Mast MD  Phone [phone]

## 2023-11-14 NOTE — ASSESSMENT & PLAN NOTE
PE in right lung. Cardiology and Pulm consulted  -Previously on eliquis and clopidogrel, but hasn't taken in months as pt did not have medication refills.  -Start on Heparin 80U/kg--> Heparin held now due to GI bleed  -Resume eliquis and clopidogrel when able. Currently held due to GI bleed can resume if patient agrees to risk/benefit.  -IVC filter placed 11/13

## 2023-11-14 NOTE — ASSESSMENT & PLAN NOTE
Pt has previous evidence of uncharacterized lung mass as he was lost to follow up by [facility] team.  Imaging on admission shows significant left sided pleural effusion, R PE, and new R hilar nodules from previous scan.    History and symptoms worrying for primary lung neoplasm and/or malignant metastasis. Will reassess and possible biopsy/EBUS per pulm recs and patient preferences.    Plan:  -Palliative team consulted, recommendations welcomed  -Pending cytology  -Oncology consulted and contacted, pending recommendations.

## 2023-11-14 NOTE — NURSING
RAPID RESPONSE NURSE PROACTIVE ROUNDING NOTE         Admit Date: 2023  LOS: 6  Code Status: DNR   Date of Visit: 2023  : 1960  Age: 63 y.o.  Sex: male  Race: Unknown  Bed: K4/K422 A:   MRN: [id number]  Was the patient discharged from an ICU this admission? No   Was the patient discharged from a PACU within last 24 hours? No   Did the patient receive conscious sedation/general anesthesia in last 24 hours? No   Was the patient in the ED within the past 24 hours? No   Was the patient on NIPPV within the past 24 hours? No   Attending Physician: Moy Martin MD  Primary Service: Hospitalist,Family Medicine       SITUATION    Diagnosis: Shortness of breath   has a past medical history of Anticoagulant long-term use, DVT (deep venous thrombosis), and Seizures.    Last Vitals:  Temp: 97.8 °F (36.6 °C) (2011)  Pulse: 97 (2011)  Resp: 18 (2204)  BP: 120/75 (2011)  SpO2: 93 % (2011)    24 Hour Vitals Range:  Temp:  [97 °F (36.1 °C)-97.9 °F (36.6 °C)]   Pulse:  []   Resp:  [16-20]   BP: (120-162)/(69-81)   SpO2:  [92 %-97 %]     Chart reviewed, no interventions necessary at this time.    FOLLOW UP    Call back the Rapid Response NurseSami at [phone] for additional questions or concerns.

## 2023-11-14 NOTE — ASSESSMENT & PLAN NOTE
"History of bilateral PAD and critical limb ischemia in lower extremities. History of DVT in lower extremities. Current segmental and subsegmental PE in R lung.  Pt describes foot as numb and unable to move toes. Feels like it is "asleep".  -Anticoagulation held following ischemic colitis induced GI bleed  BLE venous U/S showed no new DVT from previous    Plan:   -BLE arterial US ordered:  " #1. Severe peripheral vascular disease with findings compatible with severe aortoiliac stenosis.     High-grade/clinically significant stenosis of the right CFA.  Monophasic waveforms in the SFA with short segment distal right SFA occlusion.     Tardus parvus runoff waveforms in the right lower extremity probably related to inflow stenosis.     Minimal forward flow in the left common femoral artery suggests either iliac artery occlusion or critical stenosis.     Tardus parvus waveforms throughout the remainder of the left lower extremity probably related to poor inflow.  Occluded popliteal artery on the left."           "

## 2023-11-14 NOTE — SUBJECTIVE & OBJECTIVE
Interval History: No adverse events overnight. Pt had IVC filter placed yesterday without complication. Still complaining of leg pain today and feeling weak. No other complaints.    Review of Systems   Constitutional:  Positive for activity change, appetite change, fatigue and unexpected weight change. Negative for chills, diaphoresis and fever.   HENT: Negative.     Respiratory:  Positive for shortness of breath. Negative for apnea, cough and wheezing.    Cardiovascular:  Negative for chest pain, palpitations and leg swelling.   Gastrointestinal:  Negative for blood in stool and vomiting.   Genitourinary: Negative.    Musculoskeletal: Negative.    Skin:  Positive for pallor and rash (Rash on bilateral forearms present on admission).   Neurological:  Positive for weakness and numbness. Negative for facial asymmetry and light-headedness.   Psychiatric/Behavioral: Negative.  Negative for agitation and confusion. The patient is not hyperactive.      Objective:     Vital Signs (Most Recent):  Temp: 97.6 °F (36.4 °C) (11/14/23 1632)  Pulse: 103 (11/14/23 1632)  Resp: 18 (11/14/23 1632)  BP: 137/66 (11/14/23 1632)  SpO2: (!) 92 % (11/14/23 1632) Vital Signs (24h Range):  Temp:  [97 °F (36.1 °C)-98.6 °F (37 °C)] 97.6 °F (36.4 °C)  Pulse:  [] 103  Resp:  [16-20] 18  SpO2:  [92 %-96 %] 92 %  BP: (120-171)/(66-83) 137/66     Weight: 60.4 kg (133 lb 2.5 oz)  Body mass index is 19.66 kg/m².    Intake/Output Summary (Last 24 hours) at 11/14/2023 1639  Last data filed at 11/14/2023 1420  Gross per 24 hour   Intake 240 ml   Output 1725 ml   Net -1485 ml         Physical Exam  Vitals and nursing note reviewed.   Constitutional:       Appearance: He is underweight. He is ill-appearing (chronically).      Comments: Unkept appearance    HENT:      Head: Normocephalic.      Nose: Nose normal.      Mouth/Throat:      Mouth: Mucous membranes are moist.   Cardiovascular:      Rate and Rhythm: Normal rate.      Pulses:            Dorsalis pedis pulses are 0 on the left side.        Posterior tibial pulses are 0 on the left side.   Pulmonary:      Breath sounds: Decreased air movement present. Examination of the left-middle field reveals decreased breath sounds. Examination of the left-lower field reveals decreased breath sounds. Decreased breath sounds present. No wheezing or rhonchi.      Comments: Easily develops shortness of breath  Abdominal:      General: Abdomen is flat.      Palpations: Abdomen is soft.   Musculoskeletal:         General: Tenderness present. No swelling.      Left foot: Decreased range of motion. Tenderness present. No swelling or deformity. Abnormal pulse.      Comments: Pt states foot his numb and he is unable to move his toes.   Feet:      Right foot:      Toenail Condition: Right toenails are long. Fungal disease present.     Left foot:      Toenail Condition: Left toenails are long. Fungal disease present.     Comments: Left foot cold and pale compared to right. No additional swelling in left leg.  in the calf. DP non-palpable and unable to find even with doppler.  Skin:     General: Skin is warm and dry.      Capillary Refill: Capillary refill takes less than 2 seconds.      Coloration: Skin is pale.      Findings: Rash present.   Neurological:      Mental Status: He is alert and oriented to person, place, and time.      Motor: Weakness present.   Psychiatric:         Mood and Affect: Mood normal.         Behavior: Behavior normal. Behavior is cooperative.         Thought Content: Thought content normal.         Judgment: Judgment normal.             Significant Labs: All pertinent labs within the past 24 hours have been reviewed.  Recent Lab Results         11/14/23  0418   11/13/23  1648        Albumin 2.3         ALP 73         ALT 32         Anion Gap 8         AST 33         Baso # 0.06         Basophil % 0.5         BILIRUBIN TOTAL 0.7  Comment: For infants and newborns, interpretation of  results should be based  on gestational age, weight and in agreement with clinical  observations.    Premature Infant recommended reference ranges:  Up to 24 hours.............<8.0 mg/dL  Up to 48 hours............<12.0 mg/dL  3-5 days..................<15.0 mg/dL  6-29 days.................<15.0 mg/dL           BUN 14         Calcium 8.3         Chloride 88         CO2 25         Creatinine 0.8         Differential Method Automated         eGFR >60         Eos # 0.2         Eosinophil % 1.7         Glucose 110         Gran # (ANC) 8.6         Gran % 71.2         Hematocrit 27.1         Hemoglobin 9.1         Immature Grans (Abs) 0.13  Comment: Mild elevation in immature granulocytes is non specific and   can be seen in a variety of conditions including stress response,   acute inflammation, trauma and pregnancy. Correlation with other   laboratory and clinical findings is essential.           Immature Granulocytes 1.1         Lymph # 2.1         Lymph % 17.2         Magnesium  1.9         MCH 26.5         MCHC 33.6         MCV 79         Mono # 1.1         Mono % 9.4         MPV 9.0         nRBC 0         Phosphorus Level 4.7         Platelet Count 341         POCT Glucose   93       Potassium 4.2         PROTEIN TOTAL 7.8         RBC 3.43         RDW 15.4         Sodium 121         WBC 12.10                 Significant Imaging: I have reviewed all pertinent imaging results/findings within the past 24 hours.

## 2023-11-14 NOTE — PLAN OF CARE
Chart reviewed / case discussed in am MDR -   Per MD - pt's status is likely grave   May need placement at d/c --   Referral sent to over 30 NH's to explore care home placement  - possibly with Hospice.   CM will complete LOCET   CM to meet with pt to determine where his vehicle is parked - pt wants access to family contact info located in his vehicle.  Prior to admit - pt lived in his car.   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   Vehicle color/make:  black Explorer      keys are in ignition  if they aren't - the welding shop owner might have the keys - Alie Coyle.      Pt chose [facility] - CM will reach out to Tram.  Pt told to keep the phone near him as she will reach out to him today.    Per Tram - she is checking to make sure she has a bed available.  She will reach out to pt - in addition, she will likely need pt's financial info (3 mths of bank statements).  She will explain to pt why this is needed .  CM may need to send a form to pt's bank requesting info.    LOCET called in - faxed to [facility]           11/14/23 5874   Post-Acute Status   Post-Acute Authorization Placement   Post-Acute Placement Status Referrals Sent

## 2023-11-14 NOTE — PLAN OF CARE
Care plan reviewed with patient, AAOx4, no respiratory distress noted, on room air. NSR per cardiac monitor, no red alarm noted. Denies any pain or discomfort, education provided on medication effect and side effect, voices understanding.   Chest tube present, dressing clean, dry and intact, no draining from site noted.  Call light within his reach, no apparent distress noted, bed in low position, bed alarm on, educated on the importance of calling as needed, voices understanding, stable at this time.    Problem: Adult Inpatient Plan of Care  Goal: Plan of Care Review  Outcome: Ongoing, Progressing     Problem: Adult Inpatient Plan of Care  Goal: Patient-Specific Goal (Individualized)  Outcome: Ongoing, Progressing     Problem: Adult Inpatient Plan of Care  Goal: Optimal Comfort and Wellbeing  Outcome: Ongoing, Progressing     Problem: Fall Injury Risk  Goal: Absence of Fall and Fall-Related Injury  Outcome: Ongoing, Progressing     Problem: Skin Injury Risk Increased  Goal: Skin Health and Integrity  Outcome: Ongoing, Progressing

## 2023-11-14 NOTE — PROGRESS NOTES
[facility] Medicine  Progress Note    Patient Name: Eric Soliman  MRN: [id number]  Patient Class: IP- Inpatient   Admission Date: 11/7/2023  Length of Stay: 7 days  Attending Physician: Miriam Keene MD  Primary Care Provider: Jesika, Primary Doctor        Subjective:     Principal Problem:Shortness of breath        HPI:  Pt, Eric Soliman, is a 62 yo M with pmh of htn, lung nodule of undetermined character, PAD s/p thrombectomy & PTA, epilepsy, and tobacco and marijuana use here for several months of progressively worsening shortness of breath that has been worse over the last few days. Patient had previously been seen by [facility] pulmonology for workup of this lung mass and respiratory issues, but has limited means and had failure of follow up. At this visit patient describes worsening shortness of breath and fatigue, reduced appetite, and significant unintentional weight loss of up to 25% of body weight. Patient is a current tobacco and marijuana smoker with approximately 50 pack year history of tobacco use, but says he has cut back drastically lately. Patient denies chest pain, nausea/vomiting, diarrhea, night sweats, cough or sputum production, or other symptoms at this time.      In ED, patient was noted to be hypertensive at 228/116. Initial ED BP was 190/90. Patient was given his previous home dose of coreg 3.125 BID and 10mg hydralazine q8h prn. Vitals otherwise stable. Pt's labs showed WBC count of 7.52. Troponin was negative, but will repeat once more to trend. BNP non-elevated. Chest xray showed large left pleural effusion. CTA of chest showed right sided PE and left sided pleural effusion and recommended thoracentesis to clinically correlate. Thoracentesis was performed at bedside by Pulm/Crit fellow. Samples were sent for analysis and approximately 1500mL were drawn off without complication. Patient had repeat CXR for post-thoracentesis lung views. Dr. Martin and [facility]  inpatient team  were present with patient to discuss possible diagnosis including malignancy and suggest a consultation with the Palliative medicine team.    Overview/Hospital Course:  64 yo male PMH HTN, Lung nodule, PVD s/p thrombectomy, tobacco use presented to ED with shortness of breath and weight loss x 1 month that has worsened over the past few days.  Pt has been seen by previously by [facility] pulmonology for lung nodule and respiratory problems.  Initially hypertensive to 200s systolic in ED; resolved with home medications.  CTA shows large left pleural effusion and PE to right lower lobe.  US shows DVT to left femoral and posterior tibialis veins.  Pt previously on Eliquis and Plavix but non-adherent x 1 month.  Treated with Heparin drip.  Pulmonology consulted and performed thoracentesis.  1.5L fluid drawn off and analysis consisted with exudative pleural effusion.  One episode of bright red bloody stool with associated generalized weakness on hospital day 2.  Discontinued heparin. Treated with Protonix.  GI consulted.  Colonoscopy showed segmental mucosal ulceration in the ascending colon with maroon stool througout the entire colon.  Findings consistent with ischemic colitis which likely occured in the setting of hypotension in a vasculopath.  Stable BP, H&H throughout hospital course.  Pleural catheter inserted with initial drainage of approximately 1L dark brown fluid.  Drained to completion for total of 3.5L  No pleurodesis due to trapped lung on CXR.  IR consulted and IVC filter placed.  HemeOnc consulted.    Interval History: No adverse events overnight. Pt had IVC filter placed yesterday without complication. Still complaining of leg pain today and feeling weak. No other complaints.    Review of Systems   Constitutional:  Positive for activity change, appetite change, fatigue and unexpected weight change. Negative for chills, diaphoresis and fever.   HENT: Negative.     Respiratory:  Positive for shortness of breath.  Negative for apnea, cough and wheezing.    Cardiovascular:  Negative for chest pain, palpitations and leg swelling.   Gastrointestinal:  Negative for blood in stool and vomiting.   Genitourinary: Negative.    Musculoskeletal: Negative.    Skin:  Positive for pallor and rash (Rash on bilateral forearms present on admission).   Neurological:  Positive for weakness and numbness. Negative for facial asymmetry and light-headedness.   Psychiatric/Behavioral: Negative.  Negative for agitation and confusion. The patient is not hyperactive.      Objective:     Vital Signs (Most Recent):  Temp: 97.6 °F (36.4 °C) (11/14/23 1632)  Pulse: 103 (11/14/23 1632)  Resp: 18 (11/14/23 1632)  BP: 137/66 (11/14/23 1632)  SpO2: (!) 92 % (11/14/23 1632) Vital Signs (24h Range):  Temp:  [97 °F (36.1 °C)-98.6 °F (37 °C)] 97.6 °F (36.4 °C)  Pulse:  [] 103  Resp:  [16-20] 18  SpO2:  [92 %-96 %] 92 %  BP: (120-171)/(66-83) 137/66     Weight: 60.4 kg (133 lb 2.5 oz)  Body mass index is 19.66 kg/m².    Intake/Output Summary (Last 24 hours) at 11/14/2023 1639  Last data filed at 11/14/2023 1420  Gross per 24 hour   Intake 240 ml   Output 1725 ml   Net -1485 ml         Physical Exam  Vitals and nursing note reviewed.   Constitutional:       Appearance: He is underweight. He is ill-appearing (chronically).      Comments: Unkept appearance    HENT:      Head: Normocephalic.      Nose: Nose normal.      Mouth/Throat:      Mouth: Mucous membranes are moist.   Cardiovascular:      Rate and Rhythm: Normal rate.      Pulses:           Dorsalis pedis pulses are 0 on the left side.        Posterior tibial pulses are 0 on the left side.   Pulmonary:      Breath sounds: Decreased air movement present. Examination of the left-middle field reveals decreased breath sounds. Examination of the left-lower field reveals decreased breath sounds. Decreased breath sounds present. No wheezing or rhonchi.      Comments: Easily develops shortness of  breath  Abdominal:      General: Abdomen is flat.      Palpations: Abdomen is soft.   Musculoskeletal:         General: Tenderness present. No swelling.      Left foot: Decreased range of motion. Tenderness present. No swelling or deformity. Abnormal pulse.      Comments: Pt states foot his numb and he is unable to move his toes.   Feet:      Right foot:      Toenail Condition: Right toenails are long. Fungal disease present.     Left foot:      Toenail Condition: Left toenails are long. Fungal disease present.     Comments: Left foot cold and pale compared to right. No additional swelling in left leg.  in the calf. DP non-palpable and unable to find even with doppler.  Skin:     General: Skin is warm and dry.      Capillary Refill: Capillary refill takes less than 2 seconds.      Coloration: Skin is pale.      Findings: Rash present.   Neurological:      Mental Status: He is alert and oriented to person, place, and time.      Motor: Weakness present.   Psychiatric:         Mood and Affect: Mood normal.         Behavior: Behavior normal. Behavior is cooperative.         Thought Content: Thought content normal.         Judgment: Judgment normal.             Significant Labs: All pertinent labs within the past 24 hours have been reviewed.  Recent Lab Results         11/14/23  0418   11/13/23  1648        Albumin 2.3         ALP 73         ALT 32         Anion Gap 8         AST 33         Baso # 0.06         Basophil % 0.5         BILIRUBIN TOTAL 0.7  Comment: For infants and newborns, interpretation of results should be based  on gestational age, weight and in agreement with clinical  observations.    Premature Infant recommended reference ranges:  Up to 24 hours.............<8.0 mg/dL  Up to 48 hours............<12.0 mg/dL  3-5 days..................<15.0 mg/dL  6-29 days.................<15.0 mg/dL           BUN 14         Calcium 8.3         Chloride 88         CO2 25         Creatinine 0.8          Differential Method Automated         eGFR >60         Eos # 0.2         Eosinophil % 1.7         Glucose 110         Gran # (ANC) 8.6         Gran % 71.2         Hematocrit 27.1         Hemoglobin 9.1         Immature Grans (Abs) 0.13  Comment: Mild elevation in immature granulocytes is non specific and   can be seen in a variety of conditions including stress response,   acute inflammation, trauma and pregnancy. Correlation with other   laboratory and clinical findings is essential.           Immature Granulocytes 1.1         Lymph # 2.1         Lymph % 17.2         Magnesium  1.9         MCH 26.5         MCHC 33.6         MCV 79         Mono # 1.1         Mono % 9.4         MPV 9.0         nRBC 0         Phosphorus Level 4.7         Platelet Count 341         POCT Glucose   93       Potassium 4.2         PROTEIN TOTAL 7.8         RBC 3.43         RDW 15.4         Sodium 121         WBC 12.10                 Significant Imaging: I have reviewed all pertinent imaging results/findings within the past 24 hours.    Assessment/Plan:      * Shortness of breath  Chronic, progressive shortness of breath worsening over the last few days.    -CTA- R segmental and subsegmental PE w right heart strain, large L pleural effusion, R hilar nodules  -Heparin bridge for PE- will discuss long term anticoagulation. Pt previously on eliquis and clopidogrel  -Pulmonology consulted  -Thoracentesis performed- approx 1.5L drawn off, sent for analysis  -LE US- evidence of DVT in left mid femoral and left posterior tibial veins  -ECHO GIDD, EF 55-60%, small posterior pericardial effusion    -Pulmonology recommends IVC filter placement. Will discuss with IR  - Will follow up repeat CXR    GI bleed  Pt had bout of hematochezia this morning. Taken by GI for endoscopy. They believe etiology is hypotension of splanchnic circulation in a vasculopath.    -GI consulted  -H&H stable at this time  -Holding antihypertensives  -Holding anticoagulation  and antiplatelets  -Continue to monitor and reassess      Mild malnutrition  Replenish electrolytes as needed    Full adult diet    Boost supplements as tolerated      Asymptomatic hypertensive urgency  Patient has a current diagnosis of hypertensive urgency (without evidence of end organ damage) which is controlled.  Latest blood pressure and vitals reviewed-   Temp:  [97.2 °F (36.2 °C)-97.9 °F (36.6 °C)]   Pulse:  [71-87]   Resp:  [16-20]   BP: (112-175)/(61-81)   SpO2:  [94 %-98 %] .   Patient currently off IV antihypertensives.   Home meds for hypertension were reviewed and noted below.   Hypertension Medications               carvediloL (COREG) 3.125 MG tablet Take 1 tablet (3.125 mg total) by mouth 2 (two) times daily.            Medication adjustment for hospital antihypertensives is as follows- Coreg 6.25mg BID, Nifedipine 60mg daily, PRN Hydralazine IV    Will aim for controlled BP reduction by medications noted above. Monitor and mitigate end organ damage as indicated.    Pulmonary embolism  See shortness of breath    Patient taken for IVC placement today. Will resume anticoagulation as soon as able.    Pleural effusion  Patient found to have large pleural effusion on imaging. I have personally reviewed and interpreted the following imaging: Xray, CT, and Ultrasound. A thoracentesis was performed. Pleural fluid was sent for analysis. Labs reviewed, including Serum LDH   LD   Date Value Ref Range Status   11/08/2023 194 110 - 260 U/L Final     Comment:     Results are increased in hemolyzed samples.   , Pleural Fluid LDH   LD, Fluid   Date Value Ref Range Status   11/07/2023 3004 Not established U/L Final     Comment:     Reference intervals have not been established for body fluids.  Comparison of this result with the concentration in the blood,   serum,or plasma is recommended.  The reference interval for LDH in serum/plasma is   110-260 U/L. Please interpret in context with the clinical  picture.      ,  "Serum Protein   Total Protein   Date Value Ref Range Status   11/11/2023 7.5 6.0 - 8.4 g/dL Final    , Pleural Protein No results found for: "PROTEINBODYF" , Cell Count and Differential No results found for: "BFCELLCNT" , Cytology No results found for: "CYTOFLUID" , and Fluid Cultures No results found for: "BODYFLUIDCUL" . Fluid most consistent with Exudate.  . Most likely etiology includes  Lung malignancy or metastatic neoplasm . Management to include  drained 1500mL. Will reassess.  Pulmonology plans to re-tap and drain pleural effusion. Repeat cytology-results pending  Pulm drained bedside- chest tube in place, currently clamped      Lung mass  Pt has previous evidence of uncharacterized lung mass as he was lost to follow up by [facility] Pulmonology team.  Imaging on admission shows significant left sided pleural effusion, R PE, and new R hilar nodules from previous scan.    History and symptoms worrying for primary lung neoplasm and/or malignant metastasis. Will reassess and possible biopsy/EBUS per pulm recs and patient preferences.    Plan:  -Palliative team consulted, recommendations welcomed  -Pending cytology  -Oncology consulted and contacted, pending recommendations.    PAD (peripheral artery disease)  History of bilateral PAD and critical limb ischemia in lower extremities. History of DVT in lower extremities. Current segmental and subsegmental PE in R lung.  Pt describes foot as numb and unable to move toes. Feels like it is "asleep".  -Anticoagulation held following ischemic colitis induced GI bleed  BLE venous U/S showed no new DVT from previous    Plan:   -BLE arterial US ordered:  " #1. Severe peripheral vascular disease with findings compatible with severe aortoiliac stenosis.     High-grade/clinically significant stenosis of the right CFA.  Monophasic waveforms in the SFA with short segment distal right SFA occlusion.     Tardus parvus runoff waveforms in the right lower extremity probably related to " "inflow stenosis.     Minimal forward flow in the left common femoral artery suggests either iliac artery occlusion or critical stenosis.     Tardus parvus waveforms throughout the remainder of the left lower extremity probably related to poor inflow.  Occluded popliteal artery on the left."             Thrombosis  PE in right lung. Cardiology and Pulm consulted  -Previously on eliquis and clopidogrel, but hasn't taken in months as pt did not have medication refills.  -Start on Heparin 80U/kg--> Heparin held now due to GI bleed  -Resume eliquis and clopidogrel when able. Currently held due to GI bleed can resume if patient agrees to risk/benefit.  -IVC filter placed 11/13    Primary hypertension  Chronic, uncontrolled. Latest blood pressure and vitals reviewed-     Temp:  [97.6 °F (36.4 °C)-98.3 °F (36.8 °C)]   Pulse:  [74-95]   Resp:  [10-20]   BP: (100-159)/(64-84)   SpO2:  [92 %-96 %] .   Home meds for hypertension were reviewed and noted below.   Hypertension Medications               carvediloL (COREG) 3.125 MG tablet Take 1 tablet (3.125 mg total) by mouth 2 (two) times daily.          While in the hospital, will manage blood pressure as follows; Adjust home antihypertensive regimen as follows- hold for now as hypotension may have caused/contributed to mesenteric ischemia and the hematochezia.    Will utilize p.r.n. blood pressure medication only if patient's blood pressure greater than 180/110 and he develops symptoms such as worsening chest pain or shortness of breath.    Cardiology consulted  GI consulted for GI bleed    Tobacco abuse  21mg Nicotine patch    Refer patient to smoking cessation services          VTE Risk Mitigation (From admission, onward)           Ordered     IP VTE HIGH RISK PATIENT  Once         11/12/23 0904     Place sequential compression device  Until discontinued         11/07/23 2435                    Discharge Planning   [place]:      Code Status: DNR   Is the patient medically ready " for discharge?:     Reason for patient still in hospital (select all that apply): Patient trending condition, Laboratory test, Consult recommendations, and Pending disposition      Discharge Delays:  (pending medical stability)              Bob Mata MD  Department of [facility] Medicine   [facility]

## 2023-11-14 NOTE — PT/OT/SLP PROGRESS
"Occupational Therapy   Treatment    Name: Eric Soliman  MRN: [id number]  Admitting Diagnosis:  Shortness of breath  5 Days Post-Op    Recommendations:     Discharge Recommendations: Moderate Intensity Therapy  Discharge Equipment Recommendations:  other (see comments) (TBD)  Barriers to discharge:  Decreased caregiver support, Inaccessible home environment    Assessment:     Eric Soliman is a 63 y.o. male with a medical diagnosis of Shortness of breath. Performance deficits affecting function are weakness, impaired endurance, impaired self care skills, impaired functional mobility, gait instability, impaired balance, decreased lower extremity function, decreased safety awareness, pain, decreased ROM.     Rehab Prognosis:  Fair; patient would benefit from acute skilled OT services to address these deficits and reach maximum level of function.       Plan:     Patient to be seen 5 x/week to address the above listed problems via self-care/home management, therapeutic activities, therapeutic exercises  Plan of Care Expires: 12/12/23  Plan of Care Reviewed with: patient    Subjective     Chief Complaint: "my leg is killing me"  Patient/Family Comments/goals: Return to PLOF   Pain/Comfort:  Pain Rating 1: 10/10  Location - Side 1: Left  Location 1: leg  Pain Addressed 1: Nurse notified, Pre-medicate for activity, Reposition, Cessation of Activity, Distraction  Pain Rating Post-Intervention 1: 8/10    Objective:     Communicated with: nsg prior to session.  Patient found HOB elevated with bed alarm, chest tube, telemetry upon OT entry to room.    General Precautions: Standard, fall, seizure    Orthopedic Precautions:N/A  Braces: N/A  Respiratory Status: Room air     Occupational Performance:     Bed Mobility:    Patient completed Rolling/Turning to Right with stand by assistance  Patient completed Supine to Sit with contact guard assistance for balance    Functional Mobility/Transfers:  Patient completed Sit <> Stand " Transfer with contact guard assistance  with  rolling walker   Patient completed Bed <> Chair Transfer using Step Transfer technique with minimum assistance with rolling walker  Functional Mobility: Pt ambulated from EOB to room chair using RW w/Denisa. Pt LLE weight bearing limited d/t 10/10 pain.     Activities of Daily Living:  Grooming: setup Pt completed G/H tasks seated EOB  Upper Body Dressing: setup Pt donned robe seated EOB      AMPA 6 Click ADL: 21    Treatment & Education:  Pt educated on 3 point gait, RW mgmt, and PLB for pain mgmt  Pt has poor safety awareness and dynamic standing balance.   Pt required VC's for RW proximity and mgmt throughout session  All questions answered within OT scope of practice.     Patient left up in chair with all lines intact, call button in reach, chair alarm on, and nsg notified    GOALS:   Multidisciplinary Problems       Occupational Therapy Goals          Problem: Occupational Therapy    Goal Priority Disciplines Outcome Interventions   Occupational Therapy Goal     OT, PT/OT Ongoing, Progressing    Description: Goals to be met by: 12/3/2023     Patient will increase functional independence with ADLs by performing:    UE Dressing with Modified [place].  LE Dressing with Modified [place].  Grooming while standing at sink with Modified [place].  Toileting from toilet with Modified [place] for hygiene and clothing management.   Bathing from  shower chair/bench with Modified [place].  Toilet transfer to toilet with Modified [place].                         Time Tracking:     OT Date of Treatment: 11/14/23  OT Start Time: 0916  OT Stop Time: 0943  OT Total Time (min): 27 min    Billable Minutes:Self Care/Home Management 16  Therapeutic Activity 11    OT/RADHA: RADHA (SOTA)     Number of RADHA visits since last OT visit: 1    11/14/2023

## 2023-11-14 NOTE — PT/OT/SLP PROGRESS
"Physical Therapy Treatment    Patient Name:  Eric Soliman   MRN:  [id number]    Recommendations:     Discharge Recommendations: Moderate Intensity Therapy  Discharge Equipment Recommendations:  (TBD)  Barriers to discharge: Inaccessible home and Decreased caregiver support    Assessment:     rEic Soliman is a 63 y.o. male admitted with a medical diagnosis of Shortness of breath.  He presents with the following impairments/functional limitations: weakness, impaired endurance, impaired self care skills, impaired functional mobility, gait instability, impaired balance, decreased lower extremity function, decreased safety awareness, pain, decreased ROM     Rehab Prognosis: Fair; patient would benefit from acute skilled PT services to address these deficits and reach maximum level of function.    Recent Surgery: Procedure(s) (LRB):  COLONOSCOPY (N/A) 5 Days Post-Op    Plan:     During this hospitalization, patient to be seen 4 x/week to address the identified rehab impairments via gait training, therapeutic activities, therapeutic exercises, neuromuscular re-education and progress toward the following goals:    Plan of Care Expires:  12/12/23    Subjective     Chief Complaint: "They only want to give me 38 dollars out of my 900 dollar check I get each month."  Patient/Family Comments/goals: return to PLOF  Pain/Comfort:  Pain Rating 1:  (did not rate; decreased pain 2/2 pre medicated)  Location - Orientation 1: lower  Location 1: leg (one moment of pain 2/2 touched lower leg when directing pt to move the leg inward while supine)  Pain Addressed 1: Pre-medicate for activity, Cessation of Activity, Reposition, Nurse notified  Pain Rating Post-Intervention 1:  (did not rate; appeared comfortable)      Objective:     Communicated with nurse prior to session.  Patient found HOB elevated with bed alarm, telemetry upon PT entry to room.     General Precautions: Standard, fall, seizure  Orthopedic Precautions: N/A  Braces: " N/A  Respiratory Status: Room air     Functional Mobility:  Bed Mobility:     Rolling Left:  stand by assistance  Supine to Sit: contact guard assistance and use of side rail  Sit to Supine: contact guard assistance  Transfers:     Sit to Stand:  contact guard assistance with rolling walker; VCs for hand placement  Gait: Patient amb 6 steps laterally toward HOB and 4 steps forward/4 steps backward using RW and Denisa; instructed pt on decreasing wt on LLE by placing wt through UEs on the RW; VCs for sequencing and assist with RW management      AM-PAC 6 CLICK MOBILITY  Turning over in bed (including adjusting bedclothes, sheets and blankets)?: 3  Sitting down on and standing up from a chair with arms (e.g., wheelchair, bedside commode, etc.): 3  Moving from lying on back to sitting on the side of the bed?: 3  Moving to and from a bed to a chair (including a wheelchair)?: 3  Need to walk in hospital room?: 3  Climbing 3-5 steps with a railing?: 1  Basic Mobility Total Score: 16       Treatment & Education:  Increased time to convince patient to participate with benefits of movement; pt moving toward EOB when MDs came to remove chest tube; pt returned to supine; returned to see pt following chest tube removal; pt transitioned to EOB; instructed pt in how to use RW to take some wt off the LLE and avoid increased pain through discussion and demonstration; pt performed sit to stand using RW as above; pt amb with decreased wt LLE using RW with Denisa as above; pt returned to supine and repositioned for comfort.    Patient left HOB elevated with all lines intact, call button in reach, bed alarm on, and nurse notified..    GOALS:   Multidisciplinary Problems       Physical Therapy Goals          Problem: Physical Therapy    Goal Priority Disciplines Outcome Goal Variances Interventions   Physical Therapy Goal     PT, PT/OT Ongoing, Progressing     Description: Goals to be met by: 12/12/2023     Patient will increase functional  independence with mobility by performin. Supine to sit with Modified [place]  2. Sit to supine with Modified [place]  3. Sit to stand transfer with Modified [place]  4. Gait  x 100 feet with Modified [place] using Rolling Walker.   5. Stand for 10 minutes with Modified [place] using Rolling Walker                         Time Tracking:     PT Received On: 23  PT Start Time: 1356 (1337)     PT Stop Time: 1418 (1345)  PT Total Time (min): 22 min +8 min=30    Billable Minutes: Gait Training 12 and Therapeutic Activity 10  Self Care 8    Treatment Type: Treatment  PT/PTA: PT     Number of PTA visits since last PT visit: 0     2023

## 2023-11-14 NOTE — NURSING
RAPID RESPONSE NURSE PROACTIVE ROUNDING NOTE         Admit Date: 2023  LOS: 7  Code Status: DNR   Date of Visit: 2023  : 1960  Age: 63 y.o.  Sex: male  Race: White  Bed: K422/K422 A:   MRN: [id number]  Was the patient discharged from an ICU this admission? No   Was the patient discharged from a PACU within last 24 hours? No   Did the patient receive conscious sedation/general anesthesia in last 24 hours? No   Was the patient in the ED within the past 24 hours? No   Was the patient on NIPPV within the past 24 hours? No   Attending Physician: Miriam Keene MD  Primary Service: Family Medicine   Time spent at the bedside: < 15 min    SITUATION    Notified by  Rapid response list    Diagnosis: Shortness of breath   has a past medical history of Anticoagulant long-term use, DVT (deep venous thrombosis), and Seizures.    Last Vitals:  Temp: 98.6 °F (37 °C) (750)  Pulse: 86 (750)  Resp: 20 (750)  BP: 122/70 (750)  SpO2: 92 % (750)    24 Hour Vitals Range:  Temp:  [97 °F (36.1 °C)-98.6 °F (37 °C)]   Pulse:  []   Resp:  [16-20]   BP: (120-171)/(69-83)   SpO2:  [92 %-97 %]     Clinical Issues: Respiratory    ASSESSMENT/INTERVENTIONS    Labs, orders, and progress notes reviewed. Chest tube to water seal, minimal sersang. Drainage notes in the tubing. Plans for removal of CT today per critical care. No interventions needed at this time, able to intervene if needed.       FOLLOW UP    Call back the Rapid Response Nurse, Leidy Levy at [phone] for additional questions or concerns.

## 2023-11-14 NOTE — PLAN OF CARE
Recommendation:   1. Continue to encourage intake of meals & Boost as tolerated.   2. Monitor weight/labs.  3. RD to continue to follow to monitor po intake    Goals:  Pt will tolerate diet with at least 50-75% intake at meals by RD follow up  Nutrition Goal Status: progressing towards goal

## 2023-11-15 NOTE — PROGRESS NOTES
CHELO spoke with Carolyn at [facility]  - she will review pt's info   She is checking on bed availability -   CM called and spoke with Tram at [facility]  - pt is not comfortable that he will have to give up his check and keep only $38.00 -- pt says he has bills and doesn't want to give up his income.   They only have one male bed available.      11:05 AM --- CHELO spoke at length with Carolyn -- Pt isn't a candidate for inpt hospice -- pt's Medicaid only pays for 5 days - as pt is without a permanent residence - they would not be able to discharge pt should he need services after 5 days.       CM will meet with pt to encourage him to consent to NH placement.     14:45  -- CM and CM Supervisor Eunice met with pt to discuss discharge plan for pt -- [facility] [place] due to self care issues/decreased mobility.   Pt informed that pt's discharge options are limited and pt must consider NH placement with the financial obligation that comes with placement.  Pt states he will think about this.  Treatment pathway has not yet been determined by Oncologist.  CM sent a Secure Chat to Oncologist and medical team to advise them DC plans are still undetermined.

## 2023-11-15 NOTE — ASSESSMENT & PLAN NOTE
"History of bilateral PAD and critical limb ischemia in lower extremities. History of DVT in lower extremities. Current segmental and subsegmental PE in R lung.  Pt describes foot as numb and unable to move toes. Feels like it is "asleep".  -Anticoagulation held following ischemic colitis induced GI bleed  BLE venous U/S showed no new DVT from previous    Plan:   -BLE arterial US ordered:  " #1. Severe peripheral vascular disease with findings compatible with severe aortoiliac stenosis.     High-grade/clinically significant stenosis of the right CFA.  Monophasic waveforms in the SFA with short segment distal right SFA occlusion.     Tardus parvus runoff waveforms in the right lower extremity probably related to inflow stenosis.     Minimal forward flow in the left common femoral artery suggests either iliac artery occlusion or critical stenosis.     Tardus parvus waveforms throughout the remainder of the left lower extremity probably related to poor inflow.  Occluded popliteal artery on the left."    Discussed case with [facility] to determine therapeutic options for revascularization/pain relief in ischemic left leg    "

## 2023-11-15 NOTE — PLAN OF CARE
"  Problem: Adult Inpatient Plan of Care  Goal: Plan of Care Review  Outcome: Ongoing, Progressing     Problem: Adult Inpatient Plan of Care  Goal: Optimal Comfort and Wellbeing  Outcome: Ongoing, Progressing     Problem: VTE (Venous Thromboembolism)  Goal: VTE (Venous Thromboembolism) Symptom Resolution  Outcome: Ongoing, Progressing     Problem: Seizure, Active Management  Goal: Absence of Seizure/Seizure-Related Injury  Outcome: Ongoing, Progressing     .Plan of care reviewed with the patient. Scheduled medicines given and the patient tolerated well. Patient refused Polyethylene glycol, the laxative. Given two doses Hydromorphone 0.2 mg IV for LLE pain. Fall and safety precautions taken and the standard interventions are in place. On Telemetry monitoring with NSR, no true "red alarms' noted, no acute distress reported either in the shift. Patient's Accu Check. Advised the patient to call for the assistance. Continued monitoring the patient.   "

## 2023-11-15 NOTE — ASSESSMENT & PLAN NOTE
Pt has previous evidence of adenocarcinoma lung mass as he was lost to follow up by [facility] team.  Imaging on admission shows significant left sided pleural effusion, R PE, and new R hilar nodules from previous scan.    History and symptoms worrying for primary lung neoplasm and/or malignant metastasis. Will reassess and possible biopsy/EBUS per pulm recs and patient preferences.    Plan:  -Palliative team consulted, recommendations welcomed  -Cytology- Adenocarcinoma  -Oncology consulted and contacted, pending recommendations. Possible immunotherapy, but avoid chemotherapy. Will continue to assess.

## 2023-11-15 NOTE — PT/OT/SLP PROGRESS
"Occupational Therapy   Treatment    Name: Eric Soliman  MRN: [id number]  Admitting Diagnosis:  Shortness of breath  6 Days Post-Op    Recommendations:     Discharge Recommendations: Moderate Intensity Therapy  Discharge Equipment Recommendations:  other (see comments) (TBD)  Barriers to discharge:  Inaccessible home environment, Decreased caregiver support    Assessment:     Eric Soliman is a 63 y.o. male with a medical diagnosis of Shortness of breath.  Performance deficits affecting function are weakness, impaired endurance, impaired self care skills, impaired functional mobility, gait instability, impaired balance, decreased lower extremity function, decreased safety awareness, pain, decreased ROM.     Rehab Prognosis:  Good; patient would benefit from acute skilled OT services to address these deficits and reach maximum level of function.       Plan:     Patient to be seen 5 x/week to address the above listed problems via self-care/home management, therapeutic activities, therapeutic exercises  Plan of Care Expires: 12/12/23  Plan of Care Reviewed with: patient    Subjective     Chief Complaint: "my leg is killing me"   Patient/Family Comments/goals: Return to PLOF   Pain/Comfort:  Pain Rating 1: 10/10  Location - Side 1: Left  Location 1: leg  Pain Addressed 1: Pre-medicate for activity, Reposition, Distraction, Cessation of Activity    Objective:     Communicated with: nsg prior to session.  Patient found HOB elevated with bed alarm, telemetry upon OT entry to room.    General Precautions: Standard, fall, seizure    Orthopedic Precautions:N/A  Braces: N/A  Respiratory Status: Room air     Occupational Performance:     Bed Mobility:    Patient completed Rolling/Turning to Right with stand by assistance  Patient completed Supine to Sit with stand by assistance  Patient completed Sit to Supine with stand by assistance     Functional Mobility/Transfers:  Patient completed Sit <> Stand Transfer with contact guard " assistance  with  rolling walker   Patient completed Bed <> Chair Transfer using Step Transfer technique with contact guard assistance with rolling walker  Functional Mobility: Pt ambulated from EOB <> chair using RW requiring CGA.     Activities of Daily Living:  Grooming: setup Pt completed G/H tasks seated EOB       [facility] 6 Click ADL: 21    Treatment & Education:  Pt required increased verbal cues for hand placement and safety during transfers  Pt is still not WB on LLE d/t 10/10 pain. He stood multiple times throughout session at NWB to LLE, but did slightly WB on the last stand when transferring back to bed.   Pt educated on PLB for pain mgmt  Pt was encouraged to sit up in chair again today. After transferring EOB > chair, he complained of extreme LLE pain and un comfort so pt returned to bed.   All questions answered within OT scope of practice    Patient left HOB elevated with all lines intact, call button in reach, bed alarm on, and nsg notified    GOALS:   Multidisciplinary Problems       Occupational Therapy Goals          Problem: Occupational Therapy    Goal Priority Disciplines Outcome Interventions   Occupational Therapy Goal     OT, PT/OT Ongoing, Progressing    Description: Goals to be met by: 12/3/2023     Patient will increase functional independence with ADLs by performing:    UE Dressing with Modified [place].  LE Dressing with Modified [place].  Grooming while standing at sink with Modified [place].  Toileting from toilet with Modified [place] for hygiene and clothing management.   Bathing from  shower chair/bench with Modified [place].  Toilet transfer to toilet with Modified [place].                         Time Tracking:     OT Date of Treatment: 11/15/23  OT Start Time: 0911  OT Stop Time: 0946  OT Total Time (min): 35 min    Billable Minutes:Self Care/Home Management 12  Therapeutic Activity 23    OT/RADHA: RADHA (SOTA)     Number of RADAH visits since last OT  visit: 2    11/15/2023

## 2023-11-15 NOTE — CONSULTS
Hematology / Oncology   Consult Note    Consult Requested By: Bob Mata MD  Reason for Consult: Lung cancer    SUBJECTIVE:   Admit date: 11/7/2023  History of Present Illness: Mr. Soliman is a 63 y.o. male with significant past smoking history (approx 100 py), with history of pulmonary nodule observed on previous chest CT scan. He presented to the hospital last week with chief complaint of progressively worsening chest pain and shortness of breath over the previous month. Also noted significant unintentional weight loss. Initial hospital workup showed very large left-sided pleural effusion as well as a right-sided PE. A thoracentesis was performed on 11/10/23 which has returned as positive for adenocarcinoma.   Heme/onc is consulted to establish care and discuss treatment options. Mr. Soliman was unaccompanied in hospital room 422 when I visited with him this morning. He states that he is tired, noting poor sleep while in the hospital. He is currently living in his car. He denies pain at this time. Breathing is comfortable.  We discussed his diagnosis and staging. I explained that because of pleural fluid involvement, this would be considered metastatic, I.e. Stage IV cancer. Typically for metastatic lung cancer treatments are systemic in nature and they may help to alleviate symptoms and they may also help people to live longer with the cancer than they might otherwise. We discussed that general treatment options would include chemotherapy +/- immunotherapy. He is quite clear that he does not wish to pursue chemotherapy but he would be interested in immunotherapy if it were an option. Currently we are awaiting additional testing to help better identify optimal treatment plan.       PTA Medications   Medication Sig    acetaminophen (TYLENOL) 500 MG tablet Take 500 mg by mouth every 6 (six) hours as needed for Pain.    apixaban (ELIQUIS) 5 mg Tab Take 1 tablet (5 mg total) by mouth 2 (two) times daily.     atorvastatin (LIPITOR) 40 MG tablet Take 1 tablet (40 mg total) by mouth once daily.    carvediloL (COREG) 3.125 MG tablet Take 1 tablet (3.125 mg total) by mouth 2 (two) times daily.    valACYclovir (VALTREX) 1000 MG tablet Take 1 tablet (1,000 mg total) by mouth 3 (three) times daily. for 7 days    [DISCONTINUED] clopidogreL (PLAVIX) 75 mg tablet Take 1 tablet (75 mg total) by mouth once daily.      Review of patient's allergies indicates:  No Known Allergies    Past Medical History:   Diagnosis Date    Anticoagulant long-term use     DVT (deep venous thrombosis)     Seizures     epilepsy controlled w/ marijuana       Past Surgical History:   Procedure Laterality Date    ANGIOGRAPHY OF LOWER EXTREMITY Left 10/7/2022    Procedure: Angiogram Extremity Unilateral;  Surgeon: Charles Patten III, MD;  Location: [facility] CATH LAB;  Service: Cardiology;  Laterality: Left;    AORTOGRAPHY WITH SERIALOGRAPHY Bilateral 10/7/2022    Procedure: AORTOGRAM, WITH SERIALOGRAPHY;  Surgeon: Charles Patten III, MD;  Location: [facility] CATH LAB;  Service: Cardiology;  Laterality: Bilateral;    AORTOGRAPHY WITH SERIALOGRAPHY N/A 4/4/2023    Procedure: AORTOGRAM, WITH SERIALOGRAPHY;  Surgeon: Royce Yi MD;  Location: [facility] CATH LAB/EP;  Service: Cardiology;  Laterality: N/A;    ATHERECTOMY, PERIPHERAL BLOOD VESSEL Left 4/4/2023    Procedure: ATHERECTOMY, PERIPHERAL BLOOD VESSEL;  Surgeon: Royce Yi MD;  Location: [facility] CATH LAB/EP;  Service: Cardiology;  Laterality: Left;    CLOSURE DEVICE  4/4/2023    Procedure: Placement of Closure Device;  Surgeon: Royce Yi MD;  Location: [facility] CATH LAB/EP;  Service: Cardiology;;    COLONOSCOPY N/A 11/9/2023    Procedure: COLONOSCOPY;  Surgeon: Cedrick Holcomb MD;  Location: [facility] ENDO;  Service: Endoscopy;  Laterality: N/A;    FILTER PROTECTION, PERIPHERAL  4/4/2023    Procedure: Filter Protection, Peripheral;  Surgeon: Royce Yi MD;  Location: [facility] CATH LAB/EP;   Service: Cardiology;;    IVUS (INTRAVASCULAR ULTRASOUND) Left 4/4/2023    Procedure: ULTRASOUND, INTRAVASCULAR;  Surgeon: Royce Yi MD;  Location: [facility] CATH LAB/EP;  Service: Cardiology;  Laterality: Left;    PTA, ILIAC ARTERY Left 4/4/2023    Procedure: PTA, Iliac Artery;  Surgeon: Royce Yi MD;  Location: [facility] CATH LAB/EP;  Service: Cardiology;  Laterality: Left;    THROMBECTOMY  4/4/2023    Procedure: THROMBECTOMY;  Surgeon: Royce Yi MD;  Location: [facility] CATH LAB/EP;  Service: Cardiology;;      Family History   Problem Relation Age of Onset    No Known Problems Mother     Heart disease Father        Social History     Tobacco Use    Smoking status: Every Day     Current packs/day: 2.00     Average packs/day: 2.0 packs/day for 48.9 years (97.7 ttl pk-yrs)     Types: Cigarettes     Start date: 1975    Smokeless tobacco: Never    Tobacco comments:     Pt is a 2 pk/day cigarette smoker x 49 yrs. 21 mg nicotine patch ordered Q day, and order requested upon discharge for 21 mg nicotine patch Q day. Pt states ready to quit. Ambulatory referral to Smoking Cessation clinic.   Substance Use Topics    Alcohol use: Not Currently     Comment: rarely 1 beer    Drug use: Yes     Types: Marijuana        OBJECTIVE:     Vital Signs (Most Recent)   Temp: 97.6 °F (36.4 °C) (11/15/23 1140)  Pulse: 98 (11/15/23 1149)  Resp: 18 (11/15/23 1140)  BP: 134/66 (11/15/23 1140)  SpO2: 96 % (11/15/23 1140)  Body mass index is 19.66 kg/m².      Physical Exam:  Physical Exam  Vitals and nursing note reviewed.   Constitutional:       General: He is not in acute distress.     Appearance: Normal appearance. He is underweight. He is not toxic-appearing.   HENT:      Head: Normocephalic and atraumatic.   Eyes:      General: No scleral icterus.     Conjunctiva/sclera: Conjunctivae normal.   Pulmonary:      Effort: Pulmonary effort is normal. No respiratory distress.   Musculoskeletal:         General: No deformity.       "Cervical back: Neck supple.   Lymphadenopathy:      Cervical: No cervical adenopathy.   Skin:     Coloration: Skin is not jaundiced.      Findings: No erythema.   Neurological:      General: No focal deficit present.      Mental Status: He is oriented to person, place, and time. He is lethargic.   Psychiatric:         Mood and Affect: Mood normal.         Behavior: Behavior normal.         Thought Content: Thought content normal.         Cardiac Enzymes: Ejection Fractions:    No results for input(s): "CPK", "CPKMB", "MB", "TROPONINI" in the last 72 hours. EF   Date Value Ref Range Status   10/07/2022 55 % Final        Chemistries:   Recent Labs   Lab 11/13/23  0330 11/14/23  0418 11/15/23  0330   * 121* 122*   K 4.5 4.2 4.2   CL 90* 88* 90*   CO2 22* 25 24   BUN 11 14 15   CREATININE 0.9 0.8 0.8   CALCIUM 8.4* 8.3* 8.3*   PROT 7.9 7.8 7.8   BILITOT 0.1 0.7 0.2   ALKPHOS 71 73 110   ALT 42 32 94*   AST 41* 33 85*   MG 1.7 1.9 2.0   PHOS 3.7 4.7* 3.5        CBC/Anemia Labs: Coags:    Recent Labs   Lab 11/13/23 0330 11/13/23  0748 11/14/23  0418 11/15/23  0330   WBC 12.36  --  12.10 12.29   HGB 9.9* 10.2* 9.1* 8.7*   HCT 30.2* 30.4* 27.1* 26.1*     --  341 350   MCV 81*  --  79* 79*   RDW 15.6*  --  15.4* 15.3*    Recent Labs   Lab 11/09/23  0436   APTT 41.7*        POCT Glucose: HbA1c:    Recent Labs   Lab 11/13/23  1648   POCTGLUCOSE 93    Hemoglobin A1C   Date Value Ref Range Status   10/07/2022 4.7 4.0 - 5.6 % Final     Comment:     ADA Screening Guidelines:  5.7-6.4%  Consistent with prediabetes  >or=6.5%  Consistent with diabetes    High levels of fetal hemoglobin interfere with the HbA1C  assay. Heterozygous hemoglobin variants (HbS, HgC, etc)do  not significantly interfere with this assay.   However, presence of multiple variants may affect accuracy.          Lines/Drains:       Peripheral IV - Single Lumen 11/07/23 0945 20 G Anterior;Left Forearm (Active)   Site Assessment Clean;Dry;Intact;No " redness;No swelling 11/14/23 1930   Extremity Assessment Distal to IV No abnormal discoloration;No redness;No swelling;No warmth 11/14/23 1930   Line Status Saline locked;Flushed 11/14/23 1930   Dressing Status Clean;Dry;Intact 11/14/23 1930   Dressing Intervention Integrity maintained 11/14/23 1930   Dressing Change Due 11/11/23 11/14/23 1015   Site Change Due 11/11/23 11/14/23 1930   Reason Not Rotated Patient refused 11/14/23 1015   Number of days: 8            Peripheral IV - Single Lumen 11/09/23 1010 18 G Posterior;Proximal;Right Forearm (Active)   Site Assessment Clean;Dry;Intact;No redness;No swelling 11/14/23 1930   Extremity Assessment Distal to IV No abnormal discoloration;No redness;No swelling;No warmth 11/14/23 1930   Line Status Saline locked;Flushed 11/14/23 1930   Dressing Status Clean;Dry;Intact 11/14/23 1930   Dressing Intervention Integrity maintained 11/14/23 1930   Dressing Change Due 11/13/23 11/14/23 1015   Site Change Due 11/13/23 11/14/23 1930   Reason Not Rotated Patient refused 11/14/23 1015   Number of days: 6            Peripheral IV - Single Lumen 11/09/23 1015 18 G Distal;Posterior;Right Forearm (Active)   Site Assessment Clean;Dry;Intact;No redness;No swelling 11/14/23 1930   Extremity Assessment Distal to IV No abnormal discoloration;No redness;No swelling;No warmth 11/14/23 1930   Line Status Saline locked;Flushed 11/14/23 1930   Dressing Status Clean;Dry;Intact 11/14/23 1930   Dressing Intervention Integrity maintained 11/14/23 1930   Dressing Change Due 11/13/23 11/14/23 1015   Site Change Due 11/13/23 11/14/23 1930   Reason Not Rotated Not due 11/14/23 1015   Number of days: 6         ASSESSMENT/PLAN:     Active Hospital Problems    Diagnosis  POA    *Shortness of breath [R06.02]  Yes    Rectal bleeding [K62.5]  No    GI bleed [K92.2]  No    Pleural effusion [J90]  Yes    Pulmonary embolism [I26.99]  Yes    Asymptomatic hypertensive urgency [I16.0]  Yes    Tobacco dependency  [F17.200]  Yes    Palliative care encounter [Z51.5]  Not Applicable    Mild malnutrition [E44.1]  Yes    Lung mass [R91.8]  Yes    PAD (peripheral artery disease) [I73.9]  Yes    Tobacco abuse [Z72.0]  Yes    Primary hypertension [I10]  Yes    Thrombosis [I82.90]  Yes      Resolved Hospital Problems   No resolved problems to display.       SUMMARY: 63 y.o. male here with Shortness of breath secondary to large left-sided malignant pleural effusion. If he wishes to proceed with cancer-directed therapies, I would recommend he have full staging to include MRI of the brain. I have reached out to pathology about possibility of adding PD-L1 and NGS to the pathology specimen. He is quite clear in stated preference to avoid chemotherapy however he is open to potential of immunotherapy; the appropriateness of single-agent immunotherapy would depend on PD-L1 testing results. Given the advanced nature of his disease, I would agree with other consultants that hospice would also be a reasonable consideration at this point.

## 2023-11-15 NOTE — PROGRESS NOTES
[facility] Medicine  Progress Note    Patient Name: Eric Soliman  MRN: [id number]  Patient Class: IP- Inpatient   Admission Date: 11/7/2023  Length of Stay: 8 days  Attending Physician: Miriam Keene MD  Primary Care Provider: Jesika, Primary Doctor        Subjective:     Principal Problem:Shortness of breath        HPI:  Pt, Eric Soliman, is a 64 yo M with pmh of htn, lung nodule of undetermined character, PAD s/p thrombectomy & PTA, epilepsy, and tobacco and marijuana use here for several months of progressively worsening shortness of breath that has been worse over the last few days. Patient had previously been seen by [facility] pulmonology for workup of this lung mass and respiratory issues, but has limited means and had failure of follow up. At this visit patient describes worsening shortness of breath and fatigue, reduced appetite, and significant unintentional weight loss of up to 25% of body weight. Patient is a current tobacco and marijuana smoker with approximately 50 pack year history of tobacco use, but says he has cut back drastically lately. Patient denies chest pain, nausea/vomiting, diarrhea, night sweats, cough or sputum production, or other symptoms at this time.      In ED, patient was noted to be hypertensive at 228/116. Initial ED BP was 190/90. Patient was given his previous home dose of coreg 3.125 BID and 10mg hydralazine q8h prn. Vitals otherwise stable. Pt's labs showed WBC count of 7.52. Troponin was negative, but will repeat once more to trend. BNP non-elevated. Chest xray showed large left pleural effusion. CTA of chest showed right sided PE and left sided pleural effusion and recommended thoracentesis to clinically correlate. Thoracentesis was performed at bedside by Pulm/Crit fellow. Samples were sent for analysis and approximately 1500mL were drawn off without complication. Patient had repeat CXR for post-thoracentesis lung views. Dr. Martin and [facility]  inpatient team  were present with patient to discuss possible diagnosis including malignancy and suggest a consultation with the Palliative medicine team.    Overview/Hospital Course:  64 yo male PMH HTN, Lung nodule, PVD s/p thrombectomy, tobacco use presented to ED with shortness of breath and weight loss x 1 month that has worsened over the past few days.  Pt has been seen by previously by [facility]ology for lung nodule and respiratory problems.  Initially hypertensive to 200s systolic in ED; resolved with home medications.  CTA shows large left pleural effusion and PE to right lower lobe.  US shows DVT to left femoral and posterior tibialis veins.  Pt previously on Eliquis and Plavix but non-adherent x 1 month.  Treated with Heparin drip.  Pulmonology consulted and performed thoracentesis.  1.5L fluid drawn off and analysis consisted with exudative pleural effusion.  One episode of bright red bloody stool with associated generalized weakness on hospital day 2.  Discontinued heparin. Treated with Protonix.  GI consulted.  Colonoscopy showed segmental mucosal ulceration in the ascending colon with maroon stool througout the entire colon.  Findings consistent with ischemic colitis which likely occured in the setting of hypotension in a vasculopath.  Stable BP, H&H throughout hospital course.  Pleural catheter inserted with initial drainage of approximately 1L dark brown fluid.  Drained to completion for total of 3.5L  No pleurodesis offered due to trapped lung on CXR.  IR consulted and IVC filter placed.  Fluid cytology resulted adenocarcinoma.  HemeOnc consulted.    Interval History: No adverse events overnight. Cytology resulted as adenocarcinoma. Case mgmt working on placement.    Review of Systems   Constitutional:  Positive for activity change, appetite change, fatigue and unexpected weight change. Negative for chills, diaphoresis and fever.   HENT: Negative.     Respiratory:  Positive for shortness of breath. Negative for  apnea, cough and wheezing.    Cardiovascular:  Negative for chest pain, palpitations and leg swelling.   Gastrointestinal:  Negative for blood in stool and vomiting.   Genitourinary: Negative.    Musculoskeletal: Negative.    Skin:  Positive for pallor and rash (Rash on bilateral forearms present on admission).   Neurological:  Positive for weakness (LLE) and numbness (LLE). Negative for facial asymmetry and light-headedness.   Psychiatric/Behavioral: Negative.  Negative for agitation and confusion. The patient is not hyperactive.      Objective:     Vital Signs (Most Recent):  Temp: 98.2 °F (36.8 °C) (11/15/23 0706)  Pulse: 107 (11/15/23 0706)  Resp: 18 (11/15/23 0714)  BP: 129/69 (11/15/23 0706)  SpO2: (!) 91 % (11/15/23 0706) Vital Signs (24h Range):  Temp:  [97.6 °F (36.4 °C)-99.5 °F (37.5 °C)] 98.2 °F (36.8 °C)  Pulse:  [] 107  Resp:  [16-20] 18  SpO2:  [91 %-98 %] 91 %  BP: (116-137)/(58-70) 129/69     Weight: 60.4 kg (133 lb 2.5 oz)  Body mass index is 19.66 kg/m².    Intake/Output Summary (Last 24 hours) at 11/15/2023 0746  Last data filed at 11/15/2023 0716  Gross per 24 hour   Intake --   Output 2075 ml   Net -2075 ml         Physical Exam  Vitals and nursing note reviewed.   Constitutional:       Appearance: He is underweight. He is ill-appearing (chronically).      Comments: Unkept appearance    HENT:      Head: Normocephalic.      Nose: Nose normal.      Mouth/Throat:      Mouth: Mucous membranes are moist.   Cardiovascular:      Rate and Rhythm: Normal rate.      Pulses:           Dorsalis pedis pulses are 0 on the left side.        Posterior tibial pulses are 0 on the left side.   Pulmonary:      Breath sounds: Decreased air movement present. Examination of the left-middle field reveals decreased breath sounds. Examination of the left-lower field reveals decreased breath sounds. Decreased breath sounds present. No wheezing or rhonchi.      Comments: Easily develops shortness of  breath  Abdominal:      General: Abdomen is flat.      Palpations: Abdomen is soft.   Musculoskeletal:         General: Tenderness present. No swelling.      Left foot: Decreased range of motion. Tenderness present. No swelling or deformity. Abnormal pulse.      Comments: Pt states foot his numb and he is unable to move his toes.   Feet:      Right foot:      Toenail Condition: Right toenails are long. Fungal disease present.     Left foot:      Toenail Condition: Left toenails are long. Fungal disease present.     Comments: Left foot cold and pale compared to right. No additional swelling in left leg.  in the calf. DP non-palpable and unable to find even with doppler.  Skin:     General: Skin is warm and dry.      Capillary Refill: Capillary refill takes less than 2 seconds.      Coloration: Skin is pale.      Findings: Rash present.   Neurological:      Mental Status: He is alert and oriented to person, place, and time.      Motor: Weakness present.   Psychiatric:         Mood and Affect: Mood normal.         Behavior: Behavior normal. Behavior is cooperative.         Thought Content: Thought content normal.         Judgment: Judgment normal.             Significant Labs: All pertinent labs within the past 24 hours have been reviewed.  Recent Lab Results         11/15/23  0330        Albumin 2.2              ALT 94       Anion Gap 8       AST 85       Baso # 0.04       Basophil % 0.3       BILIRUBIN TOTAL 0.2  Comment: For infants and newborns, interpretation of results should be based  on gestational age, weight and in agreement with clinical  observations.    Premature Infant recommended reference ranges:  Up to 24 hours.............<8.0 mg/dL  Up to 48 hours............<12.0 mg/dL  3-5 days..................<15.0 mg/dL  6-29 days.................<15.0 mg/dL         BUN 15       Calcium 8.3       Chloride 90       CO2 24       Creatinine 0.8       Differential Method Automated       eGFR >60        Eos # 0.1       Eosinophil % 1.0       Glucose 103       Gran # (ANC) 9.0       Gran % 73.4       Hematocrit 26.1       Hemoglobin 8.7       Immature Grans (Abs) 0.10  Comment: Mild elevation in immature granulocytes is non specific and   can be seen in a variety of conditions including stress response,   acute inflammation, trauma and pregnancy. Correlation with other   laboratory and clinical findings is essential.         Immature Granulocytes 0.8       Lymph # 2.1       Lymph % 17.2       Magnesium  2.0       MCH 26.3       MCHC 33.3       MCV 79       Mono # 1.0       Mono % 8.1       MPV 9.3       nRBC 0       Phosphorus Level 3.5       Platelet Count 350       Potassium 4.2       PROTEIN TOTAL 7.8       RBC 3.31       RDW 15.3       Sodium 122       WBC 12.29               Significant Imaging: I have reviewed all pertinent imaging results/findings within the past 24 hours.    Assessment/Plan:      * Shortness of breath  Chronic, progressive shortness of breath worsening over the last few days.    -CTA- R segmental and subsegmental PE w right heart strain, large L pleural effusion, R hilar nodules  -Heparin bridge for PE- will discuss long term anticoagulation. Pt previously on eliquis and clopidogrel  -Pulmonology consulted  -Thoracentesis performed- approx 1.5L drawn off, sent for analysis  -LE US- evidence of DVT in left mid femoral and left posterior tibial veins  -ECHO GIDD, EF 55-60%, small posterior pericardial effusion    -Pulmonology recommends IVC filter placement. Will discuss with IR  - Will follow up repeat CXR    GI bleed  Pt had bout of hematochezia this morning. Taken by GI for endoscopy. They believe etiology is hypotension of splanchnic circulation in a vasculopath.    -GI consulted  -H&H stable at this time  -Holding antihypertensives  -Holding anticoagulation and antiplatelets  -Continue to monitor and reassess      Mild malnutrition  Replenish electrolytes as needed    Full adult  diet    Boost supplements as tolerated      Asymptomatic hypertensive urgency  Patient has a current diagnosis of hypertensive urgency (without evidence of end organ damage) which is controlled.  Latest blood pressure and vitals reviewed-   Temp:  [97.2 °F (36.2 °C)-97.9 °F (36.6 °C)]   Pulse:  [71-87]   Resp:  [16-20]   BP: (112-175)/(61-81)   SpO2:  [94 %-98 %] .   Patient currently off IV antihypertensives.   Home meds for hypertension were reviewed and noted below.   Hypertension Medications               carvediloL (COREG) 3.125 MG tablet Take 1 tablet (3.125 mg total) by mouth 2 (two) times daily.            Medication adjustment for hospital antihypertensives is as follows- Coreg 6.25mg BID, Nifedipine 60mg daily, PRN Hydralazine IV    Will aim for controlled BP reduction by medications noted above. Monitor and mitigate end organ damage as indicated.    Pulmonary embolism  See shortness of breath    Patient taken for IVC placement today. Will resume anticoagulation as soon as able.    Pleural effusion  Patient found to have large pleural effusion on imaging. I have personally reviewed and interpreted the following imaging: Xray, CT, and Ultrasound. A thoracentesis was performed. Pleural fluid was sent for analysis. Labs reviewed, including Serum LDH   LD   Date Value Ref Range Status   11/08/2023 194 110 - 260 U/L Final     Comment:     Results are increased in hemolyzed samples.   , Pleural Fluid LDH   LD, Fluid   Date Value Ref Range Status   11/07/2023 3004 Not established U/L Final     Comment:     Reference intervals have not been established for body fluids.  Comparison of this result with the concentration in the blood,   serum,or plasma is recommended.  The reference interval for LDH in serum/plasma is   110-260 U/L. Please interpret in context with the clinical  picture.      , Serum Protein   Total Protein   Date Value Ref Range Status   11/11/2023 7.5 6.0 - 8.4 g/dL Final    , Pleural Protein No  "results found for: "PROTEINBODYF" , Cell Count and Differential No results found for: "BFCELLCNT" , Cytology No results found for: "CYTOFLUID" , and Fluid Cultures No results found for: "BODYFLUIDCUL" . Fluid most consistent with Exudate.  . Most likely etiology includes  Lung malignancy or metastatic neoplasm . Management to include  drained 1500mL. Will reassess.  Pulmonology plans to re-tap and drain pleural effusion. Repeat cytology-results pending  Pulm drained bedside- chest tube in place, currently clamped      Lung mass  Pt has previous evidence of adenocarcinoma lung mass as he was lost to follow up by [facility] Pulmonology team.  Imaging on admission shows significant left sided pleural effusion, R PE, and new R hilar nodules from previous scan.    History and symptoms worrying for primary lung neoplasm and/or malignant metastasis. Will reassess and possible biopsy/EBUS per pulm recs and patient preferences.    Plan:  -Palliative team consulted, recommendations welcomed  -Cytology- Adenocarcinoma  -Oncology consulted and contacted, pending recommendations. Possible immunotherapy, but avoid chemotherapy. Will continue to assess.    PAD (peripheral artery disease)  History of bilateral PAD and critical limb ischemia in lower extremities. History of DVT in lower extremities. Current segmental and subsegmental PE in R lung.  Pt describes foot as numb and unable to move toes. Feels like it is "asleep".  -Anticoagulation held following ischemic colitis induced GI bleed  BLE venous U/S showed no new DVT from previous    Plan:   -BLE arterial US ordered:  " #1. Severe peripheral vascular disease with findings compatible with severe aortoiliac stenosis.     High-grade/clinically significant stenosis of the right CFA.  Monophasic waveforms in the SFA with short segment distal right SFA occlusion.     Tardus parvus runoff waveforms in the right lower extremity probably related to inflow stenosis.     Minimal forward flow " "in the left common femoral artery suggests either iliac artery occlusion or critical stenosis.     Tardus parvus waveforms throughout the remainder of the left lower extremity probably related to poor inflow.  Occluded popliteal artery on the left."    Discussed case with [facility] to determine therapeutic options for revascularization/pain relief in ischemic left leg      Thrombosis  PE in right lung. Cardiology and Pulm consulted  -Previously on eliquis and clopidogrel, but hasn't taken in months as pt did not have medication refills.  -Start on Heparin 80U/kg--> Heparin held now due to GI bleed  -Resume eliquis and clopidogrel when able. Currently held due to GI bleed can resume if patient agrees to risk/benefit.  -IVC filter placed 11/13    Primary hypertension  Chronic, uncontrolled. Latest blood pressure and vitals reviewed-     Temp:  [97.6 °F (36.4 °C)-98.3 °F (36.8 °C)]   Pulse:  [74-95]   Resp:  [10-20]   BP: (100-159)/(64-84)   SpO2:  [92 %-96 %] .   Home meds for hypertension were reviewed and noted below.   Hypertension Medications               carvediloL (COREG) 3.125 MG tablet Take 1 tablet (3.125 mg total) by mouth 2 (two) times daily.          While in the hospital, will manage blood pressure as follows; Adjust home antihypertensive regimen as follows- hold for now as hypotension may have caused/contributed to mesenteric ischemia and the hematochezia.    Will utilize p.r.n. blood pressure medication only if patient's blood pressure greater than 180/110 and he develops symptoms such as worsening chest pain or shortness of breath.    Cardiology consulted  GI consulted for GI bleed    Tobacco abuse  21mg Nicotine patch    Refer patient to smoking cessation services          VTE Risk Mitigation (From admission, onward)           Ordered     IP VTE HIGH RISK PATIENT  Once         11/12/23 0904     Place sequential compression device  Until discontinued         11/07/23 1425                    Discharge " Planning   [place]:      Code Status: DNR   Is the patient medically ready for discharge?:     Reason for patient still in hospital (select all that apply): Patient trending condition, Treatment, Consult recommendations, and PT / OT recommendations      Discharge Delays:  (pending medical stability)              Bob Mata MD  Department of [facility] Medicine   [facility]

## 2023-11-15 NOTE — SUBJECTIVE & OBJECTIVE
Interval History: No adverse events overnight. Cytology resulted as adenocarcinoma. Case mgmt working on placement.    Review of Systems   Constitutional:  Positive for activity change, appetite change, fatigue and unexpected weight change. Negative for chills, diaphoresis and fever.   HENT: Negative.     Respiratory:  Positive for shortness of breath. Negative for apnea, cough and wheezing.    Cardiovascular:  Negative for chest pain, palpitations and leg swelling.   Gastrointestinal:  Negative for blood in stool and vomiting.   Genitourinary: Negative.    Musculoskeletal: Negative.    Skin:  Positive for pallor and rash (Rash on bilateral forearms present on admission).   Neurological:  Positive for weakness (LLE) and numbness (LLE). Negative for facial asymmetry and light-headedness.   Psychiatric/Behavioral: Negative.  Negative for agitation and confusion. The patient is not hyperactive.      Objective:     Vital Signs (Most Recent):  Temp: 98.2 °F (36.8 °C) (11/15/23 0706)  Pulse: 107 (11/15/23 0706)  Resp: 18 (11/15/23 0714)  BP: 129/69 (11/15/23 0706)  SpO2: (!) 91 % (11/15/23 0706) Vital Signs (24h Range):  Temp:  [97.6 °F (36.4 °C)-99.5 °F (37.5 °C)] 98.2 °F (36.8 °C)  Pulse:  [] 107  Resp:  [16-20] 18  SpO2:  [91 %-98 %] 91 %  BP: (116-137)/(58-70) 129/69     Weight: 60.4 kg (133 lb 2.5 oz)  Body mass index is 19.66 kg/m².    Intake/Output Summary (Last 24 hours) at 11/15/2023 0746  Last data filed at 11/15/2023 0716  Gross per 24 hour   Intake --   Output 2075 ml   Net -2075 ml         Physical Exam  Vitals and nursing note reviewed.   Constitutional:       Appearance: He is underweight. He is ill-appearing (chronically).      Comments: Unkept appearance    HENT:      Head: Normocephalic.      Nose: Nose normal.      Mouth/Throat:      Mouth: Mucous membranes are moist.   Cardiovascular:      Rate and Rhythm: Normal rate.      Pulses:           Dorsalis pedis pulses are 0 on the left side.         Posterior tibial pulses are 0 on the left side.   Pulmonary:      Breath sounds: Decreased air movement present. Examination of the left-middle field reveals decreased breath sounds. Examination of the left-lower field reveals decreased breath sounds. Decreased breath sounds present. No wheezing or rhonchi.      Comments: Easily develops shortness of breath  Abdominal:      General: Abdomen is flat.      Palpations: Abdomen is soft.   Musculoskeletal:         General: Tenderness present. No swelling.      Left foot: Decreased range of motion. Tenderness present. No swelling or deformity. Abnormal pulse.      Comments: Pt states foot his numb and he is unable to move his toes.   Feet:      Right foot:      Toenail Condition: Right toenails are long. Fungal disease present.     Left foot:      Toenail Condition: Left toenails are long. Fungal disease present.     Comments: Left foot cold and pale compared to right. No additional swelling in left leg.  in the calf. DP non-palpable and unable to find even with doppler.  Skin:     General: Skin is warm and dry.      Capillary Refill: Capillary refill takes less than 2 seconds.      Coloration: Skin is pale.      Findings: Rash present.   Neurological:      Mental Status: He is alert and oriented to person, place, and time.      Motor: Weakness present.   Psychiatric:         Mood and Affect: Mood normal.         Behavior: Behavior normal. Behavior is cooperative.         Thought Content: Thought content normal.         Judgment: Judgment normal.             Significant Labs: All pertinent labs within the past 24 hours have been reviewed.  Recent Lab Results         11/15/23  0330        Albumin 2.2              ALT 94       Anion Gap 8       AST 85       Baso # 0.04       Basophil % 0.3       BILIRUBIN TOTAL 0.2  Comment: For infants and newborns, interpretation of results should be based  on gestational age, weight and in agreement with  clinical  observations.    Premature Infant recommended reference ranges:  Up to 24 hours.............<8.0 mg/dL  Up to 48 hours............<12.0 mg/dL  3-5 days..................<15.0 mg/dL  6-29 days.................<15.0 mg/dL         BUN 15       Calcium 8.3       Chloride 90       CO2 24       Creatinine 0.8       Differential Method Automated       eGFR >60       Eos # 0.1       Eosinophil % 1.0       Glucose 103       Gran # (ANC) 9.0       Gran % 73.4       Hematocrit 26.1       Hemoglobin 8.7       Immature Grans (Abs) 0.10  Comment: Mild elevation in immature granulocytes is non specific and   can be seen in a variety of conditions including stress response,   acute inflammation, trauma and pregnancy. Correlation with other   laboratory and clinical findings is essential.         Immature Granulocytes 0.8       Lymph # 2.1       Lymph % 17.2       Magnesium  2.0       MCH 26.3       MCHC 33.3       MCV 79       Mono # 1.0       Mono % 8.1       MPV 9.3       nRBC 0       Phosphorus Level 3.5       Platelet Count 350       Potassium 4.2       PROTEIN TOTAL 7.8       RBC 3.31       RDW 15.3       Sodium 122       WBC 12.29               Significant Imaging: I have reviewed all pertinent imaging results/findings within the past 24 hours.

## 2023-11-15 NOTE — NURSING
RAPID RESPONSE NURSE PROACTIVE ROUNDING NOTE       Chest tube removed today, no acute events, will remove from teams, able to intervene if needed

## 2023-11-16 NOTE — ASSESSMENT & PLAN NOTE
US BLE  Right lower extremity.   -372 cm/sec monophasic   Prox SFA 52 cm/sec monophasic   Mid SFA 65 cm/sec   monophasic.  Collateral vessels noted.   Dist SFA  47 cm/sec  with short segment occlusion and tardus parvus waveform distally.   Pop A 15 cm/sec tardus parvus   MARTHA 25 cm/sec tardus parvus  PTA 21 cm/sec  tardus parvus     Left lower extremity   CFA 7 cm/sec tardus parvus with short segment occlusion.  Tardus parvus waveforms in the deep femoral artery.   Prox SFA 14 cm/sec tardus parvus   Mid SFA 26 cm/sec  tardus parvus   Dist SFA 18 cm/sec  tardus parvus   Pop A occluded cm/sec   MARTHA minimal non phasic forward flow.   PTA minimal non phasic forward flow.    Patient with LLE pain, decreased sensation, and decreased motor function x 2-3 months without worsening. Unfortunately, patient presented late and noncompliant with medications, continued tobacco abuse, homeless, malnourished, DNR with probable stage IV lung CA and is at high risk for limb loss.  Do not feel that he is a revascularization candidate.   Medical management with asa, statin.   Palliative care on board

## 2023-11-16 NOTE — PLAN OF CARE
Pulmonology Plan of Care    Chest tube removed and patient with no worsening symptoms. Primary team working on next steps, oncology consulted and trying to determine if immunotherapy would be possible. Ongoing goals of care discussions with patient.    At this time no further intervention necessary from pulmonary perspective. We will sign off, please do not hesitate to reach out if we can be of any further assistance in the care of this patient.    Luiz Chicas M.D.  [facility] Critical [facility] Fellow

## 2023-11-16 NOTE — PROGRESS NOTES
[facility] Medicine  Progress Note    Patient Name: Eric Soliman  MRN: [id number]  Patient Class: IP- Inpatient   Admission Date: 11/7/2023  Length of Stay: 9 days  Attending Physician: Juan Manuel Newton DO  Primary Care Provider: Jesika, Primary Doctor        Subjective:     Principal Problem:Shortness of breath        HPI:  Pt, Eric Soliman, is a 62 yo M with pmh of htn, lung nodule of undetermined character, PAD s/p thrombectomy & PTA, epilepsy, and tobacco and marijuana use here for several months of progressively worsening shortness of breath that has been worse over the last few days. Patient had previously been seen by [facility] pulmonology for workup of this lung mass and respiratory issues, but has limited means and had failure of follow up. At this visit patient describes worsening shortness of breath and fatigue, reduced appetite, and significant unintentional weight loss of up to 25% of body weight. Patient is a current tobacco and marijuana smoker with approximately 50 pack year history of tobacco use, but says he has cut back drastically lately. Patient denies chest pain, nausea/vomiting, diarrhea, night sweats, cough or sputum production, or other symptoms at this time.      In ED, patient was noted to be hypertensive at 228/116. Initial ED BP was 190/90. Patient was given his previous home dose of coreg 3.125 BID and 10mg hydralazine q8h prn. Vitals otherwise stable. Pt's labs showed WBC count of 7.52. Troponin was negative, but will repeat once more to trend. BNP non-elevated. Chest xray showed large left pleural effusion. CTA of chest showed right sided PE and left sided pleural effusion and recommended thoracentesis to clinically correlate. Thoracentesis was performed at bedside by Pulm/Crit fellow. Samples were sent for analysis and approximately 1500mL were drawn off without complication. Patient had repeat CXR for post-thoracentesis lung views. Dr. Martin and [facility]  inpatient team  were present with patient to discuss possible diagnosis including malignancy and suggest a consultation with the Palliative medicine team.    Overview/Hospital Course:  64 yo male PMH HTN, Lung nodule, PVD s/p thrombectomy, tobacco use presented to ED with shortness of breath and weight loss x 1 month that has worsened over the past few days.  Pt has been seen by previously by [facility] pulmonology for lung nodule and respiratory problems.  Initially hypertensive to 200s systolic in ED; resolved with home medications.  CTA shows large left pleural effusion and PE to right lower lobe.  US shows DVT to left femoral and posterior tibialis veins.  Pt previously on Eliquis and Plavix but non-adherent x 1 month.  Treated with Heparin drip.  Pulmonology consulted and performed thoracentesis.  1.5L fluid drawn off and analysis consisted with exudative pleural effusion.  One episode of bright red bloody stool with associated generalized weakness on hospital day 2.  Discontinued heparin. Treated with Protonix.  GI consulted.  Colonoscopy showed segmental mucosal ulceration in the ascending colon with maroon stool througout the entire colon.  Findings consistent with ischemic colitis which likely occured in the setting of hypotension in a vasculopath.  Stable BP, H&H throughout hospital course.  Pleural catheter inserted with initial drainage of approximately 1L dark brown fluid.  Drained to completion for total of 3.5L  No pleurodesis offered due to trapped lung on CXR.  IR consulted and IVC filter placed.  Fluid cytology resulted adenocarcinoma.  HemeOnc consulted.    Interval History: No adverse events overnight. No change in leg pain. Still unable to ambulate effectively    Review of Systems   Constitutional:  Positive for activity change, appetite change, fatigue and unexpected weight change. Negative for chills, diaphoresis and fever.   HENT: Negative.     Respiratory:  Positive for shortness of breath. Negative for apnea,  cough and wheezing.    Cardiovascular:  Negative for chest pain, palpitations and leg swelling.   Gastrointestinal:  Negative for blood in stool and vomiting.   Genitourinary: Negative.    Musculoskeletal: Negative.    Skin:  Positive for pallor and rash (Rash on bilateral forearms present on admission).   Neurological:  Positive for weakness (LLE) and numbness (LLE). Negative for facial asymmetry and light-headedness.   Psychiatric/Behavioral: Negative.  Negative for agitation and confusion. The patient is not hyperactive.      Objective:     Vital Signs (Most Recent):  Temp: 98.6 °F (37 °C) (11/16/23 1621)  Pulse: 107 (11/16/23 1644)  Resp: 18 (11/16/23 1621)  BP: 129/68 (11/16/23 1621)  SpO2: 96 % (11/16/23 1621) Vital Signs (24h Range):  Temp:  [97.6 °F (36.4 °C)-100.3 °F (37.9 °C)] 98.6 °F (37 °C)  Pulse:  [] 107  Resp:  [16-20] 18  SpO2:  [90 %-96 %] 96 %  BP: (126-163)/(64-77) 129/68     Weight: 60.4 kg (133 lb 2.5 oz)  Body mass index is 19.66 kg/m².    Intake/Output Summary (Last 24 hours) at 11/16/2023 1708  Last data filed at 11/16/2023 1457  Gross per 24 hour   Intake 357 ml   Output 975 ml   Net -618 ml         Physical Exam  Vitals and nursing note reviewed.   Constitutional:       Appearance: He is underweight. He is ill-appearing (chronically).      Comments: Unkept appearance    HENT:      Head: Normocephalic.      Nose: Nose normal.      Mouth/Throat:      Mouth: Mucous membranes are moist.   Cardiovascular:      Rate and Rhythm: Normal rate.      Pulses:           Dorsalis pedis pulses are 0 on the left side.        Posterior tibial pulses are 0 on the left side.   Pulmonary:      Effort: No respiratory distress.      Breath sounds: Decreased air movement present. Examination of the left-middle field reveals decreased breath sounds. Examination of the left-lower field reveals decreased breath sounds. Decreased breath sounds present. No wheezing or rhonchi.      Comments: Easily develops  shortness of breath  Chest:      Chest wall: No tenderness.   Abdominal:      General: Abdomen is flat. There is no distension.      Palpations: Abdomen is soft.      Tenderness: There is no abdominal tenderness. There is no guarding.   Musculoskeletal:         General: Tenderness present. No swelling.      Right lower leg: No edema.      Left lower leg: No edema.      Left foot: Decreased range of motion. Tenderness present. No swelling or deformity. Abnormal pulse.      Comments: Pt states foot his numb and he is unable to move his toes.   Feet:      Right foot:      Toenail Condition: Right toenails are long. Fungal disease present.     Left foot:      Toenail Condition: Left toenails are long. Fungal disease present.     Comments: Left foot cold and pale compared to right. No additional swelling in left leg.  in the calf. DP non-palpable and unable to find even with doppler.  Skin:     General: Skin is warm and dry.      Capillary Refill: Capillary refill takes less than 2 seconds.      Coloration: Skin is pale.      Findings: Rash present.   Neurological:      Mental Status: He is alert and oriented to person, place, and time.      Motor: Weakness present.   Psychiatric:         Mood and Affect: Mood normal.         Behavior: Behavior normal. Behavior is cooperative.         Thought Content: Thought content normal.         Judgment: Judgment normal.             Significant Labs: All pertinent labs within the past 24 hours have been reviewed.  Recent Lab Results         11/16/23  0304        Albumin 2.3              ALT 62       Anion Gap 8       AST 43       Baso # 0.04       Basophil % 0.4       BILIRUBIN TOTAL 0.5  Comment: For infants and newborns, interpretation of results should be based  on gestational age, weight and in agreement with clinical  observations.    Premature Infant recommended reference ranges:  Up to 24 hours.............<8.0 mg/dL  Up to 48 hours............<12.0  mg/dL  3-5 days..................<15.0 mg/dL  6-29 days.................<15.0 mg/dL         BUN 15       Calcium 8.7       Chloride 90       CO2 28       Creatinine 0.8       Differential Method Automated       eGFR >60       Eos # 0.1       Eosinophil % 0.6       Glucose 105       Gran # (ANC) 6.8       Gran % 68.1       Hematocrit 28.1       Hemoglobin 9.2       Immature Grans (Abs) 0.09  Comment: Mild elevation in immature granulocytes is non specific and   can be seen in a variety of conditions including stress response,   acute inflammation, trauma and pregnancy. Correlation with other   laboratory and clinical findings is essential.         Immature Granulocytes 0.9       Lymph # 1.8       Lymph % 18.2       Magnesium  2.1       MCH 26.4       MCHC 32.7       MCV 81       Mono # 1.2       Mono % 11.8       MPV 9.3       nRBC 0       Phosphorus Level 4.4       Platelet Count 420       Potassium 4.1       PROTEIN TOTAL 8.2       RBC 3.49       RDW 15.6       Sodium 126       WBC 10.02               Significant Imaging: I have reviewed all pertinent imaging results/findings within the past 24 hours.    Assessment/Plan:      * Shortness of breath  Chronic, progressive shortness of breath worsening over the last few days.    -CTA- R segmental and subsegmental PE w right heart strain, large L pleural effusion, R hilar nodules  -Heparin bridge for PE- will discuss long term anticoagulation. Pt previously on eliquis and clopidogrel  -Pulmonology consulted  -Thoracentesis performed- approx 1.5L drawn off, sent for analysis  -LE US- evidence of DVT in left mid femoral and left posterior tibial veins  -ECHO GIDD, EF 55-60%, small posterior pericardial effusion    -Pulmonology recommends IVC filter placement. Will discuss with IR  - Will follow up repeat CXR    GI bleed  Pt had bout of hematochezia this morning. Taken by GI for endoscopy. They believe etiology is hypotension of splanchnic circulation in a  vasculopath.    -GI consulted  -H&H stable at this time  -Holding antihypertensives  -Holding anticoagulation and antiplatelets  -Continue to monitor and reassess      Mild malnutrition  Replenish electrolytes as needed    Full adult diet    Boost supplements as tolerated      Asymptomatic hypertensive urgency  Patient has a current diagnosis of hypertensive urgency (without evidence of end organ damage) which is controlled.  Latest blood pressure and vitals reviewed-   Temp:  [97.2 °F (36.2 °C)-97.9 °F (36.6 °C)]   Pulse:  [71-87]   Resp:  [16-20]   BP: (112-175)/(61-81)   SpO2:  [94 %-98 %] .   Patient currently off IV antihypertensives.   Home meds for hypertension were reviewed and noted below.   Hypertension Medications               carvediloL (COREG) 3.125 MG tablet Take 1 tablet (3.125 mg total) by mouth 2 (two) times daily.            Medication adjustment for hospital antihypertensives is as follows- Coreg 6.25mg BID, Nifedipine 60mg daily, PRN Hydralazine IV    Will aim for controlled BP reduction by medications noted above. Monitor and mitigate end organ damage as indicated.    Pulmonary embolism  See shortness of breath    Patient taken for IVC placement today. Will resume anticoagulation as soon as able.    Pleural effusion  Patient found to have large pleural effusion on imaging. I have personally reviewed and interpreted the following imaging: Xray, CT, and Ultrasound. A thoracentesis was performed. Pleural fluid was sent for analysis. Labs reviewed, including Serum LDH   LD   Date Value Ref Range Status   11/08/2023 194 110 - 260 U/L Final     Comment:     Results are increased in hemolyzed samples.   , Pleural Fluid LDH   LD, Fluid   Date Value Ref Range Status   11/07/2023 3004 Not established U/L Final     Comment:     Reference intervals have not been established for body fluids.  Comparison of this result with the concentration in the blood,   serum,or plasma is recommended.  The reference  "interval for LDH in serum/plasma is   110-260 U/L. Please interpret in context with the clinical  picture.      , Serum Protein   Total Protein   Date Value Ref Range Status   11/11/2023 7.5 6.0 - 8.4 g/dL Final    , Pleural Protein No results found for: "PROTEINBODYF" , Cell Count and Differential No results found for: "BFCELLCNT" , Cytology No results found for: "CYTOFLUID" , and Fluid Cultures No results found for: "BODYFLUIDCUL" . Fluid most consistent with Exudate.  . Most likely etiology includes  Lung malignancy or metastatic neoplasm . Management to include  drained 1500mL. Will reassess.  Pulmonology plans to re-tap and drain pleural effusion. Repeat cytology-results pending  Pulm drained bedside- chest tube in place, currently clamped      Lung mass  Pt has previous evidence of adenocarcinoma lung mass as he was lost to follow up by [facility] Pulmonology team.  Imaging on admission shows significant left sided pleural effusion, R PE, and new R hilar nodules from previous scan.    History and symptoms worrying for primary lung neoplasm and/or malignant metastasis. Will reassess and possible biopsy/EBUS per pulm recs and patient preferences.    Plan:  -Palliative team consulted, recommendations welcomed  -Cytology- Adenocarcinoma  -Oncology consulted and contacted. Possible immunotherapy, but avoid chemotherapy. Will continue to assess.    PAD (peripheral artery disease)  History of bilateral PAD and critical limb ischemia in lower extremities. History of DVT in lower extremities. Current segmental and subsegmental PE in R lung.  Pt describes foot as numb and unable to move toes. Feels like it is "asleep".  -Anticoagulation held following ischemic colitis induced GI bleed  BLE venous U/S showed no new DVT from previous    Plan:   -BLE arterial US ordered:  " #1. Severe peripheral vascular disease with findings compatible with severe aortoiliac stenosis.     High-grade/clinically significant stenosis of the right " "CFA.  Monophasic waveforms in the SFA with short segment distal right SFA occlusion.     Tardus parvus runoff waveforms in the right lower extremity probably related to inflow stenosis.     Minimal forward flow in the left common femoral artery suggests either iliac artery occlusion or critical stenosis.     Tardus parvus waveforms throughout the remainder of the left lower extremity probably related to poor inflow.  Occluded popliteal artery on the left."    Discussed case with [facility] to determine therapeutic options for revascularization/pain relief in ischemic left leg    Discussed case with cardiology and they recommend that any further procedures are not indicated at this time.      Thrombosis  PE in right lung. Cardiology and Pulm consulted  -Previously on eliquis and clopidogrel, but hasn't taken in months as pt did not have medication refills.  -Start on Heparin 80U/kg--> Heparin held now due to GI bleed  -Resume eliquis and clopidogrel when able. Currently held due to GI bleed can resume if patient agrees to risk/benefit.  -IVC filter placed 11/13    Primary hypertension  Chronic, uncontrolled. Latest blood pressure and vitals reviewed-     Temp:  [97.6 °F (36.4 °C)-98.3 °F (36.8 °C)]   Pulse:  [74-95]   Resp:  [10-20]   BP: (100-159)/(64-84)   SpO2:  [92 %-96 %] .   Home meds for hypertension were reviewed and noted below.   Hypertension Medications               carvediloL (COREG) 3.125 MG tablet Take 1 tablet (3.125 mg total) by mouth 2 (two) times daily.          While in the hospital, will manage blood pressure as follows; Adjust home antihypertensive regimen as follows- hold for now as hypotension may have caused/contributed to mesenteric ischemia and the hematochezia.    Will utilize p.r.n. blood pressure medication only if patient's blood pressure greater than 180/110 and he develops symptoms such as worsening chest pain or shortness of breath.    Cardiology consulted  GI consulted for GI " bleed    Tobacco abuse  21mg Nicotine patch    Refer patient to smoking cessation services          VTE Risk Mitigation (From admission, onward)           Ordered     IP VTE HIGH RISK PATIENT  Once         11/12/23 0904     Place sequential compression device  Until discontinued         11/07/23 1425                    Discharge Planning   [place]:      Code Status: DNR   Is the patient medically ready for discharge?:     Reason for patient still in hospital (select all that apply): Consult recommendations, PT / OT recommendations, and Pending disposition  Discharge Plan A: New Nursing Home placement - [facility] care facility   Discharge Delays: None known at this time              Bob Mata MD  Department of Hospital Medicine   [facility]

## 2023-11-16 NOTE — PT/OT/SLP PROGRESS
Physical Therapy Treatment    Patient Name:  Eric Soliman   MRN:  [id number]    Recommendations:     Discharge Recommendations: Moderate Intensity Therapy  Discharge Equipment Recommendations: walker, rolling  Barriers to discharge: Inaccessible home and Decreased caregiver support    Assessment:     Eric Soliman is a 63 y.o. male admitted with a medical diagnosis of Shortness of breath.  He presents with the following impairments/functional limitations: weakness, impaired endurance, impaired self care skills, impaired functional mobility, gait instability, impaired balance, decreased lower extremity function, decreased safety awareness, decreased ROM, pain     Rehab Prognosis: Fair; patient would benefit from acute skilled PT services to address these deficits and reach maximum level of function.    Recent Surgery: Procedure(s) (LRB):  COLONOSCOPY (N/A) 6 Days Post-Op    Plan:     During this hospitalization, patient to be seen 5 x/week to address the identified rehab impairments via gait training, therapeutic activities, therapeutic exercises, neuromuscular re-education and progress toward the following goals:    Plan of Care Expires:  12/12/23    Subjective     Chief Complaint: pain  Patient/Family Comments/goals: return to PLOF  Pain/Comfort:  Pain Rating 1:  (did not rate but reports diaudid helping with the pain)  Location - Side 1: Left  Location - Orientation 1: lower  Location 1: leg  Pain Addressed 1: Pre-medicate for activity, Reposition, Distraction, Cessation of Activity  Pain Rating Post-Intervention 1:  (did not rate, appears comfortable)      Objective:     Communicated with nurse prior to session.  Patient found HOB elevated with bed alarm, telemetry upon PT entry to room.     General Precautions: Standard, fall, seizure  Orthopedic Precautions: N/A  Braces: N/A  Respiratory Status: Room air     Functional Mobility:  Bed Mobility:     Rolling Right: stand by assistance  Scooting: minimum assistance  and pt using therapist's hand to pull on   Supine to Sit: minimum assistance and pt using therapist's hand to pull on  Sit to Supine: stand by assistance      AM-PAC 6 CLICK MOBILITY  Turning over in bed (including adjusting bedclothes, sheets and blankets)?: 3  Sitting down on and standing up from a chair with arms (e.g., wheelchair, bedside commode, etc.): 3  Moving from lying on back to sitting on the side of the bed?: 3  Moving to and from a bed to a chair (including a wheelchair)?: 3  Need to walk in hospital room?: 3  Climbing 3-5 steps with a railing?: 1  Basic Mobility Total Score: 16       Treatment & Education:  Patient reports that the dilaudid is helping, pt agreeable to sitting EOB; pt transitioned to EOB with increased time using therapist's hand to pull himself forward to EOB and therapist using bed pad to assist; pt declining to stand and take steps; pt performed gentle neck AROM, shld shrugs/rolls, trunk fl/ext, trunk leans L/R, trunk rotation L/R, APs 5-10 reps, FAQ x 2 each with APs x 3; pt declined seated scoot to HOB; sit to supine with SBA and assisted pt with VCs in using bed rail and RLE primarily in scooting self to HOB with bed in minimal trendelenburg- increased time/effort to complete; pt repositioned for comfort with HOB elevated.    Patient left HOB elevated with all lines intact, call button in reach, bed alarm on, and nurse notified..    GOALS:   Multidisciplinary Problems       Physical Therapy Goals          Problem: Physical Therapy    Goal Priority Disciplines Outcome Goal Variances Interventions   Physical Therapy Goal     PT, PT/OT Ongoing, Progressing     Description: Goals to be met by: 2023     Patient will increase functional independence with mobility by performin. Supine to sit with Modified [place]  2. Sit to supine with Modified [place]  3. Sit to stand transfer with Modified [place]  4. Gait  x 100 feet with Modified [place] using  Rolling Walker.   5. Stand for 10 minutes with Modified [place] using Rolling Walker                         Time Tracking:     PT Received On: 11/15/23  PT Start Time: 1553     PT Stop Time: 1623  PT Total Time (min): 30 min     Billable Minutes: Therapeutic Activity 15 and Therapeutic Exercise 15    Treatment Type: Treatment  PT/PTA: PT     Number of PTA visits since last PT visit: 0     11/15/2023

## 2023-11-16 NOTE — CONSULTS
[facility] - Telemetry  Cardiology  Consult Note    Patient Name: Eric Soliman  MRN: [id number]  Admission Date: 11/7/2023  Hospital Length of Stay: 9 days  Code Status: DNR   Attending Provider: Juan Manuel Newton DO   Consulting Provider: Hadley Damian NP  Primary Care Physician: Jesika, Primary Doctor  Principal Problem:Shortness of breath    Patient information was obtained from patient, past medical records, and ER records.     Inpatient consult to [facility]iology-[facility]  Consult performed by: Hadley Damian NP  Consult ordered by: Bob Mata MD        Subjective:     Chief Complaint:  SOB     HPI:   Eric Soliman, is a 64 yo M with HTN, lung nodule of undetermined character, PAD s/p thrombectomy & PTA, epilepsy,homelessness, medical noncompliance, and tobacco and marijuana use. Patient presented to the ED on 11/07/2023 with SOB. Patient noted to have a PE, pleural effusion, and possible stage IV  lung cancer. Patient reports LLE pain x 2-3 months with decreased sensation and motor function. Patient denies any acute changes or wounds. Cardiology consulted for PAD. Patient denies CP, palpitations, NV, syncope, edema.   US BLE  Right lower extremity.   -372 cm/sec monophasic   Prox SFA 52 cm/sec monophasic   Mid SFA 65 cm/sec   monophasic.  Collateral vessels noted.   Dist SFA  47 cm/sec  with short segment occlusion and tardus parvus waveform distally.   Pop A 15 cm/sec tardus parvus   MARTHA 25 cm/sec tardus parvus  PTA 21 cm/sec  tardus parvus     Left lower extremity   CFA 7 cm/sec tardus parvus with short segment occlusion.  Tardus parvus waveforms in the deep femoral artery.   Prox SFA 14 cm/sec tardus parvus   Mid SFA 26 cm/sec  tardus parvus   Dist SFA 18 cm/sec  tardus parvus   Pop A occluded cm/sec   MARTHA minimal non phasic forward flow.   PTA minimal non phasic forward flow.      Past Medical History:   Diagnosis Date    Anticoagulant long-term use     DVT (deep venous thrombosis)     Seizures      epilepsy controlled w/ marijuana       Past Surgical History:   Procedure Laterality Date    ANGIOGRAPHY OF LOWER EXTREMITY Left 10/7/2022    Procedure: Angiogram Extremity Unilateral;  Surgeon: Charles Patten III, MD;  Location: [facility] CATH LAB;  Service: Cardiology;  Laterality: Left;    AORTOGRAPHY WITH SERIALOGRAPHY Bilateral 10/7/2022    Procedure: AORTOGRAM, WITH SERIALOGRAPHY;  Surgeon: Charles Patten III, MD;  Location: [facility] CATH LAB;  Service: Cardiology;  Laterality: Bilateral;    AORTOGRAPHY WITH SERIALOGRAPHY N/A 4/4/2023    Procedure: AORTOGRAM, WITH SERIALOGRAPHY;  Surgeon: Royce Yi MD;  Location: [facility] CATH LAB/EP;  Service: Cardiology;  Laterality: N/A;    ATHERECTOMY, PERIPHERAL BLOOD VESSEL Left 4/4/2023    Procedure: ATHERECTOMY, PERIPHERAL BLOOD VESSEL;  Surgeon: Royce Yi MD;  Location: [facility] CATH LAB/EP;  Service: Cardiology;  Laterality: Left;    CLOSURE DEVICE  4/4/2023    Procedure: Placement of Closure Device;  Surgeon: Royce Yi MD;  Location: [facility] CATH LAB/EP;  Service: Cardiology;;    COLONOSCOPY N/A 11/9/2023    Procedure: COLONOSCOPY;  Surgeon: Cedrick Holcomb MD;  Location: [facility] ENDO;  Service: Endoscopy;  Laterality: N/A;    FILTER PROTECTION, PERIPHERAL  4/4/2023    Procedure: Filter Protection, Peripheral;  Surgeon: Royce Yi MD;  Location: [facility] CATH LAB/EP;  Service: Cardiology;;    IVUS (INTRAVASCULAR ULTRASOUND) Left 4/4/2023    Procedure: ULTRASOUND, INTRAVASCULAR;  Surgeon: Royce Yi MD;  Location: [facility] CATH LAB/EP;  Service: Cardiology;  Laterality: Left;    PTA, ILIAC ARTERY Left 4/4/2023    Procedure: PTA, Iliac Artery;  Surgeon: Royce Yi MD;  Location: [facility] CATH LAB/EP;  Service: Cardiology;  Laterality: Left;    THROMBECTOMY  4/4/2023    Procedure: THROMBECTOMY;  Surgeon: Royce Yi MD;  Location: [facility] CATH LAB/EP;  Service: Cardiology;;       Review of patient's allergies indicates:  No Known  Allergies    Current Facility-Administered Medications on File Prior to Encounter   Medication    iodixanoL (VISIPAQUE 320) injection     Current Outpatient Medications on File Prior to Encounter   Medication Sig    acetaminophen (TYLENOL) 500 MG tablet Take 500 mg by mouth every 6 (six) hours as needed for Pain.    apixaban (ELIQUIS) 5 mg Tab Take 1 tablet (5 mg total) by mouth 2 (two) times daily.    atorvastatin (LIPITOR) 40 MG tablet Take 1 tablet (40 mg total) by mouth once daily.    carvediloL (COREG) 3.125 MG tablet Take 1 tablet (3.125 mg total) by mouth 2 (two) times daily.    valACYclovir (VALTREX) 1000 MG tablet Take 1 tablet (1,000 mg total) by mouth 3 (three) times daily. for 7 days     Family History       Problem Relation (Age of Onset)    Heart disease Father    No Known Problems Mother          Tobacco Use    Smoking status: Every Day     Current packs/day: 2.00     Average packs/day: 2.0 packs/day for 48.9 years (97.7 ttl pk-yrs)     Types: Cigarettes     Start date: 1975    Smokeless tobacco: Never    Tobacco comments:     Pt is a 2 pk/day cigarette smoker x 49 yrs. 21 mg nicotine patch ordered Q day, and order requested upon discharge for 21 mg nicotine patch Q day. Pt states ready to quit. Ambulatory referral to Smoking Cessation clinic.   Substance and Sexual Activity    Alcohol use: Not Currently     Comment: rarely 1 beer    Drug use: Yes     Types: Marijuana    Sexual activity: Not on file     Review of Systems   Constitutional: Negative.   HENT: Negative.     Eyes: Negative.    Cardiovascular:  Positive for claudication and dyspnea on exertion. Negative for chest pain, leg swelling, near-syncope, orthopnea, palpitations, paroxysmal nocturnal dyspnea and syncope.   Respiratory:  Negative for cough and shortness of breath.    Endocrine: Negative.    Hematologic/Lymphatic: Negative.    Skin:  Negative for color change and poor wound healing.   Musculoskeletal:  Positive for myalgias.    Gastrointestinal: Negative.  Negative for hematemesis, hematochezia, nausea and vomiting.   Genitourinary: Negative.    Neurological:  Positive for focal weakness, numbness and paresthesias. Negative for dizziness.   Psychiatric/Behavioral: Negative.     Allergic/Immunologic: Negative.      Objective:     Vital Signs (Most Recent):  Temp: 97.6 °F (36.4 °C) (11/16/23 0821)  Pulse: 104 (11/16/23 0821)  Resp: 18 (11/16/23 0916)  BP: 132/72 (11/16/23 0821)  SpO2: (!) 93 % (11/16/23 0821) Vital Signs (24h Range):  Temp:  [97.6 °F (36.4 °C)-100.3 °F (37.9 °C)] 97.6 °F (36.4 °C)  Pulse:  [] 104  Resp:  [16-20] 18  SpO2:  [90 %-96 %] 93 %  BP: (128-163)/(64-77) 132/72     Weight: 60.4 kg (133 lb 2.5 oz)  Body mass index is 19.66 kg/m².    SpO2: (!) 93 %         Intake/Output Summary (Last 24 hours) at 11/16/2023 1043  Last data filed at 11/16/2023 0540  Gross per 24 hour   Intake --   Output 750 ml   Net -750 ml       Lines/Drains/Airways       Peripheral Intravenous Line  Duration                  Peripheral IV - Single Lumen 11/07/23 0945 20 G Anterior;Left Forearm 9 days         Peripheral IV - Single Lumen 11/09/23 1010 18 G Posterior;Proximal;Right Forearm 7 days         Peripheral IV - Single Lumen 11/09/23 1015 18 G Distal;Posterior;Right Forearm 7 days                     Physical Exam  Constitutional:       General: He is not in acute distress.     Appearance: He is not diaphoretic.   HENT:      Head: Atraumatic.   Eyes:      General:         Right eye: No discharge.         Left eye: No discharge.   Cardiovascular:      Rate and Rhythm: Normal rate and regular rhythm.      Heart sounds: Murmur heard.   Pulmonary:      Effort: Pulmonary effort is normal.      Breath sounds: No rales.   Abdominal:      General: Bowel sounds are normal.      Palpations: Abdomen is soft.   Musculoskeletal:         General: Tenderness present.      Right lower leg: No edema.      Left lower leg: No edema.   Skin:     General:  "Skin is warm and dry.      Capillary Refill: Capillary refill takes 2 to 3 seconds.   Neurological:      Mental Status: He is alert and oriented to person, place, and time.   Psychiatric:         Mood and Affect: Mood normal.         Behavior: Behavior normal.         Thought Content: Thought content normal.         Judgment: Judgment normal.          Significant Labs: BMP:   Recent Labs   Lab 11/15/23  0330 11/16/23  0304    105   * 126*   K 4.2 4.1   CL 90* 90*   CO2 24 28   BUN 15 15   CREATININE 0.8 0.8   CALCIUM 8.3* 8.7   MG 2.0 2.1   , CMP   Recent Labs   Lab 11/15/23  0330 11/16/23  0304   * 126*   K 4.2 4.1   CL 90* 90*   CO2 24 28    105   BUN 15 15   CREATININE 0.8 0.8   CALCIUM 8.3* 8.7   PROT 7.8 8.2   ALBUMIN 2.2* 2.3*   BILITOT 0.2 0.5   ALKPHOS 110 105   AST 85* 43*   ALT 94* 62*   ANIONGAP 8 8   , CBC   Recent Labs   Lab 11/15/23  0330 11/16/23  0304   WBC 12.29 10.02   HGB 8.7* 9.2*   HCT 26.1* 28.1*    420   , INR No results for input(s): "INR", "PROTIME" in the last 48 hours., Lipid Panel No results for input(s): "CHOL", "HDL", "LDLCALC", "TRIG", "CHOLHDL" in the last 48 hours., Troponin No results for input(s): "TROPONINI" in the last 48 hours., and All pertinent lab results from the last 24 hours have been reviewed.    Significant Imaging: Echocardiogram: Transthoracic echo (TTE) complete ([facility] Only):   Results for orders placed or performed during the hospital encounter of 11/07/23   Echo   Result Value Ref Range    BSA 1.83 m2    MV Peak A Christopher 0.91 m/s    MV stenosis pressure 1/2 time 56.30 ms    MV VTI 13.7 cm    MV Peak E Chrisotpher 0.38 m/s    MV peak gradient 4 mmHg    Ao VTI 20.80 cm    Ao peak christopher 0.93 m/s    LVOT peak VTI 15.80 cm    LVOT peak christopher 0.82 m/s    LVOT diameter 2.28 cm    E wave deceleration time 194.14 msec    MV mean gradient 2 mmHg    AV mean gradient 2 mmHg    RV S' 12.60 cm/s    TAPSE 1.57 cm    Ascending aorta 3.04 cm    STJ 2.97 cm    Sinus " 3.11 cm    TDI LATERAL 0.04 m/s    Left Ventricular Outflow Tract Mean Gradient 1.17 mmHg    Left Ventricular Outflow Tract Mean Velocity 0.50 cm/s    IVC diameter 1.25 cm    TDI SEPTAL 0.03 m/s    LV LATERAL E/E' RATIO 9.50 m/s    LV SEPTAL E/E' RATIO 12.67 m/s    AV valve area 3.10 cm²    AV Velocity Ratio 0.88     AV index (prosthetic) 0.76     MV valve area p 1/2 method 3.91 cm2    MV valve area by continuity eq 4.71 cm2    E/A ratio 0.42     Mean e' 0.04 m/s    LVOT area 4.1 cm2    LVOT stroke volume 64.48 cm3    AV peak gradient 3 mmHg    E/E' ratio 10.86 m/s    EVAN by Velocity Ratio 3.60 cm²    LA WIDTH 2.7 cm    Est. RA pres 3 mmHg    Narrative      Left Ventricle: Not well visualized due to poor sonic window. The left   ventricle is normal in size. Normal wall thickness. Normal wall motion.   There is normal systolic function with a visually estimated ejection   fraction of 55 - 60%. Grade I diastolic dysfunction.    Right Ventricle: Normal right ventricular cavity size. Systolic   function is normal.    Left Atrium: Left atrium was not well visualized. Left atrium is mildly   dilated.    Mitral Valve: There is no stenosis. The mean pressure gradient across   the mitral valve is 2 mmHg at a heart rate of  bpm.    IVC/SVC: Normal venous pressure at 3 mmHg.    Pericardium: There is a posterior effusion. Pericardial effusion is   echolucent. No indication of cardiac tamponade.       Assessment and Plan:     Pulmonary embolism  ? Malignancy related  S/p IVC filter placement   Noncompliant with Eliquis as OP now with GIB per colonoscopy  Hold OAC given the above         PAD (peripheral artery disease)  US BLE  Right lower extremity.   -372 cm/sec monophasic   Prox SFA 52 cm/sec monophasic   Mid SFA 65 cm/sec   monophasic.  Collateral vessels noted.   Dist SFA  47 cm/sec  with short segment occlusion and tardus parvus waveform distally.   Pop A 15 cm/sec tardus parvus   MARTHA 25 cm/sec tardus parvus  PTA 21  cm/sec  tardus parvus     Left lower extremity   CFA 7 cm/sec tardus parvus with short segment occlusion.  Tardus parvus waveforms in the deep femoral artery.   Prox SFA 14 cm/sec tardus parvus   Mid SFA 26 cm/sec  tardus parvus   Dist SFA 18 cm/sec  tardus parvus   Pop A occluded cm/sec   MARTHA minimal non phasic forward flow.   PTA minimal non phasic forward flow.    Patient with LLE pain, decreased sensation, and decreased motor function x 2-3 months without worsening. Unfortunately, patient presented late and noncompliant with medications, continued tobacco abuse, homeless, malnourished, DNR with probable stage IV lung CA and is at high risk for limb loss.  Do not feel that he is a revascularization candidate.   Medical management with asa, statin.   Palliative care on board       Primary hypertension  SBP 120s-140s  Continue Coreg     Tobacco abuse  Smoking cessation   NRT PRN        VTE Risk Mitigation (From admission, onward)           Ordered     IP VTE HIGH RISK PATIENT  Once         11/12/23 0904     Place sequential compression device  Until discontinued         11/07/23 1425                    Thank you for your consult. I will follow-up with patient. Please contact us if you have any additional questions.    Hadley Damian NP  Cardiology   [facility] - Telemetry

## 2023-11-16 NOTE — PLAN OF CARE
CM Supervisor met with pt - pt consents to complete paperwork and submit financials for NH placement.    CHELO spoke with Tram at [facility]  -- as of today -  there are no male beds available -- none likely for next week.   Pt was also accepted by [facility] - CHELO spoke with Ms. Rice - she will have intake persons Courtney and Ms. Hurtado contact CM.    ------------------------  CHELO spoke with Windy / [facility]  Admissions -- [phone]   -- pt's financial paperwork uploaded to [employer] -- Ms. Temple informed - she will email admission paperwork to  for pt to complete - she is working on it and will send to  today (as of 9294) .         11/16/23 1347   Post-Acute Status   Post-Acute Authorization Placement   Post-Acute Placement Status Referrals Sent   Discharge Delays None known at this time   Discharge Plan   Discharge Plan A New Nursing Home placement - [facility] care facility

## 2023-11-16 NOTE — PT/OT/SLP PROGRESS
Occupational Therapy   Treatment    Name: Eric Soliman  MRN: [id number]  Admitting Diagnosis:  Shortness of breath  7 Days Post-Op    Recommendations:     Discharge Recommendations: Moderate Intensity Therapy  Discharge Equipment Recommendations:  wheelchair, walker, rolling   Barriers to discharge:  Inaccessible home environment, Decreased caregiver support, Other (Comment) (increased level of assistance)    Assessment:     Eric Soliman is a 63 y.o. male with a medical diagnosis of Shortness of breath.  Performance deficits affecting function are weakness, impaired endurance, impaired self care skills, impaired functional mobility, gait instability, impaired balance, decreased lower extremity function, decreased upper extremity function, pain, decreased ROM.    Pt found in bed, agreeable to therapy.  Pt only able to complete stand pivot transfers with TTWB to LLE secondary to pain. Continue OT services to address functional goals, progressing as able.    Rehab Prognosis:  Fair; patient would benefit from acute skilled OT services to address these deficits and reach maximum level of function.       Plan:     Patient to be seen 5 x/week to address the above listed problems via self-care/home management, therapeutic activities, therapeutic exercises  Plan of Care Expires: 12/12/23  Plan of Care Reviewed with: patient    Subjective     Chief Complaint: I can't walk with this leg pain.   Patient/Family Comments/goals: to reduce pain and increase independence   Pain/Comfort:  Pain Rating 1: 10/10  Location - Side 1: Left  Location - Orientation 1: lower  Location 1: leg  Pain Addressed 1: Distraction, Cessation of Activity, Nurse notified  Pain Rating Post-Intervention 1: 10/10    Objective:     Communicated with: RN prior to session.  Patient found HOB elevated with bed alarm, peripheral IV, telemetry upon OT entry to room.    General Precautions: Standard, fall, seizure    Orthopedic Precautions:N/A  Braces:  N/A  Respiratory Status: Room air     Occupational Performance:     Bed Mobility:    Patient completed Rolling/Turning to Right with stand by assistance  Patient completed Scooting/Bridging with stand by assistance  Patient completed Supine to Sit with stand by assistance  Patient completed Sit to Supine with stand by assistance     Functional Mobility/Transfers:  Patient completed Sit <> Stand Transfer with minimum assistance  with  hand-held assist   Patient completed Toilet Transfer Stand Pivot technique with minimum assistance with  hand-held assist and bedside commode TTWB to LLE 2/2 pain.  Functional Mobility: declined    Activities of Daily Living:  Grooming: modified independence seated EOB  Lower Body Dressing: minimum assistance don underwear  Toileting: minimum assistance for clothing mgmt in standing  *stool dark red with minimal red blood noted on TP.  RN notified.       [facility] 6 Click ADL: 21    Treatment & Education:  Pt sat EOB at Mod I level while completing G/H task.   Pt declined OOB to chair.     Patient left HOB elevated with all lines intact, call button in reach, bed alarm on, and RN notified    GOALS:   Multidisciplinary Problems       Occupational Therapy Goals          Problem: Occupational Therapy    Goal Priority Disciplines Outcome Interventions   Occupational Therapy Goal     OT, PT/OT Ongoing, Progressing    Description: Goals to be met by: 12/3/2023     Patient will increase functional independence with ADLs by performing:    UE Dressing with Modified [place].  LE Dressing with Modified [place].  Grooming while standing at sink with Modified [place].  Toileting from toilet with Modified [place] for hygiene and clothing management.   Bathing from  shower chair/bench with Modified [place].  Toilet transfer to toilet with Modified [place].                         Time Tracking:     OT Date of Treatment: 11/16/23  OT Start Time: 1028  OT Stop Time: 1102  OT  Total Time (min): 34 min    Billable Minutes:Self Care/Home Management 24  Therapeutic Activity 10            11/16/2023

## 2023-11-16 NOTE — PROGRESS NOTES
[facility] - Telemetry  Adult Nutrition  Progress Note    SUMMARY       Recommendations    Recommendation:  1. Continue to encourage intake of meals & Boost as tolerated.   2. Monitor weight/labs.   3. RD to continue to follow to monitor po intake    Goals:  Pt will tolerate diet with at least 50-75% intake at meals by RD follow up  Nutrition Goal Status: progressing towards goal  Communication of RD Recs: reviewed with RN    Assessment and Plan  Endocrine  Mild malnutrition  Malnutrition Type:  Context: social/environmental circumstances  Level: mild    Related to (etiology):   homelessness    Signs and Symptoms (as evidenced by):   Weight loss, poor intake, mild muscle/fat wasting     Malnutrition Characteristic Summary:  Weight Loss (Malnutrition): greater than 10% in 6 months  Energy Intake (Malnutrition): less than 75% for greater than or equal to 1 month  Subcutaneous Fat (Malnutrition): mild depletion  Muscle Mass (Malnutrition): mild depletion    Interventions:  Commercial beverage  Collaboration with other providers    Nutrition Diagnosis Status:   New    Malnutrition Assessment  Malnutrition Context: social/environmental circumstances  Malnutrition Level: mild  Weight Loss (Malnutrition): greater than 10% in 6 months  Energy Intake (Malnutrition): less than 75% for greater than or equal to 1 month  Subcutaneous Fat (Malnutrition): mild depletion  Muscle Mass (Malnutrition): mild depletion   Upper Arm Region (Subcutaneous Fat Loss): mild depletion  Thoracic and Lumbar Region: mild depletion   [place] Region (Muscle Loss): mild depletion  Clavicle Bone Region (Muscle Loss): mild depletion  Patellar Region (Muscle Loss): mild depletion  Anterior Thigh Region (Muscle Loss): mild depletion  Posterior Calf Region (Muscle Loss): mild depletion       Reason for Assessment  Reason For Assessment: RD follow-up  Diagnosis:  (chest pain)  Relevant Medical History: DVT, seizures, thrombectomy  General Information Comments:  "Pt s/p colonoscopy . Pt on Regular diet with Boost Plus. Pt reports fair intake at meals and drinking some Boost. Truong 18-neck incison. Noted 18lb weight loss x 7 months. Chest tube removed . Noted cytology resulted as adenocarcinoma. Pt homeless -awaiting d/c plan. NFPE completed - mild wasting of legs, arms, thoracic region, clavicles, & temples  Nutrition Discharge Planning: pt to d/c on Regular diet    Nutrition Risk Screen  Nutrition Risk Screen: no indicators present    Nutrition/Diet History  Food Preferences: no Mandaen or cultural food prefs identified  Spiritual, Cultural Beliefs, Worship Practices, Values that Affect Care: no  Factors Affecting Nutritional Intake: socio-economic    Anthropometrics  Temp: 97.6 °F (36.4 °C)  Height Method: Stated  Height: 5' 9" (175.3 cm)  Height (inches): 69 in  Weight Method: Bed Scale  Weight: 60.4 kg (133 lb 2.5 oz)  Weight (lb): 133.16 lb  Ideal Body Weight (IBW), Male: 160 lb  % Ideal Body Weight, Male (lb): 83.23 %  BMI (Calculated): 19.7  BMI Grade: 18.5-24.9 - normal  Usual Body Weight (UBW), k.7 kg ()  % Usual Body Weight: 88.1  % Weight Change From Usual Weight: -12.08 %     Lab/Procedures/Meds  Pertinent Labs Reviewed: reviewed  Pertinent Labs Comments: Na 126L, Alb 2.3L.  Pertinent Medications Reviewed: reviewed  Pertinent Medications Comments: gabapentin, morphine, senna, pantoprazole    Estimated/Assessed Needs  Weight Used For Calorie Calculations: 59.6 kg (131 lb 6.3 oz)  Energy Calorie Requirements (kcal):  (35 kcal/kg)  Energy Need Method: Kcal/kg  Protein Requirements: 71g (1.2g/kg)  Weight Used For Protein Calculations: 59.6 kg (131 lb 6.3 oz)  Estimated Fluid Requirement Method: RDA Method  RDA Method (mL):      Nutrition Prescription Ordered  Current Diet Order: Regular  Oral Nutrition Supplement: Boost Plus    Evaluation of Received Nutrient/Fluid Intake  I/O: 0925  Energy Calories Required: not meeting " needs  Protein Required: not meeting needs  Fluid Required: not meeting needs  Comments: LBM 11/9  % Intake of Estimated Energy Needs: 50 - 75 %  % Meal Intake: 50 - 75 %    Nutrition Risk  Level of Risk/Frequency of Follow-up:  (2xweekly)     Monitor and Evaluation  Food and Nutrient Intake: food and beverage intake  Food and Nutrient Adminstration: diet order  Physical Activity and Function: nutrition-related ADLs and IADLs  Anthropometric Measurements: weight  Biochemical Data, Medical Tests and Procedures: electrolyte and renal panel  Nutrition-Focused Physical Findings: overall appearance     Nutrition Follow-Up  RD Follow-up?: Yes

## 2023-11-16 NOTE — HPI
Eric Soliman, is a 64 yo M with HTN, lung nodule of undetermined character, PAD s/p thrombectomy & PTA, epilepsy,homelessness, medical noncompliance, and tobacco and marijuana use. Patient presented to the ED on 11/07/2023 with SOB. Patient noted to have a PE, pleural effusion, and possible stage IV  lung cancer. Patient reports LLE pain x 2-3 months with decreased sensation and motor function. Patient denies any acute changes or wounds. Cardiology consulted for PAD. Patient denies CP, palpitations, NV, syncope, edema.   US BLE  Right lower extremity.   -372 cm/sec monophasic   Prox SFA 52 cm/sec monophasic   Mid SFA 65 cm/sec   monophasic.  Collateral vessels noted.   Dist SFA  47 cm/sec  with short segment occlusion and tardus parvus waveform distally.   Pop A 15 cm/sec tardus parvus   MARTHA 25 cm/sec tardus parvus  PTA 21 cm/sec  tardus parvus     Left lower extremity   CFA 7 cm/sec tardus parvus with short segment occlusion.  Tardus parvus waveforms in the deep femoral artery.   Prox SFA 14 cm/sec tardus parvus   Mid SFA 26 cm/sec  tardus parvus   Dist SFA 18 cm/sec  tardus parvus   Pop A occluded cm/sec   MARTHA minimal non phasic forward flow.   PTA minimal non phasic forward flow.

## 2023-11-16 NOTE — PLAN OF CARE
Problem: Occupational Therapy  Goal: Occupational Therapy Goal  Description: Goals to be met by: 12/3/2023     Patient will increase functional independence with ADLs by performing:    UE Dressing with Modified [place].  LE Dressing with Modified [place].  Grooming while standing at sink with Modified [place].  Toileting from toilet with Modified [place] for hygiene and clothing management.   Bathing from  shower chair/bench with Modified [place].  Toilet transfer to toilet with Modified [place].    Outcome: Ongoing, Progressing   Eric Soliman is a 63 y.o. male with a medical diagnosis of Shortness of breath.  Performance deficits affecting function are weakness, impaired endurance, impaired self care skills, impaired functional mobility, gait instability, impaired balance, decreased lower extremity function, decreased upper extremity function, pain, decreased ROM.    Pt found in bed, agreeable to therapy.  Pt only able to complete stand pivot transfers with TTWB to LLE secondary to pain. Continue OT services to address functional goals, progressing as able.

## 2023-11-16 NOTE — CARE UPDATE
Escalated pt's pain control to PO dilaudid prn q3h as pt has uncontrolled pain in LLE. Pt now agreeable to [facility] placement with SNF, referrals sent by CM.    Progress note to follow in AM.    Marquis Farmer MD  Hospice and Palliative Medicine  Palliative Care Pager: [phone]

## 2023-11-16 NOTE — ASSESSMENT & PLAN NOTE
Pt has previous evidence of adenocarcinoma lung mass as he was lost to follow up by [facility] team.  Imaging on admission shows significant left sided pleural effusion, R PE, and new R hilar nodules from previous scan.    History and symptoms worrying for primary lung neoplasm and/or malignant metastasis. Will reassess and possible biopsy/EBUS per pulm recs and patient preferences.    Plan:  -Palliative team consulted, recommendations welcomed  -Cytology- Adenocarcinoma  -Oncology consulted and contacted. Possible immunotherapy, but avoid chemotherapy. Will continue to assess.

## 2023-11-16 NOTE — CONSULTS
Consult Acknowledgement    Pulmonary consult acknowledgement. Please see initial consult note from 11/7 for documentation of that note. Plan of care from myself from today (11/16/2023) with current recommendations.    Luiz Chicas M.D.  \[facility] [place] [facility]\"" Pulmonary & Critical Care Fellow

## 2023-11-16 NOTE — PLAN OF CARE
"  Problem: Adult Inpatient Plan of Care  Goal: Plan of Care Review  Outcome: Ongoing, Progressing     Problem: Adult Inpatient Plan of Care  Goal: Optimal Comfort and Wellbeing  Outcome: Ongoing, Progressing     Problem: VTE (Venous Thromboembolism)  Goal: VTE (Venous Thromboembolism) Symptom Resolution  Outcome: Ongoing, Progressing     .Plan of care reviewed with the patient. Scheduled medicines given and the patient tolerated well. Given Hydromorphone 0.2 mg IV for acute LLE pain.  Fall and safety precautions taken and the standard interventions are in place. On Telemetry monitoring with NSR, no true "red alarms' noted, no acute distress reported either in the shift.  Advised the patient to call for the assistance. Continued monitoring the patient.   "

## 2023-11-16 NOTE — ASSESSMENT & PLAN NOTE
"History of bilateral PAD and critical limb ischemia in lower extremities. History of DVT in lower extremities. Current segmental and subsegmental PE in R lung.  Pt describes foot as numb and unable to move toes. Feels like it is "asleep".  -Anticoagulation held following ischemic colitis induced GI bleed  BLE venous U/S showed no new DVT from previous    Plan:   -BLE arterial US ordered:  " #1. Severe peripheral vascular disease with findings compatible with severe aortoiliac stenosis.     High-grade/clinically significant stenosis of the right CFA.  Monophasic waveforms in the SFA with short segment distal right SFA occlusion.     Tardus parvus runoff waveforms in the right lower extremity probably related to inflow stenosis.     Minimal forward flow in the left common femoral artery suggests either iliac artery occlusion or critical stenosis.     Tardus parvus waveforms throughout the remainder of the left lower extremity probably related to poor inflow.  Occluded popliteal artery on the left."    Discussed case with [facility] to determine therapeutic options for revascularization/pain relief in ischemic left leg    Discussed case with cardiology and they recommend that any further procedures are not indicated at this time.    "

## 2023-11-16 NOTE — ASSESSMENT & PLAN NOTE
? Malignancy related  S/p IVC filter placement   Noncompliant with Eliquis as OP now with GIB per colonoscopy  Hold OAC given the above

## 2023-11-16 NOTE — SUBJECTIVE & OBJECTIVE
Past Medical History:   Diagnosis Date    Anticoagulant long-term use     DVT (deep venous thrombosis)     Seizures     epilepsy controlled w/ marijuana       Past Surgical History:   Procedure Laterality Date    ANGIOGRAPHY OF LOWER EXTREMITY Left 10/7/2022    Procedure: Angiogram Extremity Unilateral;  Surgeon: Charles Patten III, MD;  Location: [facility] CATH LAB;  Service: Cardiology;  Laterality: Left;    AORTOGRAPHY WITH SERIALOGRAPHY Bilateral 10/7/2022    Procedure: AORTOGRAM, WITH SERIALOGRAPHY;  Surgeon: Charles Patten III, MD;  Location: [facility] CATH LAB;  Service: Cardiology;  Laterality: Bilateral;    AORTOGRAPHY WITH SERIALOGRAPHY N/A 4/4/2023    Procedure: AORTOGRAM, WITH SERIALOGRAPHY;  Surgeon: Royce Yi MD;  Location: [facility] CATH LAB/EP;  Service: Cardiology;  Laterality: N/A;    ATHERECTOMY, PERIPHERAL BLOOD VESSEL Left 4/4/2023    Procedure: ATHERECTOMY, PERIPHERAL BLOOD VESSEL;  Surgeon: Royce Yi MD;  Location: [facility] CATH LAB/EP;  Service: Cardiology;  Laterality: Left;    CLOSURE DEVICE  4/4/2023    Procedure: Placement of Closure Device;  Surgeon: Royce Yi MD;  Location: [facility] CATH LAB/EP;  Service: Cardiology;;    COLONOSCOPY N/A 11/9/2023    Procedure: COLONOSCOPY;  Surgeon: Cedrick Holcomb MD;  Location: [facility] ENDO;  Service: Endoscopy;  Laterality: N/A;    FILTER PROTECTION, PERIPHERAL  4/4/2023    Procedure: Filter Protection, Peripheral;  Surgeon: Royce Yi MD;  Location: [facility] CATH LAB/EP;  Service: Cardiology;;    IVUS (INTRAVASCULAR ULTRASOUND) Left 4/4/2023    Procedure: ULTRASOUND, INTRAVASCULAR;  Surgeon: Royce Yi MD;  Location: [facility] CATH LAB/EP;  Service: Cardiology;  Laterality: Left;    PTA, ILIAC ARTERY Left 4/4/2023    Procedure: PTA, Iliac Artery;  Surgeon: Royce Yi MD;  Location: [facility] CATH LAB/EP;  Service: Cardiology;  Laterality: Left;    THROMBECTOMY  4/4/2023    Procedure: THROMBECTOMY;  Surgeon: Royce Yi MD;   Location: [facility] CATH LAB/EP;  Service: Cardiology;;       Review of patient's allergies indicates:  No Known Allergies    Current Facility-Administered Medications on File Prior to Encounter   Medication    iodixanoL (VISIPAQUE 320) injection     Current Outpatient Medications on File Prior to Encounter   Medication Sig    acetaminophen (TYLENOL) 500 MG tablet Take 500 mg by mouth every 6 (six) hours as needed for Pain.    apixaban (ELIQUIS) 5 mg Tab Take 1 tablet (5 mg total) by mouth 2 (two) times daily.    atorvastatin (LIPITOR) 40 MG tablet Take 1 tablet (40 mg total) by mouth once daily.    carvediloL (COREG) 3.125 MG tablet Take 1 tablet (3.125 mg total) by mouth 2 (two) times daily.    valACYclovir (VALTREX) 1000 MG tablet Take 1 tablet (1,000 mg total) by mouth 3 (three) times daily. for 7 days     Family History       Problem Relation (Age of Onset)    Heart disease Father    No Known Problems Mother          Tobacco Use    Smoking status: Every Day     Current packs/day: 2.00     Average packs/day: 2.0 packs/day for 48.9 years (97.7 ttl pk-yrs)     Types: Cigarettes     Start date: 1975    Smokeless tobacco: Never    Tobacco comments:     Pt is a 2 pk/day cigarette smoker x 49 yrs. 21 mg nicotine patch ordered Q day, and order requested upon discharge for 21 mg nicotine patch Q day. Pt states ready to quit. Ambulatory referral to Smoking Cessation clinic.   Substance and Sexual Activity    Alcohol use: Not Currently     Comment: rarely 1 beer    Drug use: Yes     Types: Marijuana    Sexual activity: Not on file     Review of Systems   Constitutional: Negative.   HENT: Negative.     Eyes: Negative.    Cardiovascular:  Positive for claudication and dyspnea on exertion. Negative for chest pain, leg swelling, near-syncope, orthopnea, palpitations, paroxysmal nocturnal dyspnea and syncope.   Respiratory:  Negative for cough and shortness of breath.    Endocrine: Negative.    Hematologic/Lymphatic: Negative.     Skin:  Negative for color change and poor wound healing.   Musculoskeletal:  Positive for myalgias.   Gastrointestinal: Negative.  Negative for hematemesis, hematochezia, nausea and vomiting.   Genitourinary: Negative.    Neurological:  Positive for focal weakness, numbness and paresthesias. Negative for dizziness.   Psychiatric/Behavioral: Negative.     Allergic/Immunologic: Negative.      Objective:     Vital Signs (Most Recent):  Temp: 97.6 °F (36.4 °C) (11/16/23 0821)  Pulse: 104 (11/16/23 0821)  Resp: 18 (11/16/23 0916)  BP: 132/72 (11/16/23 0821)  SpO2: (!) 93 % (11/16/23 0821) Vital Signs (24h Range):  Temp:  [97.6 °F (36.4 °C)-100.3 °F (37.9 °C)] 97.6 °F (36.4 °C)  Pulse:  [] 104  Resp:  [16-20] 18  SpO2:  [90 %-96 %] 93 %  BP: (128-163)/(64-77) 132/72     Weight: 60.4 kg (133 lb 2.5 oz)  Body mass index is 19.66 kg/m².    SpO2: (!) 93 %         Intake/Output Summary (Last 24 hours) at 11/16/2023 1043  Last data filed at 11/16/2023 0540  Gross per 24 hour   Intake --   Output 750 ml   Net -750 ml       Lines/Drains/Airways       Peripheral Intravenous Line  Duration                  Peripheral IV - Single Lumen 11/07/23 0945 20 G Anterior;Left Forearm 9 days         Peripheral IV - Single Lumen 11/09/23 1010 18 G Posterior;Proximal;Right Forearm 7 days         Peripheral IV - Single Lumen 11/09/23 1015 18 G Distal;Posterior;Right Forearm 7 days                     Physical Exam  Constitutional:       General: He is not in acute distress.     Appearance: He is not diaphoretic.   HENT:      Head: Atraumatic.   Eyes:      General:         Right eye: No discharge.         Left eye: No discharge.   Cardiovascular:      Rate and Rhythm: Normal rate and regular rhythm.      Heart sounds: Murmur heard.   Pulmonary:      Effort: Pulmonary effort is normal.      Breath sounds: No rales.   Abdominal:      General: Bowel sounds are normal.      Palpations: Abdomen is soft.   Musculoskeletal:         General:  "Tenderness present.      Right lower leg: No edema.      Left lower leg: No edema.   Skin:     General: Skin is warm and dry.      Capillary Refill: Capillary refill takes 2 to 3 seconds.   Neurological:      Mental Status: He is alert and oriented to person, place, and time.   Psychiatric:         Mood and Affect: Mood normal.         Behavior: Behavior normal.         Thought Content: Thought content normal.         Judgment: Judgment normal.          Significant Labs: BMP:   Recent Labs   Lab 11/15/23  0330 11/16/23  0304    105   * 126*   K 4.2 4.1   CL 90* 90*   CO2 24 28   BUN 15 15   CREATININE 0.8 0.8   CALCIUM 8.3* 8.7   MG 2.0 2.1   , CMP   Recent Labs   Lab 11/15/23  0330 11/16/23  0304   * 126*   K 4.2 4.1   CL 90* 90*   CO2 24 28    105   BUN 15 15   CREATININE 0.8 0.8   CALCIUM 8.3* 8.7   PROT 7.8 8.2   ALBUMIN 2.2* 2.3*   BILITOT 0.2 0.5   ALKPHOS 110 105   AST 85* 43*   ALT 94* 62*   ANIONGAP 8 8   , CBC   Recent Labs   Lab 11/15/23  0330 11/16/23  0304   WBC 12.29 10.02   HGB 8.7* 9.2*   HCT 26.1* 28.1*    420   , INR No results for input(s): "INR", "PROTIME" in the last 48 hours., Lipid Panel No results for input(s): "CHOL", "HDL", "LDLCALC", "TRIG", "CHOLHDL" in the last 48 hours., Troponin No results for input(s): "TROPONINI" in the last 48 hours., and All pertinent lab results from the last 24 hours have been reviewed.    Significant Imaging: Echocardiogram: Transthoracic echo (TTE) complete (Cupid Only):   Results for orders placed or performed during the hospital encounter of 11/07/23   Echo   Result Value Ref Range    BSA 1.83 m2    MV Peak A Christopher 0.91 m/s    MV stenosis pressure 1/2 time 56.30 ms    MV VTI 13.7 cm    MV Peak E Christopher 0.38 m/s    MV peak gradient 4 mmHg    Ao VTI 20.80 cm    Ao peak christopher 0.93 m/s    LVOT peak VTI 15.80 cm    LVOT peak christopher 0.82 m/s    LVOT diameter 2.28 cm    E wave deceleration time 194.14 msec    MV mean gradient 2 mmHg    AV mean " gradient 2 mmHg    RV S' 12.60 cm/s    TAPSE 1.57 cm    Ascending aorta 3.04 cm    STJ 2.97 cm    Sinus 3.11 cm    TDI LATERAL 0.04 m/s    Left Ventricular Outflow Tract Mean Gradient 1.17 mmHg    Left Ventricular Outflow Tract Mean Velocity 0.50 cm/s    IVC diameter 1.25 cm    TDI SEPTAL 0.03 m/s    LV LATERAL E/E' RATIO 9.50 m/s    LV SEPTAL E/E' RATIO 12.67 m/s    AV valve area 3.10 cm²    AV Velocity Ratio 0.88     AV index (prosthetic) 0.76     MV valve area p 1/2 method 3.91 cm2    MV valve area by continuity eq 4.71 cm2    E/A ratio 0.42     Mean e' 0.04 m/s    LVOT area 4.1 cm2    LVOT stroke volume 64.48 cm3    AV peak gradient 3 mmHg    E/E' ratio 10.86 m/s    EVAN by Velocity Ratio 3.60 cm²    LA WIDTH 2.7 cm    Est. RA pres 3 mmHg    Narrative      Left Ventricle: Not well visualized due to poor sonic window. The left   ventricle is normal in size. Normal wall thickness. Normal wall motion.   There is normal systolic function with a visually estimated ejection   fraction of 55 - 60%. Grade I diastolic dysfunction.    Right Ventricle: Normal right ventricular cavity size. Systolic   function is normal.    Left Atrium: Left atrium was not well visualized. Left atrium is mildly   dilated.    Mitral Valve: There is no stenosis. The mean pressure gradient across   the mitral valve is 2 mmHg at a heart rate of  bpm.    IVC/SVC: Normal venous pressure at 3 mmHg.    Pericardium: There is a posterior effusion. Pericardial effusion is   echolucent. No indication of cardiac tamponade.

## 2023-11-16 NOTE — SUBJECTIVE & OBJECTIVE
Interval History: No adverse events overnight. No change in leg pain. Still unable to ambulate effectively    Review of Systems   Constitutional:  Positive for activity change, appetite change, fatigue and unexpected weight change. Negative for chills, diaphoresis and fever.   HENT: Negative.     Respiratory:  Positive for shortness of breath. Negative for apnea, cough and wheezing.    Cardiovascular:  Negative for chest pain, palpitations and leg swelling.   Gastrointestinal:  Negative for blood in stool and vomiting.   Genitourinary: Negative.    Musculoskeletal: Negative.    Skin:  Positive for pallor and rash (Rash on bilateral forearms present on admission).   Neurological:  Positive for weakness (LLE) and numbness (LLE). Negative for facial asymmetry and light-headedness.   Psychiatric/Behavioral: Negative.  Negative for agitation and confusion. The patient is not hyperactive.      Objective:     Vital Signs (Most Recent):  Temp: 98.6 °F (37 °C) (11/16/23 1621)  Pulse: 107 (11/16/23 1644)  Resp: 18 (11/16/23 1621)  BP: 129/68 (11/16/23 1621)  SpO2: 96 % (11/16/23 1621) Vital Signs (24h Range):  Temp:  [97.6 °F (36.4 °C)-100.3 °F (37.9 °C)] 98.6 °F (37 °C)  Pulse:  [] 107  Resp:  [16-20] 18  SpO2:  [90 %-96 %] 96 %  BP: (126-163)/(64-77) 129/68     Weight: 60.4 kg (133 lb 2.5 oz)  Body mass index is 19.66 kg/m².    Intake/Output Summary (Last 24 hours) at 11/16/2023 1708  Last data filed at 11/16/2023 1457  Gross per 24 hour   Intake 357 ml   Output 975 ml   Net -618 ml         Physical Exam  Vitals and nursing note reviewed.   Constitutional:       Appearance: He is underweight. He is ill-appearing (chronically).      Comments: Unkept appearance    HENT:      Head: Normocephalic.      Nose: Nose normal.      Mouth/Throat:      Mouth: Mucous membranes are moist.   Cardiovascular:      Rate and Rhythm: Normal rate.      Pulses:           Dorsalis pedis pulses are 0 on the left side.        Posterior tibial  pulses are 0 on the left side.   Pulmonary:      Effort: No respiratory distress.      Breath sounds: Decreased air movement present. Examination of the left-middle field reveals decreased breath sounds. Examination of the left-lower field reveals decreased breath sounds. Decreased breath sounds present. No wheezing or rhonchi.      Comments: Easily develops shortness of breath  Chest:      Chest wall: No tenderness.   Abdominal:      General: Abdomen is flat. There is no distension.      Palpations: Abdomen is soft.      Tenderness: There is no abdominal tenderness. There is no guarding.   Musculoskeletal:         General: Tenderness present. No swelling.      Right lower leg: No edema.      Left lower leg: No edema.      Left foot: Decreased range of motion. Tenderness present. No swelling or deformity. Abnormal pulse.      Comments: Pt states foot his numb and he is unable to move his toes.   Feet:      Right foot:      Toenail Condition: Right toenails are long. Fungal disease present.     Left foot:      Toenail Condition: Left toenails are long. Fungal disease present.     Comments: Left foot cold and pale compared to right. No additional swelling in left leg.  in the calf. DP non-palpable and unable to find even with doppler.  Skin:     General: Skin is warm and dry.      Capillary Refill: Capillary refill takes less than 2 seconds.      Coloration: Skin is pale.      Findings: Rash present.   Neurological:      Mental Status: He is alert and oriented to person, place, and time.      Motor: Weakness present.   Psychiatric:         Mood and Affect: Mood normal.         Behavior: Behavior normal. Behavior is cooperative.         Thought Content: Thought content normal.         Judgment: Judgment normal.             Significant Labs: All pertinent labs within the past 24 hours have been reviewed.  Recent Lab Results         11/16/23  0304        Albumin 2.3              ALT 62       Anion Gap  8       AST 43       Baso # 0.04       Basophil % 0.4       BILIRUBIN TOTAL 0.5  Comment: For infants and newborns, interpretation of results should be based  on gestational age, weight and in agreement with clinical  observations.    Premature Infant recommended reference ranges:  Up to 24 hours.............<8.0 mg/dL  Up to 48 hours............<12.0 mg/dL  3-5 days..................<15.0 mg/dL  6-29 days.................<15.0 mg/dL         BUN 15       Calcium 8.7       Chloride 90       CO2 28       Creatinine 0.8       Differential Method Automated       eGFR >60       Eos # 0.1       Eosinophil % 0.6       Glucose 105       Gran # (ANC) 6.8       Gran % 68.1       Hematocrit 28.1       Hemoglobin 9.2       Immature Grans (Abs) 0.09  Comment: Mild elevation in immature granulocytes is non specific and   can be seen in a variety of conditions including stress response,   acute inflammation, trauma and pregnancy. Correlation with other   laboratory and clinical findings is essential.         Immature Granulocytes 0.9       Lymph # 1.8       Lymph % 18.2       Magnesium  2.1       MCH 26.4       MCHC 32.7       MCV 81       Mono # 1.2       Mono % 11.8       MPV 9.3       nRBC 0       Phosphorus Level 4.4       Platelet Count 420       Potassium 4.1       PROTEIN TOTAL 8.2       RBC 3.49       RDW 15.6       Sodium 126       WBC 10.02               Significant Imaging: I have reviewed all pertinent imaging results/findings within the past 24 hours.

## 2023-11-17 NOTE — PT/OT/SLP PROGRESS
"Occupational Therapy   Treatment    Name: Eric Soliman  MRN: [id number]  Admitting Diagnosis:  Shortness of breath  8 Days Post-Op    Recommendations:     Discharge Recommendations: Moderate Intensity Therapy  Discharge Equipment Recommendations:  wheelchair, walker, rolling  Barriers to discharge:  Inaccessible home environment, Decreased caregiver support, Other (Comment) (increased level of assistance)    Assessment:     Eric Soliman is a 63 y.o. male with a medical diagnosis of Shortness of breath. Performance deficits affecting function are weakness, impaired endurance, impaired self care skills, impaired functional mobility, gait instability, decreased lower extremity function, decreased upper extremity function, pain, decreased ROM.     Rehab Prognosis:  Good; patient would benefit from acute skilled OT services to address these deficits and reach maximum level of function.       Plan:     Patient to be seen 5 x/week to address the above listed problems via self-care/home management, therapeutic activities, therapeutic exercises  Plan of Care Expires: 12/12/23  Plan of Care Reviewed with: patient    Subjective     Chief Complaint: "this left leg is not worth a thing"  Patient/Family Comments/goals: Return to PLOF  Pain/Comfort:  Pain Rating 1: 9/10  Location - Side 1: Left  Location - Orientation 1: lower  Location 1: leg  Pain Rating Post-Intervention 1: 9/10    Objective:     Communicated with: nsg prior to session.  Patient found right sidelying with bed alarm, oxygen, telemetry, peripheral IV upon OT entry to room.    General Precautions: Standard, fall, seizure    Orthopedic Precautions:N/A  Braces: N/A  Respiratory Status: Nasal cannula, flow 2 L/min     Occupational Performance:     Bed Mobility:    Patient completed Rolling/Turning to Right with stand by assistance  Patient completed Supine to Sit with contact guard assistance  Patient completed Sit to Supine with contact guard assistance "     Functional Mobility/Transfers:  Patient completed Sit <> Stand Transfer with contact guard assistance  with  rolling walker   Functional Mobility: Pt ambulated in room using RW requiring CGA.     Activities of Daily Living:  Grooming: setup Pt completed G/H tasks seated EOB       [facility] 6 Click ADL: 21    Treatment & Education:  While ambulating Pt will not fully WB through LLE due to 10/10 pain.   Pt requires frequent VC for RW mgmt and proper hand placement  Pt educated on energy conservation, OOB activities, and PLB for pain mgmt.   Pt doffed oxygen during session d/t O2 sats at 97, after OOB activity oxygen donned d/t O2 sats at 87-88  All questions answered within OT scope of practice.     Patient left HOB elevated with all lines intact, call button in reach, bed alarm on, and nsg notified    GOALS:   Multidisciplinary Problems       Occupational Therapy Goals          Problem: Occupational Therapy    Goal Priority Disciplines Outcome Interventions   Occupational Therapy Goal     OT, PT/OT Ongoing, Progressing    Description: Goals to be met by: 12/3/2023     Patient will increase functional independence with ADLs by performing:    UE Dressing with Modified [place].  LE Dressing with Modified [place].  Grooming while standing at sink with Modified [place].  Toileting from toilet with Modified [place] for hygiene and clothing management.   Bathing from  shower chair/bench with Modified [place].  Toilet transfer to toilet with Modified [place].                         Time Tracking:     OT Date of Treatment: 11/17/23  OT Start Time: 0940  OT Stop Time: 1012  OT Total Time (min): 32 min    Billable Minutes:Self Care/Home Management 23  Therapeutic Activity 9    OT/RADHA: RADHA (SOTA)     Number of RADHA visits since last OT visit: 3    11/17/2023

## 2023-11-17 NOTE — NURSING
Rapid Response Nurse Follow-up Note     Followed up with patient for proactive rounding.     -O2 sat 85-88%. Patient initially refusing oxygen, but agreed to place nasal cannula back on. On 2L satting 92%     Reviewed plan of care with bedside RNMeme .  Team will continue to follow.  Please call Rapid Response RN, Dana Rodas RN with any questions or concerns at [phone].

## 2023-11-17 NOTE — PROGRESS NOTES
Current Code Status    DNR - Set by Adriana Cardoza NP at 11/8/2023 1428 (View report)     Code Status History    Date Active Date Inactive Code Status Order ID Comments User Context   11/7/2023 1425 11/8/2023 1428 Full Code [id number]  Emily Schwartz MD ED   4/3/2023 1735 4/6/2023 2209 Full Code [id number]  Rosenda Pineda NP ED   10/7/2022 1550 10/10/2022 2158 Full Code [id number]  Sunkara, Pallavi, MD Inpatient     Advance Care Planning Documents    There are no Advance Care Planning documents on file.

## 2023-11-17 NOTE — PROGRESS NOTES
"[facility]  Palliative Medicine  Progress Note    Patient Name: Eric Soliman  MRN: [id number]  Admission Date: 11/7/2023  Hospital Length of Stay: 10 days  Code Status: DNR   Attending Provider: Juan Manuel Newton DO  Consulting Provider: Adriana Cardoza NP  Primary Care Physician: Jesika, Primary Doctor  Principal Problem:Shortness of breath    Patient information was obtained from patient, past medical records, and primary team.      Assessment/Plan:     Palliative Care  Palliative care encounter  Mr. Soliman is a 64 yo M with PMH seizure disorder, PAD s/p thrombectomy & PTA, HTN who presents to [facility] ED with c/o intermittent SOB and chest pain. Of note, patient lost to follow up with [facility] at [facility] who had wanted to perform a bronchoscopy/EBUS on patient. However, he was unable to show up and it had to be rescheduled multiple times (May and June). He was first seen by [facility] team on 4/6/23 during a hospitalization. He was noted to have increasing ALO mass with mediastinal lymphadenopathy at that time. He has a long smoking history, started at age 14 1ppd and is still smoking though he states he is smoking less than before. He has noticed significant weight loss with ~30 lbs since his admission in April 2023. Per chart review, patient has unintentionally lost 24 lb since then. Palliative care was consulted for advanced care planning and goals of care.       11/17/2023  -Met with pt at bedside, primary team present for portion of encounter. Pt seems to be in good spirits, interactive today.   -Pt agreeable to NH placement, initially concerned over cost but agrees that shelter, food and a safe bed are worth it at this point.  Pt does remain optimistic that at some point he will regain enough strength and endurance to leave skilled nursing NH.   Pt acknowledges that he can not care for himself in his current condition and that living in his car is, "not a good idea right now."  -Pts highest priority overall " is pain control and symptom management at this point in time.  Discussed the addition of hospice resources as a way to help with pain/symptom management in the setting of newly diagnosed adenocarcinoma. Pt in agreement to enroll under hospice care. Pts thoughts about cancer directed treatments have not changed at this time and pt wishes to focus on symptom burden at this time.   -Yesterday, pts pain regimen changed.  2mg PO dilaudid Q3H PRN added to standing po morphine tablet yesterday. Pt reports improvement in pain with addition of dilaudid when taken, however pt has not utilized prn medication consistently. Discussed with pt and RN.  -Plan for pt to discharge to NH with the addition of hospice services at time of discharge.  Pt in agreement with plan.  Will continue to follow pt throughout admission.       11/10/2023  -Pt seen at bedside today following chest tube insertion. Pt reports increased pain to generalized lung area, worse with each breath, focused around chest tube insertion site.  Reports that Norco was improving leg pain however is not controlling current pain. Pt appears more withdrawn today during conversation, however still appropriate.  Discussed adding 1 day of IV medication to control pain. Discussed plan with pt, pt   in agreement. 2mg IV morphine Q4H PRN break through pain refractory to Norco for the next 24 hours ordered.    -Pt agreeable to housing assistance and appreciative of staff.  -Pt remains a DNR.   -Still considering who he would elect as MPOA if he were to become able to make decisions for himself.     11/9/2023  -At time of initial encounter, pt resting in bed. AAO.  Engages in conversation appropriately.  Introduced the topic of palliative care, pt receptive to visit.   -Pt reports living in his car, struggles with food, shelter and finances.    -At this time, pt is focused on lower leg pain.  Reports no improvement with prn tylenol.  Upon chart review, pt has Norco ordered and  "has not yet utilized medication.  RN to administer dose now.  Will follow up with pt in the am to monitor symptom burden.   -Pt reports wishing to move forward with initial biopsy as previously discussed if pending cytology does not reveal malignancy. However, pt reports that he would not accept chemotherapy if cancer is found.   -Pt reports having no family involved in his care.  Reports having a friend, Linda Grey, that he has not spoken to "in a while".  Discussed MPOA and importance of establishing. Pt will reach out to Linda to see if she is agreeable to be his MPOA.  Pt wishes to discuss with her prior to filling out paper.  Document left at the bedside.   -Code status discussed.  Pt would not accept intubation/ compressions.  Code status discussed, pt in agreement with DNR.  Order entered into the pts chart.   -Pt largely focused on his leg pain at this time.  Will defer remainder of goals of care conversation until pts symptoms are more controlled.         I will follow-up with patient. Please contact us if you have any additional questions.    Subjective:     Chief Complaint:   Chief Complaint   Patient presents with    Chest Pain     Mid-sternal, CP on/off x1 month. Pt denies having CP but reports having a stent placed: "A long time ago." He's currently  homeless, no daily meds, and HTN noted upon arrival.        HPI:   Per chart, "Pt, Eric Soliman, is a 64 yo M with pmh of htn, lung nodule of undetermined character, PAD s/p thrombectomy & PTA, epilepsy, and tobacco and marijuana use here for several months of progressively worsening shortness of breath that has been worse over the last few days. Patient had previously been seen by [facility] pul[facility]ology for workup of this lung mass and respiratory issues, but has limited means and had failure of follow up. At this visit patient describes worsening shortness of breath and fatigue, reduced appetite, and significant unintentional weight loss of up to 25% of " "body weight. Patient is a current tobacco and marijuana smoker with approximately 50 pack year history of tobacco use, but says he has cut back drastically lately. Patient denies chest pain, nausea/vomiting, diarrhea, night sweats, cough or sputum production, or other symptoms at this time.        In ED, patient was noted to be hypertensive at 228/116. Initial ED BP was 190/90. Patient was given his previous home dose of coreg 3.125 BID and 10mg hydralazine q8h prn. Vitals otherwise stable. Pt's labs showed WBC count of 7.52. Troponin was negative, but will repeat once more to trend. BNP non-elevated. Chest xray showed large left pleural effusion. CTA of chest showed right sided PE and left sided pleural effusion and recommended thoracentesis to clinically correlate. Thoracentesis was performed at bedside by Pulm/Crit fellow. Samples were sent for analysis and approximately 1500mL were drawn off without complication. Patient had repeat CXR for post-thoracentesis lung views. Dr. Martin and U  inpatient team were present with patient to discuss possible diagnosis including malignancy and suggest a consultation with the Palliative medicine team."      Interval History: No acute events overnight, IVC filter placed, chest tube removed, fluid cytology resulted adenocarcinoma. Pt willing to accept NH placement with the addition of hospice services.     Medications:  Continuous Infusions:  Scheduled Meds:   gabapentin  600 mg Oral TID    magnesium sulfate IVPB  2 g Intravenous Q2H    morphine  15 mg Oral Q12H    nicotine  1 patch Transdermal Daily    pantoprazole  40 mg Intravenous BID    polyethylene glycol  17 g Oral BID    senna-docusate 8.6-50 mg  1 tablet Oral Daily     PRN Meds:acetaminophen, HYDROmorphone, LIDOcaine, melatonin, sodium chloride 0.9%    Objective:     Vital Signs (Most Recent):  Temp: 97.6 °F (36.4 °C) (11/17/23 0814)  Pulse: 97 (11/17/23 0814)  Resp: 20 (11/17/23 0858)  BP: 125/69 (11/17/23 " 0814)  SpO2: 95 % (11/17/23 0814) Vital Signs (24h Range):  Temp:  [97.2 °F (36.2 °C)-98.6 °F (37 °C)] 97.6 °F (36.4 °C)  Pulse:  [] 97  Resp:  [16-20] 20  SpO2:  [87 %-96 %] 95 %  BP: (125-141)/(66-71) 125/69     Weight: 60.4 kg (133 lb 2.5 oz)  Body mass index is 19.66 kg/m².         Physical Exam  Vitals and nursing note reviewed.   Constitutional:       Appearance: He is underweight. He is ill-appearing (chronically).      Comments: Unkept appearance    HENT:      Head: Normocephalic.      Nose: Nose normal.      Mouth/Throat:      Mouth: Mucous membranes are moist.   Cardiovascular:      Rate and Rhythm: Normal rate.      Pulses: Normal pulses.   Pulmonary:      Effort: Pulmonary effort is normal.      Breath sounds: Decreased air movement present. Decreased breath sounds present.   Abdominal:      General: Abdomen is flat.      Palpations: Abdomen is soft.   Skin:     General: Skin is warm and dry.      Capillary Refill: Capillary refill takes less than 2 seconds.      Coloration: Skin is pale.      Findings: Rash present.   Neurological:      Mental Status: He is alert and oriented to person, place, and time. Withdrawn today   Psychiatric:         Mood and Affect: Mood normal.         Behavior: Behavior normal. Behavior is cooperative.         Thought Content: Thought content normal.     Judgment: Judgment normal      Review of Symptoms        Symptom Assessment (ESAS 0-10 Scale)  Pain:  5  Dyspnea:  2  Anxiety:  0  Nausea:  0  Depression:  0  Anorexia:  0  Fatigue:  4  Insomnia:  0  Restlessness:  0  Agitation:  0         Performance Status:  40     Living Arrangements:  Lives alone (lives in his car)     Psychosocial/Cultural:   See Palliative Psychosocial Note: No  Social Issues Identified: Coping deficit pt/family and Financial Issues  Bereavement Risk: No  Caregiver Needs Discussed. Caregiver Distress: No:   Cultural:   **Primary  to Follow**  Palliative Care  Consult:  Yes     Time-Based Charting:  Yes  Chart Review: 15 minutes  Face to Face: 13 minutes  Symptom Assessment: 10 minutes  Coordination of Care: 10 minutes  Discharge Planning: 10 minutes     Total Time Spent: 58 minutes    Advance Care Planning  Advance Directives:   Living Will: No    LaPOST: No    Do Not Resuscitate Status: Yes    Medical Power of : No      Decision Making:  Patient answered questions  Goals of Care: The patient endorses that what is most important right now is to focus on remaining as independent as possible, symptom/pain control, quality of life, even if it means sacrificing a little time, and improvement in condition but with limits to invasive therapies    Accordingly, we have decided that the best plan to meet the patient's goals includes enrolling in hospice care at time of discharge.  Pt willing to accept placement at this time.            Significant Labs: All pertinent labs within the past 24 hours have been reviewed.  CBC:   Recent Labs   Lab 11/17/23 0302   WBC 10.62   HGB 8.8*   HCT 26.8*   MCV 80*   *     BMP:  Recent Labs   Lab 11/17/23 0302      *   K 4.3   CL 89*   CO2 25   BUN 13   CREATININE 0.8   CALCIUM 8.5*   MG 1.7     LFT:  Lab Results   Component Value Date    AST 83 (H) 11/17/2023    ALKPHOS 155 (H) 11/17/2023    BILITOT 0.3 11/17/2023     Albumin:   Albumin   Date Value Ref Range Status   11/17/2023 2.1 (L) 3.5 - 5.2 g/dL Final     Protein:   Total Protein   Date Value Ref Range Status   11/17/2023 8.0 6.0 - 8.4 g/dL Final     Lactic acid:   Lab Results   Component Value Date    LACTATE 1.4 11/07/2023    LACTATE 1.2 05/13/2023       Significant Imaging: I have reviewed all pertinent imaging results/findings within the past 24 hours.    Adriana Cardoza NP  Palliative Medicine  [place]      Advance Care Planning     Date: 11/17/2023    [facility]  I engaged the patient in a voluntary conversation about advance care planning and we specifically  addressed what the goals of care would be moving forward, in light of the patient's change in clinical status, specifically adenocarcinoma, debility.  We did specifically address the patient's likely prognosis, which is poor.  We explored the patient's values and preferences for future care.  The patient endorses that what is most important right now is to focus on avoiding the hospital, remaining as independent as possible, symptom/pain control, quality of life, even if it means sacrificing a little time, improvement in condition but with limits to invasive therapies, and comfort and QOL     Accordingly, we have decided that the best plan to meet the patient's goals includes enrolling in hospice care    I did explain the role for hospice care at this stage of the patient's illness, including its ability to help the patient live with the best quality of life possible.  We will be making a hospice referral.

## 2023-11-17 NOTE — PLAN OF CARE
"  Problem: Adult Inpatient Plan of Care  Goal: Plan of Care Review  Outcome: Ongoing, Progressing     Problem: Adult Inpatient Plan of Care  Goal: Optimal Comfort and Wellbeing  Outcome: Ongoing, Progressing     Problem: VTE (Venous Thromboembolism)  Goal: VTE (Venous Thromboembolism) Symptom Resolution  Outcome: Ongoing, Progressing     Problem: Gas Exchange Impaired (Pulmonary Impairment)  Goal: Optimal Gas Exchange  Outcome: Ongoing, Progressing    .Plan of care reviewed with the patient. Scheduled medicines given and the patient tolerated well. Given Hydromorphone 2 mg for LLE pain. Patient refused Miralax. Fall and safety precautions taken and the standard interventions are in place. On Telemetry monitoring with Sinus Tachycardia, no true "red alarms' noted, no acute distress reported either in the shift. Patient is satting @ high 88-90% and refused Oxygen, informed MD. Advised the patient to call for the assistance. Continued monitoring the patient.      "

## 2023-11-17 NOTE — CARE UPDATE
[facility]     Department of Hospital Medicine     [address] [place] [address]     [phone] after hours  [phone] fax       NURSING HOME ORDERS    11/17/2023    Admit to Nursing Home: [facility]                                             Diagnoses:  Active Hospital Problems    Diagnosis  POA    *Shortness of breath [R06.02]  Yes    Rectal bleeding [K62.5]  No    GI bleed [K92.2]  No    Pleural effusion [J90]  Yes    Pulmonary embolism [I26.99]  Yes    Asymptomatic hypertensive urgency [I16.0]  Yes    Tobacco dependency [F17.200]  Yes    Palliative care encounter [Z51.5]  Not Applicable    Mild malnutrition [E44.1]  Yes    Lung mass [R91.8]  Yes    PAD (peripheral artery disease) [I73.9]  Yes    Tobacco abuse [Z72.0]  Yes    Primary hypertension [I10]  Yes    Thrombosis [I82.90]  Yes      Resolved Hospital Problems   No resolved problems to display.       Patient is homebound due to:  Shortness of breath    Allergies:Review of patient's allergies indicates:  No Known Allergies    Vitals: Every shift (Skilled Nursing patients)    Diet: Adult Regular   Supplement:  1 can every three times a day with meals                         Type:  House    Acitivities:    - Up in a chair each morning as tolerated   - Ambulate with assistance to bathroom      Nursing Precautions:   - Fall precautions per nursing home protocol   - Seizure precaution per longterm protocol   - Decubitus precautions:        -  for positioning   - Pressure reducing foam mattress   - Turn patient every two hours. Use wedge pillows to anchor patient    CONSULTS:   Physical Therapy to evaluate and treat     Occupational Therapy to evaluate and treat     Hospice [facility] for Stage 4 Adenocarcinoma of the lung with a terminal prognosis of less than 6 months survival. This is complicated by severe left leg pain and weakness secondary to severe  and extensive peripheral arterial and venous disease and concomitant DVT in the left leg. Mr. Soliman also has PE in his R lung. Mr. Soliman also suffered from a GI bleed that is currently stable caused by ischemic colitis.       MISCELLANEOUS CARE:   Routine Skin for Bedridden Patients:  Apply moisture barrier cream to all skin folds and wet areas in perineal area daily and after baths and all bowel movements     Oxygen: 3L NC per patient needs to goal O2 sat above 88%. Titrate to patient needs and comfort based on activity requirements.    Medications: Discontinue all previous medication orders, if any. See new list below.       Medication List        START taking these medications      gabapentin 300 MG capsule  Commonly known as: NEURONTIN  Take 2 capsules (600 mg total) by mouth 3 (three) times daily.     HYDROmorphone 2 MG tablet  Commonly known as: DILAUDID  Take 1 tablet (2 mg total) by mouth every 3 (three) hours as needed for Pain.     LIDOcaine 5 %  Commonly known as: LIDODERM  Place 1 patch onto the skin daily as needed (back pain). Remove & Discard patch within 12 hours or as directed by MD     melatonin 3 mg tablet  Commonly known as: MELATIN  Take 3 tablets (9 mg total) by mouth nightly as needed for Insomnia.     morphine 15 MG 12 hr tablet  Commonly known as: MS CONTIN  Take 1 tablet (15 mg total) by mouth every 12 (twelve) hours.     nicotine 21 mg/24 hr  Commonly known as: NICODERM CQ  Place 1 patch onto the skin once daily.  Start taking on: November 18, 2023     polyethylene glycol 17 gram Pwpk  Commonly known as: GLYCOLAX  Take 17 g by mouth 2 (two) times daily.     senna-docusate 8.6-50 mg 8.6-50 mg per tablet  Commonly known as: PERICOLACE  Take 1 tablet by mouth once daily.  Start taking on: November 18, 2023            CHANGE how you take these medications      acetaminophen 325 MG tablet  Commonly known as: TYLENOL  Take 2 tablets (650 mg total) by mouth every 6 (six) hours as needed for  Pain.  What changed:   medication strength  how much to take            STOP taking these medications      atorvastatin 40 MG tablet  Commonly known as: LIPITOR     carvediloL 3.125 MG tablet  Commonly known as: COREG     ELIQUIS 5 mg Tab  Generic drug: apixaban     valACYclovir 1000 MG tablet  Commonly known as: VALTREX                    _________________________________  Bob Mata MD  11/17/2023

## 2023-11-17 NOTE — PT/OT/SLP PROGRESS
"Physical Therapy Missed visit      Patient Name:  Eric Soliman   MRN:  [id number]    Patient not seen today secondary to Pain. Pt states he "just worked with OT and has increased pain at this time; does not wish to do therapy again right now"; states he "was just given pain medications and they have not kicked in yet. "    11/17/23- 10:38    "

## 2023-11-17 NOTE — ASSESSMENT & PLAN NOTE
"Mr. Soliman is a 62 yo M with PMH seizure disorder, PAD s/p thrombectomy & PTA, HTN who presents to [facility] ED with c/o intermittent SOB and chest pain. Of note, patient lost to follow up with [facility] at [facility] who had wanted to perform a bronchoscopy/EBUS on patient. However, he was unable to show up and it had to be rescheduled multiple times (May and June). He was first seen by [facility] team on 4/6/23 during a hospitalization. He was noted to have increasing ALO mass with mediastinal lymphadenopathy at that time. He has a long smoking history, started at age 14 1ppd and is still smoking though he states he is smoking less than before. He has noticed significant weight loss with ~30 lbs since his admission in April 2023. Per chart review, patient has unintentionally lost 24 lb since then. Palliative care was consulted for advanced care planning and goals of care.       11/17/2023  -Met with pt at bedside, primary team present for portion of encounter. Pt seems to be in good spirits, interactive today.   -Pt agreeable to NH placement, initially concerned over cost but agrees that shelter, food and a safe bed are worth it at this point.  Pt does remain optimistic that at some point he will regain enough strength and endurance to leave [facility] NH.   Pt acknowledges that he can not care for himself in his current condition and that living in his car is, "not a good idea right now."  -Pts highest priority overall is pain control and symptom management at this point in time.  Discussed the addition of hospice resources as a way to help with pain/symptom management in the setting of newly diagnosed adenocarcinoma. Pt in agreement to enroll under hospice care. Pts thoughts about cancer directed treatments have not changed at this time and pt wishes to focus on symptom burden at this time.   -Yesterday, pts pain regimen changed.  2mg PO dilaudid Q3H PRN added to standing po morphine tablet yesterday. Pt reports " "improvement in pain with addition of dilaudid when taken, however pt has not utilized prn medication consistently. Discussed with pt and RN.  -Plan for pt to discharge to NH with the addition of hospice services at time of discharge.  Pt in agreement with plan.  Will continue to follow pt throughout admission.       11/10/2023  -Pt seen at bedside today following chest tube insertion. Pt reports increased pain to generalized lung area, worse with each breath, focused around chest tube insertion site.  Reports that Norco was improving leg pain however is not controlling current pain. Pt appears more withdrawn today during conversation, however still appropriate.  Discussed adding 1 day of IV medication to control pain. Discussed plan with pt, pt   in agreement. 2mg IV morphine Q4H PRN break through pain refractory to Norco for the next 24 hours ordered.    -Pt agreeable to housing assistance and appreciative of staff.  -Pt remains a DNR.   -Still considering who he would elect as MPOA if he were to become able to make decisions for himself.     11/9/2023  -At time of initial encounter, pt resting in bed. AAO.  Engages in conversation appropriately.  Introduced the topic of palliative care, pt receptive to visit.   -Pt reports living in his car, struggles with food, shelter and finances.    -At this time, pt is focused on lower leg pain.  Reports no improvement with prn tylenol.  Upon chart review, pt has Norco ordered and has not yet utilized medication.  RN to administer dose now.  Will follow up with pt in the am to monitor symptom burden.   -Pt reports wishing to move forward with initial biopsy as previously discussed if pending cytology does not reveal malignancy. However, pt reports that he would not accept chemotherapy if cancer is found.   -Pt reports having no family involved in his care.  Reports having a friend, Linda Grey, that he has not spoken to "in a while".  Discussed MPOA and importance of " establishing. Pt will reach out to Linda to see if she is agreeable to be his MPOA.  Pt wishes to discuss with her prior to filling out paper.  Document left at the bedside.   -Code status discussed.  Pt would not accept intubation/ compressions.  Code status discussed, pt in agreement with DNR.  Order entered into the pts chart.   -Pt largely focused on his leg pain at this time.  Will defer remainder of goals of care conversation until pts symptoms are more controlled.

## 2023-11-17 NOTE — PROGRESS NOTES
CHELO spoke with Windy at [facility] -  [facility] / [facility] Admissions --     pt expected to d/c today with Hospice   Per Windy - facility partners with [facility]   CHELO spoke with Alexandra  [phone] at [facility] -- pt will need hospice services at [facility]   Info sent per Evette - a rep from the hospice will meet with pt today - she was told pt is discharging today.       Per Windy with [facility] - pt must pay $445.00 prior to admission -- check, credit card or cash  -- Business office closes at 4:30 pm   Windy will send CM the E-Sign paperwork that pt must complete prior to d/c.       CM met with pt - advised him that CM will bring computer to him to use to complete email admit paperwork.   Also advised him that someone fm [facility] will meet with him today and also that the facility needs $445.00 prior to admit - pt states he has the money but he can only access funds by presenting a withdrawal slip at the bank.  CHELO will discuss this with CM Supervisor Blayne.    -------------------------  CM completed Doc-u-sign paperwork with pt while at bedside.  Form giving NH permission to draft pt's bank account for payment signed and faxed back   as of 1223 -- she has rec'd all needed info.

## 2023-11-17 NOTE — SUBJECTIVE & OBJECTIVE
Interval History: No acute events overnight, IVC filter placed, chest tube removed, fluid cytology resulted adenocarcinoma. Pt willing to accept NH placement with the addition of hospice services.     Medications:  Continuous Infusions:  Scheduled Meds:   gabapentin  600 mg Oral TID    magnesium sulfate IVPB  2 g Intravenous Q2H    morphine  15 mg Oral Q12H    nicotine  1 patch Transdermal Daily    pantoprazole  40 mg Intravenous BID    polyethylene glycol  17 g Oral BID    senna-docusate 8.6-50 mg  1 tablet Oral Daily     PRN Meds:acetaminophen, HYDROmorphone, LIDOcaine, melatonin, sodium chloride 0.9%    Objective:     Vital Signs (Most Recent):  Temp: 97.6 °F (36.4 °C) (11/17/23 0814)  Pulse: 97 (11/17/23 0814)  Resp: 20 (11/17/23 0858)  BP: 125/69 (11/17/23 0814)  SpO2: 95 % (11/17/23 0814) Vital Signs (24h Range):  Temp:  [97.2 °F (36.2 °C)-98.6 °F (37 °C)] 97.6 °F (36.4 °C)  Pulse:  [] 97  Resp:  [16-20] 20  SpO2:  [87 %-96 %] 95 %  BP: (125-141)/(66-71) 125/69     Weight: 60.4 kg (133 lb 2.5 oz)  Body mass index is 19.66 kg/m².         Physical Exam  Vitals and nursing note reviewed.   Constitutional:       Appearance: He is underweight. He is ill-appearing (chronically).      Comments: Unkept appearance    HENT:      Head: Normocephalic.      Nose: Nose normal.      Mouth/Throat:      Mouth: Mucous membranes are moist.   Cardiovascular:      Rate and Rhythm: Normal rate.      Pulses: Normal pulses.   Pulmonary:      Effort: Pulmonary effort is normal.      Breath sounds: Decreased air movement present. Decreased breath sounds present.   Abdominal:      General: Abdomen is flat.      Palpations: Abdomen is soft.   Skin:     General: Skin is warm and dry.      Capillary Refill: Capillary refill takes less than 2 seconds.      Coloration: Skin is pale.      Findings: Rash present.   Neurological:      Mental Status: He is alert and oriented to person, place, and time. Withdrawn today   Psychiatric:          Mood and Affect: Mood normal.         Behavior: Behavior normal. Behavior is cooperative.         Thought Content: Thought content normal.     Judgment: Judgment normal to slowed      Review of Symptoms        Symptom Assessment (ESAS 0-10 Scale)  Pain:  5  Dyspnea:  2  Anxiety:  0  Nausea:  0  Depression:  0  Anorexia:  0  Fatigue:  4  Insomnia:  0  Restlessness:  0  Agitation:  0         Performance Status:  40     Living Arrangements:  Lives alone (lives in his car)     Psychosocial/Cultural:   See Palliative Psychosocial Note: No  Social Issues Identified: Coping deficit pt/family and Financial Issues  Bereavement Risk: No  Caregiver Needs Discussed. Caregiver Distress: No:   Cultural:   **Primary  to Follow**  Palliative Care  Consult: Yes     Time-Based Charting:  Yes  Chart Review: 15 minutes  Face to Face: 13 minutes  Symptom Assessment: 10 minutes  Coordination of Care: 10 minutes  Discharge Planning: 10 minutes     Total Time Spent: 58 minutes    Advance Care Planning   Advance Directives:   Living Will: No    LaPOST: No    Do Not Resuscitate Status: Yes    Medical Power of : No      Decision Making:  Patient answered questions  Goals of Care: The patient endorses that what is most important right now is to focus on remaining as independent as possible, symptom/pain control, quality of life, even if it means sacrificing a little time, and improvement in condition but with limits to invasive therapies    Accordingly, we have decided that the best plan to meet the patient's goals includes enrolling in hospice care at time of discharge.  Pt willing to accept placement at this time.            Significant Labs: All pertinent labs within the past 24 hours have been reviewed.  CBC:   Recent Labs   Lab 11/17/23 0302   WBC 10.62   HGB 8.8*   HCT 26.8*   MCV 80*   *     BMP:  Recent Labs   Lab 11/17/23 0302      *   K 4.3   CL 89*   CO2 25   BUN 13    CREATININE 0.8   CALCIUM 8.5*   MG 1.7     LFT:  Lab Results   Component Value Date    AST 83 (H) 11/17/2023    ALKPHOS 155 (H) 11/17/2023    BILITOT 0.3 11/17/2023     Albumin:   Albumin   Date Value Ref Range Status   11/17/2023 2.1 (L) 3.5 - 5.2 g/dL Final     Protein:   Total Protein   Date Value Ref Range Status   11/17/2023 8.0 6.0 - 8.4 g/dL Final     Lactic acid:   Lab Results   Component Value Date    LACTATE 1.4 11/07/2023    LACTATE 1.2 05/13/2023       Significant Imaging: I have reviewed all pertinent imaging results/findings within the past 24 hours.

## 2023-11-17 NOTE — PLAN OF CARE
All financial and e-sign info rec'd per Windy at [facility]    OK for nurse to call and give report   - pt going to room 310 A     Stretcher with [facility] arranged for 4:30pm .    Bedside nurse to call report     Rep for [facility] completed paperwork at bedside.       Final orders sent to [facility] and [facility]   Pt verbally agreed to placement - unable to sign paper due to weakness and pain.        11/17/23 1719   Final Note   Assessment Type Final Discharge Note   Anticipated Discharge Disposition Int Care Fac  ([facility])   What phone number can be called within the next 1-3 days to see how you are doing after discharge? [phone]   Post-Acute Status   Post-Acute Authorization Hospice   Post-Acute Placement Status Set-up Complete/Auth obtained   Hospice Status Set-up Complete/Auth obtained   Discharge Delays None known at this time

## 2023-11-18 NOTE — DISCHARGE SUMMARY
[facility] Medicine  Discharge Summary      Patient Name: Eric Soliman  MRN: [id number]  GILBERT: [id number]  Patient Class: IP- Inpatient  Admission Date: 11/7/2023  Hospital Length of Stay: 10 days  Discharge Date and Time: No discharge date for patient encounter.  Attending Physician: Juan Manuel Newton DO   Discharging Provider: Bob Mata MD  Primary Care Provider: Jesika, Primary Doctor    Primary Care Team: Networked reference to record PCT     HPI:   Pt, Eric Soliman, is a 62 yo M with pmh of htn, lung nodule of undetermined character, PAD s/p thrombectomy & PTA, epilepsy, and tobacco and marijuana use here for several months of progressively worsening shortness of breath that has been worse over the last few days. Patient had previously been seen by [facility] pulmonology for workup of this lung mass and respiratory issues, but has limited means and had failure of follow up. At this visit patient describes worsening shortness of breath and fatigue, reduced appetite, and significant unintentional weight loss of up to 25% of body weight. Patient is a current tobacco and marijuana smoker with approximately 50 pack year history of tobacco use, but says he has cut back drastically lately. Patient denies chest pain, nausea/vomiting, diarrhea, night sweats, cough or sputum production, or other symptoms at this time.      In ED, patient was noted to be hypertensive at 228/116. Initial ED BP was 190/90. Patient was given his previous home dose of coreg 3.125 BID and 10mg hydralazine q8h prn. Vitals otherwise stable. Pt's labs showed WBC count of 7.52. Troponin was negative, but will repeat once more to trend. BNP non-elevated. Chest xray showed large left pleural effusion. CTA of chest showed right sided PE and left sided pleural effusion and recommended thoracentesis to clinically correlate. Thoracentesis was performed at bedside by Pulm/Crit fellow. Samples were sent for analysis and approximately  1500mL were drawn off without complication. Patient had repeat CXR for post-thoracentesis lung views. Dr. Martin and [facility] inpatient team were present with patient to discuss possible diagnosis including malignancy and suggest a consultation with the Palliative medicine team.    Procedure(s) (LRB):  COLONOSCOPY (N/A)      Hospital Course:   62 yo male PMH HTN, Lung nodule, PVD s/p thrombectomy, tobacco use presented to ED with shortness of breath and weight loss x 1 month that has worsened over the past few days.  Pt had been seen by previously by [facility]ology for lung nodule and respiratory problems.  Initially hypertensive to 200s systolic in ED; resolved with home medications.  CTA shows large left pleural effusion and PE to right lower lobe. US shows DVT to left femoral and posterior tibialis veins.  Pt previously on Eliquis and Plavix but non-adherent x 1 month. Treated with Heparin drip.  Pulmonology consulted and performed thoracentesis. 1.5L fluid drawn off and analysis consisted with exudative pleural effusion. On hospital day one, based on results of labs, imaging, and clinical presentation, patient was informed that he had a serious illness and likely had a malignant cancer. Goals of care discussions were started. One hospital day two, pt had one large episode of bright red bloody stool with associated generalized weakness. Discontinued heparin and oral anticoagulants. GI consulted. Treated with Protonix. Colonoscopy showed segmental mucosal ulceration in the ascending colon with maroon stool througout the entire colon.  Findings consistent with ischemic colitis which likely occured in the setting of hypotension in a vasculopath.  Held BP meds and patient remained normotensive. Stable BP, H&H throughout hospital course.  Pleural catheter inserted with initial drainage of approximately 1L dark brown fluid.  Drained to completion for total of 3.5L sanguinous fluid. No pleurodesis offered due to trapped  lung on CXR and patient being a poor candidate. IR consulted and IVC filter placed to prevent further cardiopulmonic emboli from DVT. Fluid cytology resulted adenocarcinoma Stage 4. Patient informed of diagnosis. HemeOnc consulted; During hospitalization, pt's main concern was left lower leg pain, numbness, and weakness. US of the LE A&V showed distal stenosis and occlusion at the L popliteal artery and emboli in the deep veins. Patient was given opiate analgesia to ameliorate the pain. PT/OT worked with him throughout his stay and worked to rehab strength and mobility, without much success. Patient remained debilitated and had mobility issues. Throughout stay, patient had several goals of care discussions with the primary team, specialist teams, and his Palliative Medicine team. He communicated that he did not want to pursue any chemotherapy or invasive treatment and so was deemed to be a candidate for hospice with placement at [facility] under long term care with [facility] hospice attending. Pt was discharged with medications and orders to [facility].         Physical Exam  Vitals and nursing note reviewed.   Constitutional:       Appearance: He is underweight. He is ill-appearing (chronically).      Comments: Unkept appearance    HENT:      Head: Normocephalic.      Nose: Nose normal.      Mouth/Throat:      Mouth: Mucous membranes are moist.   Cardiovascular:      Rate and Rhythm: Normal rate.      Pulses:           Dorsalis pedis pulses are 0 on the left side.        Posterior tibial pulses are 0 on the left side.   Pulmonary:      Effort: No respiratory distress.      Breath sounds: Decreased air movement present. Examination of the left-middle field reveals decreased breath sounds. Examination of the left-lower field reveals decreased breath sounds. Decreased breath sounds present. No wheezing or rhonchi.      Comments: Easily develops shortness of breath  Chest:      Chest wall:  No tenderness.   Abdominal:      General: Abdomen is flat. There is no distension.      Palpations: Abdomen is soft.      Tenderness: There is no abdominal tenderness. There is no guarding.   Musculoskeletal:         General: Tenderness present. No swelling.      Right lower leg: No edema.      Left lower leg: No edema.      Left foot: Decreased range of motion. Tenderness present. No swelling or deformity. Abnormal pulse.      Comments: Pt states foot his numb and he is unable to move his toes.   Feet:      Right foot:      Toenail Condition: Right toenails are long. Fungal disease present.     Left foot:      Toenail Condition: Left toenails are long. Fungal disease present.     Comments: Left foot cold and pale compared to right. No additional swelling in left leg.  in the calf. DP non-palpable and unable to find even with doppler.  Skin:     General: Skin is warm and dry.      Capillary Refill: Capillary refill takes less than 2 seconds.      Coloration: Skin is pale.      Findings: Rash present.   Neurological:      Mental Status: He is alert and oriented to person, place, and time.      Motor: Weakness present.   Psychiatric:         Mood and Affect: Mood normal.         Behavior: Behavior normal. Behavior is cooperative.         Thought Content: Thought content normal.         Judgment: Judgment normal.       Goals of Care Treatment Preferences:  Code Status: DNR          What is most important right now is to focus on avoiding the hospital, remaining as independent as possible, symptom/pain control, quality of life, even if it means sacrificing a little time, improvement in condition but with limits to invasive therapies, comfort and QOL .  Accordingly, we have decided that the best plan to meet the patient's goals includes enrolling in hospice care.      Consults:   Consults (From admission, onward)          Status Ordering Provider     Inpatient consult to Cardiology-[facility]  Once         Provider:  Royce Yi MD    Completed MARIO FUNK     Inpatient consult to Hematology/Oncology  Once        Provider:  (Not yet assigned)    Completed MARIO FUNK     Inpatient consult to Interventional Radiology  Once        Provider:  (Not yet assigned)    Completed MACO FUENTES     Inpatient consult to Anesthesiology  Once        Provider:  (Not yet assigned)    Acknowledged ANNETTA VILLAGOMEZ     Inpatient consult to Gastroenterology-[facility]  Once        Provider:  (Not yet assigned)    Completed MACO FUENTES     Inpatient consult to Palliative Care  Once        Provider:  (Not yet assigned)    Completed MARIO FUNK     Inpatient consult to Pulmonology  Once        Provider:  (Not yet assigned)    Completed MARIO FUNK     IP consult to case management  Once        Provider:  (Not yet assigned)    Completed TIFFANY ESPINAL     Pulmonology  Once        Provider:  (Not yet assigned)    Completed MACO FUENTES            No new Assessment & Plan notes have been filed under this hospital service since the last note was generated.  Service: Hospital Medicine    Final Active Diagnoses:    Diagnosis Date Noted POA    PRINCIPAL PROBLEM:  Shortness of breath [R06.02] 11/08/2023 Yes    Rectal bleeding [K62.5] 11/14/2023 No    GI bleed [K92.2] 11/09/2023 No    Pleural effusion [J90] 11/08/2023 Yes    Pulmonary embolism [I26.99] 11/08/2023 Yes    Asymptomatic hypertensive urgency [I16.0] 11/08/2023 Yes    Tobacco dependency [F17.200] 11/08/2023 Yes    Palliative care encounter [Z51.5] 11/08/2023 Not Applicable    Mild malnutrition [E44.1] 11/08/2023 Yes    Lung mass [R91.8] 04/06/2023 Yes    PAD (peripheral artery disease) [I73.9] 04/04/2023 Yes    Tobacco abuse [Z72.0] 10/07/2022 Yes    Primary hypertension [I10] 10/07/2022 Yes    Thrombosis [I82.90] 10/07/2022 Yes      Problems Resolved During this Admission:       Discharged Condition: fair    Disposition: Home  or Self Care    Follow Up:   [facility]    Why[facility]  Contact information:  [address] [facility] Follow up.    Specialties: Hospice Services, Hospice and Palliative Medicine  Why: Hospice  Contact information:  [address]  [address]  [place] [address]  [phone]                           Patient Instructions:   No discharge procedures on file.    Significant Diagnostic Studies: Labs: All labs within the past 24 hours have been reviewed    Pending Diagnostic Studies:       None           Medications:  Reconciled Home Medications:      Medication List        START taking these medications      gabapentin 300 MG capsule  Commonly known as: NEURONTIN  Take 2 capsules (600 mg total) by mouth 3 (three) times daily.     HYDROmorphone 2 MG tablet  Commonly known as: DILAUDID  Take 1 tablet (2 mg total) by mouth every 3 (three) hours as needed for Pain.     LIDOcaine 5 %  Commonly known as: LIDODERM  Place 1 patch onto the skin daily as needed (back pain). Remove & Discard patch within 12 hours or as directed by MD     melatonin 3 mg tablet  Commonly known as: MELATIN  Take 3 tablets (9 mg total) by mouth nightly as needed for Insomnia.     morphine 15 MG 12 hr tablet  Commonly known as: MS CONTIN  Take 1 tablet (15 mg total) by mouth every 12 (twelve) hours.     nicotine 21 mg/24 hr  Commonly known as: NICODERM CQ  Place 1 patch onto the skin once daily.  Start taking on: November 18, 2023     pantoprazole 40 MG tablet  Commonly known as: PROTONIX  Take 1 tablet (40 mg total) by mouth 2 (two) times daily.     polyethylene glycol 17 gram Pwpk  Commonly known as: GLYCOLAX  Take 17 g by mouth 2 (two) times daily.     senna-docusate 8.6-50 mg 8.6-50 mg per tablet  Commonly known as: PERICOLACE  Take 1 tablet by mouth once daily.  Start taking on: November 18, 2023            CHANGE how you take these medications      acetaminophen  325 MG tablet  Commonly known as: TYLENOL  Take 2 tablets (650 mg total) by mouth every 6 (six) hours as needed for Pain.  What changed:   medication strength  how much to take            STOP taking these medications      atorvastatin 40 MG tablet  Commonly known as: LIPITOR     carvediloL 3.125 MG tablet  Commonly known as: COREG     ELIQUIS 5 mg Tab  Generic drug: apixaban     valACYclovir 1000 MG tablet  Commonly known as: VALTREX              Indwelling Lines/Drains at time of discharge:   Lines/Drains/Airways       None                   Time spent on the discharge of patient: 35 minutes         Bob Mata MD  Department of Hospital Medicine  [facility]

## 2023-11-20 PROBLEM — K92.1 HEMATOCHEZIA: Status: ACTIVE | Noted: 2023-01-01

## 2023-12-03 PROBLEM — Z51.5 COMFORT MEASURES ONLY STATUS: Status: ACTIVE | Noted: 2023-01-01

## 2023-12-03 PROBLEM — I70.90 ARTERIAL OCCLUSION: Status: ACTIVE | Noted: 2023-01-01

## 2023-12-03 PROBLEM — C34.92 ADENOCARCINOMA OF LEFT LUNG, STAGE 4: Status: ACTIVE | Noted: 2023-01-01

## 2023-12-03 PROBLEM — I82.402 ACUTE DEEP VEIN THROMBOSIS (DVT) OF LEFT LOWER EXTREMITY: Status: ACTIVE | Noted: 2023-01-01

## 2023-12-03 PROBLEM — D50.9 MICROCYTIC ANEMIA: Status: ACTIVE | Noted: 2023-01-01

## 2023-12-03 PROBLEM — R74.01 ELEVATED TRANSAMINASE LEVEL: Status: ACTIVE | Noted: 2023-01-01

## 2023-12-03 PROBLEM — J93.9 PNEUMOTHORAX: Status: ACTIVE | Noted: 2023-01-01

## 2023-12-03 PROBLEM — E44.0 MODERATE PROTEIN MALNUTRITION: Status: ACTIVE | Noted: 2023-01-01

## 2023-12-03 PROBLEM — D75.839 THROMBOCYTOSIS: Status: ACTIVE | Noted: 2023-01-01

## 2023-12-03 PROBLEM — Z87.19: Status: ACTIVE | Noted: 2023-01-01

## 2023-12-03 PROBLEM — E87.1 HYPONATREMIA: Status: ACTIVE | Noted: 2023-01-01

## 2023-12-03 NOTE — HPI
63-year-old male with past medical history of stage IV adenocarcinoma of the lung, critical limb ischemia of the left lower extremity, left malignant pleural effusion, PE/DVT not on coagulation due to GI bleeding, and ischemic bowel disease admitted to hospital medicine service for left lower extremity pain control.  Pt was recently admitted to [facility] where he was diagnosed with stage IV adenocarcinoma of the lung, malignant pleural effusion s/p chest tube, PE/DVT with left lower extremity popliteal artery occlusion and unable to anticoagulate due to GI bleed from ischemic colitis.  Pt declined chemotherapy and any invasive treatment.  Palliative Medicine was consulted and pt decided to be discharged to [facility] with [facility] hosp[facility].  I contacted [facility] who said that pt did not sign hospice paperwork and hospice was never initiated.  [facility] confirmed that the referral was canceled but did not have a reason listed.  Pt does not recall refusing to sign any paperwork.  Discussed multiple diagnoses at length including new large left pneumothorax noted on chest x-ray.  Patient confirms that he would not want chemotherapy, left lower extremity amputation, or chest tube insertion for pneumothorax.  Pt is alert and oriented x4 and has decided to transition to comfort care.  Pt wishes to contact mother but does not have any of his personal belongings because they are still at [facility].  I contacted the facility who is sending someone with his wallet and bag.  Pt reports severe, sharp, constant left lower extremity pain that is not alleviated by anything and is worsened when weight-bearing or with palpation.  Denies any current chest pain.  Pt reports minimal shortness of breath and productive cough that are chronic.  Denies any fever, chills, abdominal pain, nausea, vomiting, blood in stool or urine.  Transfer order placed for symptom management unit and comfort  measures only initiated.

## 2023-12-03 NOTE — ASSESSMENT & PLAN NOTE
Left popliteal artery occlusion  Stage IV adenocarcinoma left lung  Malignant pleural effusion  Large left pneumothorax  Peripheral arterial disease  History of Ischemic bowel disease  History of GI bleed  DVT/PE not on anticoagulation, s/p IVC filter  Microcytic anemia  Thrombocytosis  Hyponatremia  Elevated transaminase levels  Moderate protein malnutrition  Tobacco dependence  Marijuana use  Pt confirmed that he would not want chemotherapy for metastatic lung cancer nor would he want aggressive treatment of left lower extremity arterial occlusion.  Pt informed of left pneumothorax and declines chest tube.  Discussed terminal prognosis, pt expressed understanding, and wishes to proceed with comfort measures only.  Palliative medications entered.  See advance care planning below.

## 2023-12-03 NOTE — SUBJECTIVE & OBJECTIVE
(Not in a hospital admission)      Review of patient's allergies indicates:  No Known Allergies    Past Medical History:   Diagnosis Date    Anticoagulant long-term use     DVT (deep venous thrombosis)     Seizures     epilepsy controlled w/ marijuana     Past Surgical History:   Procedure Laterality Date    ANGIOGRAPHY OF LOWER EXTREMITY Left 10/7/2022    Procedure: Angiogram Extremity Unilateral;  Surgeon: Charles Patten III, MD;  Location: [facility] CATH LAB;  Service: Cardiology;  Laterality: Left;    AORTOGRAPHY WITH SERIALOGRAPHY Bilateral 10/7/2022    Procedure: AORTOGRAM, WITH SERIALOGRAPHY;  Surgeon: Charles Patten III, MD;  Location: [facility] CATH LAB;  Service: Cardiology;  Laterality: Bilateral;    AORTOGRAPHY WITH SERIALOGRAPHY N/A 4/4/2023    Procedure: AORTOGRAM, WITH SERIALOGRAPHY;  Surgeon: Royce Yi MD;  Location: [facility] CATH LAB/EP;  Service: Cardiology;  Laterality: N/A;    ATHERECTOMY, PERIPHERAL BLOOD VESSEL Left 4/4/2023    Procedure: ATHERECTOMY, PERIPHERAL BLOOD VESSEL;  Surgeon: Royce Yi MD;  Location: [facility] CATH LAB/EP;  Service: Cardiology;  Laterality: Left;    CLOSURE DEVICE  4/4/2023    Procedure: Placement of Closure Device;  Surgeon: Royce Yi MD;  Location: [facility] CATH LAB/EP;  Service: Cardiology;;    COLONOSCOPY N/A 11/9/2023    Procedure: COLONOSCOPY;  Surgeon: Cedrick Holcomb MD;  Location: [facility] ENDO;  Service: Endoscopy;  Laterality: N/A;    FILTER PROTECTION, PERIPHERAL  4/4/2023    Procedure: Filter Protection, Peripheral;  Surgeon: Royce Yi MD;  Location: [facility] CATH LAB/EP;  Service: Cardiology;;    IVUS (INTRAVASCULAR ULTRASOUND) Left 4/4/2023    Procedure: ULTRASOUND, INTRAVASCULAR;  Surgeon: Royce Yi MD;  Location: [facility] CATH LAB/EP;  Service: Cardiology;  Laterality: Left;    PTA, ILIAC ARTERY Left 4/4/2023    Procedure: PTA, Iliac Artery;  Surgeon: Royce Yi MD;  Location: [facility] CATH LAB/EP;  Service: Cardiology;  Laterality:  Left;    THROMBECTOMY  4/4/2023    Procedure: THROMBECTOMY;  Surgeon: Royce Yi MD;  Location: [facility] CATH LAB/EP;  Service: Cardiology;;     Family History       Problem Relation (Age of Onset)    Heart disease Father    No Known Problems Mother          Tobacco Use    Smoking status: Every Day     Current packs/day: 2.00     Average packs/day: 2.0 packs/day for 48.9 years (97.8 ttl pk-yrs)     Types: Cigarettes     Start date: 1975    Smokeless tobacco: Never    Tobacco comments:     Pt is a 2 pk/day cigarette smoker x 49 yrs. 21 mg nicotine patch ordered Q day, and order requested upon discharge for 21 mg nicotine patch Q day. Pt states ready to quit. Ambulatory referral to Smoking Cessation clinic.   Substance and Sexual Activity    Alcohol use: Not Currently     Comment: rarely 1 beer    Drug use: Yes     Types: Marijuana    Sexual activity: Not on file     Review of Systems   Constitutional:  Negative for chills and fever.   Respiratory:  Negative for chest tightness and shortness of breath.    Cardiovascular:  Positive for leg swelling.   Gastrointestinal:  Negative for abdominal pain.   Musculoskeletal:  Positive for myalgias.   Skin:  Positive for color change and wound.   Neurological:  Positive for weakness and numbness.   Psychiatric/Behavioral:  Negative for self-injury and suicidal ideas.      Objective:     Vital Signs (Most Recent):  Temp: 97.3 °F (36.3 °C) (12/03/23 1111)  Pulse: 92 (12/03/23 1200)  Resp: (!) 22 (12/03/23 1200)  BP: 128/69 (12/03/23 1200)  SpO2: (!) 94 % (12/03/23 1200) Vital Signs (24h Range):  Temp:  [97.3 °F (36.3 °C)] 97.3 °F (36.3 °C)  Pulse:  [92] 92  Resp:  [18-22] 22  SpO2:  [94 %-98 %] 94 %  BP: (128-136)/(69-76) 128/69        There is no height or weight on file to calculate BMI.      Physical Exam  Constitutional:       Appearance: Normal appearance.   HENT:      Head: Normocephalic and atraumatic.      Mouth/Throat:      Mouth: Mucous membranes are moist.   Eyes:       Pupils: Pupils are equal, round, and reactive to light.   Cardiovascular:      Rate and Rhythm: Normal rate and regular rhythm.   Pulmonary:      Comments: Normal breathing effort with supplemental oxygen  Abdominal:      Palpations: Abdomen is soft.   Skin:     Capillary Refill: Capillary refill takes more than 3 seconds.      Comments: Sloughing skin over the dorsum of the left foot, echymosis and cyanosis of lower leg, tender to palpation, associated erythema   Neurological:      Mental Status: He is alert.      Motor: Weakness present.          Significant Labs:  Recent Lab Results         12/03/23  1116   12/03/23  1110   12/03/23  1049        Allens Test     N/A       Baso #   0.02         Basophil %   0.2         Site     Other       Differential Method   Automated         Eos #   0.0         Eosinophil %   0.3         Gran # (ANC)   7.1         Gran %   64.0         Hematocrit   23.1         Hemoglobin   7.3         Immature Grans (Abs)   0.09  Comment: Mild elevation in immature granulocytes is non specific and   can be seen in a variety of conditions including stress response,   acute inflammation, trauma and pregnancy. Correlation with other   laboratory and clinical findings is essential.           Immature Granulocytes   0.8         Lymph #   3.0         Lymph %   26.7         MCH   25.0         MCHC   31.6         MCV   79         Mono #   0.9         Mono %   8.0         MPV   8.9         nRBC   0         Platelet Count   484         POC BUN 11           POC Chloride 94           POC Creatinine 0.6           POC Glucose 83           POC Hematocrit 25           POC Ionized Calcium 1.12           POC Lactate     2.72       Potassium, Blood Gas 4.0           Sodium, Blood Gas 132           POC TCO2 (MEASURED) 26           RBC   2.92         RDW   17.2         Sample EMELYN     VENOUS       WBC   11.04                 Significant Diagnostics:  I have reviewed all pertinent imaging results/findings  within the past 24 hours.

## 2023-12-03 NOTE — ED NOTES
I-STAT Chem-8+ Results:   Value Reference Range   Sodium 132 136-145 mmol/L   Potassium  4.0 3.5-5.1 mmol/L   Chloride 94  mmol/L   Ionized Calcium 1.12 1.06-1.42 mmol/L   CO2 (measured) 26 23-29 mmol/L   Glucose 83  mg/dL   BUN 11 6-30 mg/dL   Creatinine 0.6 0.5-1.4 mg/dL   Hematocrit 25 36-54%

## 2023-12-03 NOTE — ED NOTES
Patient request no more photos be taken to add to chart for  LDA's, patient states the physician has already taken pictures of his leg today.

## 2023-12-03 NOTE — ED NOTES
Assumed care of the patient. Report received from [facility]. Pt placed on continuous cardiac monitoring. Pt in hospital gown, side rails up X2, bed low and locked, and call light is placed within reach. No family/visitors at bedside at this time. Pt denies any complaints or needs.Pt provided with juice at this time.

## 2023-12-03 NOTE — H&P
[facility] - Emergency Dept  [facility] Medicine  History & Physical    Patient Name: Eric Soliman  MRN: [id number]  Patient Class: IP- Inpatient  Admission Date: 12/3/2023  Attending Physician: Justice Brennan III*   Primary Care Provider: Jesika Primary Doctor         Patient information was obtained from patient, nursing home, past medical records, and ER records.     Subjective:     Principal Problem:Critical limb ischemia of left lower extremity    Chief Complaint:   Chief Complaint   Patient presents with    Leg Swelling     Pt arrives from [facility] for evaluation of pain, redness and swelling to the left lower extremity (Toes to knee). Pt has terminal cancer and is a DNR per [facility] staff.         HPI: 63-year-old male with past medical history of stage IV adenocarcinoma of the lung, critical limb ischemia of the left lower extremity, left malignant pleural effusion, PE/DVT not on coagulation due to GI bleeding, and ischemic bowel disease admitted to hospital medicine service for left lower extremity pain control.  Pt was recently admitted to [place] where he was diagnosed with stage IV adenocarcinoma of the lung, malignant pleural effusion s/p chest tube, PE/DVT with left lower extremity popliteal artery occlusion and unable to anticoagulate due to GI bleed from ischemic colitis.  Pt declined chemotherapy and any invasive treatment.  Palliative Medicine was consulted and pt decided to be discharged to [facility] with [facility] hosp[facility].  I contacted [facility] who said that pt did not sign hospice paperwork and hospice was never initiated.  [facility] confirmed that the referral was canceled but did not have a reason listed.  Pt does not recall refusing to sign any paperwork.  Discussed multiple diagnoses at length including new large left pneumothorax noted on chest x-ray.  Patient confirms that he would not want chemotherapy, left lower extremity  amputation, or chest tube insertion for pneumothorax.  Pt is alert and oriented x4 and has decided to transition to comfort care.  Pt wishes to contact mother but does not have any of his personal belongings because they are still at [facility].  I contacted the facility who is sending someone with his wallet and bag.  Pt reports severe, sharp, constant left lower extremity pain that is not alleviated by anything and is worsened when weight-bearing or with palpation.  Denies any current chest pain.  Pt reports minimal shortness of breath and productive cough that are chronic.  Denies any fever, chills, abdominal pain, nausea, vomiting, blood in stool or urine.  Transfer order placed for symptom management unit and comfort measures only initiated.    Past Medical History:   Diagnosis Date    DVT (deep venous thrombosis)     GI bleeding     Ischemic bowel disease     Malignant pleural effusion     PAD (peripheral artery disease)     Pulmonary embolism     Stage IV adenocarcinoma of lung, left        Past Surgical History:   Procedure Laterality Date    ANGIOGRAPHY OF LOWER EXTREMITY Left 10/7/2022    Procedure: Angiogram Extremity Unilateral;  Surgeon: Charles Patten III, MD;  Location: [facility] CATH LAB;  Service: Cardiology;  Laterality: Left;    AORTOGRAPHY WITH SERIALOGRAPHY Bilateral 10/7/2022    Procedure: AORTOGRAM, WITH SERIALOGRAPHY;  Surgeon: Charles Patten III, MD;  Location: [facility] CATH LAB;  Service: Cardiology;  Laterality: Bilateral;    AORTOGRAPHY WITH SERIALOGRAPHY N/A 4/4/2023    Procedure: AORTOGRAM, WITH SERIALOGRAPHY;  Surgeon: Royce Yi MD;  Location: [facility] CATH LAB/EP;  Service: Cardiology;  Laterality: N/A;    ATHERECTOMY, PERIPHERAL BLOOD VESSEL Left 4/4/2023    Procedure: ATHERECTOMY, PERIPHERAL BLOOD VESSEL;  Surgeon: Royce Yi MD;  Location: [facility] CATH LAB/EP;  Service: Cardiology;  Laterality: Left;    CLOSURE DEVICE  4/4/2023    Procedure: Placement of Closure  Device;  Surgeon: Royce Yi MD;  Location: [facility] CATH LAB/EP;  Service: Cardiology;;    COLONOSCOPY N/A 11/9/2023    Procedure: COLONOSCOPY;  Surgeon: Cedrick Holcomb MD;  Location: [facility] ENDO;  Service: Endoscopy;  Laterality: N/A;    FILTER PROTECTION, PERIPHERAL  4/4/2023    Procedure: Filter Protection, Peripheral;  Surgeon: Royce Yi MD;  Location: [facility] CATH LAB/EP;  Service: Cardiology;;    IVUS (INTRAVASCULAR ULTRASOUND) Left 4/4/2023    Procedure: ULTRASOUND, INTRAVASCULAR;  Surgeon: Royce Yi MD;  Location: [facility] CATH LAB/EP;  Service: Cardiology;  Laterality: Left;    PTA, ILIAC ARTERY Left 4/4/2023    Procedure: PTA, Iliac Artery;  Surgeon: Royce Yi MD;  Location: [facility] CATH LAB/EP;  Service: Cardiology;  Laterality: Left;    THROMBECTOMY  4/4/2023    Procedure: THROMBECTOMY;  Surgeon: Royce Yi MD;  Location: [facility] CATH LAB/EP;  Service: Cardiology;;       Review of patient's allergies indicates:  No Known Allergies    Current Facility-Administered Medications on File Prior to Encounter   Medication    iodixanoL (VISIPAQUE 320) injection     Current Outpatient Medications on File Prior to Encounter   Medication Sig    acetaminophen (TYLENOL) 325 MG tablet Take 2 tablets (650 mg total) by mouth every 6 (six) hours as needed for Pain.    gabapentin (NEURONTIN) 300 MG capsule Take 2 capsules (600 mg total) by mouth 3 (three) times daily.    HYDROmorphone (DILAUDID) 2 MG tablet Take 1 tablet (2 mg total) by mouth every 3 (three) hours as needed for Pain.    LIDOcaine (LIDODERM) 5 % Place 1 patch onto the skin daily as needed (back pain). Remove & Discard patch within 12 hours or as directed by MD    melatonin (MELATIN) 3 mg tablet Take 3 tablets (9 mg total) by mouth nightly as needed for Insomnia.    morphine (MS CONTIN) 15 MG 12 hr tablet Take 1 tablet (15 mg total) by mouth every 12 (twelve) hours.    nicotine (NICODERM CQ) 21 mg/24 hr Place 1 patch onto the skin  once daily.    pantoprazole (PROTONIX) 40 MG tablet Take 1 tablet (40 mg total) by mouth 2 (two) times daily.    polyethylene glycol (GLYCOLAX) 17 gram PwPk Take 17 g by mouth 2 (two) times daily.    senna-docusate 8.6-50 mg (PERICOLACE) 8.6-50 mg per tablet Take 1 tablet by mouth once daily.     Family History       Problem Relation (Age of Onset)    Heart disease Father    No Known Problems Mother          Tobacco Use    Smoking status: Every Day     Current packs/day: 2.00     Average packs/day: 2.0 packs/day for 48.9 years (97.8 ttl pk-yrs)     Types: Cigarettes     Start date: 1975    Smokeless tobacco: Never    Tobacco comments:     Pt is a 2 pk/day cigarette smoker x 49 yrs. 21 mg nicotine patch ordered Q day, and order requested upon discharge for 21 mg nicotine patch Q day. Pt states ready to quit. Ambulatory referral to Smoking Cessation clinic.   Substance and Sexual Activity    Alcohol use: Not Currently     Comment: rarely 1 beer    Drug use: Yes     Types: Marijuana    Sexual activity: Not on file     Review of Systems: Per HPI  Objective:     Vital Signs (Most Recent):  Temp: 97.9 °F (36.6 °C) (12/03/23 1400)  Pulse: 97 (12/03/23 1400)  Resp: 20 (12/03/23 1400)  BP: 118/68 (12/03/23 1400)  SpO2: 100 % (12/03/23 1400) Vital Signs (24h Range):  Temp:  [97.3 °F (36.3 °C)-97.9 °F (36.6 °C)] 97.9 °F (36.6 °C)  Pulse:  [92-99] 97  Resp:  [18-24] 20  SpO2:  [94 %-100 %] 100 %  BP: (118-136)/(66-76) 118/68        There is no height or weight on file to calculate BMI.     Physical Exam  Vitals reviewed.   Constitutional:       General: He is not in acute distress.     Appearance: He is ill-appearing (Chronically).   HENT:      Head: Normocephalic and atraumatic.      Mouth/Throat:      Mouth: Mucous membranes are moist.   Eyes:      General: No scleral icterus.     Conjunctiva/sclera: Conjunctivae normal.   Cardiovascular:      Rate and Rhythm: Normal rate and regular rhythm.      Heart sounds: Normal heart  sounds.   Pulmonary:      Effort: Pulmonary effort is normal.      Breath sounds: No wheezing.      Comments: Diminished air movement left lung  Abdominal:      General: Bowel sounds are normal. There is no distension.      Palpations: Abdomen is soft.      Tenderness: There is no abdominal tenderness. There is no guarding or rebound.   Skin:     General: Skin is warm and dry.      Capillary Refill: Capillary refill takes less than 2 seconds.      Findings: Erythema and lesion present.      Comments: Left foot sloughing skin, exquisitely tender to palpation, erythema and cyanosis of left lower leg (see media)   Neurological:      General: No focal deficit present.      Mental Status: He is alert and oriented to person, place, and time.      Cranial Nerves: No cranial nerve deficit.   Psychiatric:         Mood and Affect: Mood normal.         Behavior: Behavior normal.                          Significant Labs: All pertinent labs within the past 24 hours have been reviewed.  CBC:   Recent Labs   Lab 12/03/23  1110 12/03/23  1116   WBC 11.04  --    HGB 7.3*  --    HCT 23.1* 25*   *  --      CMP:   Recent Labs   Lab 12/03/23  1110   *   K 4.1   CL 98   CO2 23   GLU 75   BUN 12   CREATININE 0.7   CALCIUM 8.7   PROT 8.7*   ALBUMIN 2.2*   BILITOT 0.5   ALKPHOS 106   AST 78*   ALT 54*   ANIONGAP 10         Significant Imaging: I have reviewed all pertinent imaging results/findings within the past 24 hours.    High Risk Conditions: Patient is currently receiving parenteral controlled substances: Dilaudid   Assessment/Plan:     * Critical limb ischemia of left lower extremity  Left popliteal artery occlusion  Stage IV adenocarcinoma left lung  Malignant pleural effusion  Large left pneumothorax  Peripheral arterial disease  History of Ischemic bowel disease  History of GI bleed  DVT/PE not on anticoagulation, s/p IVC filter  Microcytic anemia  Thrombocytosis  Hyponatremia  Elevated transaminase levels  Moderate  protein malnutrition  Tobacco dependence  Marijuana use  Pt confirmed that he would not want chemotherapy for metastatic lung cancer nor would he want aggressive treatment of left lower extremity arterial occlusion.  Pt informed of left pneumothorax and declines chest tube.  Discussed terminal prognosis, pt expressed understanding, and wishes to proceed with comfort measures only.  Palliative medications entered.  See advance care planning below.    Comfort measures only status  Advance Care Planning    Date: 12/03/2023  Code Status  In light of the patients advanced and life limiting illness,I engaged the the patient in a voluntary conversation about the patient's preferences for care  at the very end of life. The patient wishes to have a natural, peaceful death.  Along those lines, the patient wishes to proceed with comfort measures only and does not wish to have CPR or other invasive treatments performed when his heart and/or breathing stops. I communicated to the patient that a DNR/comfort measures only order would be placed in his medical record to reflect this preference.  I spent a total of 35 minutes engaging the patient in this advance care planning discussion.      VTE Risk Mitigation (From admission, onward)      None                            Justice Brennan III, MD  Department of Hospital Medicine  [facility] - Emergency Dept

## 2023-12-03 NOTE — HPI
Eric Soliman is a 63 y.o. man with history of stage IV lung cancer recently discharged to SNF for hospice as he has stated he would not want any aggressive interventions for his cancer care. During his last hospitalization he had a left popliteal artery occlusion identified and at the time his foot was causing him to have pain which was being managed medically. He re-presents to the emergency department for evaluation and management of progressive necrosis of the left lower leg and coinciding pain.     He has also been removed from anticoagulation given the recent finding of GI bleed with ulceration of ascending colon identified.     After discussing with the patient, he does not want any aggressive interventions performed, he would not want an amputation, and he is not a surgical candidate for revascularization.

## 2023-12-03 NOTE — ED PROVIDER NOTES
Encounter Date: 12/3/2023       History     Chief Complaint   Patient presents with    Leg Swelling     Pt arrives from [facility] for evaluation of pain, redness and swelling to the left lower extremity (Toes to knee). Pt has terminal cancer and is a DNR per [facility] staff.      Pt, Eric Soliman, is a 64 yo M with pmh of htn, lung nodules diagnosed with stage IV adenocarcinoma via large pleural effusion, PAD s/p thrombectomy & PTA, epilepsy, and tobacco and marijuana use that presents to the emergency department for left lower extremity swelling and pain.  Patient states that he is noticed increased redness and swelling over the past 3 days.  States that for the last couple of weeks he has been wearing socks and has a looked at his feet.  States that the pain became unbearable today and that is why he called 911.  He denies fevers, denies any shortness a breath, he does admit having a chronic cough.  He states that he knows he is supposed to be on hospice but is unsure what that actually means.  States that he knows that he has a terminal diagnosis from his cancer and that that is why he was discharged to the [facility].        Review of patient's allergies indicates:  No Known Allergies  Past Medical History:   Diagnosis Date    DVT (deep venous thrombosis)     GI bleeding     Ischemic bowel disease     Malignant pleural effusion     PAD (peripheral artery disease)     Pulmonary embolism     Stage IV adenocarcinoma of lung, left      Past Surgical History:   Procedure Laterality Date    ANGIOGRAPHY OF LOWER EXTREMITY Left 10/7/2022    Procedure: Angiogram Extremity Unilateral;  Surgeon: Charles Patten III, MD;  Location: [facility] CATH LAB;  Service: Cardiology;  Laterality: Left;    AORTOGRAPHY WITH SERIALOGRAPHY Bilateral 10/7/2022    Procedure: AORTOGRAM, WITH SERIALOGRAPHY;  Surgeon: Charles Patten III, MD;  Location: [facility] CATH LAB;  Service: Cardiology;  Laterality:  Bilateral;    AORTOGRAPHY WITH SERIALOGRAPHY N/A 4/4/2023    Procedure: AORTOGRAM, WITH SERIALOGRAPHY;  Surgeon: Royce Yi MD;  Location: [facility] CATH LAB/EP;  Service: Cardiology;  Laterality: N/A;    ATHERECTOMY, PERIPHERAL BLOOD VESSEL Left 4/4/2023    Procedure: ATHERECTOMY, PERIPHERAL BLOOD VESSEL;  Surgeon: Royce Yi MD;  Location: [facility] CATH LAB/EP;  Service: Cardiology;  Laterality: Left;    CLOSURE DEVICE  4/4/2023    Procedure: Placement of Closure Device;  Surgeon: Royce Yi MD;  Location: [facility] CATH LAB/EP;  Service: Cardiology;;    COLONOSCOPY N/A 11/9/2023    Procedure: COLONOSCOPY;  Surgeon: Cedrick Holcomb MD;  Location: [facility] ENDO;  Service: Endoscopy;  Laterality: N/A;    FILTER PROTECTION, PERIPHERAL  4/4/2023    Procedure: Filter Protection, Peripheral;  Surgeon: Royce Yi MD;  Location: [facility] CATH LAB/EP;  Service: Cardiology;;    IVUS (INTRAVASCULAR ULTRASOUND) Left 4/4/2023    Procedure: ULTRASOUND, INTRAVASCULAR;  Surgeon: Royce Yi MD;  Location: [facility] CATH LAB/EP;  Service: Cardiology;  Laterality: Left;    PTA, ILIAC ARTERY Left 4/4/2023    Procedure: PTA, Iliac Artery;  Surgeon: Royce Yi MD;  Location: [facility] CATH LAB/EP;  Service: Cardiology;  Laterality: Left;    THROMBECTOMY  4/4/2023    Procedure: THROMBECTOMY;  Surgeon: Royce Yi MD;  Location: [facility] CATH LAB/EP;  Service: Cardiology;;     Family History   Problem Relation Age of Onset    No Known Problems Mother     Heart disease Father      Social History     Tobacco Use    Smoking status: Every Day     Current packs/day: 2.00     Average packs/day: 2.0 packs/day for 48.9 years (97.8 ttl pk-yrs)     Types: Cigarettes     Start date: 1975    Smokeless tobacco: Never    Tobacco comments:     Pt is a 2 pk/day cigarette smoker x 49 yrs. 21 mg nicotine patch ordered Q day, and order requested upon discharge for 21 mg nicotine patch Q day. Pt states ready to quit. Ambulatory  referral to Smoking Cessation clinic.   Substance Use Topics    Alcohol use: Not Currently     Comment: rarely 1 beer    Drug use: Yes     Types: Marijuana     Review of Systems  ROS negative except as noted in HPI    Physical Exam     Initial Vitals   BP Pulse Resp Temp SpO2   12/03/23 1021 12/03/23 1021 12/03/23 1021 12/03/23 1111 12/03/23 1021   136/76 92 18 97.3 °F (36.3 °C) 98 %      MAP       --                Physical Exam    Nursing note and vitals reviewed.  Constitutional: He is cooperative. He appears ill.   HENT:   Head: Normocephalic.   Right Ear: External ear normal.   Left Ear: External ear normal.   Nose: Nose normal.   Mouth/Throat: Oropharynx is clear and moist.   Eyes: Conjunctivae are normal. Right eye exhibits no discharge. Left eye exhibits no discharge. No scleral icterus.   Neck: No JVD present.   Cardiovascular:  Normal rate, regular rhythm and normal heart sounds.           Pulmonary/Chest: Effort normal. No accessory muscle usage. No respiratory distress. He has decreased breath sounds (Left lower quadrant).   Abdominal: Abdomen is soft. He exhibits no distension. There is no abdominal tenderness.   Musculoskeletal:         General: Tenderness and edema (Necrotic tissue as well as erythema with desquamation of the left lower extremity that is well demarcated and stents to mid tibia) present.     Neurological: He is alert and oriented to person, place, and time. He has normal strength. GCS score is 15. GCS eye subscore is 4. GCS verbal subscore is 5. GCS motor subscore is 6.   Skin: Skin is warm. Capillary refill takes less than 2 seconds.   Psychiatric: He has a normal mood and affect.                 ED Course   Procedures  Labs Reviewed   CBC W/ AUTO DIFFERENTIAL - Abnormal; Notable for the following components:       Result Value    RBC 2.92 (*)     Hemoglobin 7.3 (*)     Hematocrit 23.1 (*)     MCV 79 (*)     MCH 25.0 (*)     MCHC 31.6 (*)     RDW 17.2 (*)     Platelets 484 (*)      MPV 8.9 (*)     Immature Granulocytes 0.8 (*)     Immature Grans (Abs) 0.09 (*)     All other components within normal limits   COMPREHENSIVE METABOLIC PANEL - Abnormal; Notable for the following components:    Sodium 131 (*)     Total Protein 8.7 (*)     Albumin 2.2 (*)     AST 78 (*)     ALT 54 (*)     All other components within normal limits   ISTAT LACTATE - Abnormal; Notable for the following components:    POC Lactate 2.72 (*)     All other components within normal limits   ISTAT PROCEDURE - Abnormal; Notable for the following components:    POC Sodium 132 (*)     POC Chloride 94 (*)     POC Hematocrit 25 (*)     All other components within normal limits   CULTURE, BLOOD   CULTURE, BLOOD   URINALYSIS, REFLEX TO URINE CULTURE    Narrative:     Specimen Source->Urine   MAGNESIUM   ISTAT LACTATE   ISTAT CHEM8        ECG Results              EKG 12-lead (Final result)  Result time 12/03/23 13:36:27      Final result by Interface, Lab In [facility] (12/03/23 13:36:27)                   Narrative:    Test Reason : R53.1,    Vent. Rate : 091 BPM     Atrial Rate : 091 BPM     P-R Int : 138 ms          QRS Dur : 074 ms      QT Int : 354 ms       P-R-T Axes : 075 055 070 degrees     QTc Int : 435 ms    Normal sinus rhythm  Normal ECG  When compared with ECG of 07-NOV-2023 09:42,  ST no longer depressed in Inferior leads  Nonspecific T wave abnormality no longer evident in Inferior leads  T wave amplitude has increased in Anterior leads  Confirmed by VENESSA PATTERSON MD ([id number]) on 12/3/2023 1:36:19 PM    Referred By: [email or web address]   SELF           Confirmed By:VENESSA PATTERSON MD                                  Imaging Results               X-Ray Chest AP Portable (Final result)  Result time 12/03/23 13:30:23      Final result by Larry Farah MD (12/03/23 13:30:23)                   Impression:      As above.    This report was flagged in Epic as abnormal.    COMMUNICATION  This critical result was discovered/received at 13 1 5  hours.  The critical information above was relayed directly by me by telephone to Dr. Dwyer in the emergency department on 12/03/2023 at 13:20 hours.      Electronically signed by: Larry Farah MD  Date:    12/03/2023  Time:    13:30               Narrative:    EXAMINATION:  XR CHEST AP PORTABLE    CLINICAL HISTORY:  Sepsis;    TECHNIQUE:  Single frontal view of the chest was performed.    COMPARISON:  Chest radiograph most recent 11/14/2023, CT thorax 11/07/2023    FINDINGS:  Previous left lung base pigtail pleural drainage catheter has been removed.  Interval enlargement of previous left-sided pneumothorax now overall larger in volume occupying approximately 80% volume.  There is increased further collapse of majority of the left lung.  The cardiomediastinal silhouette is slightly more shifted towards the right from prior concerning for tension pneumothorax configuration.  Mild platelike scarring versus atelectasis at the right lung base.  No sizable pleural effusion on either side.  No right pneumothorax.  Right sided mediastinal and hilar contours and right aspect of the heart otherwise unchanged.  No acute osseous process seen.                                       Medications   LORazepam injection 1 mg (has no administration in time range)   polyethylene glycol packet 17 g (has no administration in time range)   senna-docusate 8.6-50 mg per tablet 2 tablet (has no administration in time range)   ondansetron injection 4 mg (has no administration in time range)   morphine injection 2 mg (has no administration in time range)   naloxone 0.4 mg/mL injection 0.02 mg (has no administration in time range)   HYDROmorphone PCA syringe 6 mg/30 mL (0.2 mg/mL) NS ( Intravenous Hold 12/3/23 1545)   morphine injection 6 mg (6 mg Intravenous Given 12/3/23 1117)   vancomycin (VANCOCIN) 1,000 mg in dextrose 5 % (D5W) 250 mL IVPB (Vial-Mate) (0 mg Intravenous Stopped 12/3/23 1437)   piperacillin-tazobactam (ZOSYN) 4.5 g in  dextrose 5 % in water (D5W) 100 mL IVPB (MB+) (0 g Intravenous Stopped 12/3/23 1231)   HYDROmorphone injection 2 mg (2 mg Intravenous Given 12/3/23 1238)     Medical Decision Making  Pt, Eric Soliman, is a 62 yo M with pmh of htn, lung nodules diagnosed with stage IV adenocarcinoma via large pleural effusion, PAD s/p thrombectomy & PTA, epilepsy, and tobacco and marijuana use that presents to the emergency department for left lower extremity swelling and pain.    On initial evaluation patient was in no acute distress however did express pain secondary to his lower extremity and vitals were within normal limits.  He was speaking in full sentences and in no respiratory distress.    Differential: Considering sepsis secondary to lower extremity soft tissue infection, ischemic limb.     Workup to include septic workup with lactic acid, basic labs, blood cultures, chest x-ray and urinalysis additionally we will obtain EKG to evaluate for and/or in dysfunction given the extent of his lower extremity physical exam.    Discussed with vascular surgery given her physical exam.  Patient did not not a candidate for surgical intervention given his history of stage IV lung cancer.  They did recommend admission for pain control given the extent of his ischemia, and the likelihood for progression.    Workup additionally demonstrated concern for a tension pneumothorax, when compared to previous x-rays he did have pneumothorax residual even with a chest tube in place after the drain his pleural effusion which was a malignant pleural effusion.  Patient does not have clinical signs of tension physiology given normal blood pressure and no respiratory distress.  But his x-ray was concerning.  Discussed with patient at bedside as well and at this time he was willing to place oxygen on him however does not want any invasive procedures such as chest tube or needles.    We will admit the patient for further pain control, and palliative care  consult.  Patient is likely going to need hospice for his knees end-stage conditions as discussed.      Amount and/or Complexity of Data Reviewed  Labs: ordered. Decision-making details documented in ED Course.  Radiology: ordered.    Risk  Prescription drug management.  Decision regarding hospitalization.              Attending Attestation:   Physician Attestation Statement for Resident:  As the supervising MD   Physician Attestation Statement: I have personally seen and examined this patient.   I agree with the above history.  - with the following exceptions: This is a 63-year-old male who has multiple medical comorbidities including hypertension, lung cancer, and peripheral artery disease amidst others.  He has been admitted for his lung cancer and large pleural effusion and at that time there was discussion of hospice.  During that admission, which was a 10 day stay with a discharge in the middle of November, there were multiple other complicating factors.  The patient was having problems with his left leg at that time, pertinent to today's presentation.  It appears that his most recent studies are on November 11th.  I have reviewed and interpreted the ultrasound studies and radiology has as well.  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  The patient does have an indwelling IVC filter. From what I can glean, he is not taking anticoagulation at this time and also had many months of non-adherence of this prior to his last admission.   He is back today stating that was living in his car prior, but now at [facility]. He is having escalating pain and skin coloration changes in his left lower leg. He is concerned about the leg and back again for reevaluation. He does not have any fever to his knowledge. In regard to his lung ca, he is not having worsening chest pain or SOB, although he is quite  distracted by the pain in the left leg at this time.      As the supervising MD I agree with the above PE.   - with the following exceptions: VS upon arrival: 136/76; 92; 18; 98%.   Consititutional: Pt is awake, alert, and answering questions and following commands. Does not appear to be in any giuseppe distress.  HEENT: PERRL; EOMI; nares patent; op clear with poor dentition; mmm without lesions.  Neck: Supple with good ROM.  CV: Normal rate; regular rhythm; no mrg. Heart sounds normal. Left leg as denoted in pictures; well demarcated area of violaceous skin change and swelling from just distal to the knee down that is circumferential. The foot itself has some desquamating areas and actually looks more hyperemic while the more proximal aspect of the lower leg looks more violaceous and has some patches of eschar.   Respiratory: Decreased breath sounds at left base with dullness to percussion about 1/3 of the way up the lung field. No respiratory distress however.   GI: Abdomen soft, NTND. No rebound. No guarding. BS normal.  MSK: As above in regard to the left leg.   Neuro: Patient will not move the distal left leg for examination due to pain at this time. Otherwise the patient is 5/5 throughout the bilateral upper extremities and right lower extremity.   Skin: As above for the left leg.       As the supervising MD I agree with the above treatment, course, plan, and disposition.   - with the following exceptions: Differential for this patient is very concerning given his known arterial occlusion to the left leg as well as DVT. Seems to be at the level of the popliteal artery / vein which correlates with exam. Differential also includes superinfection of this poorly perfused area of the leg. Thus during our initial care of the patient we have ordered sepsis labs and broad spectrum abx following cultures. Patient does not have giuseppe sepsis at this time by vital signs/exam, however superinfection was of great concern,  hence the above workup. Also, given our concern for vascular emergency that was limb threatening, vascular surgery was consulted.   Complicating all of the above is that it appears, upon my review of the sources of information we have, that the patient is supposed to be on hospice. At this point, I have had a detailed discussion with the patient regarding his wishes and he wishes for us to perform the above at this juncture.  But this will be a very complicated discussion as he is not a good surgical candidate likely, but has a significant amount of pain and likely a superinfected left lower extremity.  There will need to be a lot of discussion regarding his treatment plan moving forward.   Additionally on the differential we needed to assess how he was doing from a pulmonary standpoint given his previous large left pleural effusion from the underlying lung cancer, and thus CXR was ordered as well. So workup began with labs, ECG, CXR, and initial orders for CT evaluation of the lower extremity. We are also treating him with parenteral narcotics as needed.     The patient's EKG, independently reviewed and interpreted by me, reveals sinus rhythm at a rate in the 90s with nonspecific ST and T-wave changes that are not concerning for acute ischemia or infarction.  White blood cell count is 11.04.  Hematocrit is 23.1 which was last 26.8 in mid November.  The patient has a mild transaminitis but otherwise unremarkable comprehensive metabolic panel.  The patient's initial lactate was 2.72.  At this point, vascular surgery had evaluated the patient and discussed the case with us here in the emergency department.  The patient is not a surgical candidate and they recommend admission for pain control.  Additionally, the patient's chest x-ray returned, independently reviewed and interpreted by me and interpreted by Radiology, to show reaccumulation of a recent pneumothorax the patient had.  The pigtail catheter that was  previously in is now removed and now the patient has almost an 80% pneumothorax on that left side with some mediastinal deviation.  Interestingly, he has a normal oxygen saturation at this time sitting and talking to us.  When he sleeps it drifts down into the high 80s but otherwise is remaining in normal range.  He also has no other hemodynamic instability at this time.  He is not tachycardic.  He is not hypotensive.  Obviously, these were 2 very concerning critical diagnoses with an ischemic limb as well as a tension appearing pneumothorax.  Dr. Dwyer and I sat down at the bedside and had a long discussion with the patient regarding these diagnoses and his wishes.  He understands the severity of these diagnoses and understands that without intervention, he will die.  From our discussions here in the emergency department, he has expressed that he does not wish to have heroic interventions or invasive interventions performed.  He does not wish to be resuscitated or intubated.  He also does not wish to have a chest tube placed again or a catheter placed in that side of the chest again.  He did opt to wear supplemental oxygen after we informed him of this diagnosis and thus we did place a non-rebreather.  He did this as he wants to have a bit of time to be able to disclose what is occurring to his mother and a friend who both live out of state.  At this point I have engaged the  to help us with this.  We have also admitted the patient so that we can involve palliative care, sort out his final wishes, and provide pain control for the patient while we are determining detail disposition plans.  He is in critical condition.  ED Diagnosis:  1. Acute tension pneumothorax.  2. Acute ischemic left leg.   3. Above diagnoses complicated by metastatic lung cancer.      I have reviewed and agree with the residents interpretation of the following: lab data, x-rays, CT scans and EKG.  I have reviewed the following:  old records at this facility and old EKGs.        Attending Critical Care:   Critical Care Times:   Direct Patient Care (initial evaluation, reassessments, and time considering the case)................................................................32 minutes.   Additional History from reviewing old medical records or taking additional history from the family, EMS, PCP, etc.......................6 minutes.   Ordering, Reviewing, and Interpreting Diagnostic Studies...............................................................................................................7 minutes.   Documentation..................................................................................................................................................................................5 minutes.   Consultation with other Physicians. .................................................................................................................................................6 minutes.   ==============================================================  Total Critical Care Time - exclusive of procedural time: 56 minutes.  ==============================================================  Critical care reasons: tension ptx; ischemic limb.   Critical care was time spent personally by me on the following activities: obtaining history from patient or relative, examination of patient, review of old charts, ordering lab, x-rays, and/or EKG, development of treatment plan with patient or relative, ordering and performing treatments and interventions, evaluation of patient's response to treatment, discussion with consultants, re-evaluation of patient's conition and end of life discussions.   Critical Care Condition: life-threatening         ED Course as of 12/03/23 1607   Sun Dec 03, 2023   1107 ISTAT Lactate(!)  Elevated, which is consistent with foot ischemia   [TK]      ED Course User Index  [TK] Kat Dwyer DO                            Clinical Impression:  Final diagnoses:  [A41.9] Sepsis  [R53.1] Weakness (Primary)  [I73.9] Peripheral arterial disease  [J93.12] Secondary spontaneous pneumothorax          ED Disposition Condition    Admit                 Kat Dwyer DO  Resident  12/03/23 [id number]       Carolyn Reynoso MD  12/05/23 [id number]

## 2023-12-03 NOTE — CONSULTS
[facility] - Emergency Dept  Vascular Surgery  Consult Note    Inpatient consult to Vascular Surgery  Consult performed by: Silvio Mensah MD  Consult ordered by: Kat Dwyer DO        Subjective:     Chief Complaint/Reason for Admission: left leg ischemia    History of Present Illness: Eric Soliman is a 63 y.o. man with history of stage IV lung cancer recently discharged to SNF for hospice as he has stated he would not want any aggressive interventions for his cancer care. During his last hospitalization he had a left popliteal artery occlusion identified and at the time his foot was causing him to have pain which was being managed medically. He re-presents to the emergency department for evaluation and management of progressive necrosis of the left lower leg and coinciding pain.     He has also been removed from anticoagulation given the recent finding of GI bleed with ulceration of ascending colon identified.     After discussing with the patient, he does not want any aggressive interventions performed, he would not want an amputation, and he is not a surgical candidate for revascularization.     (Not in a hospital admission)      Review of patient's allergies indicates:  No Known Allergies    Past Medical History:   Diagnosis Date    Anticoagulant long-term use     DVT (deep venous thrombosis)     Seizures     epilepsy controlled w/ marijuana     Past Surgical History:   Procedure Laterality Date    ANGIOGRAPHY OF LOWER EXTREMITY Left 10/7/2022    Procedure: Angiogram Extremity Unilateral;  Surgeon: Charles Patten III, MD;  Location: [facility] CATH LAB;  Service: Cardiology;  Laterality: Left;    AORTOGRAPHY WITH SERIALOGRAPHY Bilateral 10/7/2022    Procedure: AORTOGRAM, WITH SERIALOGRAPHY;  Surgeon: Charles Patten III, MD;  Location: [facility] CATH LAB;  Service: Cardiology;  Laterality: Bilateral;    AORTOGRAPHY WITH SERIALOGRAPHY N/A 4/4/2023    Procedure: AORTOGRAM, WITH SERIALOGRAPHY;  Surgeon:  Royce Yi MD;  Location: [facility] CATH LAB/EP;  Service: Cardiology;  Laterality: N/A;    ATHERECTOMY, PERIPHERAL BLOOD VESSEL Left 4/4/2023    Procedure: ATHERECTOMY, PERIPHERAL BLOOD VESSEL;  Surgeon: Royce Yi MD;  Location: [facility] CATH LAB/EP;  Service: Cardiology;  Laterality: Left;    CLOSURE DEVICE  4/4/2023    Procedure: Placement of Closure Device;  Surgeon: Royce Yi MD;  Location: [facility] CATH LAB/EP;  Service: Cardiology;;    COLONOSCOPY N/A 11/9/2023    Procedure: COLONOSCOPY;  Surgeon: Cedrick Holcomb MD;  Location: [facility] ENDO;  Service: Endoscopy;  Laterality: N/A;    FILTER PROTECTION, PERIPHERAL  4/4/2023    Procedure: Filter Protection, Peripheral;  Surgeon: Royce Yi MD;  Location: [facility] CATH LAB/EP;  Service: Cardiology;;    IVUS (INTRAVASCULAR ULTRASOUND) Left 4/4/2023    Procedure: ULTRASOUND, INTRAVASCULAR;  Surgeon: Royce Yi MD;  Location: [facility] CATH LAB/EP;  Service: Cardiology;  Laterality: Left;    PTA, ILIAC ARTERY Left 4/4/2023    Procedure: PTA, Iliac Artery;  Surgeon: Royce Yi MD;  Location: [facility] CATH LAB/EP;  Service: Cardiology;  Laterality: Left;    THROMBECTOMY  4/4/2023    Procedure: THROMBECTOMY;  Surgeon: Royce Yi MD;  Location: [facility] CATH LAB/EP;  Service: Cardiology;;     Family History       Problem Relation (Age of Onset)    Heart disease Father    No Known Problems Mother          Tobacco Use    Smoking status: Every Day     Current packs/day: 2.00     Average packs/day: 2.0 packs/day for 48.9 years (97.8 ttl pk-yrs)     Types: Cigarettes     Start date: 1975    Smokeless tobacco: Never    Tobacco comments:     Pt is a 2 pk/day cigarette smoker x 49 yrs. 21 mg nicotine patch ordered Q day, and order requested upon discharge for 21 mg nicotine patch Q day. Pt states ready to quit. Ambulatory referral to Smoking Cessation clinic.   Substance and Sexual Activity    Alcohol use: Not Currently     Comment: rarely 1 beer    Drug  use: Yes     Types: Marijuana    Sexual activity: Not on file     Review of Systems   Constitutional:  Negative for chills and fever.   Respiratory:  Negative for chest tightness and shortness of breath.    Cardiovascular:  Positive for leg swelling.   Gastrointestinal:  Negative for abdominal pain.   Musculoskeletal:  Positive for myalgias.   Skin:  Positive for color change and wound.   Neurological:  Positive for weakness and numbness.   Psychiatric/Behavioral:  Negative for self-injury and suicidal ideas.      Objective:     Vital Signs (Most Recent):  Temp: 97.3 °F (36.3 °C) (12/03/23 1111)  Pulse: 92 (12/03/23 1200)  Resp: (!) 22 (12/03/23 1200)  BP: 128/69 (12/03/23 1200)  SpO2: (!) 94 % (12/03/23 1200) Vital Signs (24h Range):  Temp:  [97.3 °F (36.3 °C)] 97.3 °F (36.3 °C)  Pulse:  [92] 92  Resp:  [18-22] 22  SpO2:  [94 %-98 %] 94 %  BP: (128-136)/(69-76) 128/69        There is no height or weight on file to calculate BMI.      Physical Exam  Constitutional:       Appearance: Normal appearance.   HENT:      Head: Normocephalic and atraumatic.      Mouth/Throat:      Mouth: Mucous membranes are moist.   Eyes:      Pupils: Pupils are equal, round, and reactive to light.   Cardiovascular:      Rate and Rhythm: Normal rate and regular rhythm.   Pulmonary:      Comments: Normal breathing effort with supplemental oxygen  Abdominal:      Palpations: Abdomen is soft.   Skin:     Capillary Refill: Capillary refill takes more than 3 seconds.      Comments: Sloughing skin over the dorsum of the left foot, echymosis and cyanosis of lower leg, tender to palpation, associated erythema   Neurological:      Mental Status: He is alert.      Motor: Weakness present.          Significant Labs:  Recent Lab Results         12/03/23  1116   12/03/23  1110   12/03/23  1049        Allens Test     N/A       Baso #   0.02         Basophil %   0.2         Site     Other       Differential Method   Automated         Eos #   0.0          Eosinophil %   0.3         Gran # (ANC)   7.1         Gran %   64.0         Hematocrit   23.1         Hemoglobin   7.3         Immature Grans (Abs)   0.09  Comment: Mild elevation in immature granulocytes is non specific and   can be seen in a variety of conditions including stress response,   acute inflammation, trauma and pregnancy. Correlation with other   laboratory and clinical findings is essential.           Immature Granulocytes   0.8         Lymph #   3.0         Lymph %   26.7         MCH   25.0         MCHC   31.6         MCV   79         Mono #   0.9         Mono %   8.0         MPV   8.9         nRBC   0         Platelet Count   484         POC BUN 11           POC Chloride 94           POC Creatinine 0.6           POC Glucose 83           POC Hematocrit 25           POC Ionized Calcium 1.12           POC Lactate     2.72       Potassium, Blood Gas 4.0           Sodium, Blood Gas 132           POC TCO2 (MEASURED) 26           RBC   2.92         RDW   17.2         Sample EMELYN     VENOUS       WBC   11.04                 Significant Diagnostics:  I have reviewed all pertinent imaging results/findings within the past 24 hours.  Assessment/Plan:     Critical limb ischemia of both lower extremities  Eric Soliman is a 63 y.o. man with history of stage IV lung cancer recently discharged to SNF for hospice as he has stated he would not want any aggressive interventions for his cancer care.     On discussing the patients overall circumstance and hospice care, he would like to achieve pain control but is not interested in undergoing an amputation. The patient is not a revascularization candidate.     Recommendations:  Palliative care consult  Pain control with IV analgesics  No further imaging or interventions from vascular surgery perspective  Would defer care that does not align with patients goals of care        Thank you for your consult. I will sign off. Please contact us if you have any additional  questions.    CELESTE DENISE MD  Vascular Surgery  [facility] - Emergency Dept

## 2023-12-03 NOTE — ED TRIAGE NOTES
Patient presents to the ED via EMS from [facility] for further evaluation of pain to left leg and shortness of breath. Left leg maroon. Endorses 10 out of 10 pain. Patient currently receiving hospice  treatment for lung cancer.

## 2023-12-03 NOTE — PLAN OF CARE
Initial Discharge Planning Case Management Assessment Note:    Patient admitted on 12-3-23  Chart reviewed today  Care plan discussed with treatment team,    attending Dr Reynoso  PCP updated in Epic: at [facility]    Current dispo: pending.  Hospice setting still in process  Settings that are possible are:   [address]  In [facility] hospice  Return to [facility] with hospice actively following and completed consents    \On call [facility] team on route here, await for the informed hospice visit to be completed    Met with Mr Soliman to review discharge recommendation of hospice and he is agreeable to plan    Patient/family provided list of facilities in-network with patient's payor plan. Providers that are owned, operated, or affiliated with [facility] are included on the list.     Notified that referral sent to below listed facilities from in-network list based on proximity to home/family support:   [facility]  2. [facility]  3. [facility] (at [facility])    Patient/family instructed to identify preference.    Preferred Facility: (if more than 1, listed in order of descending preference)  [facility]    If an additional preferred facility not listed above is identified, additional referral to be sent. If above facilities unable to accept, will send additional referrals to in-network providers.         Addendum  12-3-23 at 6:30 p  [facility] consents completed  Updated OBS team and Dr Brennan

## 2023-12-03 NOTE — SUBJECTIVE & OBJECTIVE
Past Medical History:   Diagnosis Date    DVT (deep venous thrombosis)     GI bleeding     Ischemic bowel disease     Malignant pleural effusion     PAD (peripheral artery disease)     Pulmonary embolism     Stage IV adenocarcinoma of lung, left        Past Surgical History:   Procedure Laterality Date    ANGIOGRAPHY OF LOWER EXTREMITY Left 10/7/2022    Procedure: Angiogram Extremity Unilateral;  Surgeon: Charles Patten III, MD;  Location: [facility] CATH LAB;  Service: Cardiology;  Laterality: Left;    AORTOGRAPHY WITH SERIALOGRAPHY Bilateral 10/7/2022    Procedure: AORTOGRAM, WITH SERIALOGRAPHY;  Surgeon: Charles Patten III, MD;  Location: [facility] CATH LAB;  Service: Cardiology;  Laterality: Bilateral;    AORTOGRAPHY WITH SERIALOGRAPHY N/A 4/4/2023    Procedure: AORTOGRAM, WITH SERIALOGRAPHY;  Surgeon: Royce Yi MD;  Location: [facility] CATH LAB/EP;  Service: Cardiology;  Laterality: N/A;    ATHERECTOMY, PERIPHERAL BLOOD VESSEL Left 4/4/2023    Procedure: ATHERECTOMY, PERIPHERAL BLOOD VESSEL;  Surgeon: Royce Yi MD;  Location: [facility] CATH LAB/EP;  Service: Cardiology;  Laterality: Left;    CLOSURE DEVICE  4/4/2023    Procedure: Placement of Closure Device;  Surgeon: Royce Yi MD;  Location: [facility] CATH LAB/EP;  Service: Cardiology;;    COLONOSCOPY N/A 11/9/2023    Procedure: COLONOSCOPY;  Surgeon: Cedrick Holcomb MD;  Location: [facility] ENDO;  Service: Endoscopy;  Laterality: N/A;    FILTER PROTECTION, PERIPHERAL  4/4/2023    Procedure: Filter Protection, Peripheral;  Surgeon: Royce Yi MD;  Location: [facility] CATH LAB/EP;  Service: Cardiology;;    IVUS (INTRAVASCULAR ULTRASOUND) Left 4/4/2023    Procedure: ULTRASOUND, INTRAVASCULAR;  Surgeon: Royce Yi MD;  Location: [facility] CATH LAB/EP;  Service: Cardiology;  Laterality: Left;    PTA, ILIAC ARTERY Left 4/4/2023    Procedure: PTA, Iliac Artery;  Surgeon: Royce Yi MD;  Location: [facility] CATH LAB/EP;  Service: Cardiology;  Laterality:  Left;    THROMBECTOMY  4/4/2023    Procedure: THROMBECTOMY;  Surgeon: Royce Yi MD;  Location: [facility] CATH LAB/EP;  Service: Cardiology;;       Review of patient's allergies indicates:  No Known Allergies    Current Facility-Administered Medications on File Prior to Encounter   Medication    iodixanoL (VISIPAQUE 320) injection     Current Outpatient Medications on File Prior to Encounter   Medication Sig    acetaminophen (TYLENOL) 325 MG tablet Take 2 tablets (650 mg total) by mouth every 6 (six) hours as needed for Pain.    gabapentin (NEURONTIN) 300 MG capsule Take 2 capsules (600 mg total) by mouth 3 (three) times daily.    HYDROmorphone (DILAUDID) 2 MG tablet Take 1 tablet (2 mg total) by mouth every 3 (three) hours as needed for Pain.    LIDOcaine (LIDODERM) 5 % Place 1 patch onto the skin daily as needed (back pain). Remove & Discard patch within 12 hours or as directed by MD    melatonin (MELATIN) 3 mg tablet Take 3 tablets (9 mg total) by mouth nightly as needed for Insomnia.    morphine (MS CONTIN) 15 MG 12 hr tablet Take 1 tablet (15 mg total) by mouth every 12 (twelve) hours.    nicotine (NICODERM CQ) 21 mg/24 hr Place 1 patch onto the skin once daily.    pantoprazole (PROTONIX) 40 MG tablet Take 1 tablet (40 mg total) by mouth 2 (two) times daily.    polyethylene glycol (GLYCOLAX) 17 gram PwPk Take 17 g by mouth 2 (two) times daily.    senna-docusate 8.6-50 mg (PERICOLACE) 8.6-50 mg per tablet Take 1 tablet by mouth once daily.     Family History       Problem Relation (Age of Onset)    Heart disease Father    No Known Problems Mother          Tobacco Use    Smoking status: Every Day     Current packs/day: 2.00     Average packs/day: 2.0 packs/day for 48.9 years (97.8 ttl pk-yrs)     Types: Cigarettes     Start date: 1975    Smokeless tobacco: Never    Tobacco comments:     Pt is a 2 pk/day cigarette smoker x 49 yrs. 21 mg nicotine patch ordered Q day, and order requested upon discharge for 21 mg  nicotine patch Q day. Pt states ready to quit. Ambulatory referral to Smoking Cessation clinic.   Substance and Sexual Activity    Alcohol use: Not Currently     Comment: rarely 1 beer    Drug use: Yes     Types: Marijuana    Sexual activity: Not on file     Review of Systems: Per HPI  Objective:     Vital Signs (Most Recent):  Temp: 97.9 °F (36.6 °C) (12/03/23 1400)  Pulse: 97 (12/03/23 1400)  Resp: 20 (12/03/23 1400)  BP: 118/68 (12/03/23 1400)  SpO2: 100 % (12/03/23 1400) Vital Signs (24h Range):  Temp:  [97.3 °F (36.3 °C)-97.9 °F (36.6 °C)] 97.9 °F (36.6 °C)  Pulse:  [92-99] 97  Resp:  [18-24] 20  SpO2:  [94 %-100 %] 100 %  BP: (118-136)/(66-76) 118/68        There is no height or weight on file to calculate BMI.     Physical Exam  Vitals reviewed.   Constitutional:       General: He is not in acute distress.     Appearance: He is ill-appearing (Chronically).   HENT:      Head: Normocephalic and atraumatic.      Mouth/Throat:      Mouth: Mucous membranes are moist.   Eyes:      General: No scleral icterus.     Conjunctiva/sclera: Conjunctivae normal.   Cardiovascular:      Rate and Rhythm: Normal rate and regular rhythm.      Heart sounds: Normal heart sounds.   Pulmonary:      Effort: Pulmonary effort is normal.      Breath sounds: No wheezing.      Comments: Diminished air movement left lung  Abdominal:      General: Bowel sounds are normal. There is no distension.      Palpations: Abdomen is soft.      Tenderness: There is no abdominal tenderness. There is no guarding or rebound.   Skin:     General: Skin is warm and dry.      Capillary Refill: Capillary refill takes less than 2 seconds.      Findings: Erythema and lesion present.      Comments: Left foot sloughing skin, exquisitely tender to palpation, erythema and cyanosis of left lower leg (see media)   Neurological:      General: No focal deficit present.      Mental Status: He is alert and oriented to person, place, and time.      Cranial Nerves: No  cranial nerve deficit.   Psychiatric:         Mood and Affect: Mood normal.         Behavior: Behavior normal.                          Significant Labs: All pertinent labs within the past 24 hours have been reviewed.  CBC:   Recent Labs   Lab 12/03/23  1110 12/03/23  1116   WBC 11.04  --    HGB 7.3*  --    HCT 23.1* 25*   *  --      CMP:   Recent Labs   Lab 12/03/23  1110   *   K 4.1   CL 98   CO2 23   GLU 75   BUN 12   CREATININE 0.7   CALCIUM 8.7   PROT 8.7*   ALBUMIN 2.2*   BILITOT 0.5   ALKPHOS 106   AST 78*   ALT 54*   ANIONGAP 10         Significant Imaging: I have reviewed all pertinent imaging results/findings within the past 24 hours.    High Risk Conditions: Patient is currently receiving parenteral controlled substances: Dilaudid

## 2023-12-03 NOTE — ASSESSMENT & PLAN NOTE
Eric Soliman is a 63 y.o. man with history of stage IV lung cancer recently discharged to SNF for hospice as he has stated he would not want any aggressive interventions for his cancer care.     On discussing the patients overall circumstance and hospice care, he would like to achieve pain control but is not interested in undergoing an amputation. The patient is not a revascularization candidate.     Recommendations:  Palliative care consult  Pain control with IV analgesics  No further imaging or interventions from vascular surgery perspective  Would defer care that does not align with patients goals of care

## 2023-12-03 NOTE — PLAN OF CARE
[facility] Dept  Initial Discharge Assessment       Primary Care Provider: Jesika, Primary Doctor    Admission Diagnosis: Sepsis [A41.9]    Admission Date: 12/3/2023  Expected Discharge Date: 12/5/2023    Transition of Care Barriers: (P) No family/friends to help    Payor: MEDICAID / [facility] / Product Type: Managed Medicaid /     Extended Emergency Contact Information  Primary Emergency Contact: Almita Cordon  Mobile Phone: [phone]  Relation: Friend    Discharge Plan A: (P) Other (hospice care- [facility] ?)  Discharge Plan B: (P) Inpatient Hospice      [facility] - [place], [place] - [address]  [address]  [place] [address]  Phone: [phone] Fax: [phone]    [id number] - [place], [place] - [address]  [address]  [place] [address]  Phone: [phone] Fax: [phone]      Initial Assessment (most recent)       Adult Discharge Assessment - 12/03/23 1637          Discharge Assessment    Assessment Type Discharge Planning Assessment (P)      Confirmed/corrected address, phone number and insurance Yes (P)      Confirmed Demographics Correct on Facesheet (P)      Source of Information patient;health record (P)      Does patient/caregiver understand observation status Yes (P)      Communicated [place] with patient/caregiver Yes (P)      People in Home facility resident (P)      Do you expect to return to your current living situation? Other (see comments) (P)    tbd    Prior to hospitilization cognitive status: Alert/Oriented (P)      Current cognitive status: Alert/Oriented (P)      Readmission within 30 days? Yes (P)      Do you take prescription medications? Yes (P)      Do you have prescription coverage? Yes (P)      Do you have any problems affording any of your prescribed medications? No (P)      Is the patient taking medications as prescribed? yes (P)      How do you get to doctors appointments? health plan  transportation (P)      Are you on dialysis? No (P)      DME Needed Upon Discharge  none (P)      Discharge Plan discussed with: Patient (P)      Transition of Care Barriers No family/friends to help (P)      Discharge Plan A Other (P)    [facility] ?    Discharge Plan B Inpatient Hospice (P)         Physical Activity    On average, how many days per week do you engage in moderate to strenuous exercise (like a brisk walk)? 0 days (P)      On average, how many minutes do you engage in exercise at this level? 0 min (P)         Financial Resource Strain    How hard is it for you to pay for the very basics like food, housing, medical care, and heating? Hard (P)         Housing Stability    In the last 12 months, was there a time when you were not able to pay the mortgage or rent on time? Yes (P)      In the last 12 months, was there a time when you did not have a steady place to sleep or slept in a shelter (including now)? Yes (P)         Transportation Needs    In the past 12 months, has lack of transportation kept you from medical appointments or from getting medications? Yes (P)      In the past 12 months, has lack of transportation kept you from meetings, work, or from getting things needed for daily living? Yes (P)         Food Insecurity    Within the past 12 months, you worried that your food would run out before you got the money to buy more. Never true (P)      Within the past 12 months, the food you bought just didn't last and you didn't have money to get more. Never true (P)         Social Connections    In a typical week, how many times do you talk on the phone with family, friends, or neighbors? Never (P)      How often do you attend Adventist or Mormon services? Never (P)      Do you belong to any clubs or organizations such as Adventist groups, unions, fraternal or athletic groups, or school groups? No (P)      How often do you attend meetings of the clubs or organizations you  belong to? Never (P)         Alcohol Use    Q1: How often do you have a drink containing alcohol? Never (P)      Q2: How many drinks containing alcohol do you have on a typical day when you are drinking? Patient does not drink (P)      Q3: How often do you have six or more drinks on one occasion? Never (P)

## 2023-12-03 NOTE — ASSESSMENT & PLAN NOTE
Advance Care Planning     Date: 12/03/2023  Code Status  In light of the patients advanced and life limiting illness,I engaged the the patient in a voluntary conversation about the patient's preferences for care  at the very end of life. The patient wishes to have a natural, peaceful death.  Along those lines, the patient wishes to proceed with comfort measures only and does not wish to have CPR or other invasive treatments performed when his heart and/or breathing stops. I communicated to the patient that a DNR/comfort measures only order would be placed in his medical record to reflect this preference.  I spent a total of 35 minutes engaging the patient in this advance care planning discussion.   23

## 2023-12-04 PROBLEM — Z51.5 PALLIATIVE CARE ENCOUNTER: Status: ACTIVE | Noted: 2023-01-01

## 2023-12-04 NOTE — HPI
63-year-old male with past medical history of stage IV adenocarcinoma of the lung, critical limb ischemia of the left lower extremity, left malignant pleural effusion, PE/DVT not on coagulation due to GI bleeding, and ischemic bowel disease admitted to hospital medicine service for left lower extremity pain control.  Pt was recently admitted to [facility] where he was diagnosed with stage IV adenocarcinoma of the lung, malignant pleural effusion s/p chest tube, PE/DVT with left lower extremity popliteal artery occlusion and unable to anticoagulate due to GI bleed from ischemic colitis.  Pt declined chemotherapy and any invasive treatment.  Palliative Medicine was consulted and pt decided to be discharged to [facility] with [facility].  I contacted [facility] who said that pt did not sign hospice paperwork and hospice was never initiated.  [facility] confirmed that the referral was canceled but did not have a reason listed.  Pt does not recall refusing to sign any paperwork.  Discussed multiple diagnoses at length including new large left pneumothorax noted on chest x-ray.  Patient confirms that he would not want chemotherapy, left lower extremity amputation, or chest tube insertion for pneumothorax.  Pt is alert and oriented x4 and has decided to transition to comfort care.  Pt wishes to contact mother but does not have any of his personal belongings because they are still at [facility].  I contacted the facility who is sending someone with his wallet and bag.  Pt reports severe, sharp, constant left lower extremity pain that is not alleviated by anything and is worsened when weight-bearing or with palpation.  Denies any current chest pain.  Pt reports minimal shortness of breath and productive cough that are chronic.  Denies any fever, chills, abdominal pain, nausea, vomiting, blood in stool or urine.  Transfer order placed for symptom management unit and comfort  measures only initiated.     Overview/Hospital Course:  Admitted to hospital medicine service for pain control of left lower leg critical limb ischemia.  Pt was previously discharged to [facility] with hospice but unfortunately there were problems with the paperwork the pt did not ever have hospice initiated.  Pain control initiated with PCA pump.  Case management assisting with hospice and discharge planning.  Palliative Medicine consulted for assistance with symptom control.

## 2023-12-04 NOTE — PLAN OF CARE
SW received call from [facility] as pt is a resident of [facility] and they received a referral for pt on 12/03.  As per [facility], they are unable to admit pt to their services because he does not currently have insurance as it had lapsed on 11/30.      LISY sent email to [facility] and leadership to check on the pt insurance.      LISY reached out to attending provider and asked the hospice plan.  As per the provider a referral was sent to [facility] to see if pt would qualify for a hospice bed on 14WT.    LISY reached out to Linden with [facility] [phone].  As per Linden, pt signed consents for hospice but he does not meet the criteria for the inpatient bed on 14WT and will have to return to the NH.  LISY asked if Linden would accommodate pt at [facility] and Courtney stated she would.      LISY reached out to admissions at [facility] [phone] and spoke with Windy.  LISY asked Windy if pt can return to [facility] with [facility], as [facility] will not admit pt to their service.  Windy took SW number and stated she would contact SW back.      LISY informed attending of above information    Windy from [facility] returned SW call and stated that they cannot use [facility] at the NH but would be willing to use [facility].  SW/CM to follow-up    LISY contacted Alie with [facility] [phone].  As per Alie she will come and meet with pt and get consents for pt to return to NH.      Alie from [facility] spoke with SW and they cannot admit pt to hospice due to pt not having insurance currently.  SW/CM to follow-up     LISY let pt know the above information.  Pt was crying and asked SW to hold his hand, SW spoke with pt for some time and asked if she could call the  for him.  Pt was agreeable.  LISY contacted the  *[phone], who stated they would send someone to sit with pt        Nuria Partida CD, MSW, LMSW, RSW   Case Management  [facility]  Email:  [email or web address]

## 2023-12-04 NOTE — CONSULTS
[facility] - Intensive Care ([place] [id number])  Palliative Medicine  Consult Note    Patient Name: Eric Soliman  MRN: [id number]  Admission Date: 12/3/2023  Hospital Length of Stay: 1 days  Code Status: DNR   Attending Provider: Justice Brennan III*  Consulting Provider: Isrrael Robison MD  Primary Care Physician: Jesika, Primary Doctor  Principal Problem:Critical limb ischemia of left lower extremity    Patient information was obtained from patient, past medical records, and primary team.      Inpatient consult to Palliative Care  Consult performed by: Isrrael Robison MD  Consult ordered by: Justice Brennan III, MD        Assessment/Plan:     Palliative Care  Palliative care encounter  63-year-old male with past medical history of stage IV adenocarcinoma of the lung, critical limb ischemia of the left lower extremity, left malignant pleural effusion, PE/DVT not on coagulation due to GI bleeding, and ischemic bowel disease admitted to hospital medicine service for left lower extremity pain control due to acute arterial occlusion     Plan:   -recommend admitting for [facility] hosp[facility] and optimize analgesics needs while requiring parenteral meds for best symptom support  -for now recommend optimizing hydromorphone PCA   -0.5 mg/hr and 0.5 demand q 6 minutes    -increase demand by 0.5 mg if inadequately controlled; may need additional basal rate increased   - consideration of palliative amputation if unable to meet symptom needs  -[facility] support for exploring how pt had insurance  while at a nursing home;   -best supportive care plan        Thank you for your consult. I will follow-up with patient. Please contact us if you have any additional questions.    Subjective:     HPI:   63-year-old male with past medical history of stage IV adenocarcinoma of the lung, critical limb ischemia of the left lower extremity, left malignant pleural effusion, PE/DVT not on coagulation due to GI bleeding, and ischemic bowel disease  admitted to hospital medicine service for left lower extremity pain control.  Pt was recently admitted to [facility] where he was diagnosed with stage IV adenocarcinoma of the lung, malignant pleural effusion s/p chest tube, PE/DVT with left lower extremity popliteal artery occlusion and unable to anticoagulate due to GI bleed from ischemic colitis.  Pt declined chemotherapy and any invasive treatment.  Palliative Medicine was consulted and pt decided to be discharged to [facility] with [facility].  I contacted [facility] who said that pt did not sign hospice paperwork and hospice was never initiated.  [facility] confirmed that the referral was canceled but did not have a reason listed.  Pt does not recall refusing to sign any paperwork.  Discussed multiple diagnoses at length including new large left pneumothorax noted on chest x-ray.  Patient confirms that he would not want chemotherapy, left lower extremity amputation, or chest tube insertion for pneumothorax.  Pt is alert and oriented x4 and has decided to transition to comfort care.  Pt wishes to contact mother but does not have any of his personal belongings because they are still at [facility].  I contacted the facility who is sending someone with his wallet and bag.  Pt reports severe, sharp, constant left lower extremity pain that is not alleviated by anything and is worsened when weight-bearing or with palpation.  Denies any current chest pain.  Pt reports minimal shortness of breath and productive cough that are chronic.  Denies any fever, chills, abdominal pain, nausea, vomiting, blood in stool or urine.  Transfer order placed for symptom management unit and comfort measures only initiated.     Overview/Hospital Course:  Admitted to hospital medicine service for pain control of left lower leg critical limb ischemia.  Pt was previously discharged to [facility] with hospice but unfortunately there  were problems with the paperwork the pt did not ever have hospice initiated.  Pain control initiated with PCA pump.  Case management assisting with hospice and discharge planning.  Palliative Medicine consulted for assistance with symptom control.    Hospital Course:  No notes on file    Interval History: pt seen at bedside; alert and not in obvious distress or pain; not using PCA much over the past 12 hours  2 demands and 2 injections  Hydromorphone PCA 0.5  mg/hr  0.5 mg demand q 15 minutes    Past Medical History:   Diagnosis Date    DVT (deep venous thrombosis)     GI bleeding     Ischemic bowel disease     Malignant pleural effusion     PAD (peripheral artery disease)     Pulmonary embolism     Stage IV adenocarcinoma of lung, left        Past Surgical History:   Procedure Laterality Date    ANGIOGRAPHY OF LOWER EXTREMITY Left 10/7/2022    Procedure: Angiogram Extremity Unilateral;  Surgeon: Charles Patten III, MD;  Location: [facility] CATH LAB;  Service: Cardiology;  Laterality: Left;    AORTOGRAPHY WITH SERIALOGRAPHY Bilateral 10/7/2022    Procedure: AORTOGRAM, WITH SERIALOGRAPHY;  Surgeon: Charles Patten III, MD;  Location: [facility] CATH LAB;  Service: Cardiology;  Laterality: Bilateral;    AORTOGRAPHY WITH SERIALOGRAPHY N/A 4/4/2023    Procedure: AORTOGRAM, WITH SERIALOGRAPHY;  Surgeon: Royce Yi MD;  Location: [facility] CATH LAB/EP;  Service: Cardiology;  Laterality: N/A;    ATHERECTOMY, PERIPHERAL BLOOD VESSEL Left 4/4/2023    Procedure: ATHERECTOMY, PERIPHERAL BLOOD VESSEL;  Surgeon: Royce Yi MD;  Location: [facility] CATH LAB/EP;  Service: Cardiology;  Laterality: Left;    CLOSURE DEVICE  4/4/2023    Procedure: Placement of Closure Device;  Surgeon: Royce Yi MD;  Location: [facility] CATH LAB/EP;  Service: Cardiology;;    COLONOSCOPY N/A 11/9/2023    Procedure: COLONOSCOPY;  Surgeon: Cedrick Holcomb MD;  Location: [facility] ENDO;  Service: Endoscopy;  Laterality: N/A;    FILTER PROTECTION,  PERIPHERAL  4/4/2023    Procedure: Filter Protection, Peripheral;  Surgeon: Royce Yi MD;  Location: [facility] CATH LAB/EP;  Service: Cardiology;;    IVUS (INTRAVASCULAR ULTRASOUND) Left 4/4/2023    Procedure: ULTRASOUND, INTRAVASCULAR;  Surgeon: Royce Yi MD;  Location: [facility] CATH LAB/EP;  Service: Cardiology;  Laterality: Left;    PTA, ILIAC ARTERY Left 4/4/2023    Procedure: PTA, Iliac Artery;  Surgeon: Royce Yi MD;  Location: [facility] CATH LAB/EP;  Service: Cardiology;  Laterality: Left;    THROMBECTOMY  4/4/2023    Procedure: THROMBECTOMY;  Surgeon: Royce Yi MD;  Location: [facility] CATH LAB/EP;  Service: Cardiology;;       Review of patient's allergies indicates:  No Known Allergies    Medications:  Continuous Infusions:   hydromorphone in 0.9 % NaCl 6 mg/30 ml       Scheduled Meds:   senna-docusate 8.6-50 mg  2 tablet Oral QHS     PRN Meds:HYDROmorphone, lorazepam, morphine, naloxone, ondansetron, polyethylene glycol    Family History       Problem Relation (Age of Onset)    Heart disease Father    No Known Problems Mother          Tobacco Use    Smoking status: Every Day     Current packs/day: 2.00     Average packs/day: 2.0 packs/day for 48.9 years (97.8 ttl pk-yrs)     Types: Cigarettes     Start date: 1975    Smokeless tobacco: Never    Tobacco comments:     Pt is a 2 pk/day cigarette smoker x 49 yrs. 21 mg nicotine patch ordered Q day, and order requested upon discharge for 21 mg nicotine patch Q day. Pt states ready to quit. Ambulatory referral to Smoking Cessation clinic.   Substance and Sexual Activity    Alcohol use: Not Currently     Comment: rarely 1 beer    Drug use: Yes     Types: Marijuana    Sexual activity: Not on file       Review of Systems   Cardiovascular:  Positive for leg swelling.   Musculoskeletal:         Leg pain  Painful purple leg     Objective:     Vital Signs (Most Recent):  Temp: 97.7 °F (36.5 °C) (12/04/23 1624)  Pulse: 85 (12/04/23 1624)  Resp: 16 (12/04/23  1624)  BP: (!) 116/57 (12/04/23 1624)  SpO2: (!) 92 % (12/04/23 1624) Vital Signs (24h Range):  Temp:  [97.6 °F (36.4 °C)-98.8 °F (37.1 °C)] 97.7 °F (36.5 °C)  Pulse:  [] 85  Resp:  [12-24] 16  SpO2:  [88 %-100 %] 92 %  BP: (116-140)/(57-71) 116/57        There is no height or weight on file to calculate BMI.       Physical Exam  Constitutional:       General: He is in acute distress.      Appearance: He is ill-appearing.   Musculoskeletal:      Comments: Left foot sloughing skin, exquisitely tender to palpation, erythema and cyanosis of left lower leg from mid tibia to toes    Cool to touch   Skin:     Comments: Per media photos            Review of Symptoms      Symptom Assessment (ESAS 0-10 Scale)  Pain:  10  Dyspnea:  0  Anxiety:  0  Nausea:  0  Depression:  0  Anorexia:  0  Fatigue:  0  Insomnia:  0  Restlessness:  0  Agitation:  0         Pain Assessment:    Location(s): leg    Leg       Location: left        Quality: Sharp and stabbing        Quantity: 10/10 in intensity        Chronicity: Onset 2 day(s) ago, gradually worsening since Improved with PCA        Aggravating Factors: Movement        Alleviating Factors: None        Associated Symptoms: None    ECOG Performance Status rdGrdrrdarddrderd:rd rd3rd Living Arrangements:  Lives in nursing home    Psychosocial/Cultural:   See Palliative Psychosocial Note: No  **Primary  to Follow**  Palliative Care  Consult: No        Advance Care Planning  Advance Directives:   Living Will: No    LaPOST: No    Do Not Resuscitate Status: Yes    Medical Power of : No      Decision Making:  Patient answered questions  Goals of Care: What is most important right now is to focus on avoiding the hospital, remaining as independent as possible, symptom/pain control, quality of life, even if it means sacrificing a little time, improvement in condition but with limits to invasive therapies, comfort and QOL . Accordingly, we have decided that the best  "plan to meet the patient's goals includes enrolling in hospice care.         Significant Labs: All pertinent labs within the past 24 hours have been reviewed.  CBC:   Recent Labs   Lab 12/03/23  1110 12/03/23  1116   WBC 11.04  --    HGB 7.3*  --    HCT 23.1* 25*   MCV 79*  --    *  --      BMP:  No results for input(s): "GLU", "NA", "K", "CL", "CO2", "BUN", "CREATININE", "CALCIUM", "MG" in the last 24 hours.  LFT:  Lab Results   Component Value Date    AST 78 (H) 12/03/2023    ALKPHOS 106 12/03/2023    BILITOT 0.5 12/03/2023     Albumin:   Albumin   Date Value Ref Range Status   12/03/2023 2.2 (L) 3.5 - 5.2 g/dL Final     Protein:   Total Protein   Date Value Ref Range Status   12/03/2023 8.7 (H) 6.0 - 8.4 g/dL Final     Lactic acid:   Lab Results   Component Value Date    LACTATE 1.4 11/07/2023    LACTATE 1.2 05/13/2023       Significant Imaging: I have reviewed all pertinent imaging results/findings within the past 24 hours.      Isrrael Robison MD  Palliative Medicine  [facility] - Intensive Care ([facility]              "

## 2023-12-04 NOTE — ASSESSMENT & PLAN NOTE
Left popliteal artery occlusion  Stage IV adenocarcinoma left lung  Malignant pleural effusion  Large left pneumothorax  Peripheral arterial disease  History of Ischemic bowel disease  History of GI bleed  DVT/PE not on anticoagulation, s/p IVC filter  Microcytic anemia  Thrombocytosis  Hyponatremia  Elevated transaminase levels  Moderate protein malnutrition  Tobacco dependence  Marijuana use  Pt confirmed that he would not want chemotherapy for metastatic lung cancer nor would he want aggressive treatment of left lower extremity arterial occlusion.  Pt informed of left pneumothorax and declines chest tube.  Discussed terminal prognosis, pt expressed understanding, and wishes to proceed with comfort measures only.  Palliative medications entered.  See advance care planning below.  Continue PCA pump with PRN pushes, palliative Medicine consulted for assistance with symptom control.

## 2023-12-04 NOTE — ASSESSMENT & PLAN NOTE
63-year-old male with past medical history of stage IV adenocarcinoma of the lung, critical limb ischemia of the left lower extremity, left malignant pleural effusion, PE/DVT not on coagulation due to GI bleeding, and ischemic bowel disease admitted to hospital medicine service for left lower extremity pain control due to acute arterial occlusion     Plan:   -recommend admitting for [facility] hospice and optimize analgesics needs while requiring parenteral meds for best symptom support  -for now recommend optimizing hydromorphone PCA   -0.5 mg/hr and 0.5 demand q 6 minutes   - +/- anticoagulating; consideration of palliative amputation if unable to meet symptom needs  -[facility] support for exploring how pt had insurance  while at a nursing home;   -best supportive care plan

## 2023-12-04 NOTE — ED NOTES
PCA pump set up completed, explained to the patient how the pca pump works by this RN and SHABANA Mcclendon. Patient states understanding how the pca pump works. End tidal CO2 monitoring set up as well.

## 2023-12-04 NOTE — ED NOTES
Assumed pt care at this time. The patient is awake, alert and cooperative with a calm affect, patient is aware of environment. Normal respiratory effort and rate noted, skin warm and dry- LLE red and swollen with dark patches if tissue noted. No apparent distress noted at this moment. Pt on continuous cardiac monitoring. Bed locked and in lowest position with side rails up, call light within reach.

## 2023-12-04 NOTE — SUBJECTIVE & OBJECTIVE
Interval History: pt seen at bedside; alert and not in obvious distress or pain; not using PCA much over the past 12 hours  2 demands and 2 injections  Hydromorphone PCA 0.5  mg/hr  0.5 mg demand q 15 minutes    Past Medical History:   Diagnosis Date    DVT (deep venous thrombosis)     GI bleeding     Ischemic bowel disease     Malignant pleural effusion     PAD (peripheral artery disease)     Pulmonary embolism     Stage IV adenocarcinoma of lung, left        Past Surgical History:   Procedure Laterality Date    ANGIOGRAPHY OF LOWER EXTREMITY Left 10/7/2022    Procedure: Angiogram Extremity Unilateral;  Surgeon: Charles Patten III, MD;  Location: [facility] CATH LAB;  Service: Cardiology;  Laterality: Left;    AORTOGRAPHY WITH SERIALOGRAPHY Bilateral 10/7/2022    Procedure: AORTOGRAM, WITH SERIALOGRAPHY;  Surgeon: Charles Patten III, MD;  Location: [facility] CATH LAB;  Service: Cardiology;  Laterality: Bilateral;    AORTOGRAPHY WITH SERIALOGRAPHY N/A 4/4/2023    Procedure: AORTOGRAM, WITH SERIALOGRAPHY;  Surgeon: Royce Yi MD;  Location: [facility] CATH LAB/EP;  Service: Cardiology;  Laterality: N/A;    ATHERECTOMY, PERIPHERAL BLOOD VESSEL Left 4/4/2023    Procedure: ATHERECTOMY, PERIPHERAL BLOOD VESSEL;  Surgeon: Royce Yi MD;  Location: [facility] CATH LAB/EP;  Service: Cardiology;  Laterality: Left;    CLOSURE DEVICE  4/4/2023    Procedure: Placement of Closure Device;  Surgeon: Royce Yi MD;  Location: [facility] CATH LAB/EP;  Service: Cardiology;;    COLONOSCOPY N/A 11/9/2023    Procedure: COLONOSCOPY;  Surgeon: Cedrick Holcomb MD;  Location: [facility] ENDO;  Service: Endoscopy;  Laterality: N/A;    FILTER PROTECTION, PERIPHERAL  4/4/2023    Procedure: Filter Protection, Peripheral;  Surgeon: Royce Yi MD;  Location: [facility] CATH LAB/EP;  Service: Cardiology;;    IVUS (INTRAVASCULAR ULTRASOUND) Left 4/4/2023    Procedure: ULTRASOUND, INTRAVASCULAR;  Surgeon: Royce Yi MD;  Location: [facility] CATH  LAB/EP;  Service: Cardiology;  Laterality: Left;    PTA, ILIAC ARTERY Left 4/4/2023    Procedure: PTA, Iliac Artery;  Surgeon: Royce Yi MD;  Location: [facility] CATH LAB/EP;  Service: Cardiology;  Laterality: Left;    THROMBECTOMY  4/4/2023    Procedure: THROMBECTOMY;  Surgeon: Royce Yi MD;  Location: [facility] CATH LAB/EP;  Service: Cardiology;;       Review of patient's allergies indicates:  No Known Allergies    Medications:  Continuous Infusions:   hydromorphone in 0.9 % NaCl 6 mg/30 ml       Scheduled Meds:   senna-docusate 8.6-50 mg  2 tablet Oral QHS     PRN Meds:HYDROmorphone, lorazepam, morphine, naloxone, ondansetron, polyethylene glycol    Family History       Problem Relation (Age of Onset)    Heart disease Father    No Known Problems Mother          Tobacco Use    Smoking status: Every Day     Current packs/day: 2.00     Average packs/day: 2.0 packs/day for 48.9 years (97.8 ttl pk-yrs)     Types: Cigarettes     Start date: 1975    Smokeless tobacco: Never    Tobacco comments:     Pt is a 2 pk/day cigarette smoker x 49 yrs. 21 mg nicotine patch ordered Q day, and order requested upon discharge for 21 mg nicotine patch Q day. Pt states ready to quit. Ambulatory referral to Smoking Cessation clinic.   Substance and Sexual Activity    Alcohol use: Not Currently     Comment: rarely 1 beer    Drug use: Yes     Types: Marijuana    Sexual activity: Not on file       Review of Systems   Cardiovascular:  Positive for leg swelling.   Musculoskeletal:         Leg pain  Painful purple leg     Objective:     Vital Signs (Most Recent):  Temp: 97.7 °F (36.5 °C) (12/04/23 1624)  Pulse: 85 (12/04/23 1624)  Resp: 16 (12/04/23 1624)  BP: (!) 116/57 (12/04/23 1624)  SpO2: (!) 92 % (12/04/23 1624) Vital Signs (24h Range):  Temp:  [97.6 °F (36.4 °C)-98.8 °F (37.1 °C)] 97.7 °F (36.5 °C)  Pulse:  [] 85  Resp:  [12-24] 16  SpO2:  [88 %-100 %] 92 %  BP: (116-140)/(57-71) 116/57        There is no height or weight on  file to calculate BMI.       Physical Exam  Constitutional:       General: He is in acute distress.      Appearance: He is ill-appearing.   Musculoskeletal:      Comments: Left foot sloughing skin, exquisitely tender to palpation, erythema and cyanosis of left lower leg from mid tibia to toes    Cool to touch   Skin:     Comments: Per media photos            Review of Symptoms      Symptom Assessment (ESAS 0-10 Scale)  Pain:  10  Dyspnea:  0  Anxiety:  0  Nausea:  0  Depression:  0  Anorexia:  0  Fatigue:  0  Insomnia:  0  Restlessness:  0  Agitation:  0         Pain Assessment:    Location(s): leg    Leg       Location: left        Quality: Sharp and stabbing        Quantity: 10/10 in intensity        Chronicity: Onset 2 day(s) ago, gradually worsening since Improved with PCA        Aggravating Factors: Movement        Alleviating Factors: None        Associated Symptoms: None    ECOG Performance Status rdGrdrrdarddrderd:rd rd3rd Living Arrangements:  Lives in nursing home    Psychosocial/Cultural:   See Palliative Psychosocial Note: No  **Primary  to Follow**  Palliative Care  Consult: No        Advance Care Planning   Advance Directives:   Living Will: No    LaPOST: No    Do Not Resuscitate Status: Yes    Medical Power of : No      Decision Making:  Patient answered questions  Goals of Care: What is most important right now is to focus on avoiding the hospital, remaining as independent as possible, symptom/pain control, quality of life, even if it means sacrificing a little time, improvement in condition but with limits to invasive therapies, comfort and QOL . Accordingly, we have decided that the best plan to meet the patient's goals includes enrolling in hospice care.         Significant Labs: All pertinent labs within the past 24 hours have been reviewed.  CBC:   Recent Labs   Lab 12/03/23  1110 12/03/23  1116   WBC 11.04  --    HGB 7.3*  --    HCT 23.1* 25*   MCV 79*  --    *  --   "    BMP:  No results for input(s): "GLU", "NA", "K", "CL", "CO2", "BUN", "CREATININE", "CALCIUM", "MG" in the last 24 hours.  LFT:  Lab Results   Component Value Date    AST 78 (H) 12/03/2023    ALKPHOS 106 12/03/2023    BILITOT 0.5 12/03/2023     Albumin:   Albumin   Date Value Ref Range Status   12/03/2023 2.2 (L) 3.5 - 5.2 g/dL Final     Protein:   Total Protein   Date Value Ref Range Status   12/03/2023 8.7 (H) 6.0 - 8.4 g/dL Final     Lactic acid:   Lab Results   Component Value Date    LACTATE 1.4 11/07/2023    LACTATE 1.2 05/13/2023       Significant Imaging: I have reviewed all pertinent imaging results/findings within the past 24 hours.    "

## 2023-12-04 NOTE — HOSPITAL COURSE
Admitted to hospital medicine service for pain control of left lower leg critical limb ischemia.  Pt was previously discharged to [facility] with hospice but unfortunately there were problems with the paperwork and hospice was not initiated.  Pain control initiated in the ED observation unit with PCA pump.  Case management assisted with hospice and discharge planning.  Palliative Medicine consulted for assistance with symptom control and for assistance with inpatient hospice services. 12/4 patient in 10/10 pain but was not utilizing the PCA. Pt was instructed on how to utilize PCA pump. Nursing staff contacted me later that day to report continued 10/10 pain despite appropriate use of PCA and PRN IVP. Increased basal and bolus dosages as well as increased dosage of PRN IVP. Nursing staff reported pain improved to 6/10 after changes. Through the night 12/4-12/5, the nursing staff said pain was much better controlled. I was alerted morning of 12/5/23 of pt unresponsiveness. Pt pronounced dead at 0726AM on 12/5/2023. I tried to call Almita Cordon who is the only contact listed to inform her of the pt passing but apparently the number has been disconnected.

## 2023-12-04 NOTE — SUBJECTIVE & OBJECTIVE
Interval History:  Pt reports continued left lower extremity pain but was unaware of the pt controlled doses Dilaudid through the PCA pump.  Pain is slightly better than on admission.  Denies any shortness of breath more than usual.  On room air.  In no distress.    High Risk Conditions: Patient is currently receiving parenteral controlled substances: Dilaudid     Review of Systems:  As above  Objective:     Vital Signs (Most Recent):  Temp: 98.8 °F (37.1 °C) (12/04/23 1100)  Pulse: 81 (12/04/23 1100)  Resp: 16 (12/04/23 1100)  BP: 139/65 (12/04/23 1100)  SpO2: 100 % (12/04/23 1100) Vital Signs (24h Range):  Temp:  [97.6 °F (36.4 °C)-98.8 °F (37.1 °C)] 98.8 °F (37.1 °C)  Pulse:  [] 81  Resp:  [12-24] 16  SpO2:  [88 %-100 %] 100 %  BP: (118-140)/(60-71) 139/65        There is no height or weight on file to calculate BMI.  No intake or output data in the 24 hours ending 12/04/23 1457      Physical Exam  Vitals reviewed.   Constitutional:       General: He is not in acute distress.     Appearance: He is ill-appearing (Chronically).   HENT:      Head: Normocephalic and atraumatic.   Pulmonary:      Effort: Pulmonary effort is normal. No respiratory distress.   Abdominal:      General: Bowel sounds are normal. There is no distension.      Palpations: Abdomen is soft.   Skin:     General: Skin is warm and dry.      Findings: Erythema and lesion present.      Comments: Left foot sloughing skin, exquisitely tender to palpation, erythema and cyanosis of left lower leg (see media)   Neurological:      General: No focal deficit present.      Mental Status: He is alert and oriented to person, place, and time.      Cranial Nerves: No cranial nerve deficit.   Psychiatric:         Behavior: Behavior normal.

## 2023-12-04 NOTE — ED NOTES
Nurses Note -- 4 Eyes      12/4/2023   10:06 AM      Skin assessed during: Daily Assessment      [] No Altered Skin Integrity Present    []Prevention Measures Documented      [x] Yes- Altered Skin Integrity Present or Discovered   [x] LDA Added if Not in Epic (Describe Wound)   [x] New Altered Skin Integrity was Present on Admit and Documented in LDA   [x] Wound Image Taken    Wound Care Consulted? Yes    Attending Nurse:  ACE Case    Second RN/Staff Member:   ACE Rosenthal

## 2023-12-04 NOTE — PROGRESS NOTES
[facility] - Emergency Dept  [facility] Medicine  Progress Note    Patient Name: Eric Soliman  MRN: [id number]  Patient Class: IP- Inpatient   Admission Date: 12/3/2023  Length of Stay: 1 days  Attending Physician: Justice Brennan III*  Primary Care Provider: Jesika, Primary Doctor        Subjective:     Principal Problem:Critical limb ischemia of left lower extremity        HPI:  63-year-old male with past medical history of stage IV adenocarcinoma of the lung, critical limb ischemia of the left lower extremity, left malignant pleural effusion, PE/DVT not on coagulation due to GI bleeding, and ischemic bowel disease admitted to hospital medicine service for left lower extremity pain control.  Pt was recently admitted to [place] where he was diagnosed with stage IV adenocarcinoma of the lung, malignant pleural effusion s/p chest tube, PE/DVT with left lower extremity popliteal artery occlusion and unable to anticoagulate due to GI bleed from ischemic colitis.  Pt declined chemotherapy and any invasive treatment.  Palliative Medicine was consulted and pt decided to be discharged to [facility] with [facility].  I contacted [facility] who said that pt did not sign hospice paperwork and hospice was never initiated.  [facility] confirmed that the referral was canceled but did not have a reason listed.  Pt does not recall refusing to sign any paperwork.  Discussed multiple diagnoses at length including new large left pneumothorax noted on chest x-ray.  Patient confirms that he would not want chemotherapy, left lower extremity amputation, or chest tube insertion for pneumothorax.  Pt is alert and oriented x4 and has decided to transition to comfort care.  Pt wishes to contact mother but does not have any of his personal belongings because they are still at [facility].  I contacted the facility who is sending someone with his wallet and bag.  Pt reports severe, sharp, constant  left lower extremity pain that is not alleviated by anything and is worsened when weight-bearing or with palpation.  Denies any current chest pain.  Pt reports minimal shortness of breath and productive cough that are chronic.  Denies any fever, chills, abdominal pain, nausea, vomiting, blood in stool or urine.  Transfer order placed for symptom management unit and comfort measures only initiated.    Overview/Hospital Course:  Admitted to hospital medicine service for pain control of left lower leg critical limb ischemia.  Pt was previously discharged to [facility] with hospice but unfortunately there were problems with the paperwork the pt did not ever have hospice initiated.  Pain control initiated with PCA pump.  Case management assisting with hospice and discharge planning.  Palliative Medicine consulted for assistance with symptom control.    Interval History:  Pt reports continued left lower extremity pain but was unaware of the pt controlled doses Dilaudid through the PCA pump.  Pain is slightly better than on admission.  Denies any shortness of breath more than usual.  On room air.  In no distress.    High Risk Conditions: Patient is currently receiving parenteral controlled substances: Dilaudid     Review of Systems:  As above  Objective:     Vital Signs (Most Recent):  Temp: 98.8 °F (37.1 °C) (12/04/23 1100)  Pulse: 81 (12/04/23 1100)  Resp: 16 (12/04/23 1100)  BP: 139/65 (12/04/23 1100)  SpO2: 100 % (12/04/23 1100) Vital Signs (24h Range):  Temp:  [97.6 °F (36.4 °C)-98.8 °F (37.1 °C)] 98.8 °F (37.1 °C)  Pulse:  [] 81  Resp:  [12-24] 16  SpO2:  [88 %-100 %] 100 %  BP: (118-140)/(60-71) 139/65        There is no height or weight on file to calculate BMI.  No intake or output data in the 24 hours ending 12/04/23 1457      Physical Exam  Vitals reviewed.   Constitutional:       General: He is not in acute distress.     Appearance: He is ill-appearing (Chronically).   HENT:      Head:  Normocephalic and atraumatic.   Pulmonary:      Effort: Pulmonary effort is normal. No respiratory distress.   Abdominal:      General: Bowel sounds are normal. There is no distension.      Palpations: Abdomen is soft.   Skin:     General: Skin is warm and dry.      Findings: Erythema and lesion present.      Comments: Left foot sloughing skin, exquisitely tender to palpation, erythema and cyanosis of left lower leg (see media)   Neurological:      General: No focal deficit present.      Mental Status: He is alert and oriented to person, place, and time.      Cranial Nerves: No cranial nerve deficit.   Psychiatric:         Behavior: Behavior normal.         Assessment/Plan:      * Critical limb ischemia of left lower extremity  Left popliteal artery occlusion  Stage IV adenocarcinoma left lung  Malignant pleural effusion  Large left pneumothorax  Peripheral arterial disease  History of Ischemic bowel disease  History of GI bleed  DVT/PE not on anticoagulation, s/p IVC filter  Microcytic anemia  Thrombocytosis  Hyponatremia  Elevated transaminase levels  Moderate protein malnutrition  Tobacco dependence  Marijuana use  Pt confirmed that he would not want chemotherapy for metastatic lung cancer nor would he want aggressive treatment of left lower extremity arterial occlusion.  Pt informed of left pneumothorax and declines chest tube.  Discussed terminal prognosis, pt expressed understanding, and wishes to proceed with comfort measures only.  Palliative medications entered.  See advance care planning below.  Continue PCA pump with PRN pushes, palliative Medicine consulted for assistance with symptom control.    Comfort measures only status  Advance Care Planning    Date: 12/03/2023  Code Status  In light of the patients advanced and life limiting illness,I engaged the the patient in a voluntary conversation about the patient's preferences for care  at the very end of life. The patient wishes to have a natural,  peaceful death.  Along those lines, the patient wishes to proceed with comfort measures only and does not wish to have CPR or other invasive treatments performed when his heart and/or breathing stops. I communicated to the patient that a DNR/comfort measures only order would be placed in his medical record to reflect this preference.  I spent a total of 35 minutes engaging the patient in this advance care planning discussion.      VTE Risk Mitigation (From admission, onward)      None            Discharge Planning   [place]: 12/5/2023     Code Status: DNR   Is the patient medically ready for discharge?: No    Reason for patient still in hospital (select all that apply): Patient trending condition, Treatment, Consult recommendations, and Pending disposition  Discharge Plan A: Other (hospice care- [facility] ?)                  Justice Brennan III, MD  Department of Hospital Medicine   [facility] - Emergency Dept

## 2023-12-05 NOTE — CARE UPDATE
DEATH PRONOUNCEMENT    Called by nursing staff to see patient regarding unresponsiveness. After thorough examination, the patient was found to be unresponsive, with no spontaneous movements observed. Patient did not respond to verbal or painful stimuli. Patient's heart and breath sounds were absent for more than 2 minutes. Patient's pupils were fixed and dilated. Corneal reflexes were absent.     The patient was pronounced dead at 0726 AM on 12/05/2023. Primary cause of death Cardiopulmonary arrest likely associated with metastatic lung cancer and large left pneumothorax in the setting of severe left lower extremity pain from arterial occlusion. Attempted to call Almita Cordon who is only contact listed. Unfortunately, it does not appear to be a working number.

## 2023-12-05 NOTE — PLAN OF CARE
[facility] - Intensive Care ([facility]-)  Discharge Final Note    Primary Care Provider: No, Primary Doctor    Expected Discharge Date: 2023    Final Discharge Note (most recent)       Final Note - 2336          Final Note    Assessment Type Final Discharge Note (P)      Anticipated Discharge Disposition  (P)    Time of death : 0726 am       Post-Acute Status    Post-Acute Authorization Other (P)    [place]    Other Status No Post-Acute Service Needs (P)      Discharge Delays None known at this time (P)                      No future appointments.               Leslye Mandujano RN  Case Management  [facility]  [phone]

## 2023-12-05 NOTE — PLAN OF CARE
23 0800   Final Note   Assessment Type Final Discharge Note   Anticipated Discharge Disposition    Post-Acute Status   Post-Acute Placement Status Discharge Plan Changed   Hospice Status Set-up Complete/Auth obtained   Discharge Delays None known at this time       1045 am  CM called and spoke to Windy REYES at [facility] for the patients mother contact information. Will callback with contact information.      CHELO called and spoke with Courtney DEL RIO @ [facility] and the patient     Leslye Mandujano RN  Case Management  [facility]  [phone]

## 2023-12-05 NOTE — PLAN OF CARE
"12/5/23 1100 am    Patient contacted [facility] and spoke with Windy Walker and Windy stated, " This is the only number on his contacts [phone]. Mr. Soliman didn't share a lot of information with us. "  called the number and it was [facility] 4th floor number.    Leslye Mandujano RN  Case Management  [facility]  [phone]        "

## 2023-12-05 NOTE — PLAN OF CARE
Problem: Adult Inpatient Plan of Care  Goal: Optimal Comfort and Wellbeing  12/5/2023 0618 by Rosita Mccartney, RN  Outcome: Ongoing, Progressing  12/5/2023 0617 by Rosita Mccartney, RN  Outcome: Ongoing, Progressing  Goal: Readiness for Transition of Care  Outcome: Ongoing, Progressing

## 2023-12-05 NOTE — PLAN OF CARE
23 0836   Final Note   Assessment Type Final Discharge Note   Anticipated Discharge Disposition   (Time of death : 0726 am)   Post-Acute Status   Post-Acute Authorization Other  (Mark)   Other Status No Post-Acute Service Needs   Discharge Delays None known at this time     Leslye Mandujano RN  Case Management  [facility]  [phone]

## 2023-12-05 NOTE — DISCHARGE SUMMARY
[facility] - Intensive Care ([place] [address])  [facility] Medicine  Discharge Summary      Patient Name: Eric Soliman  MRN: [id number]  GILBERT: [id number]  Patient Class: IP- Inpatient  Admission Date: 12/3/2023  Hospital Length of Stay: 2 days  Discharge Date and Time: 12/5/2023   Attending Physician: Justice Brennan III*   Discharging Provider: Justice Brennan III, MD  Primary Care Provider: Jesika Primary Doctor  Hospital Medicine Team: [facility] MED  Justice Brennan III, MD  Primary Care Team: [facility] MED     HPI:   63-year-old male with past medical history of stage IV adenocarcinoma of the lung, critical limb ischemia of the left lower extremity, left malignant pleural effusion, PE/DVT not on coagulation due to GI bleeding, and ischemic bowel disease admitted to hospital medicine service for left lower extremity pain control.  Pt was recently admitted to [facility] where he was diagnosed with stage IV adenocarcinoma of the lung, malignant pleural effusion s/p chest tube, PE/DVT with left lower extremity popliteal artery occlusion and unable to anticoagulate due to GI bleed from ischemic colitis.  Pt declined chemotherapy and any invasive treatment.  Palliative Medicine was consulted and pt decided to be discharged to [facility] with [facility].  I contacted [facility] who said that pt did not sign hospice paperwork and hospice was never initiated.  [facility] confirmed that the referral was canceled but did not have a reason listed.  Pt does not recall refusing to sign any paperwork.  Discussed multiple diagnoses at length including new large left pneumothorax noted on chest x-ray.  Patient confirms that he would not want chemotherapy, left lower extremity amputation, or chest tube insertion for pneumothorax.  Pt is alert and oriented x4 and has decided to transition to comfort care.  Pt wishes to contact mother but does not have any of his personal belongings because  they are still at [facility].  I contacted the facility who is sending someone with his wallet and bag.  Pt reports severe, sharp, constant left lower extremity pain that is not alleviated by anything and is worsened when weight-bearing or with palpation.  Denies any current chest pain.  Pt reports minimal shortness of breath and productive cough that are chronic.  Denies any fever, chills, abdominal pain, nausea, vomiting, blood in stool or urine.  Transfer order placed for symptom management unit and comfort measures only initiated.    * No surgery found *      Hospital Course:   Admitted to hospital medicine service for pain control of left lower leg critical limb ischemia.  Pt was previously discharged to [facility] with hospice but unfortunately there were problems with the paperwork and hospice was not initiated.  Pain control initiated in the ED observation unit with PCA pump.  Case management assisted with hospice and discharge planning.  Palliative Medicine consulted for assistance with symptom control and for assistance with inpatient hospice services. 12/4 patient in 10/10 pain but was not utilizing the PCA. Pt was instructed on how to utilize PCA pump. Nursing staff contacted me later that day to report continued 10/10 pain despite appropriate use of PCA and PRN IVP. Increased basal and bolus dosages as well as increased dosage of PRN IVP. Nursing staff reported pain improved to 6/10 after changes. Through the night 12/4-12/5, the nursing staff said pain was much better controlled. I was alerted morning of 12/5/23 of pt unresponsiveness. Pt pronounced dead at 0726AM on 12/5/2023. I tried to call Almita Cordon who is the only contact listed to inform her of the pt passing but apparently the number has been disconnected.      Goals of Care Treatment Preferences:  Code Status: DNR, Comfort measures only    Consults:   Consults (From admission, onward)          Status Ordering Provider      Inpatient consult to Palliative Care  Once        Provider:  (Not yet assigned)    Completed ADELA CARRANZA III     Inpatient consult to Registered Dietitian/Nutritionist  Once        Provider:  (Not yet assigned)    Acknowledged ADELA CARRANZA III     Inpatient consult to Social Work  Once        Provider:  (Not yet assigned)    Completed ADELA CARRANZA III     Inpatient consult to Vascular Surgery  Once        Provider:  (Not yet assigned)    Completed EILEEN HANSON            No new Assessment & Plan notes have been filed under this hospital service since the last note was generated.  Service: Hospital Medicine    Final Active Diagnoses:    Diagnosis Date Noted POA    PRINCIPAL PROBLEM:  Critical limb ischemia of left lower extremity [I70.222] 10/07/2022 Yes    Palliative care encounter [Z51.5] 2023 Not Applicable    Comfort measures only status [Z51.5] 2023 Not Applicable    Adenocarcinoma of left lung, stage 4 [C34.92] 2023 Yes    Pneumothorax [J93.9] 2023 Yes    Hx of ischemic bowel disease [Z87.19] 2023 Not Applicable    Acute deep vein thrombosis (DVT) of left lower extremity [I82.402] 2023 Yes    Arterial occlusion [I70.90] 2023 Yes    Thrombocytosis [D75.839] 2023 Yes    Hyponatremia [E87.1] 2023 Yes    Moderate protein malnutrition [E44.0] 2023 Yes    Elevated transaminase level [R74.01] 2023 Yes    GIB (gastrointestinal bleeding) [K92.2] 2023 Yes    Pulmonary embolism [I26.99] 2023 Yes    Tobacco dependency [F17.200] 2023 Yes    Hospice care [Z51.5] 2023 Not Applicable    Microcytic anemia [D50.9] 2023 Yes      Problems Resolved During this Admission:       Discharged Condition:     Disposition:     Follow Up:    Patient Instructions:   No discharge procedures on file.    Significant Diagnostic Studies: N/A    Pending Diagnostic Studies:       None            Medications:  None    Indwelling Lines/Drains at time of discharge:   Lines/Drains/Airways       None                   Time spent on the discharge of patient: 35 minutes         Justice Brennan III, MD  Department of Hospital Medicine  [facility] - Intensive Care ([place])

## 2023-12-08 LAB
BACTERIA BLD CULT: NORMAL
BACTERIA BLD CULT: NORMAL

## 2024-02-05 NOTE — ASSESSMENT & PLAN NOTE
- presented with left foot pain x 1 months with worsening for past 2 days  - toes cool and discolored; forefoot and shin warm to touch; unable to doppler DP and PT pulses on left initially  - BLE arterial with severe PAD indicated with decreased velocities and monophasic waveforms; suspect high grade disease of left with reocclusion of DIANNE/DEB likely  - LE yesterday with thrombectomy and PTA to DIANNE/LSFA with good results; able to doppler left DP and PT pulses this AM   - continue DAPT and statin therapy-continue IV Heparin drip for now; plan to transition to full dose OAC tomorrow and will plan to stop ASA and continue Plavix   - stressed importance of medication compliance as well as tobacco cessation        Start escitalopram 5 mg daily

## 2025-03-28 NOTE — Clinical Note
An angiography was performed of the proximal, mid and distal left external iliac artery via hand injection with 10 mL of contrast Rx order for Novolog faxed to [facility]

## (undated) DEVICE — CATH ULTRAVERSE 035 5X60X75

## (undated) DEVICE — CATH IMPULSE 5FR PIGTAIL 125CM

## (undated) DEVICE — CATH INDIGO XTORQ 7 130CM

## (undated) DEVICE — CATH NAVICROSS .035X150 ANGLE

## (undated) DEVICE — GUIDEWIRE FIELDER XT.014X300CM

## (undated) DEVICE — Device

## (undated) DEVICE — GLIDESHEATH SLENDER SS 5FR10CM

## (undated) DEVICE — COVER BAND BAG 40 X 40

## (undated) DEVICE — CATH EMBOSHIELD NAV6 7.2X190CM

## (undated) DEVICE — GUIDWIRE HI-TORQUE .018X300CM

## (undated) DEVICE — GUIDEWIRE STD .035X260CM ANG

## (undated) DEVICE — CATH ULTRAVERSE 035 6X100X75

## (undated) DEVICE — CATH GLIDECATH PV MLTCRV 4FR

## (undated) DEVICE — GUIDEWIRE VIPERWIRE 18 335 145

## (undated) DEVICE — GUIDEWIRE RUNTHROUGH EF 300CM

## (undated) DEVICE — DRAPE ANGIO BRACH 38X44IN

## (undated) DEVICE — GUIDEWIRE ADVNTG 035X260CM ANG

## (undated) DEVICE — KIT LEFT HEART MANIFOLD CUSTOM

## (undated) DEVICE — CATH IMA INFINITI 4FRX100CM

## (undated) DEVICE — KIT INTRODUCER MICROPUNCTR 4F

## (undated) DEVICE — VISIPAQUE 320 200ML +PK

## (undated) DEVICE — SHEATH DESTINATION 7F X 45CM

## (undated) DEVICE — CANISTER INDIGO ENGINE

## (undated) DEVICE — SHEATH INTRODUCER 7FR 11CM

## (undated) DEVICE — CATH ULTRAVERSE 035 8X100X75

## (undated) DEVICE — INFLATOR ENCORE 26 BLLN INFL

## (undated) DEVICE — HEMOSTAT VASC BAND REG 24CM

## (undated) DEVICE — COVER PROBE US 5.5X58L NON LTX

## (undated) DEVICE — SET MICRO PUNCT 4FR/MPIS-401

## (undated) DEVICE — DEVICE MYNX GRIP 6/7F

## (undated) DEVICE — SHEATH INTRODUCER 6FR 11CM

## (undated) DEVICE — VISE RADIFOCUS MULTI TORQUE

## (undated) DEVICE — CATH PV .018

## (undated) DEVICE — PAD DEFIB CADENCE ADULT R2